# Patient Record
Sex: FEMALE | Race: OTHER | HISPANIC OR LATINO | Employment: FULL TIME | URBAN - METROPOLITAN AREA
[De-identification: names, ages, dates, MRNs, and addresses within clinical notes are randomized per-mention and may not be internally consistent; named-entity substitution may affect disease eponyms.]

---

## 2019-08-21 ENCOUNTER — OFFICE VISIT (OUTPATIENT)
Dept: OBGYN CLINIC | Facility: CLINIC | Age: 39
End: 2019-08-21
Payer: COMMERCIAL

## 2019-08-21 VITALS
BODY MASS INDEX: 19.22 KG/M2 | SYSTOLIC BLOOD PRESSURE: 116 MMHG | HEIGHT: 66 IN | DIASTOLIC BLOOD PRESSURE: 70 MMHG | WEIGHT: 119.6 LBS

## 2019-08-21 DIAGNOSIS — N63.25 BREAST LUMP ON LEFT SIDE AT 3 O'CLOCK POSITION: Primary | ICD-10-CM

## 2019-08-21 PROCEDURE — 99203 OFFICE O/P NEW LOW 30 MIN: CPT | Performed by: OBSTETRICS & GYNECOLOGY

## 2019-08-21 RX ORDER — MONTELUKAST SODIUM 10 MG/1
10 TABLET ORAL
COMMUNITY
End: 2019-10-02 | Stop reason: SDUPTHER

## 2019-08-21 RX ORDER — ACETAMINOPHEN 500 MG
500 TABLET ORAL EVERY 6 HOURS PRN
COMMUNITY
End: 2019-10-30 | Stop reason: ALTCHOICE

## 2019-08-21 NOTE — PROGRESS NOTES
Assessment/Plan:      Diagnoses and all orders for this visit:    Breast lump on left side at 3 o'clock position  -     Mammo diagnostic bilateral w 3d & cad; Future  -     US breast left limited (diagnostic); Future    Other orders  -     montelukast (SINGULAIR) 10 mg tablet; Take 10 mg by mouth daily at bedtime  -     acetaminophen (TYLENOL) 500 mg tablet; Take 500 mg by mouth every 6 (six) hours as needed for mild pain          Subjective:     Patient ID: Annabella Flores is a 44 y o  female  The pt is 45 y/o female who presents today for a breast lump on the lateral left breast she felt on SBE a few months ago  The breast is tender in the same area and the pt thinks the lump is growing  She also noticed some skin changes as she describes as "going in" to the nipple of the left breast  The pt has had 3 mammogram in the past due to some breast changes felt on physical exams, but all the results have been benign  The pt's last mammogram and her last annual visit was 3 years ago when she was living back in [facility]  Discussed getting a b/l mammogram again and a diagnostic one and having the pt return in 4 weeks for a annual exam  The pt does not drink much caffeine       Total time spent at today's visit was 25 minutes of which greater than 50% was spent face-to-face counseling the patient and coordination care      Review of Systems   Constitutional: Negative  HENT: Negative  Eyes: Negative  Respiratory: Negative  Breast lump and pain   Cardiovascular: Negative  Gastrointestinal: Negative  Endocrine: Negative  Genitourinary: Negative  Musculoskeletal: Negative  Skin: Negative  Neurological: Negative  Psychiatric/Behavioral: Negative  Objective:     Physical Exam   Constitutional: She is oriented to person, place, and time  She appears well-developed and well-nourished  HENT:   Head: Normocephalic  Neck: Normal range of motion  Cardiovascular: Normal rate  Pulmonary/Chest: Effort normal and breath sounds normal  Right breast exhibits no inverted nipple, no mass, no nipple discharge, no skin change and no tenderness  Left breast exhibits mass and tenderness  Left breast exhibits no inverted nipple, no nipple discharge and no skin change  Cystic fullness at 3 o'clock on the left breast   Neurological: She is alert and oriented to person, place, and time  Skin: Skin is warm and dry  Psychiatric: She has a normal mood and affect   Her behavior is normal  Judgment and thought content normal

## 2019-08-22 NOTE — PATIENT INSTRUCTIONS
Topic:   Left breast lump    Will obtain diagnostic mammogram as well as diagnostic ultrasound of this area    Had complete discussion with patient regarding this finding    All questions were answered in detail for the patient at today's visit    Further treatment and planning will be based upon final imaging results    Patient will call for any problems, issues or concerns that may arise for her

## 2019-09-03 ENCOUNTER — HOSPITAL ENCOUNTER (OUTPATIENT)
Dept: MAMMOGRAPHY | Facility: CLINIC | Age: 39
Discharge: HOME | End: 2019-09-03
Payer: COMMERCIAL

## 2019-09-03 ENCOUNTER — HOSPITAL ENCOUNTER (OUTPATIENT)
Dept: ULTRASOUND IMAGING | Facility: CLINIC | Age: 39
Discharge: HOME | End: 2019-09-03
Payer: COMMERCIAL

## 2019-09-03 VITALS — WEIGHT: 119 LBS | HEIGHT: 66 IN | BODY MASS INDEX: 19.13 KG/M2

## 2019-09-03 DIAGNOSIS — N63.25 BREAST LUMP ON LEFT SIDE AT 3 O'CLOCK POSITION: ICD-10-CM

## 2019-09-03 PROCEDURE — G0279 TOMOSYNTHESIS, MAMMO: HCPCS

## 2019-09-03 PROCEDURE — 77066 DX MAMMO INCL CAD BI: CPT

## 2019-09-03 PROCEDURE — 76642 ULTRASOUND BREAST LIMITED: CPT

## 2019-09-03 RX ORDER — DIPHENOXYLATE HYDROCHLORIDE AND ATROPINE SULFATE 2.5; .025 MG/1; MG/1
1 TABLET ORAL DAILY
COMMUNITY

## 2019-09-03 NOTE — PROGRESS NOTES
Met with patient and Dr Muñoz Sensing regarding recommendation for;      _____ RIGHT ___x___LEFT      __x___Ultrasound guided  ______Stereotactic  Breast biopsy  ___x__Verbalized understanding        Blood thinners:  _____yes __x___no    Date stopped: ____n/a_______    Biopsy teaching sheet given:  ___x____yes ______no

## 2019-09-10 ENCOUNTER — HOSPITAL ENCOUNTER (OUTPATIENT)
Dept: ULTRASOUND IMAGING | Facility: CLINIC | Age: 39
Discharge: HOME | End: 2019-09-10
Payer: COMMERCIAL

## 2019-09-10 ENCOUNTER — HOSPITAL ENCOUNTER (OUTPATIENT)
Dept: MAMMOGRAPHY | Facility: CLINIC | Age: 39
Discharge: HOME | End: 2019-09-10

## 2019-09-10 VITALS — HEART RATE: 80 BPM | DIASTOLIC BLOOD PRESSURE: 56 MMHG | SYSTOLIC BLOOD PRESSURE: 98 MMHG

## 2019-09-10 DIAGNOSIS — R92.8 ABNORMAL ULTRASOUND OF BREAST: ICD-10-CM

## 2019-09-10 PROCEDURE — 88342 IMHCHEM/IMCYTCHM 1ST ANTB: CPT | Performed by: PATHOLOGY

## 2019-09-10 PROCEDURE — 88305 TISSUE EXAM BY PATHOLOGIST: CPT | Performed by: PATHOLOGY

## 2019-09-10 PROCEDURE — 88361 TUMOR IMMUNOHISTOCHEM/COMPUT: CPT | Performed by: PATHOLOGY

## 2019-09-10 PROCEDURE — 19084 BX BREAST ADD LESION US IMAG: CPT

## 2019-09-10 PROCEDURE — 38505 NEEDLE BIOPSY LYMPH NODES: CPT

## 2019-09-10 PROCEDURE — 88341 IMHCHEM/IMCYTCHM EA ADD ANTB: CPT | Performed by: PATHOLOGY

## 2019-09-10 PROCEDURE — 76942 ECHO GUIDE FOR BIOPSY: CPT

## 2019-09-10 PROCEDURE — 19083 BX BREAST 1ST LESION US IMAG: CPT

## 2019-09-10 RX ORDER — LIDOCAINE HYDROCHLORIDE 10 MG/ML
4 INJECTION, SOLUTION INFILTRATION; PERINEURAL ONCE
Status: COMPLETED | OUTPATIENT
Start: 2019-09-10 | End: 2019-09-10

## 2019-09-10 RX ORDER — LIDOCAINE HYDROCHLORIDE 10 MG/ML
4 INJECTION, SOLUTION EPIDURAL; INFILTRATION; INTRACAUDAL; PERINEURAL ONCE
Status: COMPLETED | OUTPATIENT
Start: 2019-09-10 | End: 2019-09-10

## 2019-09-10 RX ADMIN — LIDOCAINE HYDROCHLORIDE 4 ML: 10 INJECTION, SOLUTION INFILTRATION; PERINEURAL at 14:40

## 2019-09-10 RX ADMIN — LIDOCAINE HYDROCHLORIDE 4 ML: 10 INJECTION, SOLUTION EPIDURAL; INFILTRATION; INTRACAUDAL; PERINEURAL at 14:16

## 2019-09-10 RX ADMIN — LIDOCAINE HYDROCHLORIDE 4 ML: 10 INJECTION, SOLUTION INFILTRATION; PERINEURAL at 14:18

## 2019-09-10 RX ADMIN — LIDOCAINE HYDROCHLORIDE 4 ML: 10 INJECTION, SOLUTION INFILTRATION; PERINEURAL at 14:25

## 2019-09-10 NOTE — DISCHARGE INSTR - OTHER ORDERS
POST LARGE CORE BREAST BIOPSY PATIENT INFORMATION      1  Place an ice pack inside your bra over the top of the dressing every hour for 20 minutes (20 minutes on, 60 minutes off)  Do this until bedtime  2  Do not shower or bathe until the following morning  3  You may bathe your breast carefully with the steri-strips in place  Be careful    Not to loosen them  The steri-strips will fall off in 3-5 days  4  You may have mild discomfort, and you may have some bruising where the   Needle entered the skin  This should clear within 5-7 days  5  If you need medicine for discomfort, take acetaminophen products such as   Tylenol  You may also take Advil or Motrin products  6  Do not participate in strenuous activities such as-tennis, aerobics, skiing,  Weight lifting, etc  for 24 hours  Refrain from swimming/soaking for 72 hours  7  Wearing a bra for sleeping may be more comfortable for the first 24-48 hours  8  Watch for continued bleeding, pain or fever over 101; please call with any questions or concerns  For procedures done at the hospitals  [facility] [place] [facility] call:  Estefani Azevedo RN at [phone]  Elizabeth Wheat RN at [phone]                    *After 4 PM call the Interventional Radiology Department                    [phone] and ask to speak with the nurse on call  For procedures done at the [address] [place] [address] call:         Lise Avelar RN at   *After 4 PM call the Interventional Radiology Department   [phone] and ask to speak with the nurse on call  For procedures done at [facility] call: The Radiology Nurse at [phone]  *After 4 PM call your physician, or go to the Emergency Department  9          The final results of your biopsy are usually available within one week

## 2019-09-10 NOTE — PROGRESS NOTES
(1st site)  Procedure type:  __x___ultrasound guided _____stereotactic    Breast:  __x___Left _____Right    Location: 2:00 2cm from nipple    Needle: 12g Marquee    # of passes: 3 (specimen A)    Clip: Securmark-cylinder    Performed by: Dr Jennifer Davenport held for 5 minutes by: Dr Murali Luke (1st & 2nd sites done through same skin nick)    Steri Strips:  __x___yes _____no (1st & 2nd sites done through same skin nick)    Band aid:  __x___yes_____no (1st & 2nd sites done through same skin nick)    Tape and guaze:  _____yes __x___no    Tolerated procedure:  __x___yes _____no      (2nd site)  Procedure type:  __x___ultrasound guided _____stereotactic    Breast:  __x___Left _____Right    Location: 3:00 1cm from nipple    Needle: 12g Marquee    # of passes: 4 (specimen B)    Clip: Hydromark-butterfly    Performed by: Dr Jennifer Davenport held for 5 minutes by: Dr Murali Luke (1st & 2nd sites done through same skin nick)    Steri Strips:  __x___yes _____no (1st & 2nd sites done through same skin nick)    Band aid:  __x___yes_____no (1st & 2nd sites done through same skin nick)    Tape and guaze:  _____yes __x___no    Tolerated procedure:  __x___yes _____no      (3rd site)  Procedure type:  __x___ultrasound guided _____stereotactic    Breast:   __x___Left _____Right    Location: axillary lymph node    Needle: 16g Marquee    # of passes: 3 (specimen C)    Clip: Hydromark-open coil    Performed by: Dr Jennifer Davenport held for 5 minutes by: Kevin Carvalho    Steri Strips:  __x___yes _____no    Band aid:  __x___yes_____no    Tape and guaze:  _____yes __x___no    Tolerated procedure:  __x___yes _____no

## 2019-09-11 ENCOUNTER — ANNUAL EXAM (OUTPATIENT)
Dept: OBGYN CLINIC | Facility: CLINIC | Age: 39
End: 2019-09-11
Payer: COMMERCIAL

## 2019-09-11 VITALS — WEIGHT: 119.4 LBS | BODY MASS INDEX: 19.27 KG/M2 | DIASTOLIC BLOOD PRESSURE: 64 MMHG | SYSTOLIC BLOOD PRESSURE: 100 MMHG

## 2019-09-11 DIAGNOSIS — Z01.419 ENCOUNTER FOR GYNECOLOGICAL EXAMINATION WITHOUT ABNORMAL FINDING: Primary | ICD-10-CM

## 2019-09-11 DIAGNOSIS — Z01.419 PAP SMEAR, AS PART OF ROUTINE GYNECOLOGICAL EXAMINATION: ICD-10-CM

## 2019-09-11 DIAGNOSIS — Z12.39 BREAST CANCER SCREENING: ICD-10-CM

## 2019-09-11 PROCEDURE — S0612 ANNUAL GYNECOLOGICAL EXAMINA: HCPCS | Performed by: OBSTETRICS & GYNECOLOGY

## 2019-09-11 NOTE — PATIENT INSTRUCTIONS
Normal gynecological physical examination  Self-breast examination stressed  Discussed regular exercise, healthy diet, importance of vitamin D and calcium supplements  Discussed importance of sun block use during periods of prolonged sun exposure  Patient will be seen in 1 year for routine gynecologic and medical examination  Patient will call office for any problems, concerns, or issues which may arise during the interim

## 2019-09-11 NOTE — PROGRESS NOTES
Post procedure call completed on 9/11/19 @ 1226    Bleeding: _____yes __x___no    Pain: _____yes ___x___no    Redness/Swelling: ______yes ___x___no    Band aid removed: __x___yes _____no    Steri-Strips intact: __x____yes _____no    Pt reported "just minimal soreness"

## 2019-09-11 NOTE — PROGRESS NOTES
Assessment/Plan:    No problem-specific Assessment & Plan notes found for this encounter  Diagnoses and all orders for this visit:    Encounter for gynecological examination without abnormal finding  -     Thinprep Tis and HPV mRNA E6/E7    Pap smear, as part of routine gynecological examination  -     Thinprep Tis and HPV mRNA E6/E7    Breast cancer screening        Subjective:      Patient ID: Mary Macias is a 44 y o  female  The pt is a 43 y/o female who presents today for her annual gynecologic wellness examination  The pt has no new allergies  The pt denies any breast changes, skin changes, lumps, tenderness, or nipple discharge  Pt does SBE monthly  The pt denies any heat/cold intolerance, increased sweating, or hot flashes  She has no change in appetite or weight  The pt has no abdominal pain, constipation, diarrhea, changes in bowel or bladder habits, dysuria, hematuria, or blood in the stool/urine  The pt denies any vaginal or vulval changes, tenderness, redness, itching, sores, discharge, or bleeding  The pt's LMP was 8/19/19  The pt is sexually active  The pt has no bleeding or pain during intercourse or after  The pt does not contraception  The pt declined STD testing today  The pt has had 2 prior pregnancies  The pt has no family history of ovarian/uterine/pancreatic/colon cancers  Pt reports a FH of breast cancer in maternal and paternal aunts  The pt has not had a PAP in 3 yrs  Pt had a breast biopsy yesterday, for an abnormality  Pt performs SBEs at home  Gynecologic Exam   The patient's pertinent negatives include no vaginal discharge  Pertinent negatives include no abdominal pain, constipation, diarrhea, dysuria, fever, frequency, hematuria, nausea, urgency or vomiting         The following portions of the patient's history were reviewed and updated as appropriate: allergies, current medications, past family history, past medical history, past social history, past surgical history and problem list     Review of Systems   Constitutional: Negative  Negative for appetite change, diaphoresis, fatigue, fever and unexpected weight change  HENT: Negative  Eyes: Negative  Respiratory: Negative  Cardiovascular: Negative  Gastrointestinal: Negative  Negative for abdominal pain, blood in stool, constipation, diarrhea, nausea and vomiting  Endocrine: Negative  Negative for cold intolerance and heat intolerance  Genitourinary: Negative  Negative for dysuria, frequency, hematuria, urgency, vaginal bleeding, vaginal discharge and vaginal pain  Musculoskeletal: Negative  Skin: Negative  Allergic/Immunologic: Negative  Neurological: Negative  Hematological: Negative  Negative for adenopathy  Psychiatric/Behavioral: Negative  Objective:      /64   Wt 54 2 kg (119 lb 6 4 oz)   LMP 08/19/2019 (Exact Date)   BMI 19 27 kg/m²          Physical Exam   Constitutional: She appears well-developed  HENT:   Head: Normocephalic  Eyes: Pupils are equal, round, and reactive to light  Neck: Normal range of motion  Neck supple  Cardiovascular: Normal rate and regular rhythm  Pulmonary/Chest: Effort normal and breath sounds normal  Right breast exhibits no mass, no nipple discharge, no skin change and no tenderness  Left breast exhibits no mass, no nipple discharge, no skin change and no tenderness  Breasts are symmetrical        Abdominal: Soft  Normal appearance and bowel sounds are normal    Genitourinary: Rectum normal and vagina normal  Pelvic exam was performed with patient supine  There is no rash or lesion on the right labia  There is no rash or lesion on the left labia  Uterus is not enlarged and not tender  Cervix exhibits no discharge and no friability  Right adnexum displays no mass, no tenderness and no fullness  Left adnexum displays no mass, no tenderness and no fullness  No erythema, tenderness or bleeding in the vagina   No vaginal discharge found  Lymphadenopathy:     She has no cervical adenopathy  She has no axillary adenopathy  Right: No inguinal and no supraclavicular adenopathy present  Left: No inguinal and no supraclavicular adenopathy present  Neurological: She is alert  Skin: Skin is intact  No rash noted  Psychiatric: She has a normal mood and affect   Her speech is normal and behavior is normal  Judgment and thought content normal  Cognition and memory are normal

## 2019-09-17 ENCOUNTER — TELEPHONE (OUTPATIENT)
Dept: MAMMOGRAPHY | Facility: CLINIC | Age: 39
End: 2019-09-17

## 2019-09-17 ENCOUNTER — TELEPHONE (OUTPATIENT)
Dept: SURGICAL ONCOLOGY | Facility: CLINIC | Age: 39
End: 2019-09-17

## 2019-09-17 NOTE — TELEPHONE ENCOUNTER
New Patient Encounter    New Patient Intake Form   Patient Details:  Asha Jeronimo  1980  [id number]    Background Information:  [address] starts by opening a telephone encounter and gathering the following information   Who is calling to schedule? If not self, relationship to patient? Kym COTA   Referring Provider RBC   What is the diagnosis? Invasive breast carcinoma    When was the diagnosis? 9/2019   Is patient aware of diagnosis? Yes   Reason for visit? NP DX   Have you had any testing done? If so: when, where? Yes   Are records in [employer]? yes   Was the patient told to bring a disk? no   Scheduling Information:   Preferred [place]:  Any     Requesting Specific Provider? NO   Are there any dates/time the patient cannot be seen? NO   Counseling Pre-Screen:  If the patient answers YES to any of the below questions, please route to the appropriate location specific counselor    Have you felt anxious or worried about cancer and the treatment you are receiving? Did Not Speak to Patient   Has your diagnosis caused physical, emotional, or financial hardship for you? Did Not Speak to Patient   Note: Do not ask the patient about transportation issues/needs  Please notate if the patient brings it up and the counselor will schedule accordingly  Miscellaneous: NA    After completing the above information, please route to Financial Counselor and the appropriate Nurse Navigator for review

## 2019-09-17 NOTE — PROGRESS NOTES
Patient Past Medical History:  Seasonal Allergies          Allergies: ASA-Swelling;  Codeine-Rash        Past Breast History:     Previous Biopsy:   Yes______ No____x____      Breast: Right____ Left______       Malignant______ Benign_______      Treatments if applicable        Present Breast History:     Right__________    Left_____x________     Density_________    Calcifications____________   Palpable_____x_________      Patient notified of results on:  9/16/19    Date of Appointment with Surgeon Kelly Ardon on 9/20/19 @ 8 am

## 2019-09-18 LAB
CLINICAL INFO: ABNORMAL
CYTO CVX: ABNORMAL
CYTOLOGY CMNT CVX/VAG CYTO-IMP: ABNORMAL
DATE PREVIOUS BX: ABNORMAL
GEN CATEG CVX/VAG CYTO-IMP: ABNORMAL
HPV E6+E7 MRNA CVX QL NAA+PROBE: NOT DETECTED
LMP START DATE: ABNORMAL
SL AMB PREV. PAP:: ABNORMAL
SPECIMEN SOURCE CVX/VAG CYTO: ABNORMAL

## 2019-09-18 NOTE — TELEPHONE ENCOUNTER
Called the insurance co & spoke to [facility] call ref# [id number]  Pt has an active self funded ppo plan that runs on a dafne year  This is an HRA plan that the pt has to pay the first $1500 towards the deductible then the HRA will pay the other $1500  Effective date is 08/01/18  Not a cobra plan  pt has chemo benefits that have a $3000 deduct that $1339 45 has bem met then co-ins of 80/20 until the out of pocket of $4000 so far $1079 45 has been met  The chemo drugs are included in the 20%  Radiation benefits are the same as the chemo  No co-pays  the advanced & standard dx imaging are the same as the chemo pt has to pay the deduct then the co-ins then the out of pocket once that is met the they are covered 100%  Labs are included in that  Pt can go to any in network lab  pcp co-pay $20  Specialist co-pay $40 ER co-pay is $100 but waived if admitted  In pt hospitalization & out pt surgery are the same    Deduct/co-ins/out of pocket    rx benefits thru [facility] & the speciality pharmacy is [facility]  can buy & bill  Genetic & genomic testing are also covered the same as the other benefits   deductible/co-ins/then out of pocket  Called pt to go over the benefits got her voicemail  left her a message to call me back

## 2019-09-19 NOTE — TELEPHONE ENCOUNTER
Pt called me back & I went over her insurance with her & she sd that she will call me back after she sees the Dr to find out what her treatment plan will be

## 2019-09-20 ENCOUNTER — CONSULT (OUTPATIENT)
Dept: SURGICAL ONCOLOGY | Facility: CLINIC | Age: 39
End: 2019-09-20
Payer: COMMERCIAL

## 2019-09-20 ENCOUNTER — APPOINTMENT (OUTPATIENT)
Dept: LAB | Facility: CLINIC | Age: 39
End: 2019-09-20
Payer: COMMERCIAL

## 2019-09-20 VITALS
RESPIRATION RATE: 14 BRPM | SYSTOLIC BLOOD PRESSURE: 100 MMHG | WEIGHT: 118 LBS | DIASTOLIC BLOOD PRESSURE: 70 MMHG | HEART RATE: 80 BPM | HEIGHT: 66 IN | BODY MASS INDEX: 18.96 KG/M2 | TEMPERATURE: 98 F

## 2019-09-20 DIAGNOSIS — C77.3 BREAST CANCER METASTASIZED TO AXILLARY LYMPH NODE, LEFT (HCC): ICD-10-CM

## 2019-09-20 DIAGNOSIS — C77.3 BREAST CANCER METASTASIZED TO AXILLARY LYMPH NODE, LEFT (HCC): Primary | ICD-10-CM

## 2019-09-20 DIAGNOSIS — C50.912 BREAST CANCER METASTASIZED TO AXILLARY LYMPH NODE, LEFT (HCC): ICD-10-CM

## 2019-09-20 DIAGNOSIS — C50.912 BREAST CANCER METASTASIZED TO AXILLARY LYMPH NODE, LEFT (HCC): Primary | ICD-10-CM

## 2019-09-20 LAB
ALBUMIN SERPL BCP-MCNC: 4.2 G/DL (ref 3.5–5)
ALP SERPL-CCNC: 58 U/L (ref 46–116)
ALT SERPL W P-5'-P-CCNC: 34 U/L (ref 12–78)
ANION GAP SERPL CALCULATED.3IONS-SCNC: 8 MMOL/L (ref 4–13)
AST SERPL W P-5'-P-CCNC: 19 U/L (ref 5–45)
BILIRUB SERPL-MCNC: 0.5 MG/DL (ref 0.2–1)
BUN SERPL-MCNC: 11 MG/DL (ref 5–25)
CALCIUM SERPL-MCNC: 8.8 MG/DL (ref 8.3–10.1)
CHLORIDE SERPL-SCNC: 103 MMOL/L (ref 100–108)
CO2 SERPL-SCNC: 26 MMOL/L (ref 21–32)
CREAT SERPL-MCNC: 0.77 MG/DL (ref 0.6–1.3)
ERYTHROCYTE [DISTWIDTH] IN BLOOD BY AUTOMATED COUNT: 14.2 % (ref 11.6–15.1)
GFR SERPL CREATININE-BSD FRML MDRD: 98 ML/MIN/1.73SQ M
GLUCOSE P FAST SERPL-MCNC: 84 MG/DL (ref 65–99)
HCT VFR BLD AUTO: 39.3 % (ref 34.8–46.1)
HGB BLD-MCNC: 12.6 G/DL (ref 11.5–15.4)
MCH RBC QN AUTO: 29.5 PG (ref 26.8–34.3)
MCHC RBC AUTO-ENTMCNC: 32.1 G/DL (ref 31.4–37.4)
MCV RBC AUTO: 92 FL (ref 82–98)
PLATELET # BLD AUTO: 350 THOUSANDS/UL (ref 149–390)
PMV BLD AUTO: 9.9 FL (ref 8.9–12.7)
POTASSIUM SERPL-SCNC: 3.3 MMOL/L (ref 3.5–5.3)
PROT SERPL-MCNC: 8.1 G/DL (ref 6.4–8.2)
RBC # BLD AUTO: 4.27 MILLION/UL (ref 3.81–5.12)
SODIUM SERPL-SCNC: 137 MMOL/L (ref 136–145)
WBC # BLD AUTO: 7.35 THOUSAND/UL (ref 4.31–10.16)

## 2019-09-20 PROCEDURE — 85027 COMPLETE CBC AUTOMATED: CPT

## 2019-09-20 PROCEDURE — 99245 OFF/OP CONSLTJ NEW/EST HI 55: CPT | Performed by: SURGERY

## 2019-09-20 PROCEDURE — 36415 COLL VENOUS BLD VENIPUNCTURE: CPT

## 2019-09-20 PROCEDURE — 80053 COMPREHEN METABOLIC PANEL: CPT

## 2019-09-20 RX ORDER — ALBUTEROL SULFATE 90 UG/1
2 AEROSOL, METERED RESPIRATORY (INHALATION) EVERY 6 HOURS PRN
COMMUNITY
End: 2019-10-02 | Stop reason: SDUPTHER

## 2019-09-20 NOTE — LETTER
September 20, 2019     Geneva Fonseca MD  [address]  [address]  [address] [place] [address]    Patient: Lamberto Dodson   YOB: 1980   Date of Visit: 9/20/2019       Dear Dr Andrzej Montalvo: Thank you for referring Lamberto Dodson to me for evaluation  Below are my notes for this consultation  If you have questions, please do not hesitate to call me  I look forward to following your patient along with you  Sincerely,        Herbert Ramos MD        CC: No Recipients  Herbert Ramos MD  9/20/2019 10:02 AM  Sign at close encounter               Surgical Oncology Consult Note       [address] [facility]  [address]  1980  [id number]  [address] [facility] SURGICAL ONCOLOGY [address]  [address]      Chief Complaint:   No chief complaint on file  New Diagnosis of breast cancer triple positive, with lymph node metastasis    Assessment and Plan:   Assessment/Plan    Patient presents with a new diagnosis of  Anatomic stage IIB, prognostic Stage IB  Left breast cancer  I have recommended  Genetic testing, MRI of the breast, bone scan, CT scan chest abdomen and pelvis  Presuming there is no metastatic disease then neoadjuvant chemotherapy  I explained she most likely would need a mastectomy regardless of this  Oncology History:      No history exists  History of Present Illness: This is a 40-year-old woman who recently appreciated a palpable abnormality in the periareolar region of her left breast   She subsequently had diagnostic mammograms and ultrasounds which demonstrated no abnormalities on the right side however on the left side she had 2 tumors 1 at the 2 o'clock position 2 cm from the nipple and the 2nd at the 3:00 position 1 cm from the nipple  She also had multiple suspicious axillary lymph nodes 1 of which was biopsied    The biopsies of the 2 breast masses demonstrated invasive ductal carcinoma the 1st measuring approximately 2 3-2 8 cm the 2nd 1 2 cm both tumors were grade 2 ER strongly positive NC strongly positive and HER2 3+  A secure cylinder clip was placed at the 2 o'clock position a butterfly clip was placed at the 3 o'clock position and an open coil clip was placed in the axilla  The patient has no significant family history for breast cancer  She presents now for an opinion regarding further management  She is accompanied by a friend  Review of Systems:   Review of Systems   Constitutional: Negative for activity change, appetite change and fatigue  HENT: Negative  Eyes: Negative  Respiratory: Negative for cough, shortness of breath and wheezing  Cardiovascular: Negative for chest pain and leg swelling  Gastrointestinal: Negative  Endocrine: Negative  Genitourinary: Negative  Musculoskeletal:        No new changes or complaints of bone pain   Skin: Negative  Allergic/Immunologic: Negative  Neurological: Negative  Hematological: Negative  Psychiatric/Behavioral: Negative  Past Medical History: There is no problem list on file for this patient         Past Medical History:   Diagnosis Date    Asthma     Breast cancer ([facility] [address] ) 09/10/2019    [facility]        Past Surgical History:   Procedure Laterality Date    BREAST BIOPSY Left 09/10/2019     SECTION      US GUIDANCE BREAST BIOPSY LEFT EACH ADDITIONAL Left 9/10/2019    US GUIDED BREAST BIOPSY LEFT COMPLETE Left 9/10/2019    US GUIDED BREAST LYMPH NODE BIOPSY LEFT Left 9/10/2019        Family History   Problem Relation Age of Onset    Diabetes Father     Asthma Father     No Known Problems Daughter     No Known Problems Maternal Aunt     No Known Problems Maternal Aunt     No Known Problems Maternal Aunt     Breast cancer Paternal Aunt         age at dx unk    Stomach cancer Paternal Aunt     Pancreatic cancer Maternal Grandmother         age at dx unk    Cancer Paternal Uncle         type and age unk        Social History     Socioeconomic History    Marital status:      Spouse name: Not on file    Number of children: Not on file    Years of education: Not on file    Highest education level: Not on file   Occupational History    Not on file   Social Needs    Financial resource strain: Not on file    Food insecurity:     Worry: Not on file     Inability: Not on file    Transportation needs:     Medical: Not on file     Non-medical: Not on file   Tobacco Use    Smoking status: Never Smoker    Smokeless tobacco: Never Used   Substance and Sexual Activity    Alcohol use: Not Currently    Drug use: Never    Sexual activity: Not on file   Lifestyle    Physical activity:     Days per week: Not on file     Minutes per session: Not on file    Stress: Not on file   Relationships    Social connections:     Talks on phone: Not on file     Gets together: Not on file     Attends Shinto service: Not on file     Active member of club or organization: Not on file     Attends meetings of clubs or organizations: Not on file     Relationship status: Not on file    Intimate partner violence:     Fear of current or ex partner: Not on file     Emotionally abused: Not on file     Physically abused: Not on file     Forced sexual activity: Not on file   Other Topics Concern    Not on file   Social History Narrative    Not on file        Current Outpatient Medications:     albuterol (PROVENTIL HFA,VENTOLIN HFA) 90 mcg/act inhaler, Inhale 2 puffs every 6 (six) hours as needed for wheezing, Disp: , Rfl:     acetaminophen (TYLENOL) 500 mg tablet, Take 500 mg by mouth every 6 (six) hours as needed for mild pain, Disp: , Rfl:     montelukast (SINGULAIR) 10 mg tablet, Take 10 mg by mouth daily at bedtime, Disp: , Rfl:     multivitamin (THERAGRAN) TABS, Take 1 tablet by mouth daily, Disp: , Rfl:      Allergies Allergen Reactions    Aspirin Edema, Eye Swelling and Facial Swelling    Other Swelling     Seafood    Codeine Rash       Physical Exam:     Vitals:    09/20/19 0831   BP: 100/70   Pulse: 80   Resp: 14   Temp: 98 °F (36 7 °C)     Physical Exam   Constitutional: She is oriented to person, place, and time  She appears well-developed and well-nourished  HENT:   Head: Normocephalic and atraumatic  Mouth/Throat: Oropharynx is clear and moist    Eyes: Pupils are equal, round, and reactive to light  EOM are normal    Neck: Normal range of motion  Neck supple  No JVD present  No tracheal deviation present  No thyromegaly present  Cardiovascular: Normal rate, regular rhythm, normal heart sounds and intact distal pulses  Exam reveals no gallop and no friction rub  No murmur heard  Pulmonary/Chest: Effort normal and breath sounds normal  No respiratory distress  She has no wheezes  She has no rales  Examination of the right breast in both the sitting and supine position demonstrate no skin changes nipple discharge dominant masses or axillary adenopathy  Examination of the left breast demonstrates lateral biopsy incisions  She has suspicious masses as outlined on the diagram   There are no other dominant masses  There is minimal ecchymosis  There is suspicious adenopathy but not bulky in the left axilla  Abdominal: Soft  She exhibits no distension and no mass  There is no hepatomegaly  There is no tenderness  There is no rebound and no guarding  Musculoskeletal: Normal range of motion  She exhibits no edema or tenderness  Lymphadenopathy:     She has no cervical adenopathy  Neurological: She is alert and oriented to person, place, and time  No cranial nerve deficit  Skin: Skin is warm and dry  No rash noted  No erythema  Psychiatric: She has a normal mood and affect  Her behavior is normal    Vitals reviewed  Results:    I reviewed her imaging    Her tumors or approximately 2 and 0 5 cm and 1/2 cm in size and  by somewhere between 2 and 3 cm  Depending on the MLO or cc view  Discussion/Summary:     I had a long conversation with the patient as well as her colleague both of whom speak English well  I reviewed her tumor characteristics and outlined a plan for neoadjuvant chemotherapy followed by surgery followed by radiation therapy  The patient has a good understanding of this course of action  I did explain that she would need the studies outlined above to exclude any genetic predisposition,   Distant metastatic disease and estimate her tumor size more accurately  We also talked about probable reconstruction if she needed a mastectomy  I will see her back in 2 weeks following the studies and will coordinate appointment with Medical Oncology now for that time as well  All questions were answered the patient's satisfaction  Advance Care Planning/Advance Directives:  I discussed the disease status, treatment plans and follow-up with the patient

## 2019-09-20 NOTE — PROGRESS NOTES
Surgical Oncology Consult Note       [address]  [facility] [place]  [address] [facility] [address]  1980  [id number]  [address] [facility]  [facility] ONCOL[facility] [place]  [address] [address]      Chief Complaint:   No chief complaint on file  New Diagnosis of breast cancer triple positive, with lymph node metastasis    Assessment and Plan:   Assessment/Plan    Patient presents with a new diagnosis of  Anatomic stage IIB, prognostic Stage IB  Left breast cancer  I have recommended  Genetic testing, MRI of the breast, bone scan, CT scan chest abdomen and pelvis  Presuming there is no metastatic disease then neoadjuvant chemotherapy  I explained she most likely would need a mastectomy regardless of this  Oncology History:      No history exists  History of Present Illness: This is a 29-year-old woman who recently appreciated a palpable abnormality in the periareolar region of her left breast   She subsequently had diagnostic mammograms and ultrasounds which demonstrated no abnormalities on the right side however on the left side she had 2 tumors 1 at the 2 o'clock position 2 cm from the nipple and the 2nd at the 3:00 position 1 cm from the nipple  She also had multiple suspicious axillary lymph nodes 1 of which was biopsied  The biopsies of the 2 breast masses demonstrated invasive ductal carcinoma the 1st measuring approximately 2 3-2 8 cm the 2nd 1 2 cm both tumors were grade 2 ER strongly positive WI strongly positive and HER2 3+  A secure cylinder clip was placed at the 2 o'clock position a butterfly clip was placed at the 3 o'clock position and an open coil clip was placed in the axilla  The patient has no significant family history for breast cancer  She presents now for an opinion regarding further management  She is accompanied by a friend        Review of Systems:   Review of Systems   Constitutional: Negative for activity change, appetite change and fatigue  HENT: Negative  Eyes: Negative  Respiratory: Negative for cough, shortness of breath and wheezing  Cardiovascular: Negative for chest pain and leg swelling  Gastrointestinal: Negative  Endocrine: Negative  Genitourinary: Negative  Musculoskeletal:        No new changes or complaints of bone pain   Skin: Negative  Allergic/Immunologic: Negative  Neurological: Negative  Hematological: Negative  Psychiatric/Behavioral: Negative  Past Medical History: There is no problem list on file for this patient         Past Medical History:   Diagnosis Date    Asthma     Breast cancer ([facility] [address] ) 09/10/2019    [facility]        Past Surgical History:   Procedure Laterality Date    BREAST BIOPSY Left 09/10/2019     SECTION      US GUIDANCE BREAST BIOPSY LEFT EACH ADDITIONAL Left 9/10/2019    US GUIDED BREAST BIOPSY LEFT COMPLETE Left 9/10/2019    US GUIDED BREAST LYMPH NODE BIOPSY LEFT Left 9/10/2019        Family History   Problem Relation Age of Onset    Diabetes Father     Asthma Father     No Known Problems Daughter     No Known Problems Maternal Aunt     No Known Problems Maternal Aunt     No Known Problems Maternal Aunt     Breast cancer Paternal Aunt         age at dx unk    Stomach cancer Paternal Aunt     Pancreatic cancer Maternal Grandmother         age at dx unk    Cancer Paternal Uncle         type and age unk        Social History     Socioeconomic History    Marital status:      Spouse name: Not on file    Number of children: Not on file    Years of education: Not on file    Highest education level: Not on file   Occupational History    Not on file   Social Needs    Financial resource strain: Not on file    Food insecurity:     Worry: Not on file     Inability: Not on file    Transportation needs:     Medical: Not on file     Non-medical: Not on file   Tobacco Use    Smoking status: Never Smoker    Smokeless tobacco: Never Used   Substance and Sexual Activity    Alcohol use: Not Currently    Drug use: Never    Sexual activity: Not on file   Lifestyle    Physical activity:     Days per week: Not on file     Minutes per session: Not on file    Stress: Not on file   Relationships    Social connections:     Talks on phone: Not on file     Gets together: Not on file     Attends Muslim service: Not on file     Active member of club or organization: Not on file     Attends meetings of clubs or organizations: Not on file     Relationship status: Not on file    Intimate partner violence:     Fear of current or ex partner: Not on file     Emotionally abused: Not on file     Physically abused: Not on file     Forced sexual activity: Not on file   Other Topics Concern    Not on file   Social History Narrative    Not on file        Current Outpatient Medications:     albuterol (PROVENTIL HFA,VENTOLIN HFA) 90 mcg/act inhaler, Inhale 2 puffs every 6 (six) hours as needed for wheezing, Disp: , Rfl:     acetaminophen (TYLENOL) 500 mg tablet, Take 500 mg by mouth every 6 (six) hours as needed for mild pain, Disp: , Rfl:     montelukast (SINGULAIR) 10 mg tablet, Take 10 mg by mouth daily at bedtime, Disp: , Rfl:     multivitamin (THERAGRAN) TABS, Take 1 tablet by mouth daily, Disp: , Rfl:      Allergies   Allergen Reactions    Aspirin Edema, Eye Swelling and Facial Swelling    Other Swelling     Seafood    Codeine Rash       Physical Exam:     Vitals:    09/20/19 0831   BP: 100/70   Pulse: 80   Resp: 14   Temp: 98 °F (36 7 °C)     Physical Exam   Constitutional: She is oriented to person, place, and time  She appears well-developed and well-nourished  HENT:   Head: Normocephalic and atraumatic  Mouth/Throat: Oropharynx is clear and moist    Eyes: Pupils are equal, round, and reactive to light  EOM are normal    Neck: Normal range of motion   Neck supple  No JVD present  No tracheal deviation present  No thyromegaly present  Cardiovascular: Normal rate, regular rhythm, normal heart sounds and intact distal pulses  Exam reveals no gallop and no friction rub  No murmur heard  Pulmonary/Chest: Effort normal and breath sounds normal  No respiratory distress  She has no wheezes  She has no rales  Examination of the right breast in both the sitting and supine position demonstrate no skin changes nipple discharge dominant masses or axillary adenopathy  Examination of the left breast demonstrates lateral biopsy incisions  She has suspicious masses as outlined on the diagram   There are no other dominant masses  There is minimal ecchymosis  There is suspicious adenopathy but not bulky in the left axilla  Abdominal: Soft  She exhibits no distension and no mass  There is no hepatomegaly  There is no tenderness  There is no rebound and no guarding  Musculoskeletal: Normal range of motion  She exhibits no edema or tenderness  Lymphadenopathy:     She has no cervical adenopathy  Neurological: She is alert and oriented to person, place, and time  No cranial nerve deficit  Skin: Skin is warm and dry  No rash noted  No erythema  Psychiatric: She has a normal mood and affect  Her behavior is normal    Vitals reviewed  Results:    I reviewed her imaging  Her tumors or approximately 2 and 0 5 cm and 1/2 cm in size and  by somewhere between 2 and 3 cm  Depending on the MLO or cc view  Discussion/Summary:     I had a long conversation with the patient as well as her colleague both of whom speak English well  I reviewed her tumor characteristics and outlined a plan for neoadjuvant chemotherapy followed by surgery followed by radiation therapy  The patient has a good understanding of this course of action    I did explain that she would need the studies outlined above to exclude any genetic predisposition,   Distant metastatic disease and estimate her tumor size more accurately  We also talked about probable reconstruction if she needed a mastectomy  I will see her back in 2 weeks following the studies and will coordinate appointment with Medical Oncology now for that time as well  All questions were answered the patient's satisfaction  Advance Care Planning/Advance Directives:  I discussed the disease status, treatment plans and follow-up with the patient

## 2019-09-23 ENCOUNTER — TRANSCRIBE ORDERS (OUTPATIENT)
Dept: RADIOLOGY | Facility: HOSPITAL | Age: 39
End: 2019-09-23

## 2019-09-23 ENCOUNTER — CONSULT (OUTPATIENT)
Dept: GYNECOLOGIC ONCOLOGY | Facility: CLINIC | Age: 39
End: 2019-09-23
Payer: COMMERCIAL

## 2019-09-23 ENCOUNTER — HOSPITAL ENCOUNTER (OUTPATIENT)
Dept: RADIOLOGY | Facility: HOSPITAL | Age: 39
Discharge: HOME | End: 2019-09-23
Attending: SURGERY
Payer: COMMERCIAL

## 2019-09-23 VITALS
BODY MASS INDEX: 19.17 KG/M2 | WEIGHT: 119.3 LBS | SYSTOLIC BLOOD PRESSURE: 100 MMHG | HEART RATE: 84 BPM | DIASTOLIC BLOOD PRESSURE: 60 MMHG | HEIGHT: 66 IN

## 2019-09-23 DIAGNOSIS — Z13.71 ENCOUNTER FOR NONPROCREATIVE GENETIC COUNSELING AND TESTING: ICD-10-CM

## 2019-09-23 DIAGNOSIS — C50.912 BREAST CANCER METASTASIZED TO AXILLARY LYMPH NODE, LEFT (HCC): ICD-10-CM

## 2019-09-23 DIAGNOSIS — C77.3 BREAST CANCER METASTASIZED TO AXILLARY LYMPH NODE, LEFT (HCC): ICD-10-CM

## 2019-09-23 DIAGNOSIS — Z13.79 GENETIC TESTING: Primary | ICD-10-CM

## 2019-09-23 DIAGNOSIS — Z71.83 ENCOUNTER FOR NONPROCREATIVE GENETIC COUNSELING AND TESTING: ICD-10-CM

## 2019-09-23 PROCEDURE — 71260 CT THORAX DX C+: CPT

## 2019-09-23 PROCEDURE — 74177 CT ABD & PELVIS W/CONTRAST: CPT

## 2019-09-23 PROCEDURE — 36415 COLL VENOUS BLD VENIPUNCTURE: CPT | Performed by: NURSE PRACTITIONER

## 2019-09-23 PROCEDURE — 99242 OFF/OP CONSLTJ NEW/EST SF 20: CPT | Performed by: NURSE PRACTITIONER

## 2019-09-23 RX ADMIN — IOHEXOL 85 ML: 350 INJECTION, SOLUTION INTRAVENOUS at 18:55

## 2019-09-23 NOTE — PROGRESS NOTES
[place]  1980  [facility]  [facility]  [address] [place] [address] [address] [address] [phone]    Chief Complaint   Patient presents with    Follow-up     genetic testing        Assessment/Plan     Assessment:  1  Genetic testing     2  Encounter for nonprocreative genetic counseling and testing     3  Breast cancer metastasized to axillary lymph node, left [facility])  Ambulatory referral to Hematology / Oncology     Cancer Staging  No matching staging information was found for the patient  Plan: 70-year-old with newly diagnosed breast cancer referred by Dr Lawrence Lokc for genetic testing  She has a family history of breast cancer in her paternal aunt and pancreatic cancer in her paternal grandmother  We discussed risk and benefits of genetic testing  She understands the possible outcomes of testing, including no pathogenic mutation, a positive pathogenic mutation, and variant of undetermined signficance (VUS)  We discussed surveillance and prophylactic measures in the event of a positive finding  We discussed implications for the patient's first degree family members if a pathogenic mutation is identified  The patient verbalizes understanding and agrees to proceed with testing  A blood sample was collected and will be sent to [facility] for processing  She understands that she will be contacted by the company in the event of any OOP co-payment  She has elected to have her results disclosed over the phone when available in approximately 2-4 weeks  In the event of a positive finding, the patient will be referred for formal genetic counseling, and any other appropriate referrals will also be made      30 minutes were spent in the care of this patient  Greater than 50% of the visit was spent in counseling and discussion of risks, benefits, and outcomes of genetic testing  History of Present Illness:  This is a 70-year-old with newly diagnosed left breast cancer who presents for genetic testing  She has a family history of breast cancer and pancreatic cancer  She is clinically well and has no complaints today  Review of Systems   Constitutional: Negative for chills, fatigue, fever and unexpected weight change  HENT: Negative for nosebleeds  Eyes: Negative  Respiratory: Negative for cough, chest tightness, shortness of breath and wheezing  Cardiovascular: Negative for chest pain, palpitations and leg swelling  Gastrointestinal: Negative for abdominal distention, abdominal pain, anal bleeding, blood in stool, constipation, diarrhea, nausea, rectal pain and vomiting  Endocrine: Negative  Genitourinary: Negative for difficulty urinating, dysuria, frequency, hematuria, pelvic pain, urgency, vaginal bleeding, vaginal discharge and vaginal pain  Musculoskeletal: Negative for arthralgias and joint swelling  Skin: Negative for color change, pallor and rash  Neurological: Negative for dizziness, weakness, light-headedness, numbness and headaches  Hematological: Negative  Psychiatric/Behavioral: Negative          Past Medical History:   Diagnosis Date    Asthma     Breast cancer ([facility] [address] ) 09/10/2019    [facility]       Past Surgical History:   Procedure Laterality Date    BREAST BIOPSY Left 09/10/2019     SECTION      US GUIDANCE BREAST BIOPSY LEFT EACH ADDITIONAL Left 9/10/2019    US GUIDED BREAST BIOPSY LEFT COMPLETE Left 9/10/2019    US GUIDED BREAST LYMPH NODE BIOPSY LEFT Left 9/10/2019       OB History        2    Para   2    Term   2            AB        Living           SAB        TAB        Ectopic        Multiple        Live Births               Obstetric Comments   Age at first menses 15  Age at first pregnancy 25             Family History   Problem Relation Age of Onset    Diabetes Father     Asthma Father     No Known Problems Daughter     No Known Problems Maternal Aunt     No Known Problems Maternal Aunt     No Known Problems Maternal Aunt     Breast cancer Paternal Aunt         age at dx unk    Stomach cancer Paternal Aunt     Pancreatic cancer Maternal Grandmother         age at dx unk    Cancer Paternal Uncle         type and age unk       Social History     Socioeconomic History    Marital status:      Spouse name: Not on file    Number of children: Not on file    Years of education: Not on file    Highest education level: Not on file   Occupational History    Not on file   Social Needs    Financial resource strain: Not on file    Food insecurity:     Worry: Not on file     Inability: Not on file    Transportation needs:     Medical: Not on file     Non-medical: Not on file   Tobacco Use    Smoking status: Never Smoker    Smokeless tobacco: Never Used   Substance and Sexual Activity    Alcohol use: Not Currently    Drug use: Never    Sexual activity: Not on file   Lifestyle    Physical activity:     Days per week: Not on file     Minutes per session: Not on file    Stress: Not on file   Relationships    Social connections:     Talks on phone: Not on file     Gets together: Not on file     Attends Buddhist service: Not on file     Active member of club or organization: Not on file     Attends meetings of clubs or organizations: Not on file     Relationship status: Not on file    Intimate partner violence:     Fear of current or ex partner: Not on file     Emotionally abused: Not on file     Physically abused: Not on file     Forced sexual activity: Not on file   Other Topics Concern    Not on file   Social History Narrative    Not on file         Current Outpatient Medications:     acetaminophen (TYLENOL) 500 mg tablet, Take 500 mg by mouth every 6 (six) hours as needed for mild pain, Disp: , Rfl:     albuterol (PROVENTIL HFA,VENTOLIN HFA) 90 mcg/act inhaler, Inhale 2 puffs every 6 (six) hours as needed for wheezing, Disp: , Rfl:     montelukast (SINGULAIR) 10 mg tablet, Take 10 mg by mouth daily at bedtime, Disp: , Rfl:     multivitamin (THERAGRAN) TABS, Take 1 tablet by mouth daily, Disp: , Rfl:     Allergies   Allergen Reactions    Aspirin Edema, Eye Swelling and Facial Swelling    Other Swelling     Seafood    Codeine Rash       Physical Exam   Constitutional: She is oriented to person, place, and time  She appears well-developed and well-nourished  No distress  HENT:   Head: Normocephalic and atraumatic  Pulmonary/Chest: Effort normal  No respiratory distress  Musculoskeletal: Normal range of motion  Neurological: She is alert and oriented to person, place, and time  Skin: Skin is warm and dry  No rash noted  She is not diaphoretic  No erythema  No pallor  Psychiatric: She has a normal mood and affect   Her behavior is normal  Judgment and thought content normal

## 2019-09-23 NOTE — ASSESSMENT & PLAN NOTE
51-year-old with newly diagnosed breast cancer referred by Dr Red Fox for genetic testing  She has a family history of breast cancer in her paternal aunt and pancreatic cancer in her paternal grandmother  We discussed risk and benefits of genetic testing  She understands the possible outcomes of testing, including no pathogenic mutation, a positive pathogenic mutation, and variant of undetermined signficance (VUS)  We discussed surveillance and prophylactic measures in the event of a positive finding  We discussed implications for the patient's first degree family members if a pathogenic mutation is identified  The patient verbalizes understanding and agrees to proceed with testing  A blood sample was collected and will be sent to [facility] for processing  She understands that she will be contacted by the company in the event of any OOP co-payment  She has elected to have her results disclosed over the phone when available in approximately 2-4 weeks  In the event of a positive finding, the patient will be referred for formal genetic counseling, and any other appropriate referrals will also be made      30 minutes were spent in the care of this patient  Greater than 50% of the visit was spent in counseling and discussion of risks, benefits, and outcomes of genetic testing

## 2019-09-24 ENCOUNTER — DOCUMENTATION (OUTPATIENT)
Dept: HEMATOLOGY ONCOLOGY | Facility: CLINIC | Age: 39
End: 2019-09-24

## 2019-09-24 ENCOUNTER — HOSPITAL ENCOUNTER (OUTPATIENT)
Dept: RADIOLOGY | Facility: HOSPITAL | Age: 39
Discharge: HOME | End: 2019-09-24
Attending: SURGERY
Payer: COMMERCIAL

## 2019-09-24 DIAGNOSIS — C50.912 BREAST CANCER METASTASIZED TO AXILLARY LYMPH NODE, LEFT (HCC): ICD-10-CM

## 2019-09-24 DIAGNOSIS — C77.3 BREAST CANCER METASTASIZED TO AXILLARY LYMPH NODE, LEFT (HCC): ICD-10-CM

## 2019-09-24 PROCEDURE — A9585 GADOBUTROL INJECTION: HCPCS | Performed by: SURGERY

## 2019-09-24 PROCEDURE — C8908 MRI W/O FOL W/CONT, BREAST,: HCPCS

## 2019-09-24 PROCEDURE — 77049 MRI BREAST C-+ W/CAD BI: CPT

## 2019-09-24 RX ADMIN — GADOBUTROL 6 ML: 604.72 INJECTION INTRAVENOUS at 17:13

## 2019-09-24 NOTE — PROGRESS NOTES
[facility] Nurse Navigator:  Referral received from [facility]  Initial navigation outreach call made to pt  Discussed recent appts, genetic testing  She has no transportation needs at this time  Primary language is Mong[place]ian but does speak [place] as well  I informed her about cancer support services and she is agreeable to me sending her written information to her home via mail  All questions answered at this time  She stated she " does understand everything but is overwhelmed by it all"  Offered therapeutic listening and encouraged her to call with questions or concerns  Provided  My office contact info with material sent via mail  Will follow up on an as needed basis

## 2019-09-26 ENCOUNTER — HOSPITAL ENCOUNTER (OUTPATIENT)
Dept: NUCLEAR MEDICINE | Facility: HOSPITAL | Age: 39
Discharge: HOME | End: 2019-09-26
Attending: SURGERY
Payer: COMMERCIAL

## 2019-09-26 DIAGNOSIS — C50.912 BREAST CANCER METASTASIZED TO AXILLARY LYMPH NODE, LEFT (HCC): ICD-10-CM

## 2019-09-26 DIAGNOSIS — C77.3 BREAST CANCER METASTASIZED TO AXILLARY LYMPH NODE, LEFT (HCC): ICD-10-CM

## 2019-09-26 PROCEDURE — 78306 BONE IMAGING WHOLE BODY: CPT

## 2019-09-26 PROCEDURE — A9503 TC99M MEDRONATE: HCPCS

## 2019-09-27 ENCOUNTER — PROCEDURE VISIT (OUTPATIENT)
Dept: OBGYN CLINIC | Facility: CLINIC | Age: 39
End: 2019-09-27
Payer: COMMERCIAL

## 2019-09-27 VITALS
WEIGHT: 117.2 LBS | HEIGHT: 66 IN | SYSTOLIC BLOOD PRESSURE: 102 MMHG | DIASTOLIC BLOOD PRESSURE: 64 MMHG | BODY MASS INDEX: 18.84 KG/M2

## 2019-09-27 DIAGNOSIS — R87.610 ASCUS OF CERVIX WITH NEGATIVE HIGH RISK HPV: Primary | ICD-10-CM

## 2019-09-30 ENCOUNTER — TELEPHONE (OUTPATIENT)
Dept: SURGICAL ONCOLOGY | Facility: CLINIC | Age: 39
End: 2019-09-30

## 2019-09-30 NOTE — TELEPHONE ENCOUNTER
Left message for the patient to contact me regarding her CT scan and bone scan as well as her   Breast MRI  Her CT scan demonstrated a benign appearing adnexal mass otherwise no evidence of any metastatic disease other than her known adenopathy  I will review this with her when she returns are call

## 2019-10-01 PROBLEM — C50.412 MALIGNANT NEOPLASM OF UPPER-OUTER QUADRANT OF LEFT BREAST IN FEMALE, ESTROGEN RECEPTOR POSITIVE (HCC): Status: ACTIVE | Noted: 2019-10-01

## 2019-10-01 PROBLEM — C77.3 BREAST CANCER METASTASIZED TO AXILLARY LYMPH NODE, LEFT (HCC): Status: RESOLVED | Noted: 2019-09-23 | Resolved: 2019-10-01

## 2019-10-01 PROBLEM — C50.812 MALIGNANT NEOPLASM OF OVERLAPPING SITES OF LEFT BREAST IN FEMALE, ESTROGEN RECEPTOR POSITIVE (HCC): Status: ACTIVE | Noted: 2019-10-01

## 2019-10-01 PROBLEM — Z13.71 ENCOUNTER FOR NONPROCREATIVE GENETIC COUNSELING AND TESTING: Status: RESOLVED | Noted: 2019-09-23 | Resolved: 2019-10-01

## 2019-10-01 PROBLEM — Z17.0 MALIGNANT NEOPLASM OF UPPER-OUTER QUADRANT OF LEFT BREAST IN FEMALE, ESTROGEN RECEPTOR POSITIVE (HCC): Status: ACTIVE | Noted: 2019-10-01

## 2019-10-01 PROBLEM — Z13.79 GENETIC TESTING: Status: RESOLVED | Noted: 2019-09-23 | Resolved: 2019-10-01

## 2019-10-01 PROBLEM — C50.912 BREAST CANCER METASTASIZED TO AXILLARY LYMPH NODE, LEFT (HCC): Status: RESOLVED | Noted: 2019-09-23 | Resolved: 2019-10-01

## 2019-10-01 PROBLEM — Z71.83 ENCOUNTER FOR NONPROCREATIVE GENETIC COUNSELING AND TESTING: Status: RESOLVED | Noted: 2019-09-23 | Resolved: 2019-10-01

## 2019-10-02 ENCOUNTER — OFFICE VISIT (OUTPATIENT)
Dept: FAMILY MEDICINE CLINIC | Facility: CLINIC | Age: 39
End: 2019-10-02
Payer: COMMERCIAL

## 2019-10-02 VITALS
DIASTOLIC BLOOD PRESSURE: 62 MMHG | WEIGHT: 117 LBS | OXYGEN SATURATION: 99 % | HEIGHT: 66 IN | SYSTOLIC BLOOD PRESSURE: 100 MMHG | HEART RATE: 71 BPM | BODY MASS INDEX: 18.8 KG/M2 | RESPIRATION RATE: 17 BRPM | TEMPERATURE: 98.3 F

## 2019-10-02 DIAGNOSIS — Z00.00 ANNUAL PHYSICAL EXAM: Primary | ICD-10-CM

## 2019-10-02 DIAGNOSIS — J30.1 SEASONAL ALLERGIC RHINITIS DUE TO POLLEN: ICD-10-CM

## 2019-10-02 DIAGNOSIS — J45.20 MILD INTERMITTENT ASTHMA WITHOUT COMPLICATION: ICD-10-CM

## 2019-10-02 DIAGNOSIS — Z13.0 SCREENING FOR ENDOCRINE, METABOLIC AND IMMUNITY DISORDER: ICD-10-CM

## 2019-10-02 DIAGNOSIS — Z17.0 MALIGNANT NEOPLASM OF OVERLAPPING SITES OF LEFT BREAST IN FEMALE, ESTROGEN RECEPTOR POSITIVE (HCC): ICD-10-CM

## 2019-10-02 DIAGNOSIS — Z13.228 SCREENING FOR ENDOCRINE, METABOLIC AND IMMUNITY DISORDER: ICD-10-CM

## 2019-10-02 DIAGNOSIS — Z13.29 SCREENING FOR ENDOCRINE, METABOLIC AND IMMUNITY DISORDER: ICD-10-CM

## 2019-10-02 DIAGNOSIS — C50.812 MALIGNANT NEOPLASM OF OVERLAPPING SITES OF LEFT BREAST IN FEMALE, ESTROGEN RECEPTOR POSITIVE (HCC): ICD-10-CM

## 2019-10-02 DIAGNOSIS — I83.812 VARICOSE VEINS OF LEFT LOWER EXTREMITY WITH PAIN: ICD-10-CM

## 2019-10-02 LAB
PATH REPORT.COMMENTS IMP SPEC: NORMAL
PATH REPORT.FINAL DX SPEC: NORMAL
PATH REPORT.FINAL DX SPEC: NORMAL
SPECIMEN SOURCE: NORMAL
SPECIMEN SOURCE: NORMAL

## 2019-10-02 PROCEDURE — 99385 PREV VISIT NEW AGE 18-39: CPT | Performed by: FAMILY MEDICINE

## 2019-10-02 RX ORDER — LORATADINE 10 MG/1
10 TABLET ORAL DAILY
Qty: 30 TABLET | Refills: 3 | Status: SHIPPED | OUTPATIENT
Start: 2019-10-02 | End: 2020-06-17 | Stop reason: SDUPTHER

## 2019-10-02 RX ORDER — MONTELUKAST SODIUM 10 MG/1
10 TABLET ORAL
Qty: 30 TABLET | Refills: 3 | Status: SHIPPED | OUTPATIENT
Start: 2019-10-02 | End: 2020-06-17 | Stop reason: SDUPTHER

## 2019-10-02 RX ORDER — MOMETASONE FUROATE 50 UG/1
2 SPRAY, METERED NASAL DAILY
Qty: 1 ACT | Refills: 3 | Status: SHIPPED | OUTPATIENT
Start: 2019-10-02 | End: 2020-06-17 | Stop reason: SDUPTHER

## 2019-10-02 RX ORDER — ALBUTEROL SULFATE 90 UG/1
2 AEROSOL, METERED RESPIRATORY (INHALATION) EVERY 6 HOURS PRN
Qty: 1 INHALER | Refills: 3 | Status: SHIPPED | OUTPATIENT
Start: 2019-10-02 | End: 2020-06-17 | Stop reason: SDUPTHER

## 2019-10-02 NOTE — PATIENT INSTRUCTIONS
Wellness Visit for Adults   AMBULATORY CARE:   A wellness visit  is when you see your healthcare provider to get screened for health problems  You can also get advice on how to stay healthy  Write down your questions so you remember to ask them  Ask your healthcare provider how often you should have a wellness visit  What happens at a wellness visit:  Your healthcare provider will ask about your health, and your family history of health problems  This includes high blood pressure, heart disease, and cancer  He or she will ask if you have symptoms that concern you, if you smoke, and about your mood  You may also be asked about your intake of medicines, supplements, food, and alcohol  Any of the following may be done:  · Your weight  will be checked  Your height may also be checked so your body mass index (BMI) can be calculated  Your BMI shows if you are at a healthy weight  · Your blood pressure  and heart rate will be checked  Your temperature may also be checked  · Blood and urine tests  may be done  Blood tests may be done to check your cholesterol levels  Abnormal cholesterol levels increase your risk for heart disease and stroke  You may also need a blood or urine test to check for diabetes if you are at increased risk  Urine tests may be done to look for signs of an infection or kidney disease  · A physical exam  includes checking your heartbeat and lungs with a stethoscope  Your healthcare provider may also check your skin to look for sun damage  · Screening tests  may be recommended  A screening test is done to check for diseases that may not cause symptoms  The screening tests you may need depend on your age, gender, family history, and lifestyle habits  For example, colorectal screening may be recommended if you are 48years old or older  Screening tests you need if you are a woman:   · A Pap smear  is used to screen for cervical cancer   Pap smears are usually done every 3 to 5 years depending on your age  You may need them more often if you have had abnormal Pap smear test results in the past  Ask your healthcare provider how often you should have a Pap smear  · A mammogram  is an x-ray of your breasts to screen for breast cancer  Experts recommend mammograms every 2 years starting at age 48 years  You may need a mammogram at age 52 years or younger if you have an increased risk for breast cancer  Talk to your healthcare provider about when you should start having mammograms and how often you need them  Vaccines you may need:   · Get an influenza vaccine  every year  The influenza vaccine protects you from the flu  Several types of viruses cause the flu  The viruses change over time, so new vaccines are made each year  · Get a tetanus-diphtheria (Td) booster vaccine  every 10 years  This vaccine protects you against tetanus and diphtheria  Tetanus is a severe infection that may cause painful muscle spasms and lockjaw  Diphtheria is a severe bacterial infection that causes a thick covering in the back of your mouth and throat  · Get a human papillomavirus (HPV) vaccine  if you are female and aged 23 to 32 or male 23 to 24 and never received it  This vaccine protects you from HPV infection  HPV is the most common infection spread by sexual contact  HPV may also cause vaginal, penile, and anal cancers  · Get a pneumococcal vaccine  if you are aged 72 years or older  The pneumococcal vaccine is an injection given to protect you from pneumococcal disease  Pneumococcal disease is an infection caused by pneumococcal bacteria  The infection may cause pneumonia, meningitis, or an ear infection  · Get a shingles vaccine  if you are aged 61 or older, even if you have had shingles before  The shingles vaccine is an injection to protect you from the varicella-zoster virus  This is the same virus that causes chickenpox   Shingles is a painful rash that develops in people who had chickenpox or have been exposed to the virus  How to eat healthy:  My Plate is a model for planning healthy meals  It shows the types and amounts of foods that should go on your plate  Fruits and vegetables make up about half of your plate, and grains and protein make up the other half  A serving of dairy is included on the side of your plate  The amount of calories and serving sizes you need depends on your age, gender, weight, and height  Examples of healthy foods are listed below:  · Eat a variety of vegetables  such as dark green, red, and orange vegetables  You can also include canned vegetables low in sodium (salt) and frozen vegetables without added butter or sauces  · Eat a variety of fresh fruits , canned fruit in 100% juice, frozen fruit, and dried fruit  · Include whole grains  At least half of the grains you eat should be whole grains  Examples include whole-wheat bread, wheat pasta, brown rice, and whole-grain cereals such as oatmeal     · Eat a variety of protein foods such as seafood (fish and shellfish), lean meat, and poultry without skin (turkey and chicken)  Examples of lean meats include pork leg, shoulder, or tenderloin, and beef round, sirloin, tenderloin, and extra lean ground beef  Other protein foods include eggs and egg substitutes, beans, peas, soy products, nuts, and seeds  · Choose low-fat dairy products such as skim or 1% milk or low-fat yogurt, cheese, and cottage cheese  · Limit unhealthy fats  such as butter, hard margarine, and shortening  Exercise:  Exercise at least 30 minutes per day on most days of the week  Some examples of exercise include walking, biking, dancing, and swimming  You can also fit in more physical activity by taking the stairs instead of the elevator or parking farther away from stores  Include muscle strengthening activities 2 days each week  Regular exercise provides many health benefits   It helps you manage your weight, and decreases your risk for type 2 diabetes, heart disease, stroke, and high blood pressure  Exercise can also help improve your mood  Ask your healthcare provider about the best exercise plan for you  General health and safety guidelines:   · Do not smoke  Nicotine and other chemicals in cigarettes and cigars can cause lung damage  Ask your healthcare provider for information if you currently smoke and need help to quit  E-cigarettes or smokeless tobacco still contain nicotine  Talk to your healthcare provider before you use these products  · Limit alcohol  A drink of alcohol is 12 ounces of beer, 5 ounces of wine, or 1½ ounces of liquor  · Lose weight, if needed  Being overweight increases your risk of certain health conditions  These include heart disease, high blood pressure, type 2 diabetes, and certain types of cancer  · Protect your skin  Do not sunbathe or use tanning beds  Use sunscreen with a SPF 15 or higher  Apply sunscreen at least 15 minutes before you go outside  Reapply sunscreen every 2 hours  Wear protective clothing, hats, and sunglasses when you are outside  · Drive safely  Always wear your seatbelt  Make sure everyone in your car wears a seatbelt  A seatbelt can save your life if you are in an accident  Do not use your cell phone when you are driving  This could distract you and cause an accident  Pull over if you need to make a call or send a text message  · Practice safe sex  Use latex condoms if are sexually active and have more than one partner  Your healthcare provider may recommend screening tests for sexually transmitted infections (STIs)  · Wear helmets, lifejackets, and protective gear  Always wear a helmet when you ride a bike or motorcycle, go skiing, or play sports that could cause a head injury  Wear protective equipment when you play sports  Wear a lifejacket when you are on a boat or doing water sports    © 2017 [address] [employer] Information is for End User's use only and may not be sold, redistributed or otherwise used for commercial purposes  All illustrations and images included in CareNotes® are the copyrighted property of A D A M , Inc  or Daniel Randall  The above information is an  only  It is not intended as medical advice for individual conditions or treatments  Talk to your doctor, nurse or pharmacist before following any medical regimen to see if it is safe and effective for you  Dieta con alto contenido de [place]   CUIDADO AMBULATORIO:   [facility] dieta con alto contenido de fibra  incluye alimentos con cecilia sally cantidad de fibra  La fibra es la parte de las frutas, los vegetales y los granos, que no se descompone al ser ingerida por dominguez cuerpo  La fibra ayuda a regular las evacuaciones intestinales  La fibra además ayuda a bajar el colesterol, controlar la glucosa en la liliana en las personas que sufren de diabetes y para aliviar el estreñimiento  También la fibra podría ayudarle a controlar dominguez peso debido a que le da la sensación de llenura más rápidamente  La mayoría de los adultos deberían consumir entre 25 a 35 gramos de fibra al día  Consulte con dominguez dietista o médico sobre la cantidad de fibra que usted necesita    Buenas rogers de fibra:   · Alimentos que contienen al menos 4 gramos de fibra por porción:      ¨ ? a ½ de cecilia taza de cereal con alto contenido de fibra (benigno la etiqueta nutricional en la caja)    ¨ ½ taza de moras o frambuesas    ¨ 4 ciruelas pasas    ¨ 1 alcachofa cocida    ¨ ½ taza de legumbres cocidas, gomez lentejas o frijoles rojos o pintos    · LocalBanya contienen 1 a 3 gramos de [place] por porción:      ¨ 1 rebanada de pan de paramjit integral, pan integral de nguyen o pan de nguyen    ¨ ½ taza de arroz integral cocido    ¨ 4 galletas integrales    ¨ 1 taza de ranjeet    ¨ ½ taza de cereal con 1 a 3 gramos de fibra por porción (benigno la etiqueta nutricional en la caja)    ¨ 1 porción pequeña de fruta gomez manzana, banana, arlette, kiwi o angelia    ¨ 3 dátiles    ¨ ½ taza de albaricoques enlatados, ensalada de frutas, duraznos o peras    ¨ ½ taza de verduras crudas o cocidas, gomez zanahorias, coliflor, repollo, espinaca, calabaza o maíz  Formas en que puede aumentar la fibra en meyers dieta:   · Escoja arroz integral o jeb en vez de arroz flanagan      · Use harina de grano integral en las recetas en vez de harina gregorio  · Demario Javier y arvejas a los guisos o las sopas  · Shu Sake y verduras frescas con la cascara en vez de jugos  Otras pautas dietéticas que debe seguir:   · Walda Maw a meyers dieta lentamente  Usted podría presentar malestar o inflamación estomacal y gases si añade fibra a meyers dieta demasiado rápido  · Claudia mucho líquido a medida que agrega fibra a meyers dieta  Es posible que tenga náuseas o desarrolle estreñimiento si no vignesh suficiente agua  Pregunte cuánto líquido debe iliana cada día y cuáles líquidos son los más adecuados para usted  © 2017 [address] Information is for End User's use only and may not be sold, redistributed or otherwise used for commercial purposes  All illustrations and images included in CareNotes® are the copyrighted property of A D A M , Inc  or Daniel Randall  Esta información es sólo para uso en educación  Meyers intención no es darle un consejo médico sobre enfermedades o tratamientos  Colsulte con meyers [employer] farmacéut[employer] antes de seguir cualquier régimen médico para saber si es seguro y efectivo para usted

## 2019-10-02 NOTE — PROGRESS NOTES
ADULT ANNUAL [facility]    NAME: Luis F Cornelius  AGE: 44 y o  SEX: female  : 1980     DATE: 10/2/2019     Assessment and Plan:     Problem List Items Addressed This Visit        Respiratory    Mild intermittent asthma without complication     Continue Singulair 10 mg daily and PRN albuterol  Relevant Medications    albuterol (PROVENTIL HFA,VENTOLIN HFA) 90 mcg/act inhaler    montelukast (SINGULAIR) 10 mg tablet    Seasonal allergic rhinitis due to pollen     Discussed using a nasal steroid spray daily and loratadine PRN  Relevant Medications    mometasone (NASONEX) 50 mcg/act nasal spray    loratadine (CLARITIN) 10 mg tablet       Cardiovascular and Mediastinum    Varicose veins of left lower extremity with pain     Will refer to vascular surgery for further evaluation and management of this issue  Relevant Orders    Ambulatory referral to Vascular Surgery       Other    Malignant neoplasm of overlapping sites of left breast in female, estrogen receptor positive ([facility] [address] )     Continue to follow with Oncology  Other Visit Diagnoses     Annual physical exam    -  Primary    Screening for endocrine, metabolic and immunity disorder        Relevant Orders    Lipid panel    CBC and Platelet    Comprehensive metabolic panel    TSH, 3rd generation with Free T4 reflex          Immunizations and preventive care screenings were discussed with patient today  Appropriate education was printed on patient's after visit summary  Counseling:  · Exercise: the importance of regular exercise/physical activity was discussed  Recommend exercise 3-5 times per week for at least 30 minutes            Return in 1 year (on 10/2/2020) for Annual physical      Chief Complaint:     Chief Complaint   Patient presents with    Establish Care      History of Present Illness:     Adult Annual Physical   Patient here for a comprehensive physical exam  The patient reports problems - asthma  Patient needs a refill of medications for her asthma and allergies  She also reports having a varicose vein on the posterior left left that intermittently causes her discomfort and spider veins on the left lower leg  Diet and Physical Activity  · Diet/Nutrition: limited junk food and limited fruits/vegetables  · Exercise: no formal exercise  Depression Screening  PHQ-9 Depression Screening    PHQ-9:    Frequency of the following problems over the past two weeks:       Little interest or pleasure in doing things:  0 - not at all  Feeling down, depressed, or hopeless:  1 - several days  PHQ-2 Score:  1       General Health  · Sleep: sleeps well and gets 4-6 hours of sleep on average  · Hearing: normal - bilateral   · Vision: no vision problems  · Dental: regular dental visits  /GYN Health  · Last menstrual period: 19  · Contraceptive method: not addressed  · History of STDs?: no      Review of Systems:     Review of Systems   Respiratory: Negative for shortness of breath and wheezing  History of intermittent asthma   Allergic/Immunologic: Positive for environmental allergies (fall due to pollen)  All other systems reviewed and are negative       Past Medical History:     Past Medical History:   Diagnosis Date    Asthma     Breast cancer ([facility] [address] ) 09/10/2019    [facility]      Past Surgical History:     Past Surgical History:   Procedure Laterality Date    BREAST BIOPSY Left 09/10/2019     SECTION      US GUIDANCE BREAST BIOPSY LEFT EACH ADDITIONAL Left 9/10/2019    US GUIDED BREAST BIOPSY LEFT COMPLETE Left 9/10/2019    US GUIDED BREAST LYMPH NODE BIOPSY LEFT Left 9/10/2019      Social History:     Social History     Socioeconomic History    Marital status:      Spouse name: None    Number of children: None    Years of education: None    Highest education level: None   Occupational History    None   Social Needs    Financial resource strain: Not hard at all   Araceli-Hali insecurity:     Worry: Never true     Inability: Never true    Transportation needs:     Medical: No     Non-medical: No   Tobacco Use    Smoking status: Never Smoker    Smokeless tobacco: Never Used   Substance and Sexual Activity    Alcohol use: Not Currently    Drug use: Never    Sexual activity: Yes   Lifestyle    Physical activity:     Days per week: 0 days     Minutes per session: 0 min    Stress:  Only a little   Relationships    Social connections:     Talks on phone: Patient refused     Gets together: Patient refused     Attends Jainism service: Patient refused     Active member of club or organization: Patient refused     Attends meetings of clubs or organizations: Patient refused     Relationship status: Patient refused    Intimate partner violence:     Fear of current or ex partner: Patient refused     Emotionally abused: Patient refused     Physically abused: Patient refused     Forced sexual activity: Patient refused   Other Topics Concern    None   Social History Narrative    None      Family History:     Family History   Problem Relation Age of Onset    Diabetes Father     Asthma Father     No Known Problems Daughter     No Known Problems Maternal Aunt     No Known Problems Maternal Aunt     No Known Problems Maternal Aunt     Breast cancer Paternal Aunt         age at dx unk    Stomach cancer Paternal Aunt     Pancreatic cancer Maternal Grandmother         age at dx unk    Cancer Paternal Uncle         type and age unk      Current Medications:     Current Outpatient Medications   Medication Sig Dispense Refill    acetaminophen (TYLENOL) 500 mg tablet Take 500 mg by mouth every 6 (six) hours as needed for mild pain      albuterol (PROVENTIL HFA,VENTOLIN HFA) 90 mcg/act inhaler Inhale 2 puffs every 6 (six) hours as needed for wheezing or shortness of breath 1 Inhaler 3    loratadine (CLARITIN) 10 mg tablet Take 1 tablet (10 mg total) by mouth daily 30 tablet 3    mometasone (NASONEX) 50 mcg/act nasal spray 2 sprays into each nostril daily 1 Act 3    montelukast (SINGULAIR) 10 mg tablet Take 1 tablet (10 mg total) by mouth daily at bedtime 30 tablet 3    multivitamin (THERAGRAN) TABS Take 1 tablet by mouth daily       No current facility-administered medications for this visit  Allergies: Allergies   Allergen Reactions    Aspirin Edema, Eye Swelling and Facial Swelling    Other Swelling     Seafood    Codeine Rash      Physical Exam:     /62 (BP Location: Left arm, Patient Position: Sitting, Cuff Size: Standard)   Pulse 71   Temp 98 3 °F (36 8 °C) (Oral)   Resp 17   Ht 5' 6" (1 676 m)   Wt 53 1 kg (117 lb)   LMP 09/11/2019 (Exact Date)   SpO2 99%   BMI 18 88 kg/m²     Physical Exam   Constitutional: She is oriented to person, place, and time  She appears well-developed and well-nourished  She is cooperative  HENT:   Head: Normocephalic and atraumatic  Right Ear: Hearing, tympanic membrane, external ear and ear canal normal    Left Ear: Hearing, tympanic membrane, external ear and ear canal normal    Mouth/Throat: Uvula is midline and mucous membranes are normal  Posterior oropharyngeal erythema (mild with post nasal drainage noted) present  Eyes: Pupils are equal, round, and reactive to light  Conjunctivae and lids are normal    Neck: Trachea normal and normal range of motion  Neck supple  No thyromegaly present  Cardiovascular: Normal rate, regular rhythm and normal heart sounds  No murmur heard  Pulses:       Posterior tibial pulses are 2+ on the right side, and 2+ on the left side  Pulmonary/Chest: Effort normal and breath sounds normal  She has no decreased breath sounds  She has no wheezes  She has no rhonchi  She has no rales  Abdominal: Soft  Normal appearance and bowel sounds are normal  There is no hepatosplenomegaly  There is no tenderness     Musculoskeletal: Normal range of motion  Right ankle: She exhibits no swelling  Left ankle: She exhibits no swelling  Lymphadenopathy:        Head (right side): No submandibular adenopathy present  Head (left side): No submandibular adenopathy present  She has no cervical adenopathy  Neurological: She is alert and oriented to person, place, and time  No cranial nerve deficit  She exhibits normal muscle tone  Gait normal    Skin: Skin is warm, dry and intact  Lesion (spider veins noted distal left lower extremity) noted  Psychiatric: She has a normal mood and affect  Her speech is normal and behavior is normal    Nursing note and vitals reviewed        Karl Leal MD   [address] [place] [address]

## 2019-10-03 ENCOUNTER — TELEPHONE (OUTPATIENT)
Dept: SURGICAL ONCOLOGY | Facility: CLINIC | Age: 39
End: 2019-10-03

## 2019-10-03 ENCOUNTER — TELEPHONE (OUTPATIENT)
Dept: OBGYN CLINIC | Facility: CLINIC | Age: 39
End: 2019-10-03

## 2019-10-03 ENCOUNTER — OFFICE VISIT (OUTPATIENT)
Dept: SURGICAL ONCOLOGY | Facility: CLINIC | Age: 39
End: 2019-10-03
Payer: COMMERCIAL

## 2019-10-03 VITALS
BODY MASS INDEX: 18.96 KG/M2 | WEIGHT: 118 LBS | SYSTOLIC BLOOD PRESSURE: 110 MMHG | HEART RATE: 73 BPM | HEIGHT: 66 IN | DIASTOLIC BLOOD PRESSURE: 70 MMHG | RESPIRATION RATE: 16 BRPM | TEMPERATURE: 96.5 F

## 2019-10-03 DIAGNOSIS — N83.209 CYST OF OVARY, UNSPECIFIED LATERALITY: Primary | ICD-10-CM

## 2019-10-03 DIAGNOSIS — C50.812 MALIGNANT NEOPLASM OF OVERLAPPING SITES OF LEFT BREAST IN FEMALE, ESTROGEN RECEPTOR POSITIVE (HCC): Primary | ICD-10-CM

## 2019-10-03 DIAGNOSIS — Z17.0 MALIGNANT NEOPLASM OF OVERLAPPING SITES OF LEFT BREAST IN FEMALE, ESTROGEN RECEPTOR POSITIVE (HCC): Primary | ICD-10-CM

## 2019-10-03 PROCEDURE — 99214 OFFICE O/P EST MOD 30 MIN: CPT | Performed by: SURGERY

## 2019-10-03 NOTE — PROGRESS NOTES
Surgical Oncology Follow Up       [address] [facility]  [facility] [place]  [address] [facility] [address]  [facility] [address]    Bekah Bravo  1980  [id number]  [address] [facility],[address] [facility]  [facility] [place]  [address] [facility] [address]    Chief Complaint   Patient presents with    Breast Cancer     Pt is here for 2 week f/u          Assessment & Plan:   Patient presents for a follow-up visit  Her metastatic workup was negative  Her genetic test are still pending  Her CT scan did demonstrate a  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  I recommended she start neoadjuvant chemotherapy and will coordinate appoint with Dr Vanita Francois  She will contact her gynecologist to review her CT scan findings  I will see her back midway through her neoadjuvant chemotherapy  Cancer History:        Malignant neoplasm of overlapping sites of left breast in female, estrogen receptor positive ([facility] [address] )    9/10/2019 Biopsy     Left breast ultrasound-guided biopsy  A  2 o'clock, 2 cm from nipple  Invasive breast carcinoma of no special type (ductal, NST)  Grade 2  ER 90  SC 70  HER2 3+    B  3 o'clock, 1 cm from nipple  Invasive breast carcinoma of no special type (ductal NST)  Grade 2  ER 70  SC 60  HER2 3+    C  Left axillary lymph node  Metastatic adenocarcinoma      9/23/2019 Genetic Testing     RESULTS PENDING  [facility]           Interval History:   See Assessment & Plan    Review of Systems:   Review of Systems   Constitutional: Negative for activity change, appetite change and fatigue  HENT: Negative  Eyes: Negative  Respiratory: Negative for cough, shortness of breath and wheezing  Cardiovascular: Negative for chest pain and leg swelling  Gastrointestinal: Negative  Endocrine: Negative  Genitourinary: Negative  Musculoskeletal:        No new changes or complaints of bone pain   Skin: Negative  Allergic/Immunologic: Negative      Neurological: Negative  Hematological: Negative  Psychiatric/Behavioral: Negative          Past Medical History     Patient Active Problem List   Diagnosis    Malignant neoplasm of overlapping sites of left breast in female, estrogen receptor positive ([facility] )    Varicose veins of left lower extremity with pain    Mild intermittent asthma without complication    Seasonal allergic rhinitis due to pollen     Past Medical History:   Diagnosis Date    Asthma     Breast cancer ([facility] ) 09/10/2019    [facility]     Past Surgical History:   Procedure Laterality Date    BREAST BIOPSY Left 09/10/2019     SECTION      US GUIDANCE BREAST BIOPSY LEFT EACH ADDITIONAL Left 9/10/2019    US GUIDED BREAST BIOPSY LEFT COMPLETE Left 9/10/2019    US GUIDED BREAST LYMPH NODE BIOPSY LEFT Left 9/10/2019     Family History   Problem Relation Age of Onset    Diabetes Father     Asthma Father     No Known Problems Daughter     No Known Problems Maternal Aunt     No Known Problems Maternal Aunt     No Known Problems Maternal Aunt     Breast cancer Paternal Aunt         age at dx unk    Stomach cancer Paternal Aunt     Pancreatic cancer Maternal Grandmother         age at dx unk    Cancer Paternal Uncle         type and age unk     Social History     Socioeconomic History    Marital status:      Spouse name: Not on file    Number of children: Not on file    Years of education: Not on file    Highest education level: Not on file   Occupational History    Not on file   Social Needs    Financial resource strain: Not hard at all   Araceli-Hali insecurity:     Worry: Never true     Inability: Never true    Transportation needs:     Medical: No     Non-medical: No   Tobacco Use    Smoking status: Never Smoker    Smokeless tobacco: Never Used   Substance and Sexual Activity    Alcohol use: Not Currently    Drug use: Never    Sexual activity: Yes   Lifestyle    Physical activity:     Days per week: 0 days     Minutes per session: 0 min    Stress: Only a little   Relationships    Social connections:     Talks on phone: Patient refused     Gets together: Patient refused     Attends Sikh service: Patient refused     Active member of club or organization: Patient refused     Attends meetings of clubs or organizations: Patient refused     Relationship status: Patient refused    Intimate partner violence:     Fear of current or ex partner: Patient refused     Emotionally abused: Patient refused     Physically abused: Patient refused     Forced sexual activity: Patient refused   Other Topics Concern    Not on file   Social History Narrative    Not on file       Current Outpatient Medications:     acetaminophen (TYLENOL) 500 mg tablet, Take 500 mg by mouth every 6 (six) hours as needed for mild pain, Disp: , Rfl:     albuterol (PROVENTIL HFA,VENTOLIN HFA) 90 mcg/act inhaler, Inhale 2 puffs every 6 (six) hours as needed for wheezing or shortness of breath, Disp: 1 Inhaler, Rfl: 3    loratadine (CLARITIN) 10 mg tablet, Take 1 tablet (10 mg total) by mouth daily, Disp: 30 tablet, Rfl: 3    mometasone (NASONEX) 50 mcg/act nasal spray, 2 sprays into each nostril daily, Disp: 1 Act, Rfl: 3    montelukast (SINGULAIR) 10 mg tablet, Take 1 tablet (10 mg total) by mouth daily at bedtime, Disp: 30 tablet, Rfl: 3    multivitamin (THERAGRAN) TABS, Take 1 tablet by mouth daily, Disp: , Rfl:   Allergies   Allergen Reactions    Aspirin Edema, Eye Swelling and Facial Swelling    Other Swelling     Seafood    Codeine Rash       Physical Exam:     Vitals:    10/03/19 0934   BP: 110/70   Pulse: 73   Resp: 16   Temp: (!) 96 5 °F (35 8 °C)     Physical Exam    Physical exam deferred     Results & Discussion:      I spent 30 minutes  ( off face-to-face time) reviewing the patient's results with her and answering questions regarding neoadjuvant chemotherapy, imaging, Plastic surgery,  In genetic testing results     We also talked about surgery as well as time the therapy  All questions were answered the patient's satisfaction  We will coordinate appointment with Dr Gabi Colon and I will see her back in 3 months  Advance Care Planning/Advance Directives:  I discussed the disease status, treatment plans and follow-up with the patient

## 2019-10-03 NOTE — TELEPHONE ENCOUNTER
New Patient Encounter    New Patient Intake Form   Patient Details:  Dede Plunkett  1980  [id number]    Background Information:  [address] starts by opening a telephone encounter and gathering the following information   Who is calling to schedule? If not self, relationship to patient? provider   Referring Provider Sharri Youngblood   What is the diagnosis? Breast cancer   When was the diagnosis? 9/2019   Is patient aware of diagnosis? Yes   Reason for visit? NP DX   Have you had any testing done? If so: when, where? Yes   Are records in [facility]? yes   Was the patient told to bring a disk? no   Scheduling Information:   Preferred Normal:  Any     Requesting Specific Provider? corbin   Are there any dates/time the patient cannot be seen? no   Counseling Pre-Screen:  If the patient answers YES to any of the below questions, please route to the appropriate location specific counselor    Have you felt anxious or worried about cancer and the treatment you are receiving? Did Not Speak to Patient   Has your diagnosis caused physical, emotional, or financial hardship for you? Did Not Speak to Patient   Note: Do not ask the patient about transportation issues/needs  Please notate if the patient brings it up and the counselor will schedule accordingly  Miscellaneous: n/a   After completing the above information, please route to Financial Counselor and the appropriate Nurse Navigator for review

## 2019-10-03 NOTE — LETTER
October 3, 2019     Godwin Siddiqui MD  [address]    Patient: Alonso Kamara   YOB: 1980   Date of Visit: 10/3/2019       Dear Dr Alessandra Tolentino: Thank you for referring Alonso Kamara to me for evaluation  Below are my notes for this consultation  If you have questions, please do not hesitate to call me  I look forward to following your patient along with you  Sincerely,        Bertin Beach MD        CC: MD Bertin Lobato MD  10/3/2019 10:24 AM  Sign at close encounter     Surgical Oncology Follow Up       [address] [facility]  [address] [place] [address]  1980  [id number]  [address] [facility] [place]  [address] [address]    Chief Complaint   Patient presents with    Breast Cancer     Pt is here for 2 week f/u          Assessment & Plan:   Patient presents for a follow-up visit  Her metastatic workup was negative  Her genetic test are still pending  Her CT scan did demonstrate a  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  I recommended she start neoadjuvant chemotherapy and will coordinate appoint with Dr Gabi Colon  She will contact her gynecologist to review her CT scan findings  I will see her back midway through her neoadjuvant chemotherapy  Cancer History:        Malignant neoplasm of overlapping sites of left breast in female, estrogen receptor positive ([facility] [address] )    9/10/2019 Biopsy     Left breast ultrasound-guided biopsy  A  2 o'clock, 2 cm from nipple  Invasive breast carcinoma of no special type (ductal, NST)  Grade 2  ER 90  FL 70  HER2 3+    B  3 o'clock, 1 cm from nipple  Invasive breast carcinoma of no special type (ductal NST)  Grade 2  ER 70  FL 60  HER2 3+    C   Left axillary lymph node  Metastatic adenocarcinoma      9/23/2019 Genetic Testing     RESULTS PENDING  [facility] Interval History:   See Assessment & Plan    Review of Systems:   Review of Systems   Constitutional: Negative for activity change, appetite change and fatigue  HENT: Negative  Eyes: Negative  Respiratory: Negative for cough, shortness of breath and wheezing  Cardiovascular: Negative for chest pain and leg swelling  Gastrointestinal: Negative  Endocrine: Negative  Genitourinary: Negative  Musculoskeletal:        No new changes or complaints of bone pain   Skin: Negative  Allergic/Immunologic: Negative  Neurological: Negative  Hematological: Negative  Psychiatric/Behavioral: Negative          Past Medical History     Patient Active Problem List   Diagnosis    Malignant neoplasm of overlapping sites of left breast in female, estrogen receptor positive ([facility] )    Varicose veins of left lower extremity with pain    Mild intermittent asthma without complication    Seasonal allergic rhinitis due to pollen     Past Medical History:   Diagnosis Date    Asthma     Breast cancer ([facility] ) 09/10/2019    [facility]     Past Surgical History:   Procedure Laterality Date    BREAST BIOPSY Left 09/10/2019     SECTION      US GUIDANCE BREAST BIOPSY LEFT EACH ADDITIONAL Left 9/10/2019    US GUIDED BREAST BIOPSY LEFT COMPLETE Left 9/10/2019    US GUIDED BREAST LYMPH NODE BIOPSY LEFT Left 9/10/2019     Family History   Problem Relation Age of Onset    Diabetes Father     Asthma Father     No Known Problems Daughter     No Known Problems Maternal Aunt     No Known Problems Maternal Aunt     No Known Problems Maternal Aunt     Breast cancer Paternal Aunt         age at dx unk    Stomach cancer Paternal Aunt     Pancreatic cancer Maternal Grandmother         age at dx unk    Cancer Paternal Uncle         type and age unk     Social History     Socioeconomic History    Marital status:      Spouse name: Not on file    Number of children: Not on file    Years of education: Not on file    Highest education level: Not on file   Occupational History    Not on file   Social Needs    Financial resource strain: Not hard at all    Food insecurity:     Worry: Never true     Inability: Never true   Medine needs:     Medical: No     Non-medical: No   Tobacco Use    Smoking status: Never Smoker    Smokeless tobacco: Never Used   Substance and Sexual Activity    Alcohol use: Not Currently    Drug use: Never    Sexual activity: Yes   Lifestyle    Physical activity:     Days per week: 0 days     Minutes per session: 0 min    Stress:  Only a little   Relationships    Social connections:     Talks on phone: Patient refused     Gets together: Patient refused     Attends Pentecostal service: Patient refused     Active member of club or organization: Patient refused     Attends meetings of clubs or organizations: Patient refused     Relationship status: Patient refused    Intimate partner violence:     Fear of current or ex partner: Patient refused     Emotionally abused: Patient refused     Physically abused: Patient refused     Forced sexual activity: Patient refused   Other Topics Concern    Not on file   Social History Narrative    Not on file       Current Outpatient Medications:     acetaminophen (TYLENOL) 500 mg tablet, Take 500 mg by mouth every 6 (six) hours as needed for mild pain, Disp: , Rfl:     albuterol (PROVENTIL HFA,VENTOLIN HFA) 90 mcg/act inhaler, Inhale 2 puffs every 6 (six) hours as needed for wheezing or shortness of breath, Disp: 1 Inhaler, Rfl: 3    loratadine (CLARITIN) 10 mg tablet, Take 1 tablet (10 mg total) by mouth daily, Disp: 30 tablet, Rfl: 3    mometasone (NASONEX) 50 mcg/act nasal spray, 2 sprays into each nostril daily, Disp: 1 Act, Rfl: 3    montelukast (SINGULAIR) 10 mg tablet, Take 1 tablet (10 mg total) by mouth daily at bedtime, Disp: 30 tablet, Rfl: 3    multivitamin (THERAGRAN) TABS, Take 1 tablet by mouth daily, Disp: , Rfl: Allergies   Allergen Reactions    Aspirin Edema, Eye Swelling and Facial Swelling    Other Swelling     Seafood    Codeine Rash       Physical Exam:     Vitals:    10/03/19 0934   BP: 110/70   Pulse: 73   Resp: 16   Temp: (!) 96 5 °F (35 8 °C)     Physical Exam    Physical exam deferred     Results & Discussion:      I spent 30 minutes  ( off face-to-face time) reviewing the patient's results with her and answering questions regarding neoadjuvant chemotherapy, imaging, Plastic surgery,  In genetic testing results  We also talked about surgery as well as time the therapy  All questions were answered the patient's satisfaction  We will coordinate appointment with Dr Zeinab Rojas and I will see her back in 3 months  Advance Care Planning/Advance Directives:  I discussed the disease status, treatment plans and follow-up with the patient

## 2019-10-09 ENCOUNTER — HOSPITAL ENCOUNTER (OUTPATIENT)
Dept: RADIOLOGY | Facility: HOSPITAL | Age: 39
Discharge: HOME | End: 2019-10-09
Attending: OBSTETRICS & GYNECOLOGY
Payer: COMMERCIAL

## 2019-10-09 DIAGNOSIS — N83.209 CYST OF OVARY, UNSPECIFIED LATERALITY: ICD-10-CM

## 2019-10-09 PROCEDURE — 57454 BX/CURETT OF CERVIX W/SCOPE: CPT | Performed by: OBSTETRICS & GYNECOLOGY

## 2019-10-09 PROCEDURE — 76856 US EXAM PELVIC COMPLETE: CPT

## 2019-10-09 NOTE — PROGRESS NOTES
Colposcopy  Date/Time: 10/9/2019 2:15 PM  Performed by: Chino Billingsley MD  Authorized by: Chino Billingsley MD     Consent:     Consent obtained:  Verbal    Consent given by:  Patient    Procedural risks discussed:  Bleeding    Patient questions answered: yes      Patient agrees, verbalizes understanding, and wants to proceed: yes      Educational handouts given: no      Instructions and paperwork completed: yes    Universal protocol:     Patient states understanding of procedure being performed: yes      Relevant documents present and verified: yes      Test results available and properly labeled: yes      Imaging studies available: yes      Required blood products, implants, devices, and special equipment available: yes      Site marked: no    Pre-procedure:     Pre-procedure timeout performed: yes      Premeds:  Ibuprofen    Prepped with: acetic acid    Indication:     Indication:  ASC-US  Procedure:     Procedure: Colposcopy w/ cervical biopsy and ECC      Under satisfactory analgesia the patient was prepped and draped in the dorsal lithotomy position: yes      Early Branch speculum was placed in the vagina: yes      Under colposcopic examination the transition zone was seen in entirety: yes      Intracervical block was performed: no      Endocervix was curetted using a Kevorkian curette: yes      Cervical biopsy performed with a cervical biopsy punch: yes      Tampon inserted: no      Monsel's solution was applied: yes      Biopsy(s): yes      Location:  5 o'clock    Specimen to pathology: yes    Post-procedure:     Findings: Mosaicism and White epithelium      Impression: Low grade cervical dysplasia    Comments:      Topic:  ASCUS Pap smear without HPV risk    patient recently diagnosed with invasive breast cancer    Colposcopy performed without difficulty and biopsy taken at 5 o'clock as well as an endocervical curettage    Excellent hemostasis observed with Monsel's solution    Instructions and restrictions given to the patient    All questions answered for her at today's visit    Further treatment and follow-up planning will be based upon final pathology results    Patient will call for any problems issues or concerns which may arise for her

## 2019-10-09 NOTE — PATIENT INSTRUCTIONS
Topic:  ASCUS Pap smear without high-risk HPV changes    Patient has recent history newly diagnosed invasive breast cancer    Colposcopy performed without difficulty and biopsy taken at 5 o'clock as well as an endocervical curettage    Instructions and restrictions given to the patient    All questions were answered for her at today's visit    Further treatment and follow-up planning will be based upon final pathology results    Patient to call for any problems issues or concerns which may arise for her

## 2019-10-10 ENCOUNTER — TELEPHONE (OUTPATIENT)
Dept: OBGYN CLINIC | Facility: CLINIC | Age: 39
End: 2019-10-10

## 2019-10-10 NOTE — TELEPHONE ENCOUNTER
PT AWARE OF RESULTS        ----- Message from Heather Miller MD sent at 10/9/2019  1:13 PM EDT -----  Good news    Both cervical biopsies were negative       will just need annual Pap smears    Thanks

## 2019-10-16 ENCOUNTER — TELEPHONE (OUTPATIENT)
Dept: GYNECOLOGIC ONCOLOGY | Facility: HOSPITAL | Age: 39
End: 2019-10-16

## 2019-10-16 NOTE — TELEPHONE ENCOUNTER
Patient notified of genetic testing results  [facility] Breast[facility] panel wild-type  Results mailed to patient and scanned into Epic

## 2019-10-22 ENCOUNTER — TELEPHONE (OUTPATIENT)
Dept: HEMATOLOGY ONCOLOGY | Facility: CLINIC | Age: 39
End: 2019-10-22

## 2019-10-22 NOTE — TELEPHONE ENCOUNTER
Left VM for patient to see if she could come in at 140pm instead of 2pm tomorrow 10/23 for her consult with Dr Vel Carcamo  Left call back number for patient

## 2019-10-23 ENCOUNTER — DOCUMENTATION (OUTPATIENT)
Dept: HEMATOLOGY ONCOLOGY | Facility: CLINIC | Age: 39
End: 2019-10-23

## 2019-10-23 ENCOUNTER — CONSULT (OUTPATIENT)
Dept: HEMATOLOGY ONCOLOGY | Facility: CLINIC | Age: 39
End: 2019-10-23
Payer: COMMERCIAL

## 2019-10-23 ENCOUNTER — DOCUMENTATION (OUTPATIENT)
Dept: RADIATION ONCOLOGY | Facility: HOSPITAL | Age: 39
End: 2019-10-23

## 2019-10-23 VITALS
SYSTOLIC BLOOD PRESSURE: 98 MMHG | DIASTOLIC BLOOD PRESSURE: 64 MMHG | HEIGHT: 66 IN | OXYGEN SATURATION: 97 % | TEMPERATURE: 98.3 F | WEIGHT: 118 LBS | RESPIRATION RATE: 18 BRPM | BODY MASS INDEX: 18.96 KG/M2 | HEART RATE: 89 BPM

## 2019-10-23 DIAGNOSIS — L65.8 DRUG-RELATED HAIR LOSS: ICD-10-CM

## 2019-10-23 DIAGNOSIS — T45.1X5A CHEMOTHERAPY INDUCED NEUTROPENIA (HCC): ICD-10-CM

## 2019-10-23 DIAGNOSIS — C50.812 MALIGNANT NEOPLASM OF OVERLAPPING SITES OF LEFT BREAST IN FEMALE, ESTROGEN RECEPTOR POSITIVE (HCC): ICD-10-CM

## 2019-10-23 DIAGNOSIS — Z17.0 MALIGNANT NEOPLASM OF OVERLAPPING SITES OF LEFT BREAST IN FEMALE, ESTROGEN RECEPTOR POSITIVE (HCC): Primary | ICD-10-CM

## 2019-10-23 DIAGNOSIS — T50.905A DRUG-RELATED HAIR LOSS: ICD-10-CM

## 2019-10-23 DIAGNOSIS — D70.1 CHEMOTHERAPY INDUCED NEUTROPENIA (HCC): ICD-10-CM

## 2019-10-23 DIAGNOSIS — C50.812 MALIGNANT NEOPLASM OF OVERLAPPING SITES OF LEFT BREAST IN FEMALE, ESTROGEN RECEPTOR POSITIVE (HCC): Primary | ICD-10-CM

## 2019-10-23 DIAGNOSIS — Z17.0 MALIGNANT NEOPLASM OF OVERLAPPING SITES OF LEFT BREAST IN FEMALE, ESTROGEN RECEPTOR POSITIVE (HCC): ICD-10-CM

## 2019-10-23 PROCEDURE — 99245 OFF/OP CONSLTJ NEW/EST HI 55: CPT | Performed by: INTERNAL MEDICINE

## 2019-10-23 RX ORDER — DOXORUBICIN HYDROCHLORIDE 2 MG/ML
60 INJECTION, SOLUTION INTRAVENOUS ONCE
Status: CANCELLED | OUTPATIENT
Start: 2019-11-05

## 2019-10-23 RX ORDER — SODIUM CHLORIDE 9 MG/ML
20 INJECTION, SOLUTION INTRAVENOUS ONCE
Status: CANCELLED | OUTPATIENT
Start: 2019-11-20

## 2019-10-23 RX ORDER — ONDANSETRON 4 MG/1
4 TABLET, FILM COATED ORAL EVERY 8 HOURS PRN
Qty: 30 TABLET | Refills: 1 | Status: SHIPPED | OUTPATIENT
Start: 2019-10-23 | End: 2020-06-17 | Stop reason: ALTCHOICE

## 2019-10-23 RX ORDER — DOXORUBICIN HYDROCHLORIDE 2 MG/ML
60 INJECTION, SOLUTION INTRAVENOUS ONCE
Status: CANCELLED | OUTPATIENT
Start: 2019-11-20

## 2019-10-23 RX ORDER — SODIUM CHLORIDE 9 MG/ML
20 INJECTION, SOLUTION INTRAVENOUS ONCE
Status: CANCELLED | OUTPATIENT
Start: 2019-11-05

## 2019-10-23 NOTE — PROGRESS NOTES
Met with patient, her mother, and her  to discuss treatment  Dr Lashae Albright had extensive discussion regarding the diagnosis, stage probable staging information, aggressive nature of disease, tumor phenotype rationale of neoadjuvant chemotherapy, goal of treatment and treatment options  He recommended her to have neoadjuvant chemotherapy with dose dense AC x4 followed by weekly paclitaxel, trastuzumab and try weekly pertuzumab  Side effects of this regimen was thoroughly discussed, including but not limited to alopecia, nausea, vomiting, neutropenia, risk of infection, cardiotoxicity, fatigue, neuropathy, allergic reaction, risk of pulmonary menopause as well as small risk of secondary leukemia  Script for zofran sent to pharmacy on file, wig script given to patient, medication information in Irish/english provided, chemotherapy and You Booklet, and Nutrition booklet provided to patient  Also provided patient with a work note to excuse her from 11/6-11/11, with a return date of 11/12  This will also her to adjust to first chemotherapy cycle and time to process information  We discussed different medications including, magic mouthwash, imodium, lomotil, zofran, reglan, compazine, miralax, and senna  IR is going to place a port for patient and an echo will be done before treatment  Patient will begin treatment 11/6 at the [address] [place] [address]  She verbalized understanding of above  Consent signed  I will ask that LEYLA Richardson contact patient to discuss any additional information, including wig resources

## 2019-10-23 NOTE — PROGRESS NOTES
Hematology / Oncology Outpatient Consult Note    Cheryl Rivera 44 y o  female [id number]         Date:  10/23/2019    Assessment / Plan:  A 66-year-old premenopausal woman with newly diagnosed locally advanced left breast cancer, grade 2, ER 90 present positive, NJ 70% positive, HER2 3+ disease  She has relatively large palpable left breast mass as well as palpable left axillary adenopathy which was biopsy confirmed to be metastatic disease  She has no evidence of distant metastasis  She presents today with her  and mother to discuss neoadjuvant chemotherapy  We had extensive discussion regarding the diagnosis, stage probable staging information, aggressive nature of disease, tumor phenotype rationale of neoadjuvant chemotherapy, goal of treatment and treatment options  I recommended her to have neoadjuvant chemotherapy with dose dense AC x4 followed by weekly paclitaxel, trastuzumab and try weekly pertuzumab  Side effects of this regimen was thoroughly discussed, including but not limited to alopecia, nausea, vomiting, neutropenia, risk of infection, cardiotoxicity, fatigue, neuropathy, allergic reaction, risk of pulmonary menopause as well as small risk of secondary leukemia  She understood and wished to proceed  I am going to ask interventional radiologist to place a Port-A-Cath  She is going to have echocardiogram as initial cardiac monitoring  She is going to start 1st cycle of chemotherapy at [facility] in November 5, 2019  She was instructed to give us a call immediately if she has fever  I will see her again when she is due for 2nd cycle treatment  All the patient and her family members questions were answered to their satisfaction  Subjective:     HPI:  A 66-year-old premenopausal woman who noticed a left breast lump recently which she brought to medical attention    She was found to have radiographic abnormality in her left breast as well as left axilla  She underwent left breast biopsy in September 10, 2019 which showed invasive ductal carcinoma, grade 2  This was ER 90 % positive, WI 70% positive, HER2 3+ disease  Biopsy from enlarged left axillary lymph node was also positive for metastatic disease  She was seen by Dr Lul Patel who sent her to me for neoadjuvant chemotherapy  She is negative for BRCA gene mutation  She underwent bone scan and CT scan chest abdomen pelvis which showed no evidence of distant metastasis  She presents today with her  in mother to discuss neoadjuvant chemotherapy  She has some left breast pain  Otherwise, she feels well  She denied any respiratory symptoms  She has no complaint of bone pain  She has regular menstrual cycle  She has 2 children  She has no plan to have more children  She has no surgical history  She has no significant past medical history  She is a lifetime never smoker  Her performance status is normal           Interval History:          Objective:     Primary Diagnosis:    Locally advanced left breast cancer, grade 2, ER 90 % positive, WI 70 % positive, HER2 3+ disease  Diagnosed in September 2019  Cancer Staging:  Cancer Staging  No matching staging information was found for the patient  Previous Hematologic/ Oncologic Treatment:         Current Hematologic/ Oncologic Treatment:      Neoadjuvant chemotherapy with dose dense AC x4 followed by weekly paclitaxel, trastuzumab and try weekly pertuzumab x12 weeks  1st cycle of AC to be given in November 5, 2019  Disease Status:     Not evaluated at this time  Test Results:    Pathology:    Left breast biopsy showed invasive ductal carcinoma, grade 2  ER 90 % positive, WI 70 % positive, HER2 3+ disease  Radiology:    CT scan chest abdomen pelvis showed no evidence of distant metastasis  Bone scan was negative for osseous metastasis  Laboratory:    See below      Physical Exam:      General Appearance:    Alert, oriented        Eyes:    PERRL   Ears:    Normal external ear canals, both ears   Nose:   Nares normal, septum midline   Throat:   Mucosa moist  Pharynx without injection  Neck:   Supple       Lungs:     Clear to auscultation bilaterally   Chest Wall:    No tenderness or deformity    Heart:    Regular rate and rhythm       Abdomen:     Soft, non-tender, bowel sounds +, no organomegaly           Extremities:   Extremities no cyanosis or edema       Skin:   no rash or icterus  Lymph nodes:   Cervical, supraclavicular, and axillary nodes normal   Neurologic:   CNII-XII intact, normal strength, sensation and reflexes     Throughout          Breast exam:   3 x 4 cm of palpable mass at 2:00 position in her left breast   1-1 5 cm of palpable left axillary adenopathy  Right breast exam is negative  ROS: Review of Systems   All other systems reviewed and are negative  Imaging: Nm Bone Scan Whole Body    Result Date: 9/26/2019  Narrative: BONE SCAN  WHOLE BODY INDICATION: C50 912: Malignant neoplasm of unspecified site of left female breast C77 3: Secondary and unspecified malignant neoplasm of axilla and upper limb lymph nodes PREVIOUS FILM CORRELATION:    CT chest abdomen pelvis September 23, 2019 TECHNIQUE:   This study was performed following the intravenous administration of 27 5 mCi Tc-99m labeled MDP  Delayed, anterior and posterior whole body images were acquired, 2-3 hours after radiopharmaceutical administration  Additional delayed static images acquired of the thorax  FINDINGS:  Focal asymmetric radiotracer uptake at the left 1st costochondral junction corresponding to degenerative spurring on CT  Whole-body uptake is otherwise within normal limits  There is no scintigraphic evidence of osseous metastasis  Renal activity is symmetric  Impression: 1  No scintigraphic evidence of osseous metastasis   Workstation performed: [id number]     Ct Chest Abdomen Pelvis W Contrast    Result Date: 9/23/2019  Narrative: CT CHEST, ABDOMEN AND PELVIS WITH IV CONTRAST INDICATION:   C50 912: Malignant neoplasm of unspecified site of left female breast C77 3: Secondary and unspecified malignant neoplasm of axilla and upper limb lymph nodes  Newly diagnosed left breast cancer  COMPARISON:  None  TECHNIQUE: CT examination of the chest, abdomen and pelvis was performed  Axial, sagittal, and coronal 2D reformatted images were created from the source data and submitted for interpretation  Radiation dose length product (DLP) for this visit:  560 mGy-cm   This examination, like all CT scans performed in the [facility], was performed utilizing techniques to minimize radiation dose exposure, including the use of iterative reconstruction and automated exposure control  IV Contrast:  85 mL of iohexol (OMNIPAQUE) Enteric Contrast: Enteric contrast was administered  FINDINGS: CHEST LUNGS:  Lungs are clear  There is no tracheal or endobronchial lesion  PLEURA:  Unremarkable  HEART/GREAT VESSELS:  Unremarkable for patient's age  MEDIASTINUM AND ROHAN:  Unremarkable  CHEST WALL AND LOWER NECK:   Left axillary adenopathy with dominant node measuring 21 x 13 mm  High density area in the left breast likely correlating to a biopsy site  ABDOMEN LIVER/BILIARY TREE:  One or more subcentimeter sharply circumscribed low-density hepatic lesion(s) are noted, too small to accurately characterize, but statistically most likely to represent subcentimeter hepatic cysts  No suspicious solid hepatic lesion is identified  Hepatic contours are normal   No biliary dilatation  GALLBLADDER:  No calcified gallstones  No pericholecystic inflammatory change  SPLEEN:  Unremarkable  PANCREAS:  Unremarkable  ADRENAL GLANDS:  Unremarkable  KIDNEYS/URETERS:  Unremarkable  No hydronephrosis  STOMACH AND BOWEL:  Unremarkable  APPENDIX:  No findings to suggest appendicitis   ABDOMINOPELVIC CAVITY:  No ascites or free intraperitoneal air  No lymphadenopathy  VESSELS:  Unremarkable for patient's age  PELVIS REPRODUCTIVE ORGANS:  Predominantly lipomatous mass of the right adnexa measuring 3 7 x 2 8 x 3 2 cm with some small solid and calcific elements in keeping with a mature teratoma/ dermoid  URINARY BLADDER:  Unremarkable  ABDOMINAL WALL/INGUINAL REGIONS:  Unremarkable  OSSEOUS STRUCTURES:  No acute fracture or destructive osseous lesion  Impression: Left axillary adenopathy  No other signs of metastatic disease within the chest, abdomen or pelvis  Predominantly lipomatous mass of the right adnexa measuring 3 7 x 2 8 x 3 2 cm with some small solid and calcific elements in keeping with a mature teratoma/ dermoid  Workstation performed: [id number]     Mri Breast Bilateral W And Wo Contrast W Cad    Result Date: 9/26/2019  Narrative: DIAGNOSIS: Breast cancer metastasized to axillary lymph node, left (HCC) TECHNIQUE: MRI of both breasts was performed in axial and sagittal planes utilizing a combination of axial diffusion, fat suppressed sagittal T2 , gradient echo in phase T1 weighting followed by sagittal dynamic post contrast imaging with 3D fat suppressed gradient-echo T1 sequences (VIBRANT) at 1 5 minute intervals  Axial VIBRANT post contrast images were obtained  Sagittal subtraction images were generated  This exam was read in conjunction with Prevacus software  Intravenous contrast was administered at 2cc/sec, without documented contrast reaction  MEDICATIONS ADMINISTERED: gadobutrol injection (MULTI-DOSE) SOLN 6 mL, Total Given: 6 mL (1 dose) COMPARISONS: None available  RELEVANT HISTORY: Family Breast Cancer History: History of breast cancer in Paternal [de-identified]  Family Medical History: Family medical history includes breast cancer in paternal aunt  Personal History: No known relevant hormone history  Surgical history includes breast biopsy  Medical history includes breast cancer   RISK ASSESSMENT: 5 Year Tyrer-Cuzick: 0 76 % 10 Year Tyrer-Cuzick: 1 94 % Lifetime Tyrer-Cuzick: 16 52 % TISSUE DENSITY: FGT: The breasts have heterogeneous fibroglandular tissue  BPE: The background parenchymal enhancement is mild and symmetric  INDICATION: Bekah Bravo is a 44 y o  female presenting for pre neoadjuvant chemotherapy  FINDINGS: LEFT 1) MASS: There is an 18 mm x 18 mm x 14 mm irregularly shaped mass with spiculated margins seen in the left breast at 2 o'clock in the anterior depth  Kinetics initial phase is fast  Delayed phase is washout  2) MASS: There is a 10 mm x 7 mm x 10 mm irregularly shaped mass with indistinct margins seen in the left breast at 3 o'clock in the posterior depth, 1 cm from the nipple  Kinetics initial phase is fast  Delayed phase is washout  3) LYMPH NODE: There are 4 similar 17 mm x 12 mm x 14 mm lymph nodes seen in the axilla region of the left axilla  Other foci of enhancement in the left breast are not dissimilar to symmetric enhancement seen in the contralateral breast  RIGHT There are no suspicious enhancing masses or suspicious areas of nonmass enhancement  There is no axillary or internal mammary adenopathy  Impression:  Previously biopsied malignant lesions in the left breast (including metastatic lymphadenopathy) identified  ASSESSMENT/BI-RADS CATEGORY: Left: 6 - Known Biopsy-Proven Malignancy Overall: 6 - Known Biopsy-Proven Malignancy RECOMMENDATION:      - Surgical consultation for the left breast  Workstation ID: [id number]    Us Pelvis Transabdominal Only    Result Date: 10/13/2019  Narrative: PELVIC ULTRASOUND, COMPLETE INDICATION:  44years old  N83 209: Unspecified ovarian cyst, unspecified side  COMPARISON: CT September 23, 2019 TECHNIQUE:   Transabdominal pelvic ultrasound was performed in sagittal and transverse planes with a curvilinear transducer  Imaging included volumetric sweeps as well as traditional still imaging technique   FINDINGS: UTERUS: The uterus is anteverted in position, measuring 8 3 x 4 5 x 5 9 cm  Contour and echotexture appear normal  The cervix shows no suspicious abnormality  ENDOMETRIUM:  Normal caliber of 3 mm  Trace nonspecific endometrial fluid  OVARIES/ADNEXA: Right ovary is normal size, 13 cc Right ovarian 4 x 2 7 x 3 3 cm dermoid  Doppler flow within normal limits  Left ovary is normal size, 2 cc No suspicious left ovarian abnormality  Doppler flow within normal limits  No suspicious adnexal mass or loculated collections  There is no free fluid  Impression:  Right ovarian 4 x 2 7 x 3 3 cm dermoid  Trace nonspecific endometrial fluid  Endometrial material could relate to blood products although indeterminate, consider direct visualization or follow-up pelvic ultrasound in 8-12 weeks  Workstation performed: [id number]         Labs:   Lab Results   Component Value Date    WBC 7 35 09/20/2019    HGB 12 6 09/20/2019    HCT 39 3 09/20/2019    MCV 92 09/20/2019     09/20/2019     Lab Results   Component Value Date    K 3 3 (L) 09/20/2019     09/20/2019    CO2 26 09/20/2019    BUN 11 09/20/2019    CREATININE 0 77 09/20/2019    GLUF 84 09/20/2019    CALCIUM 8 8 09/20/2019    AST 19 09/20/2019    ALT 34 09/20/2019    ALKPHOS 58 09/20/2019    EGFR 98 09/20/2019         Vital Sign:    Body surface area is 1 6 meters squared      Wt Readings from Last 3 Encounters:   10/23/19 53 5 kg (118 lb)   10/03/19 53 5 kg (118 lb)   10/02/19 53 1 kg (117 lb)        Temp Readings from Last 3 Encounters:   10/23/19 98 3 °F (36 8 °C) (Oral)   10/03/19 (!) 96 5 °F (35 8 °C) (Temporal)   10/02/19 98 3 °F (36 8 °C) (Oral)        BP Readings from Last 3 Encounters:   10/23/19 98/64   10/03/19 110/70   10/02/19 100/62         Pulse Readings from Last 3 Encounters:   10/23/19 89   10/03/19 73   10/02/19 71     @LASTSAO2(3)@    Active Problems:   Patient Active Problem List   Diagnosis    Malignant neoplasm of overlapping sites of left breast in female, estrogen receptor positive ([facility] [address] )    Varicose veins of left lower extremity with pain    Mild intermittent asthma without complication    Seasonal allergic rhinitis due to pollen       Past Medical History:   Past Medical History:   Diagnosis Date    Asthma     Breast cancer ([address] ) 09/10/2019    IDC       Surgical History:   Past Surgical History:   Procedure Laterality Date    BREAST BIOPSY Left 09/10/2019     SECTION      US GUIDANCE BREAST BIOPSY LEFT EACH ADDITIONAL Left 9/10/2019    US GUIDED BREAST BIOPSY LEFT COMPLETE Left 9/10/2019    US GUIDED BREAST LYMPH NODE BIOPSY LEFT Left 9/10/2019       Family History:    Family History   Problem Relation Age of Onset    Diabetes Father     Asthma Father     No Known Problems Daughter     No Known Problems Maternal Aunt     No Known Problems Maternal Aunt     No Known Problems Maternal Aunt     Breast cancer Paternal Aunt         age at dx unk    Stomach cancer Paternal Aunt     Pancreatic cancer Maternal Grandmother         age at dx unk    Cancer Paternal Uncle         type and age unk       Cancer-related family history includes Breast cancer in her paternal aunt; Cancer in her paternal uncle; Pancreatic cancer in her maternal grandmother; Stomach cancer in her paternal aunt      Social History:   Social History     Socioeconomic History    Marital status:      Spouse name: Not on file    Number of children: Not on file    Years of education: Not on file    Highest education level: Not on file   Occupational History    Not on file   Social Needs    Financial resource strain: Not hard at all   FlowCo insecurity:     Worry: Never true     Inability: Never true   Rise needs:     Medical: No     Non-medical: No   Tobacco Use    Smoking status: Never Smoker    Smokeless tobacco: Never Used   Substance and Sexual Activity    Alcohol use: Not Currently    Drug use: Never    Sexual activity: Yes   Lifestyle    Physical activity:     Days per week: 0 days     Minutes per session: 0 min    Stress: Only a little   Relationships    Social connections:     Talks on phone: Patient refused     Gets together: Patient refused     Attends Shinto service: Patient refused     Active member of club or organization: Patient refused     Attends meetings of clubs or organizations: Patient refused     Relationship status: Patient refused    Intimate partner violence:     Fear of current or ex partner: Patient refused     Emotionally abused: Patient refused     Physically abused: Patient refused     Forced sexual activity: Patient refused   Other Topics Concern    Not on file   Social History Narrative    Not on file       Current Medications:   Current Outpatient Medications   Medication Sig Dispense Refill    acetaminophen (TYLENOL) 500 mg tablet Take 500 mg by mouth every 6 (six) hours as needed for mild pain      albuterol (PROVENTIL HFA,VENTOLIN HFA) 90 mcg/act inhaler Inhale 2 puffs every 6 (six) hours as needed for wheezing or shortness of breath 1 Inhaler 3    loratadine (CLARITIN) 10 mg tablet Take 1 tablet (10 mg total) by mouth daily 30 tablet 3    mometasone (NASONEX) 50 mcg/act nasal spray 2 sprays into each nostril daily 1 Act 3    montelukast (SINGULAIR) 10 mg tablet Take 1 tablet (10 mg total) by mouth daily at bedtime 30 tablet 3    multivitamin (THERAGRAN) TABS Take 1 tablet by mouth daily       No current facility-administered medications for this visit  Allergies:    Allergies   Allergen Reactions    Aspirin Edema, Eye Swelling and Facial Swelling    Other Swelling     Seafood    Codeine Rash

## 2019-10-23 NOTE — H&P (VIEW-ONLY)
Hematology / Oncology Outpatient Consult Note    Britt Pastrana 44 y o  female [id number]         Date:  10/23/2019    Assessment / Plan:  A 68-year-old premenopausal woman with newly diagnosed locally advanced left breast cancer, grade 2, ER 90 present positive, IN 70% positive, HER2 3+ disease  She has relatively large palpable left breast mass as well as palpable left axillary adenopathy which was biopsy confirmed to be metastatic disease  She has no evidence of distant metastasis  She presents today with her  and mother to discuss neoadjuvant chemotherapy  We had extensive discussion regarding the diagnosis, stage probable staging information, aggressive nature of disease, tumor phenotype rationale of neoadjuvant chemotherapy, goal of treatment and treatment options  I recommended her to have neoadjuvant chemotherapy with dose dense AC x4 followed by weekly paclitaxel, trastuzumab and try weekly pertuzumab  Side effects of this regimen was thoroughly discussed, including but not limited to alopecia, nausea, vomiting, neutropenia, risk of infection, cardiotoxicity, fatigue, neuropathy, allergic reaction, risk of pulmonary menopause as well as small risk of secondary leukemia  She understood and wished to proceed  I am going to ask interventional radiologist to place a Port-A-Cath  She is going to have echocardiogram as initial cardiac monitoring  She is going to start 1st cycle of chemotherapy at [facility] in November 5, 2019  She was instructed to give us a call immediately if she has fever  I will see her again when she is due for 2nd cycle treatment  All the patient and her family members questions were answered to their satisfaction  Subjective:     HPI:  A 68-year-old premenopausal woman who noticed a left breast lump recently which she brought to medical attention    She was found to have radiographic abnormality in her left breast as well as left axilla  She underwent left breast biopsy in September 10, 2019 which showed invasive ductal carcinoma, grade 2  This was ER 90 % positive, FL 70% positive, HER2 3+ disease  Biopsy from enlarged left axillary lymph node was also positive for metastatic disease  She was seen by Dr Angeles Bhagat who sent her to me for neoadjuvant chemotherapy  She is negative for BRCA gene mutation  She underwent bone scan and CT scan chest abdomen pelvis which showed no evidence of distant metastasis  She presents today with her  in mother to discuss neoadjuvant chemotherapy  She has some left breast pain  Otherwise, she feels well  She denied any respiratory symptoms  She has no complaint of bone pain  She has regular menstrual cycle  She has 2 children  She has no plan to have more children  She has no surgical history  She has no significant past medical history  She is a lifetime never smoker  Her performance status is normal           Interval History:          Objective:     Primary Diagnosis:    Locally advanced left breast cancer, grade 2, ER 90 % positive, FL 70 % positive, HER2 3+ disease  Diagnosed in September 2019  Cancer Staging:  Cancer Staging  No matching staging information was found for the patient  Previous Hematologic/ Oncologic Treatment:         Current Hematologic/ Oncologic Treatment:      Neoadjuvant chemotherapy with dose dense AC x4 followed by weekly paclitaxel, trastuzumab and try weekly pertuzumab x12 weeks  1st cycle of AC to be given in November 5, 2019  Disease Status:     Not evaluated at this time  Test Results:    Pathology:    Left breast biopsy showed invasive ductal carcinoma, grade 2  ER 90 % positive, FL 70 % positive, HER2 3+ disease  Radiology:    CT scan chest abdomen pelvis showed no evidence of distant metastasis  Bone scan was negative for osseous metastasis  Laboratory:    See below      Physical Exam:      General Appearance:    Alert, oriented        Eyes:    PERRL   Ears:    Normal external ear canals, both ears   Nose:   Nares normal, septum midline   Throat:   Mucosa moist  Pharynx without injection  Neck:   Supple       Lungs:     Clear to auscultation bilaterally   Chest Wall:    No tenderness or deformity    Heart:    Regular rate and rhythm       Abdomen:     Soft, non-tender, bowel sounds +, no organomegaly           Extremities:   Extremities no cyanosis or edema       Skin:   no rash or icterus  Lymph nodes:   Cervical, supraclavicular, and axillary nodes normal   Neurologic:   CNII-XII intact, normal strength, sensation and reflexes     Throughout          Breast exam:   3 x 4 cm of palpable mass at 2:00 position in her left breast   1-1 5 cm of palpable left axillary adenopathy  Right breast exam is negative  ROS: Review of Systems   All other systems reviewed and are negative  Imaging: Nm Bone Scan Whole Body    Result Date: 9/26/2019  Narrative: BONE SCAN  WHOLE BODY INDICATION: C50 912: Malignant neoplasm of unspecified site of left female breast C77 3: Secondary and unspecified malignant neoplasm of axilla and upper limb lymph nodes PREVIOUS FILM CORRELATION:    CT chest abdomen pelvis September 23, 2019 TECHNIQUE:   This study was performed following the intravenous administration of 27 5 mCi Tc-99m labeled MDP  Delayed, anterior and posterior whole body images were acquired, 2-3 hours after radiopharmaceutical administration  Additional delayed static images acquired of the thorax  FINDINGS:  Focal asymmetric radiotracer uptake at the left 1st costochondral junction corresponding to degenerative spurring on CT  Whole-body uptake is otherwise within normal limits  There is no scintigraphic evidence of osseous metastasis  Renal activity is symmetric  Impression: 1  No scintigraphic evidence of osseous metastasis   Workstation performed: [id number]     Ct Chest Abdomen Pelvis W Contrast    Result Date: 9/23/2019  Narrative: CT CHEST, ABDOMEN AND PELVIS WITH IV CONTRAST INDICATION:   C50 912: Malignant neoplasm of unspecified site of left female breast C77 3: Secondary and unspecified malignant neoplasm of axilla and upper limb lymph nodes  Newly diagnosed left breast cancer  COMPARISON:  None  TECHNIQUE: CT examination of the chest, abdomen and pelvis was performed  Axial, sagittal, and coronal 2D reformatted images were created from the source data and submitted for interpretation  Radiation dose length product (DLP) for this visit:  560 mGy-cm   This examination, like all CT scans performed in the [facility], was performed utilizing techniques to minimize radiation dose exposure, including the use of iterative reconstruction and automated exposure control  IV Contrast:  85 mL of iohexol (OMNIPAQUE) Enteric Contrast: Enteric contrast was administered  FINDINGS: CHEST LUNGS:  Lungs are clear  There is no tracheal or endobronchial lesion  PLEURA:  Unremarkable  HEART/GREAT VESSELS:  Unremarkable for patient's age  MEDIASTINUM AND ROHAN:  Unremarkable  CHEST WALL AND LOWER NECK:   Left axillary adenopathy with dominant node measuring 21 x 13 mm  High density area in the left breast likely correlating to a biopsy site  ABDOMEN LIVER/BILIARY TREE:  One or more subcentimeter sharply circumscribed low-density hepatic lesion(s) are noted, too small to accurately characterize, but statistically most likely to represent subcentimeter hepatic cysts  No suspicious solid hepatic lesion is identified  Hepatic contours are normal   No biliary dilatation  GALLBLADDER:  No calcified gallstones  No pericholecystic inflammatory change  SPLEEN:  Unremarkable  PANCREAS:  Unremarkable  ADRENAL GLANDS:  Unremarkable  KIDNEYS/URETERS:  Unremarkable  No hydronephrosis  STOMACH AND BOWEL:  Unremarkable  APPENDIX:  No findings to suggest appendicitis   ABDOMINOPELVIC CAVITY:  No ascites or free intraperitoneal air  No lymphadenopathy  VESSELS:  Unremarkable for patient's age  PELVIS REPRODUCTIVE ORGANS:  Predominantly lipomatous mass of the right adnexa measuring 3 7 x 2 8 x 3 2 cm with some small solid and calcific elements in keeping with a mature teratoma/ dermoid  URINARY BLADDER:  Unremarkable  ABDOMINAL WALL/INGUINAL REGIONS:  Unremarkable  OSSEOUS STRUCTURES:  No acute fracture or destructive osseous lesion  Impression: Left axillary adenopathy  No other signs of metastatic disease within the chest, abdomen or pelvis  Predominantly lipomatous mass of the right adnexa measuring 3 7 x 2 8 x 3 2 cm with some small solid and calcific elements in keeping with a mature teratoma/ dermoid  Workstation performed: [id number]     Mri Breast Bilateral W And Wo Contrast W Cad    Result Date: 9/26/2019  Narrative: DIAGNOSIS: Breast cancer metastasized to axillary lymph node, left (HCC) TECHNIQUE: MRI of both breasts was performed in axial and sagittal planes utilizing a combination of axial diffusion, fat suppressed sagittal T2 , gradient echo in phase T1 weighting followed by sagittal dynamic post contrast imaging with 3D fat suppressed gradient-echo T1 sequences (VIBRANT) at 1 5 minute intervals  Axial VIBRANT post contrast images were obtained  Sagittal subtraction images were generated  This exam was read in conjunction with Gem software  Intravenous contrast was administered at 2cc/sec, without documented contrast reaction  MEDICATIONS ADMINISTERED: gadobutrol injection (MULTI-DOSE) SOLN 6 mL, Total Given: 6 mL (1 dose) COMPARISONS: None available  RELEVANT HISTORY: Family Breast Cancer History: History of breast cancer in Paternal [de-identified]  Family Medical History: Family medical history includes breast cancer in paternal aunt  Personal History: No known relevant hormone history  Surgical history includes breast biopsy  Medical history includes breast cancer   RISK ASSESSMENT: 5 Year Tyrer-Cuzick: 0 76 % 10 Year Tyrer-Cuzick: 1 94 % Lifetime Tyrer-Cuzick: 16 52 % TISSUE DENSITY: FGT: The breasts have heterogeneous fibroglandular tissue  BPE: The background parenchymal enhancement is mild and symmetric  INDICATION: Paty Lamas is a 44 y o  female presenting for pre neoadjuvant chemotherapy  FINDINGS: LEFT 1) MASS: There is an 18 mm x 18 mm x 14 mm irregularly shaped mass with spiculated margins seen in the left breast at 2 o'clock in the anterior depth  Kinetics initial phase is fast  Delayed phase is washout  2) MASS: There is a 10 mm x 7 mm x 10 mm irregularly shaped mass with indistinct margins seen in the left breast at 3 o'clock in the posterior depth, 1 cm from the nipple  Kinetics initial phase is fast  Delayed phase is washout  3) LYMPH NODE: There are 4 similar 17 mm x 12 mm x 14 mm lymph nodes seen in the axilla region of the left axilla  Other foci of enhancement in the left breast are not dissimilar to symmetric enhancement seen in the contralateral breast  RIGHT There are no suspicious enhancing masses or suspicious areas of nonmass enhancement  There is no axillary or internal mammary adenopathy  Impression:  Previously biopsied malignant lesions in the left breast (including metastatic lymphadenopathy) identified  ASSESSMENT/BI-RADS CATEGORY: Left: 6 - Known Biopsy-Proven Malignancy Overall: 6 - Known Biopsy-Proven Malignancy RECOMMENDATION:      - Surgical consultation for the left breast  Workstation ID: [id number]    Us Pelvis Transabdominal Only    Result Date: 10/13/2019  Narrative: PELVIC ULTRASOUND, COMPLETE INDICATION:  44years old  N83 209: Unspecified ovarian cyst, unspecified side  COMPARISON: CT September 23, 2019 TECHNIQUE:   Transabdominal pelvic ultrasound was performed in sagittal and transverse planes with a curvilinear transducer  Imaging included volumetric sweeps as well as traditional still imaging technique   FINDINGS: UTERUS: The uterus is anteverted in position, measuring 8 3 x 4 5 x 5 9 cm  Contour and echotexture appear normal  The cervix shows no suspicious abnormality  ENDOMETRIUM:  Normal caliber of 3 mm  Trace nonspecific endometrial fluid  OVARIES/ADNEXA: Right ovary is normal size, 13 cc Right ovarian 4 x 2 7 x 3 3 cm dermoid  Doppler flow within normal limits  Left ovary is normal size, 2 cc No suspicious left ovarian abnormality  Doppler flow within normal limits  No suspicious adnexal mass or loculated collections  There is no free fluid  Impression:  Right ovarian 4 x 2 7 x 3 3 cm dermoid  Trace nonspecific endometrial fluid  Endometrial material could relate to blood products although indeterminate, consider direct visualization or follow-up pelvic ultrasound in 8-12 weeks  Workstation performed: [id number]         Labs:   Lab Results   Component Value Date    WBC 7 35 09/20/2019    HGB 12 6 09/20/2019    HCT 39 3 09/20/2019    MCV 92 09/20/2019     09/20/2019     Lab Results   Component Value Date    K 3 3 (L) 09/20/2019     09/20/2019    CO2 26 09/20/2019    BUN 11 09/20/2019    CREATININE 0 77 09/20/2019    GLUF 84 09/20/2019    CALCIUM 8 8 09/20/2019    AST 19 09/20/2019    ALT 34 09/20/2019    ALKPHOS 58 09/20/2019    EGFR 98 09/20/2019         Vital Sign:    Body surface area is 1 6 meters squared      Wt Readings from Last 3 Encounters:   10/23/19 53 5 kg (118 lb)   10/03/19 53 5 kg (118 lb)   10/02/19 53 1 kg (117 lb)        Temp Readings from Last 3 Encounters:   10/23/19 98 3 °F (36 8 °C) (Oral)   10/03/19 (!) 96 5 °F (35 8 °C) (Temporal)   10/02/19 98 3 °F (36 8 °C) (Oral)        BP Readings from Last 3 Encounters:   10/23/19 98/64   10/03/19 110/70   10/02/19 100/62         Pulse Readings from Last 3 Encounters:   10/23/19 89   10/03/19 73   10/02/19 71     @LASTSAO2(3)@    Active Problems:   Patient Active Problem List   Diagnosis    Malignant neoplasm of overlapping sites of left breast in female, estrogen receptor positive ([address] )    Varicose veins of left lower extremity with pain    Mild intermittent asthma without complication    Seasonal allergic rhinitis due to pollen       Past Medical History:   Past Medical History:   Diagnosis Date    Asthma     Breast cancer ([address] ) 09/10/2019    IDC       Surgical History:   Past Surgical History:   Procedure Laterality Date    BREAST BIOPSY Left 09/10/2019     SECTION      US GUIDANCE BREAST BIOPSY LEFT EACH ADDITIONAL Left 9/10/2019    US GUIDED BREAST BIOPSY LEFT COMPLETE Left 9/10/2019    US GUIDED BREAST LYMPH NODE BIOPSY LEFT Left 9/10/2019       Family History:    Family History   Problem Relation Age of Onset    Diabetes Father     Asthma Father     No Known Problems Daughter     No Known Problems Maternal Aunt     No Known Problems Maternal Aunt     No Known Problems Maternal Aunt     Breast cancer Paternal Aunt         age at dx unk    Stomach cancer Paternal Aunt     Pancreatic cancer Maternal Grandmother         age at dx unk    Cancer Paternal Uncle         type and age unk       Cancer-related family history includes Breast cancer in her paternal aunt; Cancer in her paternal uncle; Pancreatic cancer in her maternal grandmother; Stomach cancer in her paternal aunt      Social History:   Social History     Socioeconomic History    Marital status:      Spouse name: Not on file    Number of children: Not on file    Years of education: Not on file    Highest education level: Not on file   Occupational History    Not on file   Social Needs    Financial resource strain: Not hard at all   [employer] insecurity:     Worry: Never true     Inability: Never true   [employer] needs:     Medical: No     Non-medical: No   Tobacco Use    Smoking status: Never Smoker    Smokeless tobacco: Never Used   Substance and Sexual Activity    Alcohol use: Not Currently    Drug use: Never    Sexual activity: Yes   Lifestyle    Physical activity:     Days per week: 0 days     Minutes per session: 0 min    Stress: Only a little   Relationships    Social connections:     Talks on phone: Patient refused     Gets together: Patient refused     Attends Zoroastrianism service: Patient refused     Active member of club or organization: Patient refused     Attends meetings of clubs or organizations: Patient refused     Relationship status: Patient refused    Intimate partner violence:     Fear of current or ex partner: Patient refused     Emotionally abused: Patient refused     Physically abused: Patient refused     Forced sexual activity: Patient refused   Other Topics Concern    Not on file   Social History Narrative    Not on file       Current Medications:   Current Outpatient Medications   Medication Sig Dispense Refill    acetaminophen (TYLENOL) 500 mg tablet Take 500 mg by mouth every 6 (six) hours as needed for mild pain      albuterol (PROVENTIL HFA,VENTOLIN HFA) 90 mcg/act inhaler Inhale 2 puffs every 6 (six) hours as needed for wheezing or shortness of breath 1 Inhaler 3    loratadine (CLARITIN) 10 mg tablet Take 1 tablet (10 mg total) by mouth daily 30 tablet 3    mometasone (NASONEX) 50 mcg/act nasal spray 2 sprays into each nostril daily 1 Act 3    montelukast (SINGULAIR) 10 mg tablet Take 1 tablet (10 mg total) by mouth daily at bedtime 30 tablet 3    multivitamin (THERAGRAN) TABS Take 1 tablet by mouth daily       No current facility-administered medications for this visit  Allergies:    Allergies   Allergen Reactions    Aspirin Edema, Eye Swelling and Facial Swelling    Other Swelling     Seafood    Codeine Rash

## 2019-10-23 NOTE — LETTER
October 23, 2019     Denise Rodriguez MD  [id number]    Patient: Sonia Ratliff   YOB: 1980   Date of Visit: 10/23/2019       Dear Dr Cyn Mckeon: Thank you for referring Sonia Ratliff to me for evaluation  Below are my notes for this consultation  If you have questions, please do not hesitate to call me  I look forward to following your patient along with you  Sincerely,        Irasema Casey MD        CC: MD Irasema Mohan MD  10/23/2019  2:42 PM  Sign at close encounter  Hematology / Oncology Outpatient Consult Note    Sonia Ratliff 44 y o  female [id number]         Date:  10/23/2019    Assessment / Plan:  A 22-year-old premenopausal woman with newly diagnosed locally advanced left breast cancer, grade 2, ER 90 present positive, ID 70% positive, HER2 3+ disease  She has relatively large palpable left breast mass as well as palpable left axillary adenopathy which was biopsy confirmed to be metastatic disease  She has no evidence of distant metastasis  She presents today with her  and mother to discuss neoadjuvant chemotherapy  We had extensive discussion regarding the diagnosis, stage probable staging information, aggressive nature of disease, tumor phenotype rationale of neoadjuvant chemotherapy, goal of treatment and treatment options  I recommended her to have neoadjuvant chemotherapy with dose dense AC x4 followed by weekly paclitaxel, trastuzumab and try weekly pertuzumab  Side effects of this regimen was thoroughly discussed, including but not limited to alopecia, nausea, vomiting, neutropenia, risk of infection, cardiotoxicity, fatigue, neuropathy, allergic reaction, risk of pulmonary menopause as well as small risk of secondary leukemia  She understood and wished to proceed  I am going to ask interventional radiologist to place a Port-A-Cath    She is going to have echocardiogram as initial cardiac monitoring  She is going to start 1st cycle of chemotherapy at [facility] in November 5, 2019  She was instructed to give us a call immediately if she has fever  I will see her again when she is due for 2nd cycle treatment  All the patient and her family members questions were answered to their satisfaction  Subjective:     HPI:  A 51-year-old premenopausal woman who noticed a left breast lump recently which she brought to medical attention  She was found to have radiographic abnormality in her left breast as well as left axilla  She underwent left breast biopsy in September 10, 2019 which showed invasive ductal carcinoma, grade 2  This was ER 90 % positive, RI 70% positive, HER2 3+ disease  Biopsy from enlarged left axillary lymph node was also positive for metastatic disease  She was seen by Dr North Turner who sent her to me for neoadjuvant chemotherapy  She is negative for BRCA gene mutation  She underwent bone scan and CT scan chest abdomen pelvis which showed no evidence of distant metastasis  She presents today with her  in mother to discuss neoadjuvant chemotherapy  She has some left breast pain  Otherwise, she feels well  She denied any respiratory symptoms  She has no complaint of bone pain  She has regular menstrual cycle  She has 2 children  She has no plan to have more children  She has no surgical history  She has no significant past medical history  She is a lifetime never smoker  Her performance status is normal           Interval History:          Objective:     Primary Diagnosis:    Locally advanced left breast cancer, grade 2, ER 90 % positive, RI 70 % positive, HER2 3+ disease  Diagnosed in September 2019  Cancer Staging:  Cancer Staging  No matching staging information was found for the patient        Previous Hematologic/ Oncologic Treatment:         Current Hematologic/ Oncologic Treatment:      Neoadjuvant chemotherapy with dose dense AC x4 followed by weekly paclitaxel, trastuzumab and try weekly pertuzumab x12 weeks  1st cycle of AC to be given in November 5, 2019  Disease Status:     Not evaluated at this time  Test Results:    Pathology:    Left breast biopsy showed invasive ductal carcinoma, grade 2  ER 90 % positive, ND 70 % positive, HER2 3+ disease  Radiology:    CT scan chest abdomen pelvis showed no evidence of distant metastasis  Bone scan was negative for osseous metastasis  Laboratory:    See below  Physical Exam:      General Appearance:    Alert, oriented        Eyes:    PERRL   Ears:    Normal external ear canals, both ears   Nose:   Nares normal, septum midline   Throat:   Mucosa moist  Pharynx without injection  Neck:   Supple       Lungs:     Clear to auscultation bilaterally   Chest Wall:    No tenderness or deformity    Heart:    Regular rate and rhythm       Abdomen:     Soft, non-tender, bowel sounds +, no organomegaly           Extremities:   Extremities no cyanosis or edema       Skin:   no rash or icterus  Lymph nodes:   Cervical, supraclavicular, and axillary nodes normal   Neurologic:   CNII-XII intact, normal strength, sensation and reflexes     Throughout          Breast exam:   3 x 4 cm of palpable mass at 2:00 position in her left breast   1-1 5 cm of palpable left axillary adenopathy  Right breast exam is negative  ROS: Review of Systems   All other systems reviewed and are negative  Imaging: Nm Bone Scan Whole Body    Result Date: 9/26/2019  Narrative: BONE SCAN  WHOLE BODY INDICATION: C50 912: Malignant neoplasm of unspecified site of left female breast C77 3: Secondary and unspecified malignant neoplasm of axilla and upper limb lymph nodes PREVIOUS FILM CORRELATION:    CT chest abdomen pelvis September 23, 2019 TECHNIQUE:   This study was performed following the intravenous administration of 27 5 mCi Tc-99m labeled MDP    Delayed, anterior and posterior whole body images were acquired, 2-3 hours after radiopharmaceutical administration  Additional delayed static images acquired of the thorax  FINDINGS:  Focal asymmetric radiotracer uptake at the left 1st costochondral junction corresponding to degenerative spurring on CT  Whole-body uptake is otherwise within normal limits  There is no scintigraphic evidence of osseous metastasis  Renal activity is symmetric  Impression: 1  No scintigraphic evidence of osseous metastasis  Workstation performed: [id number]     Ct Chest Abdomen Pelvis W Contrast    Result Date: 9/23/2019  Narrative: CT CHEST, ABDOMEN AND PELVIS WITH IV CONTRAST INDICATION:   C50 912: Malignant neoplasm of unspecified site of left female breast C77 3: Secondary and unspecified malignant neoplasm of axilla and upper limb lymph nodes  Newly diagnosed left breast cancer  COMPARISON:  None  TECHNIQUE: CT examination of the chest, abdomen and pelvis was performed  Axial, sagittal, and coronal 2D reformatted images were created from the source data and submitted for interpretation  Radiation dose length product (DLP) for this visit:  560 mGy-cm   This examination, like all CT scans performed in the [facility], was performed utilizing techniques to minimize radiation dose exposure, including the use of iterative reconstruction and automated exposure control  IV Contrast:  85 mL of iohexol (OMNIPAQUE) Enteric Contrast: Enteric contrast was administered  FINDINGS: CHEST LUNGS:  Lungs are clear  There is no tracheal or endobronchial lesion  PLEURA:  Unremarkable  HEART/GREAT VESSELS:  Unremarkable for patient's age  MEDIASTINUM AND ROHAN:  Unremarkable  CHEST WALL AND LOWER NECK:   Left axillary adenopathy with dominant node measuring 21 x 13 mm  High density area in the left breast likely correlating to a biopsy site   ABDOMEN LIVER/BILIARY TREE:  One or more subcentimeter sharply circumscribed low-density hepatic lesion(s) are noted, too small to accurately characterize, but statistically most likely to represent subcentimeter hepatic cysts  No suspicious solid hepatic lesion is identified  Hepatic contours are normal   No biliary dilatation  GALLBLADDER:  No calcified gallstones  No pericholecystic inflammatory change  SPLEEN:  Unremarkable  PANCREAS:  Unremarkable  ADRENAL GLANDS:  Unremarkable  KIDNEYS/URETERS:  Unremarkable  No hydronephrosis  STOMACH AND BOWEL:  Unremarkable  APPENDIX:  No findings to suggest appendicitis  ABDOMINOPELVIC CAVITY:  No ascites or free intraperitoneal air  No lymphadenopathy  VESSELS:  Unremarkable for patient's age  PELVIS REPRODUCTIVE ORGANS:  Predominantly lipomatous mass of the right adnexa measuring 3 7 x 2 8 x 3 2 cm with some small solid and calcific elements in keeping with a mature teratoma/ dermoid  URINARY BLADDER:  Unremarkable  ABDOMINAL WALL/INGUINAL REGIONS:  Unremarkable  OSSEOUS STRUCTURES:  No acute fracture or destructive osseous lesion  Impression: Left axillary adenopathy  No other signs of metastatic disease within the chest, abdomen or pelvis  Predominantly lipomatous mass of the right adnexa measuring 3 7 x 2 8 x 3 2 cm with some small solid and calcific elements in keeping with a mature teratoma/ dermoid  Workstation performed: [id number]     Mri Breast Bilateral W And Wo Contrast W Cad    Result Date: 9/26/2019  Narrative: DIAGNOSIS: Breast cancer metastasized to axillary lymph node, left (HCC) TECHNIQUE: MRI of both breasts was performed in axial and sagittal planes utilizing a combination of axial diffusion, fat suppressed sagittal T2 , gradient echo in phase T1 weighting followed by sagittal dynamic post contrast imaging with 3D fat suppressed gradient-echo T1 sequences (VIBRANT) at 1 5 minute intervals  Axial VIBRANT post contrast images were obtained  Sagittal subtraction images were generated  This exam was read in conjunction with [employer] software   Intravenous contrast was administered at 2cc/sec, without documented contrast reaction  MEDICATIONS ADMINISTERED: gadobutrol injection (MULTI-DOSE) SOLN 6 mL, Total Given: 6 mL (1 dose) COMPARISONS: None available  RELEVANT HISTORY: Family Breast Cancer History: History of breast cancer in Paternal [de-identified]  Family Medical History: Family medical history includes breast cancer in paternal aunt  Personal History: No known relevant hormone history  Surgical history includes breast biopsy  Medical history includes breast cancer  RISK ASSESSMENT: 5 Year Tyrer-Cuzick: 0 76 % 10 Year Tyrer-Cuzick: 1 94 % Lifetime Tyrer-Cuzick: 16 52 % TISSUE DENSITY: FGT: The breasts have heterogeneous fibroglandular tissue  BPE: The background parenchymal enhancement is mild and symmetric  INDICATION: Juma Smart is a 44 y o  female presenting for pre neoadjuvant chemotherapy  FINDINGS: LEFT 1) MASS: There is an 18 mm x 18 mm x 14 mm irregularly shaped mass with spiculated margins seen in the left breast at 2 o'clock in the anterior depth  Kinetics initial phase is fast  Delayed phase is washout  2) MASS: There is a 10 mm x 7 mm x 10 mm irregularly shaped mass with indistinct margins seen in the left breast at 3 o'clock in the posterior depth, 1 cm from the nipple  Kinetics initial phase is fast  Delayed phase is washout  3) LYMPH NODE: There are 4 similar 17 mm x 12 mm x 14 mm lymph nodes seen in the axilla region of the left axilla  Other foci of enhancement in the left breast are not dissimilar to symmetric enhancement seen in the contralateral breast  RIGHT There are no suspicious enhancing masses or suspicious areas of nonmass enhancement  There is no axillary or internal mammary adenopathy  Impression:  Previously biopsied malignant lesions in the left breast (including metastatic lymphadenopathy) identified   ASSESSMENT/BI-RADS CATEGORY: Left: 6 - Known Biopsy-Proven Malignancy Overall: 6 - Known Biopsy-Proven Malignancy RECOMMENDATION:      - Surgical consultation for the left breast  Workstation ID: [id number]    Us Pelvis Transabdominal Only    Result Date: 10/13/2019  Narrative: PELVIC ULTRASOUND, COMPLETE INDICATION:  44years old  N83 209: Unspecified ovarian cyst, unspecified side  COMPARISON: CT September 23, 2019 TECHNIQUE:   Transabdominal pelvic ultrasound was performed in sagittal and transverse planes with a curvilinear transducer  Imaging included volumetric sweeps as well as traditional still imaging technique  FINDINGS: UTERUS: The uterus is anteverted in position, measuring 8 3 x 4 5 x 5 9 cm  Contour and echotexture appear normal  The cervix shows no suspicious abnormality  ENDOMETRIUM:  Normal caliber of 3 mm  Trace nonspecific endometrial fluid  OVARIES/ADNEXA: Right ovary is normal size, 13 cc Right ovarian 4 x 2 7 x 3 3 cm dermoid  Doppler flow within normal limits  Left ovary is normal size, 2 cc No suspicious left ovarian abnormality  Doppler flow within normal limits  No suspicious adnexal mass or loculated collections  There is no free fluid  Impression:  Right ovarian 4 x 2 7 x 3 3 cm dermoid  Trace nonspecific endometrial fluid  Endometrial material could relate to blood products although indeterminate, consider direct visualization or follow-up pelvic ultrasound in 8-12 weeks  Workstation performed: [id number]         Labs:   Lab Results   Component Value Date    WBC 7 35 09/20/2019    HGB 12 6 09/20/2019    HCT 39 3 09/20/2019    MCV 92 09/20/2019     09/20/2019     Lab Results   Component Value Date    K 3 3 (L) 09/20/2019     09/20/2019    CO2 26 09/20/2019    BUN 11 09/20/2019    CREATININE 0 77 09/20/2019    GLUF 84 09/20/2019    CALCIUM 8 8 09/20/2019    AST 19 09/20/2019    ALT 34 09/20/2019    ALKPHOS 58 09/20/2019    EGFR 98 09/20/2019         Vital Sign:    Body surface area is 1 6 meters squared      Wt Readings from Last 3 Encounters:   10/23/19 53 5 kg (118 lb)   10/03/19 53 5 kg (118 lb) 10/02/19 53 1 kg (117 lb)        Temp Readings from Last 3 Encounters:   10/23/19 98 3 °F (36 8 °C) (Oral)   10/03/19 (!) 96 5 °F (35 8 °C) (Temporal)   10/02/19 98 3 °F (36 8 °C) (Oral)        BP Readings from Last 3 Encounters:   10/23/19 98/64   10/03/19 110/70   10/02/19 100/62         Pulse Readings from Last 3 Encounters:   10/23/19 89   10/03/19 73   10/02/19 71     @LASTSAO2(3)@    Active Problems:   Patient Active Problem List   Diagnosis    Malignant neoplasm of overlapping sites of left breast in female, estrogen receptor positive ([facility] [address] )    Varicose veins of left lower extremity with pain    Mild intermittent asthma without complication    Seasonal allergic rhinitis due to pollen       Past Medical History:   Past Medical History:   Diagnosis Date    Asthma     Breast cancer ([facility] [address] ) 09/10/2019    IDC       Surgical History:   Past Surgical History:   Procedure Laterality Date    BREAST BIOPSY Left 09/10/2019     SECTION      US GUIDANCE BREAST BIOPSY LEFT EACH ADDITIONAL Left 9/10/2019    US GUIDED BREAST BIOPSY LEFT COMPLETE Left 9/10/2019    US GUIDED BREAST LYMPH NODE BIOPSY LEFT Left 9/10/2019       Family History:    Family History   Problem Relation Age of Onset    Diabetes Father     Asthma Father     No Known Problems Daughter     No Known Problems Maternal Aunt     No Known Problems Maternal Aunt     No Known Problems Maternal Aunt     Breast cancer Paternal Aunt         age at dx unk    Stomach cancer Paternal Aunt     Pancreatic cancer Maternal Grandmother         age at dx unk    Cancer Paternal Uncle         type and age unk       Cancer-related family history includes Breast cancer in her paternal aunt; Cancer in her paternal uncle; Pancreatic cancer in her maternal grandmother; Stomach cancer in her paternal aunt      Social History:   Social History     Socioeconomic History    Marital status:      Spouse name: Not on file    Number of children: Not on file    Years of education: Not on file    Highest education level: Not on file   Occupational History    Not on file   Social Needs    Financial resource strain: Not hard at all    Food insecurity:     Worry: Never true     Inability: Never true   [employer] needs:     Medical: No     Non-medical: No   Tobacco Use    Smoking status: Never Smoker    Smokeless tobacco: Never Used   Substance and Sexual Activity    Alcohol use: Not Currently    Drug use: Never    Sexual activity: Yes   Lifestyle    Physical activity:     Days per week: 0 days     Minutes per session: 0 min    Stress:  Only a little   Relationships    Social connections:     Talks on phone: Patient refused     Gets together: Patient refused     Attends [employer] service: Patient refused     Active member of club or organization: Patient refused     Attends meetings of clubs or organizations: Patient refused     Relationship status: Patient refused    Intimate partner violence:     Fear of current or ex partner: Patient refused     Emotionally abused: Patient refused     Physically abused: Patient refused     Forced sexual activity: Patient refused   Other Topics Concern    Not on file   Social History Narrative    Not on file       Current Medications:   Current Outpatient Medications   Medication Sig Dispense Refill    acetaminophen (TYLENOL) 500 mg tablet Take 500 mg by mouth every 6 (six) hours as needed for mild pain      albuterol (PROVENTIL HFA,VENTOLIN HFA) 90 mcg/act inhaler Inhale 2 puffs every 6 (six) hours as needed for wheezing or shortness of breath 1 Inhaler 3    loratadine (CLARITIN) 10 mg tablet Take 1 tablet (10 mg total) by mouth daily 30 tablet 3    mometasone (NASONEX) 50 mcg/act nasal spray 2 sprays into each nostril daily 1 Act 3    montelukast (SINGULAIR) 10 mg tablet Take 1 tablet (10 mg total) by mouth daily at bedtime 30 tablet 3    multivitamin (THERAGRAN) TABS Take 1 tablet by mouth daily       No current facility-administered medications for this visit  Allergies:    Allergies   Allergen Reactions    Aspirin Edema, Eye Swelling and Facial Swelling    Other Swelling     Seafood    Codeine Rash

## 2019-10-23 NOTE — PROGRESS NOTES
Reviewed pt's medical record in [facility]  Met with her, her spouse Judson Leahy and her mother Dave Mcrae in [address] [place] [address] at [facility]  Briefly reviewed my role with them and provided them with my contact information  Pt was agreeable to have me meet with her when she is receiving treatment at [facility] on 11/5/19  Offered ongoing support for which the pt expressed her gratitude

## 2019-10-24 ENCOUNTER — TELEPHONE (OUTPATIENT)
Dept: RADIOLOGY | Facility: HOSPITAL | Age: 39
End: 2019-10-24

## 2019-10-24 RX ORDER — SODIUM CHLORIDE 9 MG/ML
75 INJECTION, SOLUTION INTRAVENOUS CONTINUOUS
Status: CANCELLED | OUTPATIENT
Start: 2019-10-24

## 2019-10-25 ENCOUNTER — TELEPHONE (OUTPATIENT)
Dept: HEMATOLOGY ONCOLOGY | Facility: CLINIC | Age: 39
End: 2019-10-25

## 2019-10-25 NOTE — TELEPHONE ENCOUNTER
BCN Navigator:  Called patient for navigation outreach pre-chemo start  Discussed upcoming port placement, infusion center process, chemotherapy, general side effects,  RX, and cancer support  Patient will be coming with her mother and/or  to her first cycle, transportation is not a concern  I will plan to meet pt at [facility] infusion C[facility], to which she is agreeable to  At that time I will answer any further questions she might have, offer emotional support and provide reinforcement education as needed  Pt was given my contact information and encouraged to call for questions/concerns  Will follow up as needed

## 2019-10-28 ENCOUNTER — TELEPHONE (OUTPATIENT)
Dept: SURGERY | Facility: HOSPITAL | Age: 39
End: 2019-10-28

## 2019-10-29 ENCOUNTER — HOSPITAL ENCOUNTER (OUTPATIENT)
Dept: RADIOLOGY | Facility: HOSPITAL | Age: 39
Discharge: HOME | End: 2019-10-29
Attending: INTERNAL MEDICINE | Admitting: INTERNAL MEDICINE
Payer: COMMERCIAL

## 2019-10-29 VITALS
RESPIRATION RATE: 17 BRPM | OXYGEN SATURATION: 100 % | DIASTOLIC BLOOD PRESSURE: 51 MMHG | WEIGHT: 118 LBS | TEMPERATURE: 98.2 F | BODY MASS INDEX: 18.96 KG/M2 | SYSTOLIC BLOOD PRESSURE: 92 MMHG | HEIGHT: 66 IN | HEART RATE: 79 BPM

## 2019-10-29 DIAGNOSIS — Z17.0 MALIGNANT NEOPLASM OF OVERLAPPING SITES OF LEFT BREAST IN FEMALE, ESTROGEN RECEPTOR POSITIVE (HCC): ICD-10-CM

## 2019-10-29 DIAGNOSIS — C50.812 MALIGNANT NEOPLASM OF OVERLAPPING SITES OF LEFT BREAST IN FEMALE, ESTROGEN RECEPTOR POSITIVE (HCC): ICD-10-CM

## 2019-10-29 PROCEDURE — 76937 US GUIDE VASCULAR ACCESS: CPT

## 2019-10-29 PROCEDURE — 77001 FLUOROGUIDE FOR VEIN DEVICE: CPT | Performed by: RADIOLOGY

## 2019-10-29 PROCEDURE — 99153 MOD SED SAME PHYS/QHP EA: CPT

## 2019-10-29 PROCEDURE — C1769 GUIDE WIRE: HCPCS

## 2019-10-29 PROCEDURE — 36561 INSERT TUNNELED CV CATH: CPT | Performed by: RADIOLOGY

## 2019-10-29 PROCEDURE — 77001 FLUOROGUIDE FOR VEIN DEVICE: CPT

## 2019-10-29 PROCEDURE — C1788 PORT, INDWELLING, IMP: HCPCS

## 2019-10-29 PROCEDURE — 99152 MOD SED SAME PHYS/QHP 5/>YRS: CPT | Performed by: RADIOLOGY

## 2019-10-29 PROCEDURE — 36561 INSERT TUNNELED CV CATH: CPT

## 2019-10-29 PROCEDURE — 99152 MOD SED SAME PHYS/QHP 5/>YRS: CPT

## 2019-10-29 PROCEDURE — 76937 US GUIDE VASCULAR ACCESS: CPT | Performed by: RADIOLOGY

## 2019-10-29 RX ORDER — DIPHENHYDRAMINE HYDROCHLORIDE 50 MG/ML
INJECTION INTRAMUSCULAR; INTRAVENOUS CODE/TRAUMA/SEDATION MEDICATION
Status: COMPLETED | OUTPATIENT
Start: 2019-10-29 | End: 2019-10-29

## 2019-10-29 RX ORDER — MIDAZOLAM HYDROCHLORIDE 1 MG/ML
INJECTION INTRAMUSCULAR; INTRAVENOUS CODE/TRAUMA/SEDATION MEDICATION
Status: COMPLETED | OUTPATIENT
Start: 2019-10-29 | End: 2019-10-29

## 2019-10-29 RX ORDER — FENTANYL CITRATE 50 UG/ML
INJECTION, SOLUTION INTRAMUSCULAR; INTRAVENOUS CODE/TRAUMA/SEDATION MEDICATION
Status: COMPLETED | OUTPATIENT
Start: 2019-10-29 | End: 2019-10-29

## 2019-10-29 RX ADMIN — Medication 1000 MG: at 10:50

## 2019-10-29 RX ADMIN — DIPHENHYDRAMINE HYDROCHLORIDE 50 MG: 50 INJECTION, SOLUTION INTRAMUSCULAR; INTRAVENOUS at 11:07

## 2019-10-29 RX ADMIN — MIDAZOLAM HYDROCHLORIDE 0.5 MG: 1 INJECTION, SOLUTION INTRAMUSCULAR; INTRAVENOUS at 11:32

## 2019-10-29 RX ADMIN — FENTANYL CITRATE 25 MCG: 50 INJECTION INTRAMUSCULAR; INTRAVENOUS at 11:32

## 2019-10-29 RX ADMIN — MIDAZOLAM HYDROCHLORIDE 1 MG: 1 INJECTION, SOLUTION INTRAMUSCULAR; INTRAVENOUS at 11:01

## 2019-10-29 RX ADMIN — MIDAZOLAM HYDROCHLORIDE 0.5 MG: 1 INJECTION, SOLUTION INTRAMUSCULAR; INTRAVENOUS at 11:18

## 2019-10-29 RX ADMIN — MIDAZOLAM HYDROCHLORIDE 0.5 MG: 1 INJECTION, SOLUTION INTRAMUSCULAR; INTRAVENOUS at 11:23

## 2019-10-29 RX ADMIN — FENTANYL CITRATE 25 MCG: 50 INJECTION INTRAMUSCULAR; INTRAVENOUS at 11:18

## 2019-10-29 RX ADMIN — FENTANYL CITRATE 50 MCG: 50 INJECTION INTRAMUSCULAR; INTRAVENOUS at 11:01

## 2019-10-29 RX ADMIN — FENTANYL CITRATE 25 MCG: 50 INJECTION INTRAMUSCULAR; INTRAVENOUS at 11:23

## 2019-10-29 NOTE — DISCHARGE INSTRUCTIONS
Implanted Venous Access Port     WHAT YOU NEED TO KNOW:   An implanted venous access port is a device used to give treatments and take blood  It may also be called a central venous access device (CVAD)  The port is a small container that is placed under your skin, usually in your upper chest  The port is attached to a catheter that enters a large vein  DISCHARGE INSTRUCTIONS:   Resume your normal diet  Small sips of flat soda will help with mild nausea  Prevent an infection:   · Wash your hands often  Use soap and water  Clean your hands before and after you care for your port  Remind everyone who cares for your port to wash their hands  · Check your skin for infection every day  Look for redness, swelling, or fluid oozing from the port site  Care for your port:   1  You may shower beginning 48 hours after procedure  2  Change dressing if it becomes wet  3  Remove dressing after 24 hours  Leave glue in place  4  It is normal for some bruising to occur  5  Use Tylenol for pain  6  Limit use of arm on the side that your port was placed  Lift nothing heavier than 5 pounds for 1 week, and then gradually increase activity as tolerated  7  DO NOT apply ointment, lotion or cream to port site until incision is healed  Allow glue to fall off  DO NOT attempt to peel glue from skin even it it begins to flake  8, After the port incision is healed you may swim, bathe  Notify the Interventional Radiologist if you have any of the followin  Fever above 101 F    2  Increased redness or swelling after 1st day  3  Increased pain after 1st day  4  Any sign of infection (drainage from port site, skin separation, hot to touch)  5  Persistent nausea or vomiting  Contact Interventional Radiology at [phone] [facility] PATIENTS: Contact Interventional Radiology at [phone]) ([address]: Contact Interventional Radiology at [phone])

## 2019-10-29 NOTE — INTERVAL H&P NOTE
Update: (This section must be completed if the H&P was completed greater than 24 hrs to procedure or admission)    H&P reviewed  After examining the patient, I find no changed to the H&P since it had been written  BP 99/56   Pulse 62   Temp (!) 97 2 °F (36 2 °C) (Temporal)   Resp 18   Ht 5' 6" (1 676 m)   Wt 53 5 kg (118 lb)   LMP 09/08/2019 Comment: URINE PREG NEGATIVE  SpO2 100%   BMI 19 05 kg/m²     Patient re-evaluated   Accept as history and physical     Marilu Staples MD/October 29, 2019/11:03 AM

## 2019-10-29 NOTE — LETTER
[address] [address]  Dept: [phone]    October 29, 2019     Patient: Axel Kamara   YOB: 1980   Date of Visit: 10/29/2019       To Whom it May Concern:    Axel Kamara is under my professional care  She was seen in the hospital from 10/29/2019   to 10/29/19  She may return to work on November 4, 2019 without limitations  If you have any questions or concerns, please don't hesitate to call           Sincerely,          Comfort Soto MD

## 2019-10-29 NOTE — BRIEF OP NOTE (RAD/CATH)
IR PORT PLACEMENT  Procedure Note    PATIENT NAME: Kamla Momin  : 1980  MRN: [id number]     Pre-op Diagnosis:   1  Malignant neoplasm of overlapping sites of left breast in female, estrogen receptor positive ([facility] [address] )      Post-op Diagnosis:   1  Malignant neoplasm of overlapping sites of left breast in female, estrogen receptor positive [facility])        Surgeon:   John Nayak MD  Assistants:     No qualified resident was available, Resident is only observing    Estimated Blood Loss: minimal  Findings: Successful port placement  May use immediately      Specimens: none    Complications:  None immediate    Anesthesia: Conscious sedation    John Nayak MD     Date: 10/29/2019  Time: 11:03 AM

## 2019-10-30 ENCOUNTER — CONSULT (OUTPATIENT)
Dept: VASCULAR SURGERY | Facility: CLINIC | Age: 39
End: 2019-10-30
Payer: COMMERCIAL

## 2019-10-30 ENCOUNTER — TELEPHONE (OUTPATIENT)
Dept: HEMATOLOGY ONCOLOGY | Facility: CLINIC | Age: 39
End: 2019-10-30

## 2019-10-30 ENCOUNTER — HOSPITAL ENCOUNTER (OUTPATIENT)
Dept: NON INVASIVE DIAGNOSTICS | Facility: CLINIC | Age: 39
Discharge: HOME | End: 2019-10-30
Payer: COMMERCIAL

## 2019-10-30 VITALS
SYSTOLIC BLOOD PRESSURE: 102 MMHG | HEART RATE: 71 BPM | TEMPERATURE: 97.2 F | WEIGHT: 119 LBS | HEIGHT: 66 IN | DIASTOLIC BLOOD PRESSURE: 64 MMHG | BODY MASS INDEX: 19.13 KG/M2

## 2019-10-30 DIAGNOSIS — Z17.0 MALIGNANT NEOPLASM OF OVERLAPPING SITES OF LEFT BREAST IN FEMALE, ESTROGEN RECEPTOR POSITIVE (HCC): ICD-10-CM

## 2019-10-30 DIAGNOSIS — C50.812 MALIGNANT NEOPLASM OF OVERLAPPING SITES OF LEFT BREAST IN FEMALE, ESTROGEN RECEPTOR POSITIVE (HCC): ICD-10-CM

## 2019-10-30 DIAGNOSIS — I83.812 VARICOSE VEINS OF LEFT LOWER EXTREMITY WITH PAIN: Primary | ICD-10-CM

## 2019-10-30 DIAGNOSIS — D70.1 CHEMOTHERAPY INDUCED NEUTROPENIA (HCC): ICD-10-CM

## 2019-10-30 DIAGNOSIS — T45.1X5A CHEMOTHERAPY INDUCED NEUTROPENIA (HCC): ICD-10-CM

## 2019-10-30 PROCEDURE — 99214 OFFICE O/P EST MOD 30 MIN: CPT | Performed by: NURSE PRACTITIONER

## 2019-10-30 PROCEDURE — 93306 TTE W/DOPPLER COMPLETE: CPT

## 2019-10-30 PROCEDURE — 93306 TTE W/DOPPLER COMPLETE: CPT | Performed by: INTERNAL MEDICINE

## 2019-10-30 NOTE — TELEPHONE ENCOUNTER
Left VM for patient to let her know the following information  Her insurance denied Neulasta onpro  However, they did approve the equivalent, which is Fulphila  This will need to be given on day 2 of treatment  Her infusions of chemotherapy did not change  However, her day 2 injections needed to be scheduled  These appointments are in the system  I did send patient a calender of the updated appointments and highlighted her appointments for chemotherapy infusions and Fulphila injection  If she has any questions, I left call back number

## 2019-10-30 NOTE — PATIENT INSTRUCTIONS
Symptomatic varicose veins to the left leg with pain  -We will initiate a trial of conservative therapy to include the daily use of 20-30 mmHg knee high compression stockings  Wear stockings daily during waking hours only, there is no benefit to sleeping in stockings  -Other things we discussed that can help manage symptoms:  Periodic elevation, regular physical activity and maintenance of a healthy weight  -If your symptoms worsen despite conservative therapy notify our office and further testing can be done at that time to explore further possible surgical treatment options          Varicose Veins   WHAT YOU NEED TO KNOW:   Varicose veins are veins that become large, twisted, and swollen  They are common on the back of the calves, knees, and thighs  Varicose veins are caused by valves in your veins that do not work properly  This causes blood to collect and increase pressure in the veins of your legs  The increased pressure causes your veins to stretch, get larger, swell, and twist        DISCHARGE INSTRUCTIONS:   Return to the emergency department if:   · You have a wound that does not heal or is infected  · You have an injury that has broken your skin and caused your varicose veins to bleed  · Your leg is swollen and hard  · You have pain in your leg that does not go away or gets worse  · You notice that your legs or feet are turning blue or black  · Your leg feels warm, tender, and painful  It may look swollen and red  Contact your healthcare provider if:   · Your symptoms get worse or they keep you from doing your daily activities  · You have an injury that has caused your varicose veins to bleed underneath your skin  · You have a rash on your leg  · Your symptoms keep you from doing your daily activities  · You have questions or concerns about your condition or care  Medicines:   · Prescription pain medicine  may be given  Ask how to take this medicine safely      · Take your medicine as directed  Contact your healthcare provider if you think your medicine is not helping or if you have side effects  Tell him or her if you are allergic to any medicine  Keep a list of the medicines, vitamins, and herbs you take  Include the amounts, and when and why you take them  Bring the list or the pill bottles to follow-up visits  Carry your medicine list with you in case of an emergency  Follow up with your healthcare provider as directed:  Write down your questions so you remember to ask them during your visits  Manage varicose veins:   · Wear pressure stockings  The stockings are tight and put pressure on your legs  They improve blood flow and help prevent clots  · Elevate your legs  Keep them above the level of your heart for 15 to 30 minutes several times a day  This will help blood to flow back to your heart  · Do not sit or stand for long periods of time  This can cause the blood to collect in your legs and make your symptoms worse  Walk around for a few minutes every hour to get blood moving in your legs  · Do not wear tight clothing or shoes  Do not wear high-heeled shoes  Do not wear clothes that are tight around the waist     · Get plenty of exercise  Talk to your healthcare provider about the best exercise plan for you  Exercise can decrease your blood pressure and improve your health  Bend or rotate your ankles several times every hour  This will help blood to flow back to the heart  · Maintain a healthy weight  Your heart works harder when you are overweight  This can make varicose vein worse  Ask your healthcare provider how much you should weigh  Ask him to help you create a weight loss plan if you are overweight  · Do not smoke  Nicotine and other chemicals in cigarettes and cigars can cause blood vessel damage  Ask your healthcare provider for information if you currently smoke and need help to quit   E-cigarettes or smokeless tobacco still contain nicotine  Talk to your healthcare provider before you use these products  © 2017 [employer] is for End User's use only and may not be sold, redistributed or otherwise used for commercial purposes  All illustrations and images included in CareNotes® are the copyrighted property of [employer]  or Daniel Randall  The above information is an  only  It is not intended as medical advice for individual conditions or treatments  Talk to your doctor, nurse or pharmacist before following any medical regimen to see if it is safe and effective for you

## 2019-10-30 NOTE — PROGRESS NOTES
Assessment/Plan:    Varicose veins of left lower extremity with pain  43 y/o F with active breast cancer, s/p port placement yesterday, planning to start chemotherapy next week, seen for evaluation of varicose veins  She has varicosities to BLE with intermittent pain to the left leg only  We discussed the pathophysiology of venous disease as it relates to her symptoms  Advised the use of 20-30 mmHg compression stockings  Discussed the benefits of periodic elevation and regular activity to help manage symptoms  If her symptoms worsen despite conservative management she will notify our office, otherwise we will be happy to see her on an as needed basis  Ideally would complete breast cancer treatment prior to any elective treatment of her varicose veins  Diagnoses and all orders for this visit:    Varicose veins of left lower extremity with pain  -     Ambulatory referral to Vascular Surgery  -     Compression Stocking    Malignant neoplasm of overlapping sites of left breast in female, estrogen receptor positive ([facility] [address] [address] )    Other orders  -     Cancel: VAS reflux lower limb venous duplex study with reflux assesment, unilateral; Future          Subjective:      Patient ID: Natalie Leavitt is a 44 y o  female  Patient is new to our practice, referred by her PCP for evaluation of varicose veins  She has bilateral lower extremity varicose veins, worse to the left leg with truncal varicosity to the left posterior calf  She states that veins have been present since she was around 23years old, however over the past few years she has developed intermittent pain symptoms  She states that when she is sitting or standing for long periods of time she develops pain to the left leg  She is unable to further qualify the pain, but states it is localized over the vein on her left posterior calf  She reports intermittent lower extremity swelling  She does not wear compression  She does not elevate    She was recently diagnosed with breast cancer and had a chemotherapy port placed yesterday  She plans to start chemotherapy next week  No skin changes  New patient presents in office for VV  Patient has not prior testing  Patient reports pain in L leg when sitting or walking for long periods of time  Patient reports tiredness, heaviness, cramping in the calf, and swelling  Patient reports never using compression socks  The following portions of the patient's history were reviewed and updated as appropriate: allergies, current medications, past family history, past medical history, past social history, past surgical history and problem list     Review of Systems   Constitutional: Positive for appetite change, fatigue and unexpected weight change (2lbs weight gain)  HENT: Positive for rhinorrhea, sinus pressure, sinus pain and sneezing  Eyes: Negative  Respiratory: Negative  Cardiovascular:        Painful veins   Gastrointestinal: Positive for abdominal pain, constipation and nausea  Endocrine: Negative  Genitourinary: Negative  Musculoskeletal: Positive for neck pain  Leg pain   Skin: Negative  Allergic/Immunologic: Positive for environmental allergies and food allergies  Neurological: Negative  Hematological: Negative  Psychiatric/Behavioral: Negative  Objective:     Physical Exam   Constitutional: She is oriented to person, place, and time  She appears well-nourished  No distress  HENT:   Head: Normocephalic and atraumatic  Eyes: No scleral icterus  Neck: Neck supple  No JVD present  Cardiovascular: Normal rate and regular rhythm  Pulses:       Dorsalis pedis pulses are 2+ on the right side, and 2+ on the left side  Bilateral lower extremity varicosities, truncal varicosity to the left posterior calf   Pulmonary/Chest: Effort normal    Abdominal: Soft  She exhibits no distension  There is no tenderness  Musculoskeletal: She exhibits no edema  Neurological: She is alert and oriented to person, place, and time  Skin: Skin is warm and dry  Psychiatric: She has a normal mood and affect  I have reviewed and made appropriate changes to the review of systems input by the medical assistant      Vitals:    10/30/19 1537   BP: 102/64   BP Location: Left arm   Patient Position: Sitting   Cuff Size: Adult   Pulse: 71   Temp: (!) 97 2 °F (36 2 °C)   TempSrc: Tympanic   Weight: 54 kg (119 lb)   Height: 5' 6" (1 676 m)       Patient Active Problem List   Diagnosis    Malignant neoplasm of overlapping sites of left breast in female, estrogen receptor positive ([address] )    Varicose veins of left lower extremity with pain    Mild intermittent asthma without complication    Seasonal allergic rhinitis due to pollen    Chemotherapy induced neutropenia [facility])       Past Surgical History:   Procedure Laterality Date    BREAST BIOPSY Left 09/10/2019     SECTION      IR PORT PLACEMENT  10/29/2019    US GUIDANCE BREAST BIOPSY LEFT EACH ADDITIONAL Left 9/10/2019    US GUIDED BREAST BIOPSY LEFT COMPLETE Left 9/10/2019    US GUIDED BREAST LYMPH NODE BIOPSY LEFT Left 9/10/2019       Family History   Problem Relation Age of Onset    Diabetes Father     Asthma Father     No Known Problems Daughter     No Known Problems Maternal Aunt     No Known Problems Maternal Aunt     No Known Problems Maternal Aunt     Breast cancer Paternal Aunt         age at dx unk    Stomach cancer Paternal Aunt     Pancreatic cancer Maternal Grandmother         age at dx unk    Cancer Paternal Uncle         type and age unk       Social History     Socioeconomic History    Marital status:      Spouse name: Not on file    Number of children: Not on file    Years of education: Not on file    Highest education level: Not on file   Occupational History    Not on file   Social Needs    Financial resource strain: Not hard at all   [place] insecurity:     Worry: Never true     Inability: Never true    Transportation needs:     Medical: No     Non-medical: No   Tobacco Use    Smoking status: Never Smoker    Smokeless tobacco: Never Used   Substance and Sexual Activity    Alcohol use: Not Currently    Drug use: Never    Sexual activity: Yes   Lifestyle    Physical activity:     Days per week: 0 days     Minutes per session: 0 min    Stress:  Only a little   Relationships    Social connections:     Talks on phone: Patient refused     Gets together: Patient refused     Attends Mosque service: Patient refused     Active member of club or organization: Patient refused     Attends meetings of clubs or organizations: Patient refused     Relationship status: Patient refused    Intimate partner violence:     Fear of current or ex partner: Patient refused     Emotionally abused: Patient refused     Physically abused: Patient refused     Forced sexual activity: Patient refused   Other Topics Concern    Not on file   Social History Narrative    Not on file       Allergies   Allergen Reactions    Aspirin Edema, Eye Swelling and Facial Swelling    Other Swelling     Seafood    Codeine Rash         Current Outpatient Medications:     albuterol (PROVENTIL HFA,VENTOLIN HFA) 90 mcg/act inhaler, Inhale 2 puffs every 6 (six) hours as needed for wheezing or shortness of breath, Disp: 1 Inhaler, Rfl: 3    CRANIAL PROSTHESIS, RX,, One wig as needed, Disp: 1 Piece, Rfl: 0    loratadine (CLARITIN) 10 mg tablet, Take 1 tablet (10 mg total) by mouth daily, Disp: 30 tablet, Rfl: 3    mometasone (NASONEX) 50 mcg/act nasal spray, 2 sprays into each nostril daily, Disp: 1 Act, Rfl: 3    montelukast (SINGULAIR) 10 mg tablet, Take 1 tablet (10 mg total) by mouth daily at bedtime, Disp: 30 tablet, Rfl: 3    multivitamin (THERAGRAN) TABS, Take 1 tablet by mouth daily, Disp: , Rfl:     ondansetron (ZOFRAN) 4 mg tablet, Take 1 tablet (4 mg total) by mouth every 8 (eight) hours as needed for nausea or vomiting, Disp: 30 tablet, Rfl: 1

## 2019-10-30 NOTE — ASSESSMENT & PLAN NOTE
43 y/o F with active breast cancer, s/p port placement yesterday, planning to start chemotherapy next week, seen for evaluation of varicose veins  She has varicosities to BLE with intermittent pain to the left leg only  We discussed the pathophysiology of venous disease as it relates to her symptoms  Advised the use of 20-30 mmHg compression stockings  Discussed the benefits of periodic elevation and regular activity to help manage symptoms  If her symptoms worsen despite conservative management she will notify our office, otherwise we will be happy to see her on an as needed basis  Ideally would complete breast cancer treatment prior to any elective treatment of her varicose veins

## 2019-10-31 ENCOUNTER — TELEPHONE (OUTPATIENT)
Dept: OBGYN CLINIC | Facility: CLINIC | Age: 39
End: 2019-10-31

## 2019-10-31 NOTE — TELEPHONE ENCOUNTER
----- Message from Holli Christian MD sent at 10/27/2019  4:39 PM EDT -----  Ultrasound   showed what appeared to be a small benign ovarian cyst    There was also a minimal amount of fluid inside the uterus but the lining was normal    Please it bring patient in for brief visit with me for discussion    Thanks

## 2019-11-04 ENCOUNTER — TELEPHONE (OUTPATIENT)
Dept: HEMATOLOGY ONCOLOGY | Facility: CLINIC | Age: 39
End: 2019-11-04

## 2019-11-04 ENCOUNTER — APPOINTMENT (OUTPATIENT)
Dept: LAB | Facility: HOSPITAL | Age: 39
End: 2019-11-04
Attending: INTERNAL MEDICINE
Payer: COMMERCIAL

## 2019-11-04 DIAGNOSIS — T45.1X5A CHEMOTHERAPY INDUCED NEUTROPENIA (HCC): ICD-10-CM

## 2019-11-04 DIAGNOSIS — D70.1 CHEMOTHERAPY INDUCED NEUTROPENIA (HCC): ICD-10-CM

## 2019-11-04 DIAGNOSIS — C50.812 MALIGNANT NEOPLASM OF OVERLAPPING SITES OF LEFT BREAST IN FEMALE, ESTROGEN RECEPTOR POSITIVE (HCC): ICD-10-CM

## 2019-11-04 DIAGNOSIS — Z17.0 MALIGNANT NEOPLASM OF OVERLAPPING SITES OF LEFT BREAST IN FEMALE, ESTROGEN RECEPTOR POSITIVE (HCC): ICD-10-CM

## 2019-11-04 LAB
ALBUMIN SERPL BCP-MCNC: 4.5 G/DL (ref 3.5–5)
ALP SERPL-CCNC: 47 U/L (ref 46–116)
ALT SERPL W P-5'-P-CCNC: 17 U/L (ref 12–78)
ANION GAP SERPL CALCULATED.3IONS-SCNC: 7 MMOL/L (ref 4–13)
AST SERPL W P-5'-P-CCNC: 12 U/L (ref 5–45)
BASOPHILS # BLD AUTO: 0.02 THOUSANDS/ΜL (ref 0–0.1)
BASOPHILS NFR BLD AUTO: 0 % (ref 0–1)
BILIRUB SERPL-MCNC: 0.44 MG/DL (ref 0.2–1)
BUN SERPL-MCNC: 6 MG/DL (ref 5–25)
CALCIUM SERPL-MCNC: 8.9 MG/DL (ref 8.3–10.1)
CHLORIDE SERPL-SCNC: 106 MMOL/L (ref 100–108)
CO2 SERPL-SCNC: 27 MMOL/L (ref 21–32)
CREAT SERPL-MCNC: 0.71 MG/DL (ref 0.6–1.3)
EOSINOPHIL # BLD AUTO: 0.25 THOUSAND/ΜL (ref 0–0.61)
EOSINOPHIL NFR BLD AUTO: 5 % (ref 0–6)
ERYTHROCYTE [DISTWIDTH] IN BLOOD BY AUTOMATED COUNT: 12.9 % (ref 11.6–15.1)
GFR SERPL CREATININE-BSD FRML MDRD: 108 ML/MIN/1.73SQ M
GLUCOSE SERPL-MCNC: 90 MG/DL (ref 65–140)
HCT VFR BLD AUTO: 38.2 % (ref 34.8–46.1)
HGB BLD-MCNC: 12.4 G/DL (ref 11.5–15.4)
IMM GRANULOCYTES # BLD AUTO: 0 THOUSAND/UL (ref 0–0.2)
IMM GRANULOCYTES NFR BLD AUTO: 0 % (ref 0–2)
LYMPHOCYTES # BLD AUTO: 2.21 THOUSANDS/ΜL (ref 0.6–4.47)
LYMPHOCYTES NFR BLD AUTO: 43 % (ref 14–44)
MCH RBC QN AUTO: 30 PG (ref 26.8–34.3)
MCHC RBC AUTO-ENTMCNC: 32.5 G/DL (ref 31.4–37.4)
MCV RBC AUTO: 93 FL (ref 82–98)
MONOCYTES # BLD AUTO: 0.51 THOUSAND/ΜL (ref 0.17–1.22)
MONOCYTES NFR BLD AUTO: 10 % (ref 4–12)
NEUTROPHILS # BLD AUTO: 2.16 THOUSANDS/ΜL (ref 1.85–7.62)
NEUTS SEG NFR BLD AUTO: 42 % (ref 43–75)
NRBC BLD AUTO-RTO: 0 /100 WBCS
PLATELET # BLD AUTO: 264 THOUSANDS/UL (ref 149–390)
PMV BLD AUTO: 10.7 FL (ref 8.9–12.7)
POTASSIUM SERPL-SCNC: 3.4 MMOL/L (ref 3.5–5.3)
PROT SERPL-MCNC: 7.7 G/DL (ref 6.4–8.2)
RBC # BLD AUTO: 4.13 MILLION/UL (ref 3.81–5.12)
SODIUM SERPL-SCNC: 140 MMOL/L (ref 136–145)
WBC # BLD AUTO: 5.15 THOUSAND/UL (ref 4.31–10.16)

## 2019-11-04 PROCEDURE — 36415 COLL VENOUS BLD VENIPUNCTURE: CPT

## 2019-11-04 PROCEDURE — 80053 COMPREHEN METABOLIC PANEL: CPT

## 2019-11-04 PROCEDURE — 85025 COMPLETE CBC W/AUTO DIFF WBC: CPT

## 2019-11-06 ENCOUNTER — DOCUMENTATION (OUTPATIENT)
Dept: HEMATOLOGY ONCOLOGY | Facility: CLINIC | Age: 39
End: 2019-11-06

## 2019-11-06 ENCOUNTER — HOSPITAL ENCOUNTER (OUTPATIENT)
Dept: INFUSION CENTER | Facility: CLINIC | Age: 39
Discharge: HOME | End: 2019-11-06
Payer: COMMERCIAL

## 2019-11-06 ENCOUNTER — OFFICE VISIT (OUTPATIENT)
Dept: OBGYN CLINIC | Facility: CLINIC | Age: 39
End: 2019-11-06
Payer: COMMERCIAL

## 2019-11-06 VITALS
WEIGHT: 115.08 LBS | TEMPERATURE: 98.5 F | BODY MASS INDEX: 19.17 KG/M2 | DIASTOLIC BLOOD PRESSURE: 59 MMHG | RESPIRATION RATE: 18 BRPM | SYSTOLIC BLOOD PRESSURE: 102 MMHG | HEART RATE: 71 BPM | HEIGHT: 65 IN

## 2019-11-06 VITALS — SYSTOLIC BLOOD PRESSURE: 100 MMHG | BODY MASS INDEX: 18.24 KG/M2 | DIASTOLIC BLOOD PRESSURE: 72 MMHG | WEIGHT: 113 LBS

## 2019-11-06 DIAGNOSIS — D70.1 CHEMOTHERAPY INDUCED NEUTROPENIA (HCC): Primary | ICD-10-CM

## 2019-11-06 DIAGNOSIS — T45.1X5A CHEMOTHERAPY INDUCED NEUTROPENIA (HCC): Primary | ICD-10-CM

## 2019-11-06 DIAGNOSIS — N83.209 CYST OF OVARY, UNSPECIFIED LATERALITY: Primary | ICD-10-CM

## 2019-11-06 DIAGNOSIS — C50.812 MALIGNANT NEOPLASM OF OVERLAPPING SITES OF LEFT BREAST IN FEMALE, ESTROGEN RECEPTOR POSITIVE (HCC): ICD-10-CM

## 2019-11-06 DIAGNOSIS — Z17.0 MALIGNANT NEOPLASM OF OVERLAPPING SITES OF LEFT BREAST IN FEMALE, ESTROGEN RECEPTOR POSITIVE (HCC): ICD-10-CM

## 2019-11-06 PROCEDURE — 99213 OFFICE O/P EST LOW 20 MIN: CPT | Performed by: OBSTETRICS & GYNECOLOGY

## 2019-11-06 PROCEDURE — 96413 CHEMO IV INFUSION 1 HR: CPT

## 2019-11-06 PROCEDURE — 96367 TX/PROPH/DG ADDL SEQ IV INF: CPT

## 2019-11-06 PROCEDURE — 96411 CHEMO IV PUSH ADDL DRUG: CPT

## 2019-11-06 RX ORDER — DOXORUBICIN HYDROCHLORIDE 2 MG/ML
60 INJECTION, SOLUTION INTRAVENOUS ONCE
Status: COMPLETED | OUTPATIENT
Start: 2019-11-06 | End: 2019-11-06

## 2019-11-06 RX ORDER — SODIUM CHLORIDE 9 MG/ML
20 INJECTION, SOLUTION INTRAVENOUS ONCE
Status: COMPLETED | OUTPATIENT
Start: 2019-11-06 | End: 2019-11-06

## 2019-11-06 RX ADMIN — SODIUM CHLORIDE 150 MG: 0.9 INJECTION, SOLUTION INTRAVENOUS at 11:35

## 2019-11-06 RX ADMIN — DOXORUBICIN HYDROCHLORIDE 96 MG: 2 INJECTION, SOLUTION INTRAVENOUS at 12:15

## 2019-11-06 RX ADMIN — CYCLOPHOSPHAMIDE 1000 MG: 1 INJECTION, POWDER, FOR SOLUTION INTRAVENOUS; ORAL at 12:34

## 2019-11-06 RX ADMIN — SODIUM CHLORIDE 20 ML/HR: 0.9 INJECTION, SOLUTION INTRAVENOUS at 10:50

## 2019-11-06 RX ADMIN — DEXAMETHASONE SODIUM PHOSPHATE: 10 INJECTION, SOLUTION INTRAMUSCULAR; INTRAVENOUS at 11:07

## 2019-11-06 NOTE — PROGRESS NOTES
BCN Navigator:  Met with patient and her  at [facility] infusion center for Cycle 1 A/C  Educated on and provided her with information for cancer support  We discussed alopecia, wigs, N/V, antiemetics, constipation, mouth care/sores and labs  I answered all their questions to their satisfaction, provided them with my contact information and encouraged them to call for any questions or concerns  Will follow up as needed

## 2019-11-06 NOTE — PROGRESS NOTES
Pt here for initial chemotherapy, pt is nervous and tearful  Emotional support provided, explained infusion day to patient  Vitals stable  Labs reviewed-within parameters for treatment  Call bell in reach, will continue to monitor

## 2019-11-06 NOTE — PROGRESS NOTES
Pt tolerated treatment well without any adverse reactions  Aware of next appointment tomorrow at 2pm for Neulasta injection  AVS provided

## 2019-11-07 ENCOUNTER — HOSPITAL ENCOUNTER (OUTPATIENT)
Dept: INFUSION CENTER | Facility: CLINIC | Age: 39
Discharge: HOME | End: 2019-11-07
Payer: COMMERCIAL

## 2019-11-07 ENCOUNTER — DOCUMENTATION (OUTPATIENT)
Dept: RADIATION ONCOLOGY | Facility: HOSPITAL | Age: 39
End: 2019-11-07

## 2019-11-07 DIAGNOSIS — T45.1X5A CHEMOTHERAPY INDUCED NEUTROPENIA (HCC): Primary | ICD-10-CM

## 2019-11-07 DIAGNOSIS — Z17.0 MALIGNANT NEOPLASM OF OVERLAPPING SITES OF LEFT BREAST IN FEMALE, ESTROGEN RECEPTOR POSITIVE (HCC): ICD-10-CM

## 2019-11-07 DIAGNOSIS — D70.1 CHEMOTHERAPY INDUCED NEUTROPENIA (HCC): Primary | ICD-10-CM

## 2019-11-07 DIAGNOSIS — C50.812 MALIGNANT NEOPLASM OF OVERLAPPING SITES OF LEFT BREAST IN FEMALE, ESTROGEN RECEPTOR POSITIVE (HCC): ICD-10-CM

## 2019-11-07 PROCEDURE — 96372 THER/PROPH/DIAG INJ SC/IM: CPT

## 2019-11-07 RX ADMIN — PEGFILGRASTIM 6 MG: 6 INJECTION SUBCUTANEOUS at 14:11

## 2019-11-07 NOTE — PROGRESS NOTES
Pt reported tolerating chemo well, having some slight dizziness, nausea and fatigue today  Pt has not yet taken her anti-nausea medication; meds encouraged  Pt aware to call MD if nausea is unresolved  Fulphila given in [facility] without issue  Pt declined AVS  Next appt reviewed c/ pt

## 2019-11-07 NOTE — PROGRESS NOTES
Late Entry  Met with the pt and her spouse on 11/6/19 in the [address] [place] [address] [facility]aka Kristy  Pt was fearful at times during our visit re her concerns about tx and the unknown  Provided reassurance and support re these feeling and the appropriateness of same as txs are initiated  She indicated that she has a strong support system between her family, friends and [employer]ai  It is not in her nature to ask for help, preferring to be the caretaker rather than the  of same  We discussed the difficulty in changing one's approach to life in general as well as one's personality  I asked the pt to consider how this state she's currently in will hopefully pass quickly and that she can resume taking the approach with which she's most comfortable  We took time and discussed her 2 children who are in high school, a senior and a tonny  Offered ongoing support to the pt and her family as needed

## 2019-11-08 NOTE — PROGRESS NOTES
48 Hour follow-up phone call done  No answer on patient  phone  Left message advising patient to call back if she has any issues or questions  Also reminded patient her next Dr Suman Kaminski and chemotherapy appointments are Nov 20th and to make sure she has her blood work done before that day

## 2019-11-16 ENCOUNTER — APPOINTMENT (OUTPATIENT)
Dept: LAB | Facility: HOSPITAL | Age: 39
End: 2019-11-16
Payer: COMMERCIAL

## 2019-11-16 DIAGNOSIS — Z13.0 SCREENING FOR ENDOCRINE, METABOLIC AND IMMUNITY DISORDER: ICD-10-CM

## 2019-11-16 DIAGNOSIS — Z13.228 SCREENING FOR ENDOCRINE, METABOLIC AND IMMUNITY DISORDER: ICD-10-CM

## 2019-11-16 DIAGNOSIS — D70.1 CHEMOTHERAPY INDUCED NEUTROPENIA (HCC): ICD-10-CM

## 2019-11-16 DIAGNOSIS — T45.1X5A CHEMOTHERAPY INDUCED NEUTROPENIA (HCC): ICD-10-CM

## 2019-11-16 DIAGNOSIS — Z13.29 SCREENING FOR ENDOCRINE, METABOLIC AND IMMUNITY DISORDER: ICD-10-CM

## 2019-11-16 DIAGNOSIS — C50.812 MALIGNANT NEOPLASM OF OVERLAPPING SITES OF LEFT BREAST IN FEMALE, ESTROGEN RECEPTOR POSITIVE (HCC): ICD-10-CM

## 2019-11-16 DIAGNOSIS — Z17.0 MALIGNANT NEOPLASM OF OVERLAPPING SITES OF LEFT BREAST IN FEMALE, ESTROGEN RECEPTOR POSITIVE (HCC): ICD-10-CM

## 2019-11-16 LAB
ALBUMIN SERPL BCP-MCNC: 3.8 G/DL (ref 3.5–5)
ALP SERPL-CCNC: 89 U/L (ref 46–116)
ALT SERPL W P-5'-P-CCNC: 15 U/L (ref 12–78)
ANION GAP SERPL CALCULATED.3IONS-SCNC: 11 MMOL/L (ref 4–13)
ANISOCYTOSIS BLD QL SMEAR: PRESENT
AST SERPL W P-5'-P-CCNC: 12 U/L (ref 5–45)
BASOPHILS # BLD MANUAL: 0 THOUSAND/UL (ref 0–0.1)
BASOPHILS NFR MAR MANUAL: 0 % (ref 0–1)
BILIRUB SERPL-MCNC: 0.13 MG/DL (ref 0.2–1)
BUN SERPL-MCNC: 11 MG/DL (ref 5–25)
CALCIUM SERPL-MCNC: 8.6 MG/DL (ref 8.3–10.1)
CHLORIDE SERPL-SCNC: 107 MMOL/L (ref 100–108)
CHOLEST SERPL-MCNC: 92 MG/DL (ref 50–200)
CO2 SERPL-SCNC: 26 MMOL/L (ref 21–32)
CREAT SERPL-MCNC: 0.56 MG/DL (ref 0.6–1.3)
EOSINOPHIL # BLD MANUAL: 0 THOUSAND/UL (ref 0–0.4)
EOSINOPHIL NFR BLD MANUAL: 0 % (ref 0–6)
ERYTHROCYTE [DISTWIDTH] IN BLOOD BY AUTOMATED COUNT: 12.4 % (ref 11.6–15.1)
GFR SERPL CREATININE-BSD FRML MDRD: 118 ML/MIN/1.73SQ M
GLUCOSE P FAST SERPL-MCNC: 82 MG/DL (ref 65–99)
HCT VFR BLD AUTO: 32.7 % (ref 34.8–46.1)
HDLC SERPL-MCNC: 35 MG/DL
HGB BLD-MCNC: 10.7 G/DL (ref 11.5–15.4)
LDLC SERPL CALC-MCNC: 48 MG/DL (ref 0–100)
LYMPHOCYTES # BLD AUTO: 1.56 THOUSAND/UL (ref 0.6–4.47)
LYMPHOCYTES # BLD AUTO: 7 % (ref 14–44)
MCH RBC QN AUTO: 30.1 PG (ref 26.8–34.3)
MCHC RBC AUTO-ENTMCNC: 32.7 G/DL (ref 31.4–37.4)
MCV RBC AUTO: 92 FL (ref 82–98)
METAMYELOCYTES NFR BLD MANUAL: 6 % (ref 0–1)
MONOCYTES # BLD AUTO: 1.34 THOUSAND/UL (ref 0–1.22)
MONOCYTES NFR BLD: 6 % (ref 4–12)
MYELOCYTES NFR BLD MANUAL: 10 % (ref 0–1)
NEUTROPHILS # BLD MANUAL: 15.8 THOUSAND/UL (ref 1.85–7.62)
NEUTS SEG NFR BLD AUTO: 71 % (ref 43–75)
NONHDLC SERPL-MCNC: 57 MG/DL
NRBC BLD AUTO-RTO: 0 /100 WBCS
PLATELET # BLD AUTO: 131 THOUSANDS/UL (ref 149–390)
PLATELET BLD QL SMEAR: ABNORMAL
PMV BLD AUTO: 11 FL (ref 8.9–12.7)
POLYCHROMASIA BLD QL SMEAR: PRESENT
POTASSIUM SERPL-SCNC: 3.4 MMOL/L (ref 3.5–5.3)
PROT SERPL-MCNC: 7 G/DL (ref 6.4–8.2)
RBC # BLD AUTO: 3.56 MILLION/UL (ref 3.81–5.12)
RBC MORPH BLD: PRESENT
SODIUM SERPL-SCNC: 144 MMOL/L (ref 136–145)
TOTAL CELLS COUNTED SPEC: 100
TRIGL SERPL-MCNC: 43 MG/DL
TSH SERPL DL<=0.05 MIU/L-ACNC: 1.37 UIU/ML (ref 0.36–3.74)
WBC # BLD AUTO: 22.25 THOUSAND/UL (ref 4.31–10.16)

## 2019-11-16 PROCEDURE — 85007 BL SMEAR W/DIFF WBC COUNT: CPT

## 2019-11-16 PROCEDURE — 85027 COMPLETE CBC AUTOMATED: CPT

## 2019-11-16 PROCEDURE — 84443 ASSAY THYROID STIM HORMONE: CPT

## 2019-11-16 PROCEDURE — 80053 COMPREHEN METABOLIC PANEL: CPT

## 2019-11-16 PROCEDURE — 36415 COLL VENOUS BLD VENIPUNCTURE: CPT

## 2019-11-16 PROCEDURE — 80061 LIPID PANEL: CPT

## 2019-11-17 NOTE — PROGRESS NOTES
Assessment/Plan:      Diagnoses and all orders for this visit:    Cyst of ovary, unspecified laterality          Subjective:     Patient ID: Paty Lamas is a 44 y o  female  Patient is a 70-year-old female who is being followed by  hematology oncology for management of recently diagnosed malignancy of the left breast   She is scheduled to begin chemotherapy in the near future      She presented today to review findings on a recent pelvic ultrasound    Small ovarian cyst had consistent with a small dermoid was visualized at that time as well as a trace amount of endometrial fluid    I advised her that we would follow up the study and approximately 4-6 weeks to ensure that the cyst remains stable and that no other changes are seen within the uterine cavity  All questions were answered in detail for her at today's visit    An appointment will be made for follow-up scan in approximately 4-6 weeks    Patient will call for any problems, issues or concerns which may arise for her during the interim      Total time of today's visit was 20 minutes of which greater than 50% was spent face-to-face counseling the patient as well as coordination of care  Review of Systems   Constitutional: Negative  Negative for appetite change, diaphoresis, fatigue, fever and unexpected weight change  HENT: Negative  Eyes: Negative  Respiratory: Negative  Cardiovascular: Negative  Gastrointestinal: Negative  Negative for abdominal pain, blood in stool, constipation, diarrhea, nausea and vomiting  Endocrine: Negative  Negative for cold intolerance and heat intolerance  Genitourinary: Negative  Negative for dysuria, frequency, hematuria, urgency, vaginal bleeding, vaginal discharge and vaginal pain  Musculoskeletal: Negative  Skin: Negative  Allergic/Immunologic: Negative  Neurological: Negative  Hematological: Negative  Negative for adenopathy  Psychiatric/Behavioral: Negative  Objective:     Physical Exam   Constitutional: She is oriented to person, place, and time  She appears well-developed and well-nourished  HENT:   Head: Normocephalic  Eyes: Pupils are equal, round, and reactive to light  Neck: Normal range of motion  Neck supple  Cardiovascular: Normal rate and regular rhythm  Pulmonary/Chest: Effort normal and breath sounds normal    Abdominal: Soft  Musculoskeletal: Normal range of motion  Neurological: She is alert and oriented to person, place, and time  Skin: Skin is warm and dry  Psychiatric: She has a normal mood and affect   Her behavior is normal  Judgment and thought content normal

## 2019-11-17 NOTE — PATIENT INSTRUCTIONS
Topic:  Ovarian cyst and fluid within the endometrial cavity    Results of a recent pelvic ultrasound were carefully discussed and the office with the patient at today's visit and I reviewed the above findings    All questions were answered for her during the visit    We will obtain a follow-up scan in 4-6 weeks to ensure that findings remained stable    Patient will call for any problems issues or concerns which may arise for her

## 2019-11-20 ENCOUNTER — HOSPITAL ENCOUNTER (OUTPATIENT)
Dept: INFUSION CENTER | Facility: CLINIC | Age: 39
Discharge: HOME | End: 2019-11-20
Payer: COMMERCIAL

## 2019-11-20 ENCOUNTER — OFFICE VISIT (OUTPATIENT)
Dept: HEMATOLOGY ONCOLOGY | Facility: CLINIC | Age: 39
End: 2019-11-20
Payer: COMMERCIAL

## 2019-11-20 VITALS
BODY MASS INDEX: 18.99 KG/M2 | HEART RATE: 66 BPM | TEMPERATURE: 98.1 F | DIASTOLIC BLOOD PRESSURE: 60 MMHG | WEIGHT: 114 LBS | SYSTOLIC BLOOD PRESSURE: 82 MMHG | HEIGHT: 65 IN | RESPIRATION RATE: 18 BRPM

## 2019-11-20 VITALS
RESPIRATION RATE: 18 BRPM | DIASTOLIC BLOOD PRESSURE: 64 MMHG | HEIGHT: 65 IN | WEIGHT: 114 LBS | BODY MASS INDEX: 18.99 KG/M2 | SYSTOLIC BLOOD PRESSURE: 102 MMHG | OXYGEN SATURATION: 98 % | TEMPERATURE: 97.3 F | HEART RATE: 86 BPM

## 2019-11-20 DIAGNOSIS — T45.1X5A CHEMOTHERAPY INDUCED NEUTROPENIA (HCC): ICD-10-CM

## 2019-11-20 DIAGNOSIS — D70.1 CHEMOTHERAPY INDUCED NEUTROPENIA (HCC): ICD-10-CM

## 2019-11-20 DIAGNOSIS — C50.812 MALIGNANT NEOPLASM OF OVERLAPPING SITES OF LEFT BREAST IN FEMALE, ESTROGEN RECEPTOR POSITIVE (HCC): ICD-10-CM

## 2019-11-20 DIAGNOSIS — Z17.0 MALIGNANT NEOPLASM OF OVERLAPPING SITES OF LEFT BREAST IN FEMALE, ESTROGEN RECEPTOR POSITIVE (HCC): ICD-10-CM

## 2019-11-20 DIAGNOSIS — D70.1 CHEMOTHERAPY INDUCED NEUTROPENIA (HCC): Primary | ICD-10-CM

## 2019-11-20 DIAGNOSIS — Z17.0 MALIGNANT NEOPLASM OF OVERLAPPING SITES OF LEFT BREAST IN FEMALE, ESTROGEN RECEPTOR POSITIVE (HCC): Primary | ICD-10-CM

## 2019-11-20 DIAGNOSIS — C50.812 MALIGNANT NEOPLASM OF OVERLAPPING SITES OF LEFT BREAST IN FEMALE, ESTROGEN RECEPTOR POSITIVE (HCC): Primary | ICD-10-CM

## 2019-11-20 DIAGNOSIS — T45.1X5A CHEMOTHERAPY INDUCED NEUTROPENIA (HCC): Primary | ICD-10-CM

## 2019-11-20 PROCEDURE — 96411 CHEMO IV PUSH ADDL DRUG: CPT

## 2019-11-20 PROCEDURE — 99214 OFFICE O/P EST MOD 30 MIN: CPT | Performed by: INTERNAL MEDICINE

## 2019-11-20 PROCEDURE — 96367 TX/PROPH/DG ADDL SEQ IV INF: CPT

## 2019-11-20 PROCEDURE — 96413 CHEMO IV INFUSION 1 HR: CPT

## 2019-11-20 RX ORDER — SODIUM CHLORIDE 9 MG/ML
20 INJECTION, SOLUTION INTRAVENOUS ONCE
Status: COMPLETED | OUTPATIENT
Start: 2019-11-20 | End: 2019-11-20

## 2019-11-20 RX ORDER — DOXORUBICIN HYDROCHLORIDE 2 MG/ML
60 INJECTION, SOLUTION INTRAVENOUS ONCE
Status: CANCELLED | OUTPATIENT
Start: 2019-12-04

## 2019-11-20 RX ORDER — DOXORUBICIN HYDROCHLORIDE 2 MG/ML
60 INJECTION, SOLUTION INTRAVENOUS ONCE
Status: COMPLETED | OUTPATIENT
Start: 2019-11-20 | End: 2019-11-20

## 2019-11-20 RX ORDER — CEPHALEXIN 500 MG/1
500 CAPSULE ORAL EVERY 8 HOURS SCHEDULED
Qty: 21 CAPSULE | Refills: 0 | Status: SHIPPED | OUTPATIENT
Start: 2019-11-20 | End: 2019-11-27

## 2019-11-20 RX ORDER — SODIUM CHLORIDE 9 MG/ML
20 INJECTION, SOLUTION INTRAVENOUS ONCE
Status: CANCELLED | OUTPATIENT
Start: 2019-12-04

## 2019-11-20 RX ORDER — SODIUM CHLORIDE 9 MG/ML
20 INJECTION, SOLUTION INTRAVENOUS ONCE
Status: CANCELLED | OUTPATIENT
Start: 2019-12-18

## 2019-11-20 RX ORDER — DOXORUBICIN HYDROCHLORIDE 2 MG/ML
60 INJECTION, SOLUTION INTRAVENOUS ONCE
Status: CANCELLED | OUTPATIENT
Start: 2019-12-18

## 2019-11-20 RX ADMIN — SODIUM CHLORIDE 20 ML/HR: 0.9 INJECTION, SOLUTION INTRAVENOUS at 09:20

## 2019-11-20 RX ADMIN — CYCLOPHOSPHAMIDE 1000 MG: 1 INJECTION, POWDER, FOR SOLUTION INTRAVENOUS; ORAL at 11:20

## 2019-11-20 RX ADMIN — DOXORUBICIN HYDROCHLORIDE 96 MG: 2 INJECTION, SOLUTION INTRAVENOUS at 11:05

## 2019-11-20 RX ADMIN — SODIUM CHLORIDE 150 MG: 0.9 INJECTION, SOLUTION INTRAVENOUS at 10:20

## 2019-11-20 RX ADMIN — DEXAMETHASONE SODIUM PHOSPHATE: 10 INJECTION, SOLUTION INTRAMUSCULAR; INTRAVENOUS at 09:55

## 2019-11-20 NOTE — PROGRESS NOTES
Patient to [facility] for Adriamycin / Cytoxan: Offers no complaints at present time: Lab work ( 11/16/19 ) reviewed:  Within parameters to treat: Right PAC accessed without difficulty: Good blood return noted

## 2019-11-20 NOTE — PROGRESS NOTES
Tolerated infusion without incident: No adverse reactions noted: Verified follow up appt with patient: AVS given per request

## 2019-11-20 NOTE — PROGRESS NOTES
Hematology / Oncology Outpatient Follow Up Note    Alonso Kamara 44 y o  female QBE:[id number]         Date:  11/20/2019    Assessment / Plan:  A 80-year-old premenopausal woman with locally advanced left breast cancer, grade 2, ER 90 % positive, [place] 70% positive, HER2 3+ disease  She has relatively large palpable left breast mass as well as palpable left axillary adenopathy which was biopsy confirmed to be metastatic disease  She is negative for BRCA gene mutation  She is currently on neoadjuvant chemotherapy with dose dense AC with excellent tolerance  Based on physical examination, she already have partial response  She has adequate ANC to have 2nd cycle of dose dense AC, today  She has minor redness around the port site  I prescribed Keflex for 7 days  I will see her again in 2 weeks when she is due for 3rd cycle of AC  She and her  are in agreement with my recommendations              Subjective:      HPI:  A 80-year-old premenopausal woman who noticed a left breast lump recently which she brought to medical attention  She was found to have radiographic abnormality in her left breast as well as left axilla  She underwent left breast biopsy in September 10, 2019 which showed invasive ductal carcinoma, grade 2  This was ER 90 % positive, AZ 70% positive, HER2 3+ disease  Biopsy from enlarged left axillary lymph node was also positive for metastatic disease  She was seen by Dr Gerri Rowell who sent her to me for neoadjuvant chemotherapy  She is negative for BRCA gene mutation  She underwent bone scan and CT scan chest abdomen pelvis which showed no evidence of distant metastasis  She presents today with her  in mother to discuss neoadjuvant chemotherapy  She has some left breast pain  Otherwise, she feels well  She denied any respiratory symptoms  She has no complaint of bone pain  She has regular menstrual cycle  She has 2 children    She has no plan to have more children  She has no surgical history  She has no significant past medical history  She is a lifetime never smoker  Her performance status is normal              Interval History:  A 40-year-old premenopausal woman with locally advanced left breast cancer, grade 2, ER 90 % positive, MS 70% positive, HER2 3+ disease  She has relatively large palpable left breast mass as well as palpable left axillary adenopathy which was biopsy confirmed to be metastatic disease  She has no evidence of distant metastasis  She is negative for BRCA gene mutation  She started neoadjuvant chemotherapy with dose dense AC which she tolerated very well  She had 4 days of nausea with minimal vomiting  She had fatigue for 7 days  She is maintaining her weight  She has no respiratory symptoms  She also had 1-2 days of Neulasta induced body achiness  Currently, she feels well with no pain  She is afebrile  She has no respiratory symptoms  Her performance status is normal            Objective:      Primary Diagnosis:     1  Locally advanced left breast cancer, grade 2, ER 90 % positive, MS 70 % positive, HER2 3+ disease  Diagnosed in September 2019    2  BRCA gene mutation negative      Cancer Staging:  Cancer Staging  No matching staging information was found for the patient         Previous Hematologic/ Oncologic Treatment:            Current Hematologic/ Oncologic Treatment:       Neoadjuvant chemotherapy with dose dense AC x4 followed by weekly paclitaxel, trastuzumab and try weekly pertuzumab x12 weeks  2nd cycle of AC to be given today       Disease Status:      Not evaluated at this time      Test Results:     Pathology:     Left breast biopsy showed invasive ductal carcinoma, grade 2  ER 90 % positive, MS 70 % positive, HER2 3+ disease        Radiology:     CT scan chest abdomen pelvis showed no evidence of distant metastasis      Bone scan was negative for osseous metastasis      Echocardiogram showed ejection fraction 60%      Laboratory:     See below      Physical Exam:        General Appearance:    Alert, oriented          Eyes:    PERRL   Ears:    Normal external ear canals, both ears   Nose:   Nares normal, septum midline   Throat:   Mucosa moist  Pharynx without injection  Neck:   Supple         Lungs:     Clear to auscultation bilaterally   Chest Wall:    No tenderness or deformity    Heart:    Regular rate and rhythm         Abdomen:     Soft, non-tender, bowel sounds +, no organomegaly               Extremities:   Extremities no cyanosis or edema         Skin:   no rash or icterus  Lymph nodes:   Cervical, supraclavicular, and axillary nodes normal   Neurologic:   CNII-XII intact, normal strength, sensation and reflexes     Throughout             Breast exam:    2 5 x 2 5 cm of palpable mass at 2:00 position in her left breast    less than 1 cm of palpable left axillary adenopathy  Right breast exam is negative  ROS: Review of Systems   All other systems reviewed and are negative  Imaging: Ir Port Placement    Result Date: 10/29/2019  Narrative: Chest port placement  Clinical History: Breast cancer Fluoro time: 1 4 MINUTES Number of Images: 4 Radiation Dose:  6 mGy Conscious sedation time: 1 hour and 15 minutes Technique: The patient was brought to the interventional radiology suite and identified verbally and by wristband  The patient was placed supine on the table  The right internal jugular vein was evaluated as a potential access site with ultrasound  The vessel was found to be patent and compressible  The right neck and upper chest was prepped and draped in the usual sterile fashion  All elements of maximal sterile barrier technique were followed (cap, mask, sterile gown, sterile gloves, large sterile sheet, hand hygiene, and 2% chlorhexidine for cutaneous antisepsis)  Lidocaine was administered to the skin and a small skin incision was made   Under ultrasound guidance, utilizing sterile ultrasound technique with sterile gel and a sterile probe cover, the right internal jugular vein was accessed using single wall Seldinger technique  Static images of real time needle entry into the vessel were obtained  A 0 018 wire was then advanced through the needle into the central venous system  The needle was removed, and a 5 Palestinian coaxial dilator was inserted  A heavy wire was inserted through the outer dilator and a 6 Palestinian peel-away sheath was inserted over the wire  A subcutaneous pocket was created in the skin of the upper chest for the reservoir utilizing a surgical incision  The port catheter was then tunneled under the skin of the upper chest  The catheter was advanced through the peel-away and the peel-away was removed  The catheter tip was then positioned in the right atrium under fluoroscopic control  The catheter was connected to the port, and the port inserted into the subcutaneous pocket  The pocket was closed with Vicryl suture and Histoacryl  A sterile dressing was applied and 100 unit heparin/cc solution was administered into the lumen  Impression: Impression: 1  Successful ultrasound and fluoroscopically guided placement of a chest port via the right internal jugular vein  2  The tip of the catheter is in the right atrium and may be used immediately   Workstation performed: [id number]         Labs:   Lab Results   Component Value Date    WBC 22 25 (H) 11/16/2019    HGB 10 7 (L) 11/16/2019    HCT 32 7 (L) 11/16/2019    MCV 92 11/16/2019     (L) 11/16/2019     Lab Results   Component Value Date    K 3 4 (L) 11/16/2019     11/16/2019    CO2 26 11/16/2019    BUN 11 11/16/2019    CREATININE 0 56 (L) 11/16/2019    GLUF 82 11/16/2019    CALCIUM 8 6 11/16/2019    AST 12 11/16/2019    ALT 15 11/16/2019    ALKPHOS 89 11/16/2019    EGFR 118 11/16/2019           Current Medications: Reviewed  Allergies: Reviewed  PMH/FH/SH:  Reviewed      Vital Sign:    Body surface area is 1 55 meters squared      Wt Readings from Last 3 Encounters:   11/20/19 51 7 kg (114 lb)   11/06/19 52 2 kg (115 lb 1 3 oz)   11/06/19 51 3 kg (113 lb)        Temp Readings from Last 3 Encounters:   11/20/19 (!) 97 3 °F (36 3 °C) (Tympanic Core)   11/06/19 98 5 °F (36 9 °C) (Oral)   10/30/19 (!) 97 2 °F (36 2 °C) (Tympanic)        BP Readings from Last 3 Encounters:   11/20/19 102/64   11/06/19 102/59   11/06/19 100/72         Pulse Readings from Last 3 Encounters:   11/20/19 86   11/06/19 71   10/30/19 71     @LASTSAO2(3)@

## 2019-11-21 ENCOUNTER — HOSPITAL ENCOUNTER (OUTPATIENT)
Dept: INFUSION CENTER | Facility: CLINIC | Age: 39
Discharge: HOME | End: 2019-11-21
Payer: COMMERCIAL

## 2019-11-21 VITALS — TEMPERATURE: 98 F

## 2019-11-21 DIAGNOSIS — T45.1X5A CHEMOTHERAPY INDUCED NEUTROPENIA (HCC): Primary | ICD-10-CM

## 2019-11-21 DIAGNOSIS — D70.1 CHEMOTHERAPY INDUCED NEUTROPENIA (HCC): Primary | ICD-10-CM

## 2019-11-21 DIAGNOSIS — Z17.0 MALIGNANT NEOPLASM OF OVERLAPPING SITES OF LEFT BREAST IN FEMALE, ESTROGEN RECEPTOR POSITIVE (HCC): ICD-10-CM

## 2019-11-21 DIAGNOSIS — C50.812 MALIGNANT NEOPLASM OF OVERLAPPING SITES OF LEFT BREAST IN FEMALE, ESTROGEN RECEPTOR POSITIVE (HCC): ICD-10-CM

## 2019-11-21 PROCEDURE — 96372 THER/PROPH/DIAG INJ SC/IM: CPT

## 2019-11-21 RX ADMIN — PEGFILGRASTIM 6 MG: 6 INJECTION SUBCUTANEOUS at 13:09

## 2019-11-21 NOTE — PROGRESS NOTES
Pt here for fulphila injection, offers no complaints  Afebrile  Fulphila given in L arm without any issues  Pt aware of next appointments   Declines AVS

## 2019-12-03 ENCOUNTER — APPOINTMENT (OUTPATIENT)
Dept: LAB | Facility: HOSPITAL | Age: 39
End: 2019-12-03
Attending: INTERNAL MEDICINE
Payer: COMMERCIAL

## 2019-12-03 DIAGNOSIS — D70.1 CHEMOTHERAPY INDUCED NEUTROPENIA (HCC): ICD-10-CM

## 2019-12-03 DIAGNOSIS — T45.1X5A CHEMOTHERAPY INDUCED NEUTROPENIA (HCC): ICD-10-CM

## 2019-12-03 DIAGNOSIS — C50.812 MALIGNANT NEOPLASM OF OVERLAPPING SITES OF LEFT BREAST IN FEMALE, ESTROGEN RECEPTOR POSITIVE (HCC): ICD-10-CM

## 2019-12-03 DIAGNOSIS — Z17.0 MALIGNANT NEOPLASM OF OVERLAPPING SITES OF LEFT BREAST IN FEMALE, ESTROGEN RECEPTOR POSITIVE (HCC): ICD-10-CM

## 2019-12-03 LAB
ALBUMIN SERPL BCP-MCNC: 4.2 G/DL (ref 3.5–5)
ALP SERPL-CCNC: 99 U/L (ref 46–116)
ALT SERPL W P-5'-P-CCNC: 17 U/L (ref 12–78)
ANION GAP SERPL CALCULATED.3IONS-SCNC: 3 MMOL/L (ref 4–13)
AST SERPL W P-5'-P-CCNC: 11 U/L (ref 5–45)
BASOPHILS # BLD MANUAL: 0.21 THOUSAND/UL (ref 0–0.1)
BASOPHILS NFR MAR MANUAL: 1 % (ref 0–1)
BILIRUB SERPL-MCNC: 0.18 MG/DL (ref 0.2–1)
BUN SERPL-MCNC: 11 MG/DL (ref 5–25)
CALCIUM SERPL-MCNC: 9.6 MG/DL (ref 8.3–10.1)
CHLORIDE SERPL-SCNC: 108 MMOL/L (ref 100–108)
CO2 SERPL-SCNC: 30 MMOL/L (ref 21–32)
CREAT SERPL-MCNC: 0.61 MG/DL (ref 0.6–1.3)
EOSINOPHIL # BLD MANUAL: 0 THOUSAND/UL (ref 0–0.4)
EOSINOPHIL NFR BLD MANUAL: 0 % (ref 0–6)
ERYTHROCYTE [DISTWIDTH] IN BLOOD BY AUTOMATED COUNT: 12.3 % (ref 11.6–15.1)
GFR SERPL CREATININE-BSD FRML MDRD: 115 ML/MIN/1.73SQ M
GLUCOSE SERPL-MCNC: 97 MG/DL (ref 65–140)
HCT VFR BLD AUTO: 29.9 % (ref 34.8–46.1)
HGB BLD-MCNC: 9.6 G/DL (ref 11.5–15.4)
LYMPHOCYTES # BLD AUTO: 1.07 THOUSAND/UL (ref 0.6–4.47)
LYMPHOCYTES # BLD AUTO: 5 % (ref 14–44)
MCH RBC QN AUTO: 29.4 PG (ref 26.8–34.3)
MCHC RBC AUTO-ENTMCNC: 32.1 G/DL (ref 31.4–37.4)
MCV RBC AUTO: 92 FL (ref 82–98)
MONOCYTES # BLD AUTO: 2.13 THOUSAND/UL (ref 0–1.22)
MONOCYTES NFR BLD: 10 % (ref 4–12)
NEUTROPHILS # BLD MANUAL: 17.91 THOUSAND/UL (ref 1.85–7.62)
NEUTS BAND NFR BLD MANUAL: 1 % (ref 0–8)
NEUTS SEG NFR BLD AUTO: 83 % (ref 43–75)
NRBC BLD AUTO-RTO: 0 /100 WBCS
PLATELET # BLD AUTO: 310 THOUSANDS/UL (ref 149–390)
PLATELET BLD QL SMEAR: ADEQUATE
PMV BLD AUTO: 9.5 FL (ref 8.9–12.7)
POTASSIUM SERPL-SCNC: 3.6 MMOL/L (ref 3.5–5.3)
PROT SERPL-MCNC: 7.5 G/DL (ref 6.4–8.2)
RBC # BLD AUTO: 3.26 MILLION/UL (ref 3.81–5.12)
RBC MORPH BLD: NORMAL
SODIUM SERPL-SCNC: 141 MMOL/L (ref 136–145)
WBC # BLD AUTO: 21.32 THOUSAND/UL (ref 4.31–10.16)

## 2019-12-03 PROCEDURE — 85007 BL SMEAR W/DIFF WBC COUNT: CPT

## 2019-12-03 PROCEDURE — 36415 COLL VENOUS BLD VENIPUNCTURE: CPT

## 2019-12-03 PROCEDURE — 80053 COMPREHEN METABOLIC PANEL: CPT

## 2019-12-03 PROCEDURE — 85027 COMPLETE CBC AUTOMATED: CPT

## 2019-12-04 ENCOUNTER — HOSPITAL ENCOUNTER (OUTPATIENT)
Dept: INFUSION CENTER | Facility: CLINIC | Age: 39
Discharge: HOME | End: 2019-12-04
Payer: COMMERCIAL

## 2019-12-04 ENCOUNTER — OFFICE VISIT (OUTPATIENT)
Dept: HEMATOLOGY ONCOLOGY | Facility: CLINIC | Age: 39
End: 2019-12-04
Payer: COMMERCIAL

## 2019-12-04 VITALS
OXYGEN SATURATION: 99 % | BODY MASS INDEX: 18.37 KG/M2 | DIASTOLIC BLOOD PRESSURE: 58 MMHG | TEMPERATURE: 98 F | WEIGHT: 110.23 LBS | RESPIRATION RATE: 18 BRPM | HEART RATE: 90 BPM | SYSTOLIC BLOOD PRESSURE: 82 MMHG | HEIGHT: 65 IN

## 2019-12-04 VITALS
DIASTOLIC BLOOD PRESSURE: 68 MMHG | RESPIRATION RATE: 18 BRPM | WEIGHT: 110 LBS | HEART RATE: 106 BPM | BODY MASS INDEX: 18.33 KG/M2 | SYSTOLIC BLOOD PRESSURE: 102 MMHG | OXYGEN SATURATION: 98 % | TEMPERATURE: 98.6 F | HEIGHT: 65 IN

## 2019-12-04 DIAGNOSIS — C50.812 MALIGNANT NEOPLASM OF OVERLAPPING SITES OF LEFT BREAST IN FEMALE, ESTROGEN RECEPTOR POSITIVE (HCC): ICD-10-CM

## 2019-12-04 DIAGNOSIS — T45.1X5A CHEMOTHERAPY INDUCED NEUTROPENIA (HCC): ICD-10-CM

## 2019-12-04 DIAGNOSIS — Z17.0 MALIGNANT NEOPLASM OF OVERLAPPING SITES OF LEFT BREAST IN FEMALE, ESTROGEN RECEPTOR POSITIVE (HCC): ICD-10-CM

## 2019-12-04 DIAGNOSIS — Z17.0 MALIGNANT NEOPLASM OF OVERLAPPING SITES OF LEFT BREAST IN FEMALE, ESTROGEN RECEPTOR POSITIVE (HCC): Primary | ICD-10-CM

## 2019-12-04 DIAGNOSIS — K13.79 MOUTH SORES: Primary | ICD-10-CM

## 2019-12-04 DIAGNOSIS — C50.812 MALIGNANT NEOPLASM OF OVERLAPPING SITES OF LEFT BREAST IN FEMALE, ESTROGEN RECEPTOR POSITIVE (HCC): Primary | ICD-10-CM

## 2019-12-04 DIAGNOSIS — D70.1 CHEMOTHERAPY INDUCED NEUTROPENIA (HCC): Primary | ICD-10-CM

## 2019-12-04 DIAGNOSIS — T45.1X5A CHEMOTHERAPY INDUCED NEUTROPENIA (HCC): Primary | ICD-10-CM

## 2019-12-04 DIAGNOSIS — D70.1 CHEMOTHERAPY INDUCED NEUTROPENIA (HCC): ICD-10-CM

## 2019-12-04 PROCEDURE — 96367 TX/PROPH/DG ADDL SEQ IV INF: CPT

## 2019-12-04 PROCEDURE — 96413 CHEMO IV INFUSION 1 HR: CPT

## 2019-12-04 PROCEDURE — 96411 CHEMO IV PUSH ADDL DRUG: CPT

## 2019-12-04 PROCEDURE — 99214 OFFICE O/P EST MOD 30 MIN: CPT | Performed by: INTERNAL MEDICINE

## 2019-12-04 RX ORDER — DOXORUBICIN HYDROCHLORIDE 2 MG/ML
60 INJECTION, SOLUTION INTRAVENOUS ONCE
Status: COMPLETED | OUTPATIENT
Start: 2019-12-04 | End: 2019-12-04

## 2019-12-04 RX ORDER — SODIUM CHLORIDE 9 MG/ML
20 INJECTION, SOLUTION INTRAVENOUS ONCE
Status: CANCELLED | OUTPATIENT
Start: 2020-02-17

## 2019-12-04 RX ORDER — SODIUM CHLORIDE 9 MG/ML
20 INJECTION, SOLUTION INTRAVENOUS ONCE
Status: CANCELLED | OUTPATIENT
Start: 2020-01-20

## 2019-12-04 RX ORDER — SODIUM CHLORIDE 9 MG/ML
20 INJECTION, SOLUTION INTRAVENOUS ONCE
Status: CANCELLED | OUTPATIENT
Start: 2020-01-29

## 2019-12-04 RX ORDER — SODIUM CHLORIDE 9 MG/ML
20 INJECTION, SOLUTION INTRAVENOUS ONCE
Status: CANCELLED | OUTPATIENT
Start: 2020-02-06

## 2019-12-04 RX ORDER — SODIUM CHLORIDE 9 MG/ML
20 INJECTION, SOLUTION INTRAVENOUS ONCE
Status: CANCELLED | OUTPATIENT
Start: 2020-01-10

## 2019-12-04 RX ORDER — SODIUM CHLORIDE 9 MG/ML
20 INJECTION, SOLUTION INTRAVENOUS ONCE
Status: COMPLETED | OUTPATIENT
Start: 2019-12-04 | End: 2019-12-04

## 2019-12-04 RX ADMIN — DOXORUBICIN HYDROCHLORIDE 96 MG: 2 INJECTION, SOLUTION INTRAVENOUS at 15:03

## 2019-12-04 RX ADMIN — CYCLOPHOSPHAMIDE 1000 MG: 1 INJECTION, POWDER, FOR SOLUTION INTRAVENOUS; ORAL at 15:19

## 2019-12-04 RX ADMIN — DEXAMETHASONE SODIUM PHOSPHATE: 10 INJECTION, SOLUTION INTRAMUSCULAR; INTRAVENOUS at 13:48

## 2019-12-04 RX ADMIN — SODIUM CHLORIDE 150 MG: 0.9 INJECTION, SOLUTION INTRAVENOUS at 14:21

## 2019-12-04 RX ADMIN — SODIUM CHLORIDE 20 ML/HR: 0.9 INJECTION, SOLUTION INTRAVENOUS at 13:36

## 2019-12-04 NOTE — PROGRESS NOTES
Hematology / Oncology Outpatient Follow Up Note    Shayan Ho 44 y o  female QWY:[id number]         Date:  12/4/2019     Assessment / Plan:  A 70-year-old premenopausal woman with locally advanced left breast cancer, grade 2, ER 90 % positive, [place] 70% positive, HER2 3+ disease  Zev Marcelo has relatively large palpable left breast mass as well as palpable left axillary adenopathy which was biopsy confirmed to be metastatic disease  She is negative for BRCA gene mutation  She is currently on neoadjuvant chemotherapy with dose dense AC with excellent tolerance  Clinically, she has partial response based on physical examinations  She has adequate ANC to have 3rd cycle of AC, today as neoadjuvant chemotherapy  Regarding her mouth sore, I will prescribe Magic mouthwash  I will see her again in the 2nd week of January 2020 for follow-up  She is in agreement with my recommendation  She will continue with current regimen as we initially planned        Subjective:      HPI:  A 70-year-old premenopausal woman who noticed a left breast lump recently which she brought to medical attention  Zev Marcelo was found to have radiographic abnormality in her left breast as well as left axilla   She underwent left breast biopsy in September 10, 2019 which showed invasive ductal carcinoma, grade 2   This was ER 90 % positive, [place] 70% positive, HER2 3+ disease   Biopsy from enlarged left axillary lymph node was also positive for metastatic disease   She was seen by Dr Mary Kate Quiñonez who sent her to me for neoadjuvant chemotherapy   She is negative for BRCA gene mutation   She underwent bone scan and CT scan chest abdomen pelvis which showed no evidence of distant metastasis   She presents today with her  in mother to discuss neoadjuvant chemotherapy   She has some left breast pain   Otherwise, she feels well   She denied any respiratory symptoms   She has no complaint of bone pain   She has regular menstrual cycle   She has 2 children   She has no plan to have more children   She has no surgical history   She has no significant past medical history  Colleen Stewart is a lifetime never smoker   Her performance status is normal              Interval History:  A 68-year-old premenopausal woman with locally advanced left breast cancer, grade 2, ER 90 % positive, MS 70% positive, HER2 3+ disease   She has relatively large palpable left breast mass as well as palpable left axillary adenopathy which was biopsy confirmed to be metastatic disease   She has no evidence of distant metastasis  She is negative for BRCA gene mutation  She is currently on neoadjuvant chemotherapy with dose dense AC  With 2nd cycle treatment, she tolerated treatment better  She has some nausea but no vomiting  She has a complaint of mouth sore  She has 4 lb of weight loss  She has no history of neutropenic fever  She has no respiratory symptoms  Her performance status is normal   She presents today for 3rd cycle of neoadjuvant chemotherapy with AC        Objective:      Primary Diagnosis:     1  Locally advanced left breast cancer, grade 2, ER 90 % positive, MS 70 % positive, HER2 3+ disease   Diagnosed in September 2019    2  BRCA gene mutation negative      Cancer Staging:  Cancer Staging  No matching staging information was found for the patient         Previous Hematologic/ Oncologic Treatment:            Current Hematologic/ Oncologic Treatment:       Neoadjuvant chemotherapy with dose dense AC x4 followed by weekly paclitaxel, trastuzumab and try weekly pertuzumab x12 weeks  3rd cycle of AC to be given today       Disease Status:      Clinical partial response      Test Results:     Pathology:     Left breast biopsy showed invasive ductal carcinoma, grade 2   ER 90 % positive, MS 70 % positive, HER2 3+ disease        Radiology:     CT scan chest abdomen pelvis showed no evidence of distant metastasis      Bone scan was negative for osseous metastasis      Echocardiogram showed ejection fraction 60%     Laboratory:     See below      Physical Exam:        General Appearance:    Alert, oriented          Eyes:    PERRL   Ears:    Normal external ear canals, both ears   Nose:   Nares normal, septum midline   Throat:   Mucosa moist  Pharynx without injection  Neck:   Supple         Lungs:     Clear to auscultation bilaterally   Chest Wall:    No tenderness or deformity    Heart:    Regular rate and rhythm         Abdomen:     Soft, non-tender, bowel sounds +, no organomegaly               Extremities:   Extremities no cyanosis or edema         Skin:   no rash or icterus  Lymph nodes:   Cervical, supraclavicular, and axillary nodes normal   Neurologic:   CNII-XII intact, normal strength, sensation and reflexes     Throughout             Breast exam:  2 x 2  cm of palpable mass at 2:00 position in her left breast   Left axillary adenopathy with hardly palpable   Right breast exam is negative  ROS: Review of Systems   HENT:        Mouth sore   All other systems reviewed and are negative  Imaging: No results found  Labs:   Lab Results   Component Value Date    WBC 21 32 (H) 12/03/2019    HGB 9 6 (L) 12/03/2019    HCT 29 9 (L) 12/03/2019    MCV 92 12/03/2019     12/03/2019     Lab Results   Component Value Date    K 3 6 12/03/2019     12/03/2019    CO2 30 12/03/2019    BUN 11 12/03/2019    CREATININE 0 61 12/03/2019    GLUF 82 11/16/2019    CALCIUM 9 6 12/03/2019    AST 11 12/03/2019    ALT 17 12/03/2019    ALKPHOS 99 12/03/2019    EGFR 115 12/03/2019         Current Medications: Reviewed  Allergies: Reviewed  PMH/FH/SH:  Reviewed      Vital Sign:    Body surface area is 1 53 meters squared      Wt Readings from Last 3 Encounters:   12/04/19 49 9 kg (110 lb)   11/20/19 51 7 kg (114 lb)   11/20/19 51 7 kg (114 lb)        Temp Readings from Last 3 Encounters:   12/04/19 98 6 °F (37 °C) (Tympanic Core)   11/21/19 98 °F (36 7 °C) (Oral)   11/20/19 98 1 °F (36 7 °C) (Temporal)        BP Readings from Last 3 Encounters:   12/04/19 102/68   11/20/19 (!) 82/60   11/20/19 102/64         Pulse Readings from Last 3 Encounters:   12/04/19 (!) 106   11/20/19 66   11/20/19 86     @LASTSAO2(3)@

## 2019-12-04 NOTE — PROGRESS NOTES
Pt here for chemotherapy, offers no new complaints, resting comfortably  Vitals stable  Labs reviewed-within parameters for treatment  Pt tolerated treatment well without any adverse reactions  Aware of next appointment tomorrow for Fulphila at 4pm  AVS provided

## 2019-12-05 ENCOUNTER — ULTRASOUND (OUTPATIENT)
Dept: OBGYN CLINIC | Facility: CLINIC | Age: 39
End: 2019-12-05
Payer: COMMERCIAL

## 2019-12-05 ENCOUNTER — HOSPITAL ENCOUNTER (OUTPATIENT)
Dept: INFUSION CENTER | Facility: CLINIC | Age: 39
Discharge: HOME | End: 2019-12-05
Payer: COMMERCIAL

## 2019-12-05 VITALS — SYSTOLIC BLOOD PRESSURE: 88 MMHG | TEMPERATURE: 98.1 F | DIASTOLIC BLOOD PRESSURE: 48 MMHG

## 2019-12-05 DIAGNOSIS — D70.1 CHEMOTHERAPY INDUCED NEUTROPENIA (HCC): Primary | ICD-10-CM

## 2019-12-05 DIAGNOSIS — N83.292 COMPLEX CYST OF LEFT OVARY: Primary | ICD-10-CM

## 2019-12-05 DIAGNOSIS — R10.2 PELVIC PAIN: ICD-10-CM

## 2019-12-05 DIAGNOSIS — T45.1X5A CHEMOTHERAPY INDUCED NEUTROPENIA (HCC): Primary | ICD-10-CM

## 2019-12-05 DIAGNOSIS — C50.812 MALIGNANT NEOPLASM OF OVERLAPPING SITES OF LEFT BREAST IN FEMALE, ESTROGEN RECEPTOR POSITIVE (HCC): ICD-10-CM

## 2019-12-05 DIAGNOSIS — Z17.0 MALIGNANT NEOPLASM OF OVERLAPPING SITES OF LEFT BREAST IN FEMALE, ESTROGEN RECEPTOR POSITIVE (HCC): ICD-10-CM

## 2019-12-05 PROCEDURE — 96372 THER/PROPH/DIAG INJ SC/IM: CPT

## 2019-12-05 PROCEDURE — 76856 US EXAM PELVIC COMPLETE: CPT | Performed by: OBSTETRICS & GYNECOLOGY

## 2019-12-05 RX ADMIN — PEGFILGRASTIM 6 MG: 6 INJECTION SUBCUTANEOUS at 16:00

## 2019-12-05 NOTE — PROGRESS NOTES
Pt here for Fulphila, states that her mouth is dry and sore, picked up magic mouthwash today from pharmacy    Pt is afebrile  Fulphila given in L arm without any issues  Pt aware of next appointments   Declines AVS

## 2019-12-05 NOTE — PROGRESS NOTES
AMB US Pelvic Non OB  Date/Time: 12/5/2019 12:53 PM  Performed by: Daniel Marc  Authorized by: Darin Ramos MD     Procedure details:     Indications: ovarian cysts and non-obstetric abdominal pain      Technique:  US Pelvic, Non-OB with complete exam  Uterine findings:     Length (cm): 6 3    Height (cm):  6 6    Width (cm):  3 8    Uterine adhesions: not identified      Adnexal mass: not identified      Polyps: not identified      Myomas: not identified      Endometrial stripe: identified      Endometrial hyperplasia: not identified      Endometrium thickness (mm):  5  Left ovary findings:     Left ovary:  Visualized    Cysts: not identified      Length (cm): 2 4    Height (cm): 2 1    Width (cm): 1 9  Right ovary findings:     Right ovary:  Visualized    Cysts: not identified      Length (cm): 2 5    Height (cm): 2 4    Width (cm): 2 3  Other findings:     Free pelvic fluid: not identified      Free peritoneal fluid: not identified    Post-Procedure Details:     Impression:  No cyst seen today, WNL    Tolerance:   Tolerated well, no immediate complications

## 2019-12-17 ENCOUNTER — APPOINTMENT (OUTPATIENT)
Dept: LAB | Facility: HOSPITAL | Age: 39
End: 2019-12-17
Attending: INTERNAL MEDICINE
Payer: COMMERCIAL

## 2019-12-17 DIAGNOSIS — D70.1 CHEMOTHERAPY INDUCED NEUTROPENIA (HCC): ICD-10-CM

## 2019-12-17 DIAGNOSIS — Z17.0 MALIGNANT NEOPLASM OF OVERLAPPING SITES OF LEFT BREAST IN FEMALE, ESTROGEN RECEPTOR POSITIVE (HCC): ICD-10-CM

## 2019-12-17 DIAGNOSIS — C50.812 MALIGNANT NEOPLASM OF OVERLAPPING SITES OF LEFT BREAST IN FEMALE, ESTROGEN RECEPTOR POSITIVE (HCC): ICD-10-CM

## 2019-12-17 DIAGNOSIS — T45.1X5A CHEMOTHERAPY INDUCED NEUTROPENIA (HCC): ICD-10-CM

## 2019-12-17 LAB
ALBUMIN SERPL BCP-MCNC: 3.8 G/DL (ref 3.5–5)
ALP SERPL-CCNC: 106 U/L (ref 46–116)
ALT SERPL W P-5'-P-CCNC: 17 U/L (ref 12–78)
ANION GAP SERPL CALCULATED.3IONS-SCNC: 3 MMOL/L (ref 4–13)
AST SERPL W P-5'-P-CCNC: 9 U/L (ref 5–45)
BASOPHILS # BLD MANUAL: 0 THOUSAND/UL (ref 0–0.1)
BASOPHILS NFR MAR MANUAL: 0 % (ref 0–1)
BILIRUB SERPL-MCNC: 0.23 MG/DL (ref 0.2–1)
BUN SERPL-MCNC: 8 MG/DL (ref 5–25)
CALCIUM SERPL-MCNC: 8.9 MG/DL (ref 8.3–10.1)
CHLORIDE SERPL-SCNC: 108 MMOL/L (ref 100–108)
CO2 SERPL-SCNC: 29 MMOL/L (ref 21–32)
CREAT SERPL-MCNC: 0.55 MG/DL (ref 0.6–1.3)
EOSINOPHIL # BLD MANUAL: 0.27 THOUSAND/UL (ref 0–0.4)
EOSINOPHIL NFR BLD MANUAL: 1 % (ref 0–6)
ERYTHROCYTE [DISTWIDTH] IN BLOOD BY AUTOMATED COUNT: 13.2 % (ref 11.6–15.1)
GFR SERPL CREATININE-BSD FRML MDRD: 119 ML/MIN/1.73SQ M
GLUCOSE SERPL-MCNC: 87 MG/DL (ref 65–140)
HCT VFR BLD AUTO: 24.6 % (ref 34.8–46.1)
HGB BLD-MCNC: 7.9 G/DL (ref 11.5–15.4)
LYMPHOCYTES # BLD AUTO: 0.8 THOUSAND/UL (ref 0.6–4.47)
LYMPHOCYTES # BLD AUTO: 3 % (ref 14–44)
MCH RBC QN AUTO: 29.7 PG (ref 26.8–34.3)
MCHC RBC AUTO-ENTMCNC: 32.1 G/DL (ref 31.4–37.4)
MCV RBC AUTO: 93 FL (ref 82–98)
MONOCYTES # BLD AUTO: 0.8 THOUSAND/UL (ref 0–1.22)
MONOCYTES NFR BLD: 3 % (ref 4–12)
NEUTROPHILS # BLD MANUAL: 24.65 THOUSAND/UL (ref 1.85–7.62)
NEUTS BAND NFR BLD MANUAL: 2 % (ref 0–8)
NEUTS SEG NFR BLD AUTO: 90 % (ref 43–75)
NRBC BLD AUTO-RTO: 0 /100 WBCS
PLATELET # BLD AUTO: 252 THOUSANDS/UL (ref 149–390)
PLATELET BLD QL SMEAR: ADEQUATE
PMV BLD AUTO: 9.7 FL (ref 8.9–12.7)
POIKILOCYTOSIS BLD QL SMEAR: PRESENT
POLYCHROMASIA BLD QL SMEAR: PRESENT
POTASSIUM SERPL-SCNC: 3.1 MMOL/L (ref 3.5–5.3)
PROT SERPL-MCNC: 7 G/DL (ref 6.4–8.2)
RBC # BLD AUTO: 2.66 MILLION/UL (ref 3.81–5.12)
RBC MORPH BLD: PRESENT
SODIUM SERPL-SCNC: 140 MMOL/L (ref 136–145)
VARIANT LYMPHS # BLD AUTO: 1 %
WBC # BLD AUTO: 26.79 THOUSAND/UL (ref 4.31–10.16)

## 2019-12-17 PROCEDURE — 85027 COMPLETE CBC AUTOMATED: CPT

## 2019-12-17 PROCEDURE — 80053 COMPREHEN METABOLIC PANEL: CPT

## 2019-12-17 PROCEDURE — 36415 COLL VENOUS BLD VENIPUNCTURE: CPT

## 2019-12-17 PROCEDURE — 85007 BL SMEAR W/DIFF WBC COUNT: CPT

## 2019-12-18 ENCOUNTER — HOSPITAL ENCOUNTER (OUTPATIENT)
Dept: INFUSION CENTER | Facility: CLINIC | Age: 39
Discharge: HOME | End: 2019-12-18
Payer: COMMERCIAL

## 2019-12-18 VITALS
BODY MASS INDEX: 18.16 KG/M2 | OXYGEN SATURATION: 100 % | TEMPERATURE: 97.8 F | DIASTOLIC BLOOD PRESSURE: 54 MMHG | HEART RATE: 84 BPM | WEIGHT: 109 LBS | RESPIRATION RATE: 14 BRPM | SYSTOLIC BLOOD PRESSURE: 90 MMHG | HEIGHT: 65 IN

## 2019-12-18 DIAGNOSIS — T45.1X5A CHEMOTHERAPY INDUCED NEUTROPENIA (HCC): Primary | ICD-10-CM

## 2019-12-18 DIAGNOSIS — Z17.0 MALIGNANT NEOPLASM OF OVERLAPPING SITES OF LEFT BREAST IN FEMALE, ESTROGEN RECEPTOR POSITIVE (HCC): ICD-10-CM

## 2019-12-18 DIAGNOSIS — C50.812 MALIGNANT NEOPLASM OF OVERLAPPING SITES OF LEFT BREAST IN FEMALE, ESTROGEN RECEPTOR POSITIVE (HCC): ICD-10-CM

## 2019-12-18 DIAGNOSIS — D70.1 CHEMOTHERAPY INDUCED NEUTROPENIA (HCC): Primary | ICD-10-CM

## 2019-12-18 PROCEDURE — 96411 CHEMO IV PUSH ADDL DRUG: CPT

## 2019-12-18 PROCEDURE — 96367 TX/PROPH/DG ADDL SEQ IV INF: CPT

## 2019-12-18 PROCEDURE — 96413 CHEMO IV INFUSION 1 HR: CPT

## 2019-12-18 RX ORDER — SODIUM CHLORIDE 9 MG/ML
20 INJECTION, SOLUTION INTRAVENOUS ONCE
Status: COMPLETED | OUTPATIENT
Start: 2019-12-18 | End: 2019-12-18

## 2019-12-18 RX ORDER — DOXORUBICIN HYDROCHLORIDE 2 MG/ML
60 INJECTION, SOLUTION INTRAVENOUS ONCE
Status: COMPLETED | OUTPATIENT
Start: 2019-12-18 | End: 2019-12-18

## 2019-12-18 RX ADMIN — CYCLOPHOSPHAMIDE 1000 MG: 1 INJECTION, POWDER, FOR SOLUTION INTRAVENOUS; ORAL at 12:15

## 2019-12-18 RX ADMIN — DEXAMETHASONE SODIUM PHOSPHATE: 10 INJECTION, SOLUTION INTRAMUSCULAR; INTRAVENOUS at 10:54

## 2019-12-18 RX ADMIN — DOXORUBICIN HYDROCHLORIDE 96 MG: 2 INJECTION, SOLUTION INTRAVENOUS at 11:59

## 2019-12-18 RX ADMIN — SODIUM CHLORIDE 20 ML/HR: 0.9 INJECTION, SOLUTION INTRAVENOUS at 10:40

## 2019-12-18 RX ADMIN — SODIUM CHLORIDE 150 MG: 0.9 INJECTION, SOLUTION INTRAVENOUS at 11:24

## 2019-12-18 NOTE — PLAN OF CARE
Problem: Potential for Falls  Goal: Patient will remain free of falls  Description  INTERVENTIONS:  - Assess patient frequently for physical needs  -  Identify cognitive and physical deficits and behaviors that affect risk of falls  -  [place] fall precautions as indicated by assessment   - Educate patient/family on patient safety including physical limitations  - Instruct patient to call for assistance with activity based on assessment  - Modify environment to reduce risk of injury  - Consider OT/PT consult to assist with strengthening/mobility  Outcome: Progressing     Problem: SAFETY ADULT  Goal: Patient will remain free of falls  Description  INTERVENTIONS:  - Assess patient frequently for physical needs  -  Identify cognitive and physical deficits and behaviors that affect risk of falls  -  [place] fall precautions as indicated by assessment   - Educate patient/family on patient safety including physical limitations  - Instruct patient to call for assistance with activity based on assessment  - Modify environment to reduce risk of injury  - Consider OT/PT consult to assist with strengthening/mobility  Outcome: Progressing     Problem: Knowledge Deficit  Goal: Patient/family/caregiver demonstrates understanding of disease process, treatment plan, medications, and discharge instructions  Description  Complete learning assessment and assess knowledge base    Interventions:  - Provide teaching at level of understanding  - Provide teaching via preferred learning methods  Outcome: Progressing

## 2019-12-18 NOTE — PROGRESS NOTES
Patient tolerated treatment today without issues  Good blood return before, during, and after Adriamycin push  Patient aware to return tomorrow for Fulphila injection  AVS provided

## 2019-12-19 ENCOUNTER — HOSPITAL ENCOUNTER (OUTPATIENT)
Dept: INFUSION CENTER | Facility: CLINIC | Age: 39
Discharge: HOME | End: 2019-12-19
Payer: COMMERCIAL

## 2019-12-19 VITALS
SYSTOLIC BLOOD PRESSURE: 92 MMHG | OXYGEN SATURATION: 100 % | TEMPERATURE: 98.4 F | DIASTOLIC BLOOD PRESSURE: 55 MMHG | HEART RATE: 79 BPM | RESPIRATION RATE: 20 BRPM

## 2019-12-19 DIAGNOSIS — C50.812 MALIGNANT NEOPLASM OF OVERLAPPING SITES OF LEFT BREAST IN FEMALE, ESTROGEN RECEPTOR POSITIVE (HCC): ICD-10-CM

## 2019-12-19 DIAGNOSIS — Z17.0 MALIGNANT NEOPLASM OF OVERLAPPING SITES OF LEFT BREAST IN FEMALE, ESTROGEN RECEPTOR POSITIVE (HCC): ICD-10-CM

## 2019-12-19 DIAGNOSIS — D70.1 CHEMOTHERAPY INDUCED NEUTROPENIA (HCC): Primary | ICD-10-CM

## 2019-12-19 DIAGNOSIS — T45.1X5A CHEMOTHERAPY INDUCED NEUTROPENIA (HCC): Primary | ICD-10-CM

## 2019-12-19 PROCEDURE — 96372 THER/PROPH/DIAG INJ SC/IM: CPT

## 2019-12-19 RX ADMIN — PEGFILGRASTIM 6 MG: 6 INJECTION SUBCUTANEOUS at 14:46

## 2019-12-19 NOTE — PROGRESS NOTES
Nurse communicated via [place] with Verito Jennings RN & she stated per Dr Feng Pina pt  Should contact & follow up with her PCP regarding Left sided pain  Nurse ok to give Fulphila as ordered at this time  Nurse informed the pt  Of the same  Pt  Is agreeable & verbalized understanding  Confirmed pts  Next appt   Pt  Declined AVS

## 2019-12-19 NOTE — PROGRESS NOTES
Nurse spoke with Christine Mortensen RN & informed her pt  Is here for fulphila injection & pt  Reports a 7/10 pain on her left side rib area x 4 days and states it is constant  Pt  Reports pain with deep inspiration at times  Pt  States she has been taking tylenol with some relief  Salome Hernandez stated she would discuss the same with Dr Vel Carcamo & call nurse back

## 2019-12-21 ENCOUNTER — OFFICE VISIT (OUTPATIENT)
Dept: FAMILY MEDICINE CLINIC | Facility: CLINIC | Age: 39
End: 2019-12-21
Payer: COMMERCIAL

## 2019-12-21 VITALS
RESPIRATION RATE: 17 BRPM | DIASTOLIC BLOOD PRESSURE: 62 MMHG | WEIGHT: 110 LBS | BODY MASS INDEX: 18.78 KG/M2 | HEIGHT: 64 IN | SYSTOLIC BLOOD PRESSURE: 90 MMHG | HEART RATE: 104 BPM | TEMPERATURE: 97 F | OXYGEN SATURATION: 98 %

## 2019-12-21 DIAGNOSIS — M62.838 MUSCLE SPASM: Primary | ICD-10-CM

## 2019-12-21 DIAGNOSIS — C50.812 MALIGNANT NEOPLASM OF OVERLAPPING SITES OF LEFT BREAST IN FEMALE, ESTROGEN RECEPTOR POSITIVE (HCC): ICD-10-CM

## 2019-12-21 DIAGNOSIS — J45.20 MILD INTERMITTENT ASTHMA WITHOUT COMPLICATION: ICD-10-CM

## 2019-12-21 DIAGNOSIS — Z17.0 MALIGNANT NEOPLASM OF OVERLAPPING SITES OF LEFT BREAST IN FEMALE, ESTROGEN RECEPTOR POSITIVE (HCC): ICD-10-CM

## 2019-12-21 PROCEDURE — 99214 OFFICE O/P EST MOD 30 MIN: CPT | Performed by: FAMILY MEDICINE

## 2019-12-21 RX ORDER — METHYLPREDNISOLONE 4 MG/1
TABLET ORAL
Qty: 21 EACH | Refills: 0 | Status: SHIPPED | OUTPATIENT
Start: 2019-12-21 | End: 2020-05-22 | Stop reason: ALTCHOICE

## 2019-12-21 RX ORDER — CYCLOBENZAPRINE HCL 5 MG
TABLET ORAL
Qty: 30 TABLET | Refills: 0 | Status: SHIPPED | OUTPATIENT
Start: 2019-12-21 | End: 2020-06-17 | Stop reason: ALTCHOICE

## 2019-12-21 NOTE — PATIENT INSTRUCTIONS
Espasmo muscular   LO QUE NECESITA SABER:   Un espasmo muscular es cecilia contracción convulsiva involuntaria de cualquier músculo o de un kyleigh de músculos  Un calambre muscular es un espasmo muscular muy doloroso  Los calambres musculares generalmente ocurren después del ejercicio intenso o rosmery el [place]  Estos también pueden ser provocados por ciertos medicamentos, la deshidratación, bajos niveles de calcio o magnesio u otras condiciones de Bennie Yang Homans [facility]:   Medicamentos:  Usted podría  necesitar lo siguiente:  · AINEs (Analgésicos antiinflamatorios no esteroides)  pueden disminuir la inflamación y el dolor o la fiebre  Micky medicamento esta disponible con o sin cecilia receta médica  Los AINEs pueden causar sangrado estomacal o problemas renales en ciertas personas  Si usted vignesh un medicamento anticoagulante, siempre pregúntele a dominguez médico si los KASSANDRA son seguros para usted  Siempre benigno la etiqueta de micky medicamento y [place] instrucciones  · [place] lissett medicamentos gomez se le haya indicado  Consulte con dominguez médico si usted mathew que dominguez medicamento no le está ayudando o si presenta efectos secundarios  Infórmele si es alérgico a algún medicamento  Mantenga cecilia lista actualizada de los [employer], las vitaminas y los productos herbales que vignesh  Incluya los siguientes datos de los medicamentos: cantidad, frecuencia y motivo de administración  Traiga con usted la lista o los envases de la píldoras a lissett citas de seguimiento  Lleve la lista de los medicamentos con usted en bo de cecilia emergencia  Acuda a lissett consultas de control con dominguez médico según le indicaron  Usted puede necesitar otros exámenes o tratamientos  También es posible que lo refieran a un fisioterapeuta u otro especialista  Anote lissett preguntas para que se acuerde de hacerlas rosmery lissett visitas  Cuidados personales:   · El estiramiento  de dominguez músculo ayuda a aliviar el calambre   También puede ser de Mariano [place] mantener el músculo estirado hasta que el calambre desaparezca  · Aplique calor  para ayudar a disminuir el dolor y el espasmo muscular  Aplíquese calor en el área lesionada rosmery 20 a 30 minutos cada 2 horas rosmery la cantidad de [employer] indiquen  · Aplique hielo  para ayudar a disminuir la inflamación y el dolor  El hielo también puede contribuir a evitar el daño de los tejidos  Use un paquete de hielo o ponga hielo molido dentro de [employer]  Envuelva la compresa o bolsa con cecilia toalla y colóquela sobre dominguez músculo por 15 a 20 minutos cada hora o gomez se lo indicaron  · Consuma más líquidos  para ayudar a prevenir espasmos musculares causados por la deshidratación  Las bebidas atléticas pueden ayudar a reemplazar los electrolitos que pierde rosmery el ejercicio por la sudoración  Pregunte a dominguez médico sobre la cantidad de líquido que necesita iliana todos los días y cuáles le recomienda  · Consuma alimentos saludables , gomez frutas, verduras, granos integrales, productos lácteos bajos en grasa y proteínas bajas en grasa (carne [place], legumbres y pescado)  Si está embarazada, pregúntele a dominguez médico qué alimentos son ricos en magnesio y [place]  Estos pueden ayudar para [employer] calambres rosmery el [place]  · Dé masajes suaves sobre el músculo  para aliviar el calambre  · Respire profundo varias veces  hasta que el calambre se mejore  Acuéstese mientras respira profundo para que no sufra un mareo o desvanecimiento  Pregúntele a dominguez Hennie Rise vitaminas y minerales son adecuados para usted  · Usted presenta signos de deshidratación, gomez dolor de [place] de color amarillo oscuro, ojos o boca secos o latidos cardíacos rápidos  · Usted tiene preguntas o inquietudes acerca de dominguez condición o cuidado  Regrese a la shane de emergencias si:   · El músculo con el calambre está caliente al tacto, inflamado o enrojecido       · Usted sufre con frecuencia y sin alivio de calambres musculares en varios músculos diferentes  · Usted presenta calambres musculares con entumecimiento, cosquilleo y sensación quemante en lissett mirza y pies  © 2017 [facility] Information is for End User's use only and may not be sold, redistributed or otherwise used for commercial purposes  All illustrations and images included in CareNotes® are the copyrighted property of [employer]  or Daniel Randall  Esta información es sólo para uso en educación  Dominguez intención no es darle un consejo médico sobre enfermedades o tratamientos  Colsulte con dominguez Gladys Grapes farmacéutico antes de seguir cualquier régimen médico para saber si es seguro y efectivo para usted

## 2019-12-21 NOTE — PROGRESS NOTES
Assessment/Plan:    Muscle spasm  Patient appears to have a muscle spasm of a left abdominal muscle  Unable to prescribe NSAID due to aspirin allergy  Will treat with a course of oral steroids and a muscle relaxer  Discussed that the muscle relaxer will cause drowsiness, and to use only as needed  Encouraged gentle stretching exercises  Also recommend applying heat 20 minutes three times a day  If symptoms change or worsen, recommend follow-up to reassess  Malignant neoplasm of overlapping sites of left breast in female, estrogen receptor positive ([facility] )  Patient has had mouth sores and nausea due to her chemotherapy  Thus, she has not been hydrating well as of late  Encouraged patient to increase her fluids as tolerated  Continue magic mouthwash and ondansetron as prescribed by oncology  Mild intermittent asthma without complication  No wheezing, rales, or rhonchi on exam today  Her left side pain does not appear to be due to a pulmonary issue at this time  Continue Singluair and PRN albuterol as prescribed  Diagnoses and all orders for this visit:    Muscle spasm  -     methylPREDNISolone 4 MG tablet therapy pack; Use as directed on package  -     cyclobenzaprine (FLEXERIL) 5 mg tablet; 1-2 tablets Q 8 Hours PRN muscle pain    Malignant neoplasm of overlapping sites of left breast in female, estrogen receptor positive ([facility] )    Mild intermittent asthma without complication          Subjective:      Patient ID: Shayan Ho is a 44 y o  female  Pain in the left side  Pain x 1 week  Tried Icy Hot topically  Pain same all day; worse with deep inspiration and twisting  No fever, cough, some nasal congestion due to allergies  Nausea secondary to cancer therapy; no rash in area of pain  No trauma  On chemotherapy; having issues with fluids due to nausea and mouth sores        The following portions of the patient's history were reviewed and updated as appropriate: allergies, current medications, past family history, past medical history, past social history, past surgical history and problem list     Review of Systems   Constitutional: Negative for fever  HENT: Positive for congestion and mouth sores (has magic mouthwash Rx by oncology)  Respiratory: Negative for cough and shortness of breath  Gastrointestinal: Positive for nausea (has Zofran Rx by oncology)  Musculoskeletal: Positive for myalgias (left side)  Skin: Negative for rash  Allergic/Immunologic: Positive for environmental allergies  Objective:      BP 90/62 (BP Location: Left arm, Patient Position: Sitting, Cuff Size: Standard)   Pulse 104   Temp (!) 97 °F (36 1 °C) (Tympanic)   Resp 17   Ht 5' 4" (1 626 m)   Wt 49 9 kg (110 lb)   SpO2 98%   BMI 18 88 kg/m²          Physical Exam   Constitutional: She is oriented to person, place, and time  She appears well-developed and well-nourished  HENT:   Head: Normocephalic and atraumatic  Right Ear: External ear normal    Left Ear: External ear normal    Mouth/Throat: Oropharynx is clear and moist    Eyes: Conjunctivae are normal    Neck: Normal range of motion  Neck supple  Cardiovascular: Normal rate, regular rhythm and normal heart sounds  Pulmonary/Chest: Effort normal and breath sounds normal  No respiratory distress  She has no wheezes  She has no rhonchi  She has no rales  Musculoskeletal: Normal range of motion  Tenderness to palpation over left lateral abdominal muscle and onto left lower rib area   Lymphadenopathy:     She has no cervical adenopathy  Neurological: She is alert and oriented to person, place, and time  Skin: Skin is warm and dry  No rash (in area of pain) noted  Psychiatric: She has a normal mood and affect   Her behavior is normal

## 2019-12-28 ENCOUNTER — APPOINTMENT (EMERGENCY)
Dept: RADIOLOGY | Facility: HOSPITAL | Age: 39
End: 2019-12-28
Payer: COMMERCIAL

## 2019-12-28 ENCOUNTER — HOSPITAL ENCOUNTER (EMERGENCY)
Facility: HOSPITAL | Age: 39
Discharge: HOME | End: 2019-12-28
Attending: EMERGENCY MEDICINE | Admitting: EMERGENCY MEDICINE
Payer: COMMERCIAL

## 2019-12-28 VITALS
DIASTOLIC BLOOD PRESSURE: 54 MMHG | HEIGHT: 65 IN | WEIGHT: 109 LBS | SYSTOLIC BLOOD PRESSURE: 99 MMHG | OXYGEN SATURATION: 100 % | HEART RATE: 87 BPM | TEMPERATURE: 99 F | RESPIRATION RATE: 18 BRPM | BODY MASS INDEX: 18.16 KG/M2

## 2019-12-28 DIAGNOSIS — R10.9 FLANK PAIN: Primary | ICD-10-CM

## 2019-12-28 DIAGNOSIS — C50.912 INVASIVE DUCTAL CARCINOMA OF BREAST, FEMALE, LEFT (HCC): ICD-10-CM

## 2019-12-28 LAB
ANION GAP SERPL CALCULATED.3IONS-SCNC: 6 MMOL/L (ref 4–13)
BASOPHILS # BLD MANUAL: 0 THOUSAND/UL (ref 0–0.1)
BASOPHILS NFR MAR MANUAL: 0 % (ref 0–1)
BUN SERPL-MCNC: 11 MG/DL (ref 5–25)
CALCIUM SERPL-MCNC: 9.1 MG/DL (ref 8.3–10.1)
CHLORIDE SERPL-SCNC: 103 MMOL/L (ref 100–108)
CO2 SERPL-SCNC: 29 MMOL/L (ref 21–32)
CREAT SERPL-MCNC: 0.61 MG/DL (ref 0.6–1.3)
D DIMER PPP FEU-MCNC: 0.51 UG/ML FEU
DACRYOCYTES BLD QL SMEAR: PRESENT
EOSINOPHIL # BLD MANUAL: 0 THOUSAND/UL (ref 0–0.4)
EOSINOPHIL NFR BLD MANUAL: 0 % (ref 0–6)
ERYTHROCYTE [DISTWIDTH] IN BLOOD BY AUTOMATED COUNT: 14.1 % (ref 11.6–15.1)
GFR SERPL CREATININE-BSD FRML MDRD: 115 ML/MIN/1.73SQ M
GLUCOSE SERPL-MCNC: 99 MG/DL (ref 65–140)
HCT VFR BLD AUTO: 23.2 % (ref 34.8–46.1)
HGB BLD-MCNC: 7.7 G/DL (ref 11.5–15.4)
LG PLATELETS BLD QL SMEAR: PRESENT
LYMPHOCYTES # BLD AUTO: 0.59 THOUSAND/UL (ref 0.6–4.47)
LYMPHOCYTES # BLD AUTO: 8 % (ref 14–44)
MCH RBC QN AUTO: 30.6 PG (ref 26.8–34.3)
MCHC RBC AUTO-ENTMCNC: 33.2 G/DL (ref 31.4–37.4)
MCV RBC AUTO: 92 FL (ref 82–98)
MONOCYTES # BLD AUTO: 0.29 THOUSAND/UL (ref 0–1.22)
MONOCYTES NFR BLD: 4 % (ref 4–12)
MYELOCYTES NFR BLD MANUAL: 1 % (ref 0–1)
NEUTROPHILS # BLD MANUAL: 6.3 THOUSAND/UL (ref 1.85–7.62)
NEUTS BAND NFR BLD MANUAL: 5 % (ref 0–8)
NEUTS SEG NFR BLD AUTO: 81 % (ref 43–75)
NRBC BLD AUTO-RTO: 0 /100 WBCS
NRBC BLD AUTO-RTO: 1 /100 WBC (ref 0–2)
OVALOCYTES BLD QL SMEAR: PRESENT
PLATELET # BLD AUTO: 125 THOUSANDS/UL (ref 149–390)
PLATELET BLD QL SMEAR: ABNORMAL
PMV BLD AUTO: 11 FL (ref 8.9–12.7)
POIKILOCYTOSIS BLD QL SMEAR: PRESENT
POTASSIUM SERPL-SCNC: 3 MMOL/L (ref 3.5–5.3)
RBC # BLD AUTO: 2.52 MILLION/UL (ref 3.81–5.12)
RBC MORPH BLD: PRESENT
SCHISTOCYTES BLD QL SMEAR: PRESENT
SODIUM SERPL-SCNC: 138 MMOL/L (ref 136–145)
VARIANT LYMPHS # BLD AUTO: 1 %
WBC # BLD AUTO: 7.33 THOUSAND/UL (ref 4.31–10.16)

## 2019-12-28 PROCEDURE — 74177 CT ABD & PELVIS W/CONTRAST: CPT

## 2019-12-28 PROCEDURE — 85007 BL SMEAR W/DIFF WBC COUNT: CPT | Performed by: EMERGENCY MEDICINE

## 2019-12-28 PROCEDURE — 36415 COLL VENOUS BLD VENIPUNCTURE: CPT | Performed by: EMERGENCY MEDICINE

## 2019-12-28 PROCEDURE — 96374 THER/PROPH/DIAG INJ IV PUSH: CPT

## 2019-12-28 PROCEDURE — 99285 EMERGENCY DEPT VISIT HI MDM: CPT

## 2019-12-28 PROCEDURE — 96361 HYDRATE IV INFUSION ADD-ON: CPT

## 2019-12-28 PROCEDURE — 99284 EMERGENCY DEPT VISIT MOD MDM: CPT | Performed by: EMERGENCY MEDICINE

## 2019-12-28 PROCEDURE — 80048 BASIC METABOLIC PNL TOTAL CA: CPT | Performed by: EMERGENCY MEDICINE

## 2019-12-28 PROCEDURE — 85027 COMPLETE CBC AUTOMATED: CPT | Performed by: EMERGENCY MEDICINE

## 2019-12-28 PROCEDURE — 71275 CT ANGIOGRAPHY CHEST: CPT

## 2019-12-28 PROCEDURE — 85379 FIBRIN DEGRADATION QUANT: CPT | Performed by: EMERGENCY MEDICINE

## 2019-12-28 RX ORDER — ACETAMINOPHEN 325 MG/1
650 TABLET ORAL ONCE
Status: COMPLETED | OUTPATIENT
Start: 2019-12-28 | End: 2019-12-28

## 2019-12-28 RX ORDER — POTASSIUM CHLORIDE 20 MEQ/1
40 TABLET, EXTENDED RELEASE ORAL ONCE
Status: COMPLETED | OUTPATIENT
Start: 2019-12-28 | End: 2019-12-28

## 2019-12-28 RX ORDER — KETOROLAC TROMETHAMINE 30 MG/ML
15 INJECTION, SOLUTION INTRAMUSCULAR; INTRAVENOUS ONCE
Status: COMPLETED | OUTPATIENT
Start: 2019-12-28 | End: 2019-12-28

## 2019-12-28 RX ADMIN — IOHEXOL 100 ML: 350 INJECTION, SOLUTION INTRAVENOUS at 21:54

## 2019-12-28 RX ADMIN — ACETAMINOPHEN 650 MG: 325 TABLET ORAL at 20:36

## 2019-12-28 RX ADMIN — SODIUM CHLORIDE 1000 ML: 0.9 INJECTION, SOLUTION INTRAVENOUS at 20:31

## 2019-12-28 RX ADMIN — KETOROLAC TROMETHAMINE 15 MG: 30 INJECTION, SOLUTION INTRAMUSCULAR at 20:37

## 2019-12-28 RX ADMIN — POTASSIUM CHLORIDE 40 MEQ: 1500 TABLET, EXTENDED RELEASE ORAL at 22:16

## 2019-12-29 NOTE — DISCHARGE INSTRUCTIONS
Please take ibuprofen 600mg every 6 hours as needed for pain  Please take tylenol 625mg every 4 hours as needed for pain

## 2019-12-29 NOTE — ED ATTENDING ATTESTATION
12/28/2019  Angel Emerson DO, saw and evaluated the patient  I have discussed the patient with the resident/non-physician practitioner and agree with the resident's/non-physician practitioner's findings, Plan of Care, and MDM as documented in the resident's/non-physician practitioner's note, except where noted  All available labs and Radiology studies were reviewed  I was present for key portions of any procedure(s) performed by the resident/non-physician practitioner and I was immediately available to provide assistance  At this point I agree with the current assessment done in the Emergency Department  I have conducted an independent evaluation of this patient a history and physical is as follows:    43 yo female w/recent dx breast CA 9/2019 presents for evaluation of 2wk hx of L sided flank/back pain worse with deep breath, and moving  Denies CP/SOB, leg pain or swelling  Tried robaxin and a steroid rx by PMD without improvement  Pain severe when she takes a deep breath  Non radiating  No other a/e factors  No hx of DVT/PE  Pt is currently undergoing chemotx and had a ultrasound guided breast bx at time of dx  Imp: L flank/back pain, likely MSK but pt tachy, active CA w/chemotx  Has pretest probability for PE of 16 2% plan: ECG, BMP, D-dimer  If elevated, recommend CTA PE study  If not elevated, CXR will be suitable  Bedside echo shows no pleural effusion  Will tx pain and reassess        ED Course         Critical Care Time  Procedures

## 2019-12-29 NOTE — ED PROVIDER NOTES
History  Chief Complaint   Patient presents with    Flank Pain     pt has history of breast cancer and lymph node cancer  States she having left sided pain under armpit/down left flank     72-year-old female history of invasive ductal carcinoma status post left breast biopsy, left axillary lymph node biopsy in September, currently on chemo therapy presents with 2 weeks of left flank pain  Patient describes a pressure-like pain, worse with movement, breathing, repositioning  Does not remember what she was doing when she 1st noticed it, denies any heavy lifting, trauma  Went to PCP 1 week ago was prescribed steroid and Flexeril without any relief  Has tried [employer], NSAIDs unsure of dose at home without relief  Has not been taking Tylenol home  Has generalized constitutional symptoms secondary to chemotherapy including reduced appetite, constipation, intermittent fevers, nausea  Denies chest pain, abdominal pain, shortness of breath, dysuria, hematuria  Prior to Admission Medications   Prescriptions Last Dose Informant Patient Reported? Taking?    CRANIAL PROSTHESIS, RX,  Self No No   Sig: One wig as needed   al mag oxide-diphenhydramine-lidocaine viscous (MAGIC MOUTHWASH) 1:1:1 suspension   No Yes   Sig: Swish and spit 10 mL every 4 (four) hours as needed for mouth pain or discomfort   albuterol (PROVENTIL HFA,VENTOLIN HFA) 90 mcg/act inhaler  Self No Yes   Sig: Inhale 2 puffs every 6 (six) hours as needed for wheezing or shortness of breath   cyclobenzaprine (FLEXERIL) 5 mg tablet   No Yes   Si-2 tablets Q 8 Hours PRN muscle pain   loratadine (CLARITIN) 10 mg tablet  Self No Yes   Sig: Take 1 tablet (10 mg total) by mouth daily   Patient taking differently: Take 10 mg by mouth daily as needed    methylPREDNISolone 4 MG tablet therapy pack Not Taking at Unknown time  No No   Sig: Use as directed on package   Patient not taking: Reported on 2019   mometasone (NASONEX) 50 mcg/act nasal spray Self No Yes   Si sprays into each nostril daily   Patient taking differently: 2 sprays into each nostril daily as needed    montelukast (SINGULAIR) 10 mg tablet  Self No Yes   Sig: Take 1 tablet (10 mg total) by mouth daily at bedtime   Patient taking differently: Take 10 mg by mouth daily at bedtime as needed    multivitamin (THERAGRAN) TABS Not Taking at Unknown time Self Yes No   Sig: Take 1 tablet by mouth daily   ondansetron (ZOFRAN) 4 mg tablet  Self No Yes   Sig: Take 1 tablet (4 mg total) by mouth every 8 (eight) hours as needed for nausea or vomiting      Facility-Administered Medications: None       Past Medical History:   Diagnosis Date    Asthma     Breast cancer ([facility] ) 09/10/2019    ID[facility]       Past Surgical History:   Procedure Laterality Date    BREAST BIOPSY Left 09/10/2019     SECTION      IR PORT PLACEMENT  10/29/2019    US GUIDANCE BREAST BIOPSY LEFT EACH ADDITIONAL Left 9/10/2019    US GUIDED BREAST BIOPSY LEFT COMPLETE Left 9/10/2019    US GUIDED BREAST LYMPH NODE BIOPSY LEFT Left 9/10/2019       Family History   Problem Relation Age of Onset    Diabetes Father     Asthma Father     No Known Problems Daughter     No Known Problems Maternal Aunt     No Known Problems Maternal Aunt     No Known Problems Maternal Aunt     Breast cancer Paternal Aunt         age at dx unk    Stomach cancer Paternal Aunt     Pancreatic cancer Maternal Grandmother         age at dx unk    Cancer Paternal Uncle         type and age unk     I have reviewed and agree with the history as documented  Social History     Tobacco Use    Smoking status: Never Smoker    Smokeless tobacco: Never Used   Substance Use Topics    Alcohol use: Not Currently    Drug use: Never        Review of Systems   Constitutional: Positive for appetite change  Negative for fever  HENT: Negative for ear pain, sinus pain and sore throat  Eyes: Negative for pain     Respiratory: Negative for shortness of breath  Cardiovascular: Negative for chest pain  Gastrointestinal: Positive for constipation and nausea  Negative for abdominal pain, diarrhea and vomiting  Genitourinary: Positive for flank pain  Negative for decreased urine volume, difficulty urinating, dysuria, hematuria, pelvic pain and urgency  Musculoskeletal: Negative for back pain and neck pain  Skin: Negative for rash  Neurological: Negative for headaches  All other systems reviewed and are negative  Physical Exam  ED Triage Vitals   Temperature Pulse Respirations Blood Pressure SpO2   12/28/19 1931 12/28/19 1931 12/28/19 1931 12/28/19 1931 12/28/19 1931   99 °F (37 2 °C) (!) 127 18 130/86 98 %      Temp Source Heart Rate Source Patient Position - Orthostatic VS BP Location FiO2 (%)   12/28/19 1931 12/28/19 1931 12/28/19 1931 12/28/19 1931 --   Oral Monitor Sitting Left arm       Pain Score       12/28/19 2037       9             Orthostatic Vital Signs  Vitals:    12/28/19 1931 12/28/19 2224   BP: 130/86 99/54   Pulse: (!) 127 87   Patient Position - Orthostatic VS: Sitting        Physical Exam   Constitutional: She is oriented to person, place, and time  No distress  Cachectic   HENT:   Head: Normocephalic  Nose: Nose normal    Eyes: Conjunctivae and EOM are normal  Right eye exhibits no discharge  Left eye exhibits no discharge  Neck: Normal range of motion  Cardiovascular: Normal rate and normal heart sounds  Exam reveals no gallop and no friction rub  No murmur heard  Pulmonary/Chest: Effort normal and breath sounds normal  No stridor  No respiratory distress  Abdominal: Soft  She exhibits no distension  There is no tenderness  There is no rebound and no guarding  Musculoskeletal: She exhibits tenderness (left flank)  Neurological: She is alert and oriented to person, place, and time  No cranial nerve deficit  Skin: Skin is warm and dry  Capillary refill takes less than 2 seconds  She is not diaphoretic  Psychiatric: Her behavior is normal  Thought content normal    Tearful on exam of left flank   Nursing note and vitals reviewed  ED Medications  Medications   acetaminophen (TYLENOL) tablet 650 mg (650 mg Oral Given 12/28/19 2036)   ketorolac (TORADOL) injection 15 mg (15 mg Intravenous Given 12/28/19 2037)   sodium chloride 0 9 % bolus 1,000 mL (0 mL Intravenous Stopped 12/28/19 2200)   potassium chloride (K-DUR,KLOR-CON) CR tablet 40 mEq (40 mEq Oral Given 12/28/19 2216)   iohexol (OMNIPAQUE) 350 MG/ML injection (MULTI-DOSE) 100 mL (100 mL Intravenous Given 12/28/19 2154)       Diagnostic Studies  Results Reviewed     Procedure Component Value Units Date/Time    CBC and differential [[id number]]  (Abnormal) Collected:  12/28/19 2031    Lab Status:  Final result Specimen:  Blood from Arm, Left Updated:  12/28/19 2140     WBC 7 33 Thousand/uL      RBC 2 52 Million/uL      Hemoglobin 7 7 g/dL      Hematocrit 23 2 %      MCV 92 fL      MCH 30 6 pg      MCHC 33 2 g/dL      RDW 14 1 %      MPV 11 0 fL      Platelets 327 Thousands/uL      nRBC 0 /100 WBCs     Narrative: This is an appended report  These results have been appended to a previously verified report      D-dimer, quantitative [[id number]]  (Abnormal) Collected:  12/28/19 2031    Lab Status:  Final result Specimen:  Blood from Arm, Left Updated:  12/28/19 2130     D-Dimer, Quant 0 51 ug/ml FEU     Basic metabolic panel [[id number]]  (Abnormal) Collected:  12/28/19 2031    Lab Status:  Final result Specimen:  Blood from Arm, Left Updated:  12/28/19 2118     Sodium 138 mmol/L      Potassium 3 0 mmol/L      Chloride 103 mmol/L      CO2 29 mmol/L      ANION GAP 6 mmol/L      BUN 11 mg/dL      Creatinine 0 61 mg/dL      Glucose 99 mg/dL      Calcium 9 1 mg/dL      eGFR 115 ml/min/1 73sq m     Narrative:       [place] guidelines for Chronic Kidney Disease (CKD):     Stage 1 with normal or high GFR (GFR > 90 mL/min/1 73 square meters)    Stage 2 Mild CKD (GFR = 60-89 mL/min/1 73 square meters)    Stage 3A Moderate CKD (GFR = 45-59 mL/min/1 73 square meters)    Stage 3B Moderate CKD (GFR = 30-44 mL/min/1 73 square meters)    Stage 4 Severe CKD (GFR = 15-29 mL/min/1 73 square meters)    Stage 5 End Stage CKD (GFR <15 mL/min/1 73 square meters)  Note: GFR calculation is accurate only with a steady state creatinine                 PE Study with CT Abdomen and Pelvis with contrast    (Results Pending)         Procedures  Procedures      ED Course                               MDM  Number of Diagnoses or Management Options  Flank pain:   Invasive ductal carcinoma of breast, female, left [facility]):   Diagnosis management comments: Will treat pain with Tylenol, Toradol  Basic labs, will include D-dimer to rule out PE  Morphine if inadequate response to above mentioned meds  D dimer above threshold, will order CT PE, exclude PE, other pleuritic etiologies including PNA, atelectasis, effusion, hemothorax  Pain much improved without need for morphine  With negative study will discharge with outpatient nsaid, tylenol  Return precautions and PCP follow up  Disposition  Final diagnoses:   Flank pain   Invasive ductal carcinoma of breast, female, left ([facility] )     Time reflects when diagnosis was documented in both MDM as applicable and the Disposition within this note     Time User Action Codes Description Comment    12/28/2019 11:23 PM Tennis Fruit Add [R10 9] Flank pain     12/28/2019 11:26 PM Jayne Litten T Add [C50 912] Invasive ductal carcinoma of breast, female, left [facility])       ED Disposition     ED Disposition Condition Date/Time Comment    Discharge Stable Sat Dec 28, 2019 11:23 PM Bekah Bravo discharge to home/self care              Follow-up Information     Follow up With Specialties Details Why Contact Info Additional Information    Waqar Cabral MD Family Medicine Schedule an appointment as soon as possible for a visit For follow up regarding your symptoms [address] [place] [address] [facility] Emergency Department Emergency Medicine  If symptoms worsen [address] [phone]  [phone]  ED, [address], [address], [address] [facility], [facility]          Discharge Medication List as of 12/28/2019 11:29 PM      CONTINUE these medications which have NOT CHANGED    Details   al mag oxide-diphenhydramine-lidocaine viscous (MAGIC MOUTHWASH) 1:1:1 suspension Swish and spit 10 mL every 4 (four) hours as needed for mouth pain or discomfort, Starting Wed 12/4/2019, Normal      albuterol (PROVENTIL HFA,VENTOLIN HFA) 90 mcg/act inhaler Inhale 2 puffs every 6 (six) hours as needed for wheezing or shortness of breath, Starting Wed 10/2/2019, Normal      CRANIAL PROSTHESIS, RX, One wig as needed, Print      cyclobenzaprine (FLEXERIL) 5 mg tablet 1-2 tablets Q 8 Hours PRN muscle pain, Normal      loratadine (CLARITIN) 10 mg tablet Take 1 tablet (10 mg total) by mouth daily, Starting Wed 10/2/2019, Normal      methylPREDNISolone 4 MG tablet therapy pack Use as directed on package, Normal      mometasone (NASONEX) 50 mcg/act nasal spray 2 sprays into each nostril daily, Starting Wed 10/2/2019, Normal      montelukast (SINGULAIR) 10 mg tablet Take 1 tablet (10 mg total) by mouth daily at bedtime, Starting Wed 10/2/2019, Normal      multivitamin (THERAGRAN) TABS Take 1 tablet by mouth daily, Historical Med      ondansetron (ZOFRAN) 4 mg tablet Take 1 tablet (4 mg total) by mouth every 8 (eight) hours as needed for nausea or vomiting, Starting Wed 10/23/2019, Normal           No discharge procedures on file  ED Provider  Attending physically available and evaluated Shayan Ho  LAUREEN managed the patient along with the ED Attending      Electronically Signed by         Geronimo Carver MD  12/29/19 2991

## 2020-01-03 ENCOUNTER — HOSPITAL ENCOUNTER (OUTPATIENT)
Dept: INFUSION CENTER | Facility: CLINIC | Age: 40
Discharge: HOME | End: 2020-01-03

## 2020-01-03 DIAGNOSIS — T45.1X5A CHEMOTHERAPY INDUCED NEUTROPENIA (HCC): ICD-10-CM

## 2020-01-03 DIAGNOSIS — C50.812 MALIGNANT NEOPLASM OF OVERLAPPING SITES OF LEFT BREAST IN FEMALE, ESTROGEN RECEPTOR POSITIVE (HCC): ICD-10-CM

## 2020-01-03 DIAGNOSIS — Z17.0 MALIGNANT NEOPLASM OF OVERLAPPING SITES OF LEFT BREAST IN FEMALE, ESTROGEN RECEPTOR POSITIVE (HCC): ICD-10-CM

## 2020-01-03 DIAGNOSIS — B02.9 HERPES ZOSTER WITHOUT COMPLICATION: Primary | ICD-10-CM

## 2020-01-03 DIAGNOSIS — D70.1 CHEMOTHERAPY INDUCED NEUTROPENIA (HCC): ICD-10-CM

## 2020-01-03 RX ORDER — VALACYCLOVIR HYDROCHLORIDE 1 G/1
1000 TABLET, FILM COATED ORAL 3 TIMES DAILY
Qty: 21 TABLET | Refills: 0 | Status: SHIPPED | OUTPATIENT
Start: 2020-01-03 | End: 2020-03-30 | Stop reason: ALTCHOICE

## 2020-01-03 NOTE — PROGRESS NOTES
Patient arrived for cycle 1 Taxol with herceptin and perjeta  She states she has had a workup for left sided rib area pain several weeks back and her symptoms continue  She also states she had pain about 3 days ago around her port and now has a rash in that area  I noted a patchy vesicular rash that starts to the right of her port and tracks up to her right shoulder and right side of her neck  She states the rash is itchy as well as painful at times  Confirmed with Adelaide Khan that this is most likely shingles  Spoke with Dr Tha Kwan nurse regarding above and a picture was requested to be sent by my manager Adelaide Khan via tiger text to Dr Kennedy Auguste  Awaiting a call back regarding this from Manuel Morales RN

## 2020-01-03 NOTE — PROGRESS NOTES
Per Dr Yoni Coffman we will delay patient treatment for a week as well as all other treatments will be bumped by 1 week  She will start valtrex 3x daily for 7 days and patient verbalized understanding she needs to pick this up and start this today  Regarding her left sided pain they would like her to give this some more time to get better and feel as though it could be related to her shingles outbreak  Patient states she was advised previously to alternate tylenol and ibuprofen for this pain so she will do this along with the valtrex over the next week and keep us informed regarding relief of this pain  She does have a f/u with Dr Yoin Coffman on 1/8/20 prior to her next dosing of chemotherapy  Chemotherapy rescheduled for 1/15/20

## 2020-01-06 ENCOUNTER — OFFICE VISIT (OUTPATIENT)
Dept: SURGICAL ONCOLOGY | Facility: CLINIC | Age: 40
End: 2020-01-06
Payer: COMMERCIAL

## 2020-01-06 VITALS
TEMPERATURE: 97.9 F | BODY MASS INDEX: 17.33 KG/M2 | RESPIRATION RATE: 14 BRPM | WEIGHT: 104 LBS | SYSTOLIC BLOOD PRESSURE: 90 MMHG | DIASTOLIC BLOOD PRESSURE: 60 MMHG | HEIGHT: 65 IN | HEART RATE: 112 BPM

## 2020-01-06 DIAGNOSIS — C50.812 MALIGNANT NEOPLASM OF OVERLAPPING SITES OF LEFT BREAST IN FEMALE, ESTROGEN RECEPTOR POSITIVE (HCC): Primary | ICD-10-CM

## 2020-01-06 DIAGNOSIS — Z17.0 MALIGNANT NEOPLASM OF OVERLAPPING SITES OF LEFT BREAST IN FEMALE, ESTROGEN RECEPTOR POSITIVE (HCC): Primary | ICD-10-CM

## 2020-01-06 PROCEDURE — 99214 OFFICE O/P EST MOD 30 MIN: CPT | Performed by: SURGERY

## 2020-01-06 NOTE — PROGRESS NOTES
Surgical Oncology Follow Up       [address]  [facility] [place]  [address] [facility] [address]    Arian Iniguez  1980  [id number]  [address] [facility] [facility]  [facility] [place]  [address] [address]    Chief Complaint   Patient presents with    Breast Cancer     Pt is here or a three month follow up          Assessment & Plan:   Patient presents for a three-month follow-up visit  She is midway through her chemotherapy and tolerating that relatively well however she has recently developed shingles along her right shoulder and back  Her chemotherapy is currently on hold  Review of her mammogram and MRI demonstrate relatively extensive multifocal disease  I have recommended mastectomy in conjunction with an axillary dissection  I have also recommended she see a plastic surgeon  She would need radiation therapy given her lan involvement  We will coordinate this for her  We will see her back in 3 months  I most likely would order a MRI at that time  She still has some palpable tissue in the left breast     Cancer History:        Malignant neoplasm of overlapping sites of left breast in female, estrogen receptor positive ([facility] [address] )    9/10/2019 Biopsy     Left breast ultrasound-guided biopsy  A  2 o'clock, 2 cm from nipple  Invasive breast carcinoma of no special type (ductal, NST)  Grade 2  ER 90  MN 70  HER2 3+    B  3 o'clock, 1 cm from nipple  Invasive breast carcinoma of no special type (ductal NST)  Grade 2  ER 70  MN 60  HER2 3+    C  Left axillary lymph node  Metastatic adenocarcinoma      9/23/2019 Genetic Testing     Panel of 19 genes were evaluated  Negative result   No pathogenic sequence variants or deletions/dupllications identified  [facility]      11/6/2019 -  Chemotherapy     DOXOrubicin (ADRIAMYCIN) injection 96 mg, 60 mg/m2 = 96 mg, Intravenous, Once, 4 of 4 cycles  Administration: 96 mg (2019), 96 mg (2019), 96 mg (2019), 96 mg (2019)  cyclophosphamide (CYTOXAN) 1,000 mg in sodium chloride 0 9 % 250 mL IVPB, 960 mg, Intravenous, Once, 4 of 4 cycles  Administration: 1,000 mg (2019), 1,000 mg (2019), 1,000 mg (2019), 1,000 mg (2019)  fosaprepitant (EMEND) 150 mg in sodium chloride 0 9 % 255 mL IVPB, 150 mg, Intravenous, Once, 4 of 4 cycles  Administration: 150 mg (2019), 150 mg (2019), 150 mg (2019), 150 mg (2019)  pertuzumab (PERJETA) 840 mg in sodium chloride 0 9 % 250 mL IVPB, 840 mg (100 % of original dose 840 mg), Intravenous, Once, 1 of 4 cycles  Dose modification: 840 mg (original dose 840 mg, Cycle 5), 420 mg (original dose 420 mg, Cycle 8)  PACLitaxel (TAXOL) 127 8 mg in sodium chloride 0 9 % 250 mL chemo IVPB, 80 mg/m2 = 127 8 mg, Intravenous, Once, 1 of 12 cycles  trastuzumab (HERCEPTIN) 214 mg in sodium chloride 0 9 % 250 mL chemo infusion, 4 mg/kg = 214 mg, Intravenous, Once, 1 of 26 cycles           Interval History:   See Assessment & Plan    Review of Systems:   Review of Systems   Constitutional: Positive for fatigue  Right shingles   Musculoskeletal: Positive for arthralgias         Past Medical History     Patient Active Problem List   Diagnosis    Malignant neoplasm of overlapping sites of left breast in female, estrogen receptor positive ([address] )    Varicose veins of left lower extremity with pain    Mild intermittent asthma without complication    Seasonal allergic rhinitis due to pollen    Chemotherapy induced neutropenia ([address] )    Muscle spasm     Past Medical History:   Diagnosis Date    Asthma     BRCA gene mutation negative 10/2019    [facility]    Breast cancer ([address] ) 09/10/2019    [facility]    History of chemotherapy 10/2019     Past Surgical History:   Procedure Laterality Date    BREAST BIOPSY Left 09/10/2019     SECTION      IR PORT PLACEMENT  10/29/2019    US GUIDANCE BREAST BIOPSY LEFT EACH ADDITIONAL Left 9/10/2019    US GUIDED BREAST BIOPSY LEFT COMPLETE Left 9/10/2019    US GUIDED BREAST LYMPH NODE BIOPSY LEFT Left 9/10/2019     Family History   Problem Relation Age of Onset    Diabetes Father     Asthma Father     No Known Problems Daughter     No Known Problems Maternal Aunt     No Known Problems Maternal Aunt     No Known Problems Maternal Aunt     Breast cancer Paternal Aunt         age at dx unk    Stomach cancer Paternal Aunt     Pancreatic cancer Maternal Grandmother         age at dx unk    Cancer Paternal Uncle         type and age unk     Social History     Socioeconomic History    Marital status:      Spouse name: Not on file    Number of children: Not on file    Years of education: Not on file    Highest education level: Not on file   Occupational History    Not on file   Social Needs    Financial resource strain: Not hard at all   Smarp insecurity:     Worry: Never true     Inability: Never true   [employer] needs:     Medical: No     Non-medical: No   Tobacco Use    Smoking status: Never Smoker    Smokeless tobacco: Never Used   Substance and Sexual Activity    Alcohol use: Not Currently    Drug use: Never    Sexual activity: Yes   Lifestyle    Physical activity:     Days per week: 0 days     Minutes per session: 0 min    Stress:  Only a little   Relationships    Social connections:     Talks on phone: Patient refused     Gets together: Patient refused     Attends Jainism service: Patient refused     Active member of club or organization: Patient refused     Attends meetings of clubs or organizations: Patient refused     Relationship status: Patient refused    Intimate partner violence:     Fear of current or ex partner: Patient refused     Emotionally abused: Patient refused     Physically abused: Patient refused     Forced sexual activity: Patient refused   Other Topics Concern    Not on file   Social History Narrative    Not on file Current Outpatient Medications:     al mag oxide-diphenhydramine-lidocaine viscous (MAGIC MOUTHWASH) 1:1:1 suspension, Swish and spit 10 mL every 4 (four) hours as needed for mouth pain or discomfort, Disp: 180 mL, Rfl: 1    albuterol (PROVENTIL HFA,VENTOLIN HFA) 90 mcg/act inhaler, Inhale 2 puffs every 6 (six) hours as needed for wheezing or shortness of breath, Disp: 1 Inhaler, Rfl: 3    CRANIAL PROSTHESIS, RX,, One wig as needed, Disp: 1 Piece, Rfl: 0    cyclobenzaprine (FLEXERIL) 5 mg tablet, 1-2 tablets Q 8 Hours PRN muscle pain, Disp: 30 tablet, Rfl: 0    loratadine (CLARITIN) 10 mg tablet, Take 1 tablet (10 mg total) by mouth daily (Patient taking differently: Take 10 mg by mouth daily as needed ), Disp: 30 tablet, Rfl: 3    methylPREDNISolone 4 MG tablet therapy pack, Use as directed on package, Disp: 21 each, Rfl: 0    mometasone (NASONEX) 50 mcg/act nasal spray, 2 sprays into each nostril daily (Patient taking differently: 2 sprays into each nostril daily as needed ), Disp: 1 Act, Rfl: 3    montelukast (SINGULAIR) 10 mg tablet, Take 1 tablet (10 mg total) by mouth daily at bedtime (Patient taking differently: Take 10 mg by mouth daily at bedtime as needed ), Disp: 30 tablet, Rfl: 3    multivitamin (THERAGRAN) TABS, Take 1 tablet by mouth daily, Disp: , Rfl:     ondansetron (ZOFRAN) 4 mg tablet, Take 1 tablet (4 mg total) by mouth every 8 (eight) hours as needed for nausea or vomiting, Disp: 30 tablet, Rfl: 1    valACYclovir (VALTREX) 1,000 mg tablet, Take 1 tablet (1,000 mg total) by mouth 3 (three) times a day for 7 days, Disp: 21 tablet, Rfl: 0  Allergies   Allergen Reactions    Aspirin Edema, Eye Swelling and Facial Swelling    Other Swelling     Seafood    Codeine Rash       Physical Exam:     Vitals:    01/06/20 0913   BP: 90/60   Pulse: (!) 112   Resp: 14   Temp: 97 9 °F (36 6 °C)     Physical Exam   Constitutional: She is oriented to person, place, and time   She appears well-developed and well-nourished  HENT:   Head: Normocephalic and atraumatic  Mouth/Throat: Oropharynx is clear and moist    Eyes: Pupils are equal, round, and reactive to light  EOM are normal    Neck: Normal range of motion  Neck supple  No JVD present  No tracheal deviation present  No thyromegaly present  Cardiovascular: Normal rate, regular rhythm, normal heart sounds and intact distal pulses  Exam reveals no gallop and no friction rub  No murmur heard  Pulmonary/Chest: Effort normal and breath sounds normal  No respiratory distress  She has no wheezes  She has no rales  Examination of the right breast demonstrates shingles over the anterior and posterior chest and shoulder  The right breast shows no worrisome skin findings other than shingles in the upper portion, there are no dominant masses and no nipple discharge  Examination of the left breast demonstrates an area of indurated tissue consistent with her breast cancer  I do not appreciate any adenopathy  Abdominal: Soft  She exhibits no distension and no mass  There is no hepatomegaly  There is no tenderness  There is no rebound and no guarding  Musculoskeletal: Normal range of motion  She exhibits no edema or tenderness  Lymphadenopathy:     She has no cervical adenopathy  Neurological: She is alert and oriented to person, place, and time  No cranial nerve deficit  Skin: Skin is warm and dry  No rash noted  No erythema  Psychiatric: She has a normal mood and affect  Her behavior is normal    Vitals reviewed  Results & Discussion:     The patient has a clinical response based on her axillary disease is difficult to compare this with her original exam for response in the breast itself  I recommended a mastectomy in conjunction with an axillary dissection given her overall findings both clinically and on her MRI  I recommended she see a plastic surgeon she is agreeable to this    We will see her back in 3 months  Advance Care Planning/Advance Directives:  I discussed the disease status, treatment plans and follow-up with the patient

## 2020-01-07 ENCOUNTER — OFFICE VISIT (OUTPATIENT)
Dept: HEMATOLOGY ONCOLOGY | Facility: CLINIC | Age: 40
End: 2020-01-07
Payer: COMMERCIAL

## 2020-01-07 VITALS
BODY MASS INDEX: 17.33 KG/M2 | RESPIRATION RATE: 17 BRPM | DIASTOLIC BLOOD PRESSURE: 60 MMHG | TEMPERATURE: 98.3 F | WEIGHT: 104 LBS | OXYGEN SATURATION: 100 % | HEIGHT: 65 IN | HEART RATE: 126 BPM | SYSTOLIC BLOOD PRESSURE: 102 MMHG

## 2020-01-07 DIAGNOSIS — T45.1X5A CHEMOTHERAPY INDUCED NEUTROPENIA (HCC): ICD-10-CM

## 2020-01-07 DIAGNOSIS — C50.812 MALIGNANT NEOPLASM OF OVERLAPPING SITES OF LEFT BREAST IN FEMALE, ESTROGEN RECEPTOR POSITIVE (HCC): Primary | ICD-10-CM

## 2020-01-07 DIAGNOSIS — Z17.0 MALIGNANT NEOPLASM OF OVERLAPPING SITES OF LEFT BREAST IN FEMALE, ESTROGEN RECEPTOR POSITIVE (HCC): Primary | ICD-10-CM

## 2020-01-07 DIAGNOSIS — D70.1 CHEMOTHERAPY INDUCED NEUTROPENIA (HCC): ICD-10-CM

## 2020-01-07 PROCEDURE — 99215 OFFICE O/P EST HI 40 MIN: CPT | Performed by: INTERNAL MEDICINE

## 2020-01-07 RX ORDER — SODIUM CHLORIDE 9 MG/ML
20 INJECTION, SOLUTION INTRAVENOUS ONCE
Status: CANCELLED | OUTPATIENT
Start: 2020-02-25

## 2020-01-07 RX ORDER — SODIUM CHLORIDE 9 MG/ML
20 INJECTION, SOLUTION INTRAVENOUS ONCE
Status: CANCELLED | OUTPATIENT
Start: 2020-03-02

## 2020-01-07 RX ORDER — OXYCODONE HYDROCHLORIDE AND ACETAMINOPHEN 5; 325 MG/1; MG/1
1 TABLET ORAL EVERY 6 HOURS PRN
Qty: 30 TABLET | Refills: 0 | Status: SHIPPED | OUTPATIENT
Start: 2020-01-07 | End: 2020-01-28 | Stop reason: SDUPTHER

## 2020-01-07 NOTE — PROGRESS NOTES
Hematology / Oncology Outpatient Follow Up Note    Prince Murry 44 y o  female NBZ:[id number] RRW:[id number]         Date:  1/7/2020    Assessment / Plan:  A 25-year-old premenopausal woman with locally advanced left breast cancer, grade 2, ER 90 % positive, RI 70% positive, HER2 3+ disease  Roxana Ochoa has relatively large palpable left breast mass as well as palpable left axillary adenopathy which was biopsy confirmed to be metastatic disease   She is negative for BRCA gene mutation  She had 4 cycle of dose dense AC as neoadjuvant chemotherapy with partial response  Her paclitaxel and trastuzumab of was delayed due to the shingle for which she is on a 1st cycle of air  She has very localized left lower ribcage pain for which she underwent CTA of chest abdomen pelvis with no evidence of PE or intra-abdominal abnormalities  Etiology of this pain is unclear  This could be related to the rib or radiculopathy  I recommended her to have bone scan as well as MRI of the thoracic spine  She is not ready to restart chemotherapy due to the shingle tomorrow  I recommended her to start weekly paclitaxel, trastuzumab and try weekly pertuzumab in January 15, 2020  I will see her again in 3 weeks for routine follow-up  I prescribed Percocet which she can take as needed  She is in agreement with my recommendations         Subjective:      HPI:  A 25-year-old premenopausal woman who noticed a left breast lump recently which she brought to medical attention  Roxana Ochoa was found to have radiographic abnormality in her left breast as well as left axilla   She underwent left breast biopsy in September 10, 2019 which showed invasive ductal carcinoma, grade 2   This was ER 90 % positive, RI 70% positive, HER2 3+ disease   Biopsy from enlarged left axillary lymph node was also positive for metastatic disease   She was seen by Dr Ronaldo Castleman who sent her to me for neoadjuvant chemotherapy   She is negative for BRCA gene mutation  Roxana Ochoa underwent bone scan and CT scan chest abdomen pelvis which showed no evidence of distant metastasis   She presents today with her  in mother to discuss neoadjuvant chemotherapy   She has some left breast pain   Otherwise, she feels well   She denied any respiratory symptoms   She has no complaint of bone pain   She has regular menstrual cycle   She has 2 children   She has no plan to have more children   She has no surgical history   She has no significant past medical history  Barbara Benoit is a lifetime never smoker   Her performance status is normal              Interval History:  A 17-year-old premenopausal woman with locally advanced left breast cancer, grade 2, ER 90% positive, RI 70% positive, HER2 3+ disease   She has relatively large palpable left breast mass as well as palpable left axillary adenopathy which was biopsy confirmed to be metastatic disease   She has no evidence of distant metastasis   She is negative for BRCA gene mutation  She had 4 cycle of dose dense AC with partial response  Her left axillary adenopathy is no longer palpable  She was in infusion center to start paclitaxel and trastuzumab, last week when she was found to have shingles  Therefore, chemotherapy was postponed  She is currently on valacyclovir  Her rash just started to be crusted  She still have some sore on the right shoulder where she has shingle rash  Since a month ago, she had very localized left flank pain, almost located in the lower edge of the left rib  Even with the light touch as well as movement cause significant pain  She went to the emergency room almost end of December 2019 where, she underwent CTA of chest abdomen pelvis which showed no pulmonary embolism or any other intra-abdominal abnormalities  Deep inspiration also cause the pain in the left lower ribcage  She has no weight change  She has no complaint shortness of breath  She is afebrile    Her performance status is 0 to 1/4 on the ECOG scale       Objective:      Primary Diagnosis:     1  Locally advanced left breast cancer, grade 2, ER 90 % positive, AK 70 % positive, HER2 3+ disease   Diagnosed in September 2019    2  BRCA gene mutation negative      Cancer Staging:  Cancer Staging  No matching staging information was found for the patient         Previous Hematologic/ Oncologic Treatment:            Current Hematologic/ Oncologic Treatment:       Neoadjuvant chemotherapy with dose dense AC x4 followed by weekly paclitaxel, trastuzumab and try weekly pertuzumab x12 weeks     1st cycle of paclitaxel, trastuzumab and pertuzumab to be given in January 10, 2020      Disease Status:      Clinical partial response      Test Results:     Pathology:     Left breast biopsy showed invasive ductal carcinoma, grade 2  ER 90 % positive, AK 70 % positive, HER2 3+ disease        Radiology:     CT scan chest abdomen pelvis showed no evidence of distant metastasis      Bone scan was negative for osseous metastasis      Echocardiogram showed ejection fraction 60%     Laboratory:     See below      Physical Exam:        General Appearance:    Alert, oriented          Eyes:    PERRL   Ears:    Normal external ear canals, both ears   Nose:   Nares normal, septum midline   Throat:   Mucosa moist  Pharynx without injection  Neck:   Supple         Lungs:     Clear to auscultation bilaterally, very localized tenderness even with light touch at left lateral lower rib cage  Chest Wall:    No tenderness or deformity    Heart:    Regular rate and rhythm         Abdomen:     Soft, non-tender, bowel sounds +, no organomegaly               Extremities:   Extremities no cyanosis or edema         Skin:   no rash or icterus  Typical shingle rash in the right shoulder     Lymph nodes:   Cervical, supraclavicular, and axillary nodes normal   Neurologic:   CNII-XII intact, normal strength, sensation and reflexes     Throughout             Breast exam:  2 x 2cm of palpable mass at 2:00 position in her left breast   Left axillary adenopathy with hardly palpable   Right breast exam is negative  ROS: Review of Systems   All other systems reviewed and are negative  Imaging: Pe Study With Ct Abdomen And Pelvis With Contrast    Result Date: 12/29/2019  Narrative: CT PULMONARY ANGIOGRAM OF THE CHEST AND CT ABDOMEN AND PELVIS WITH INTRAVENOUS CONTRAST INDICATION:   PE suspected, intermediate prob, positive D-dimer  History of invasive ductal carcinoma left breast, status post ultrasound-guided biopsy September 10, 2019  History of left axillary adenopathy  Presents with left-sided chest pressure and nausea  COMPARISON:  CT chest, abdomen and pelvis September 23, 2019  TECHNIQUE:  CT examination of the chest, abdomen and pelvis was performed  Thin section CT angiographic technique was used in the chest in order to evaluate for pulmonary embolus and coronal 3D MIP postprocessing was performed on the acquisition scanner  Axial, sagittal, and coronal 2D reformatted images were created from the source data and submitted for interpretation  Radiation dose length product (DLP) for this visit:  487 73 mGy-cm   This examination, like all CT scans performed in the [facility], was performed utilizing techniques to minimize radiation dose exposure, including the use of iterative  reconstruction and automated exposure control  IV Contrast:  100 mL of iohexol (OMNIPAQUE) Enteric Contrast:  Enteric contrast was not administered  FINDINGS: CHEST PULMONARY ARTERIAL TREE:  No pulmonary embolus is seen  LUNGS:  Lungs are clear  There is no tracheal or endobronchial lesion  PLEURA:  Unremarkable  HEART/AORTA:  Unremarkable for patient's age  MEDIASTINUM AND ROHAN:  Unremarkable  CHEST WALL AND LOWER NECK:   Improving adenopathy within the left axilla  Right-sided single lumen [id number] with catheter tip above cavoatrial junction   ABDOMEN LIVER/BILIARY TREE:  One or more subcentimeter sharply circumscribed low-density hepatic lesion(s) are noted, too small to accurately characterize, but statistically most likely to represent subcentimeter hepatic cysts  No suspicious solid hepatic lesion is identified  Hepatic contours are normal   No biliary dilatation  GALLBLADDER: Contracted, consistent with the nonfasting state  No calcified gallstones  No pericholecystic inflammatory change  SPLEEN:  Unremarkable  PANCREAS:  Unremarkable  ADRENAL GLANDS:  Unremarkable  KIDNEYS/URETERS:  Unremarkable  No hydronephrosis  STOMACH AND BOWEL:  Evaluation limited due to lack of oral contrast material as well as the nonfasting state of the patient  Stomach mildly distended and filled with ingested food products  Hiatal hernia  No evidence of small bowel obstruction  Normal fecal burden throughout the colon  APPENDIX:  No findings to suggest appendicitis  ABDOMINOPELVIC CAVITY:  No ascites or free intraperitoneal air  No lymphadenopathy  VESSELS:  Unremarkable for patient's age  PELVIS REPRODUCTIVE ORGANS:  Stable fat-containing mass right adnexa, most compatible with ovarian dermoid/teratoma  Prominent vessels in the adnexal regions bilaterally, consistent with pelvic venous congestion  URINARY BLADDER:  Unremarkable  ABDOMINAL WALL/INGUINAL REGIONS:  Unremarkable  OSSEOUS STRUCTURES:  No acute fracture or destructive osseous lesion  Impression: 1  No CT evidence of pulmonary embolism  2   No acute abnormality within the chest, abdomen or pelvis  3   Improving adenopathy left axilla  Findings are consistent with the preliminary report from [facility] which was provided shortly after completion of the exam  Workstation performed: [id number]     Radiology Results    Result Date: 12/30/2019  Narrative: Ordered by an unspecified provider          Labs:   Lab Results   Component Value Date    WBC 7 33 12/28/2019    HGB 7 7 (L) 12/28/2019    HCT 23 2 (L) 12/28/2019    MCV 92 12/28/2019  (L) 12/28/2019     Lab Results   Component Value Date    K 3 0 (L) 12/28/2019     12/28/2019    CO2 29 12/28/2019    BUN 11 12/28/2019    CREATININE 0 61 12/28/2019    GLUF 82 11/16/2019    CALCIUM 9 1 12/28/2019    AST 9 12/17/2019    ALT 17 12/17/2019    ALKPHOS 106 12/17/2019    EGFR 115 12/28/2019         Current Medications: Reviewed  Allergies: Reviewed  PMH/FH/SH:  Reviewed      Vital Sign:    Body surface area is 1 5 meters squared      Wt Readings from Last 3 Encounters:   01/07/20 47 2 kg (104 lb)   01/06/20 47 2 kg (104 lb)   12/28/19 49 4 kg (109 lb)        Temp Readings from Last 3 Encounters:   01/07/20 98 3 °F (36 8 °C) (Oral)   01/06/20 97 9 °F (36 6 °C)   12/28/19 99 °F (37 2 °C) (Oral)        BP Readings from Last 3 Encounters:   01/07/20 102/60   01/06/20 90/60   12/28/19 99/54         Pulse Readings from Last 3 Encounters:   01/07/20 (!) 126   01/06/20 (!) 112   12/28/19 87     @LASTSAO2(3)@

## 2020-01-08 ENCOUNTER — HOSPITAL ENCOUNTER (OUTPATIENT)
Dept: INFUSION CENTER | Facility: CLINIC | Age: 40
End: 2020-01-08

## 2020-01-14 ENCOUNTER — APPOINTMENT (OUTPATIENT)
Dept: LAB | Facility: HOSPITAL | Age: 40
End: 2020-01-14
Attending: INTERNAL MEDICINE
Payer: COMMERCIAL

## 2020-01-14 DIAGNOSIS — C50.812 MALIGNANT NEOPLASM OF OVERLAPPING SITES OF LEFT BREAST IN FEMALE, ESTROGEN RECEPTOR POSITIVE (HCC): ICD-10-CM

## 2020-01-14 DIAGNOSIS — D70.1 CHEMOTHERAPY INDUCED NEUTROPENIA (HCC): ICD-10-CM

## 2020-01-14 DIAGNOSIS — T45.1X5A CHEMOTHERAPY INDUCED NEUTROPENIA (HCC): ICD-10-CM

## 2020-01-14 DIAGNOSIS — Z17.0 MALIGNANT NEOPLASM OF OVERLAPPING SITES OF LEFT BREAST IN FEMALE, ESTROGEN RECEPTOR POSITIVE (HCC): ICD-10-CM

## 2020-01-14 LAB
ALBUMIN SERPL BCP-MCNC: 4 G/DL (ref 3.5–5)
ALP SERPL-CCNC: 60 U/L (ref 46–116)
ALT SERPL W P-5'-P-CCNC: 62 U/L (ref 12–78)
ANION GAP SERPL CALCULATED.3IONS-SCNC: 5 MMOL/L (ref 4–13)
AST SERPL W P-5'-P-CCNC: 40 U/L (ref 5–45)
BASOPHILS # BLD AUTO: 0.03 THOUSANDS/ΜL (ref 0–0.1)
BASOPHILS NFR BLD AUTO: 1 % (ref 0–1)
BILIRUB SERPL-MCNC: 0.3 MG/DL (ref 0.2–1)
BUN SERPL-MCNC: 10 MG/DL (ref 5–25)
CALCIUM SERPL-MCNC: 9.2 MG/DL (ref 8.3–10.1)
CHLORIDE SERPL-SCNC: 107 MMOL/L (ref 100–108)
CO2 SERPL-SCNC: 29 MMOL/L (ref 21–32)
CREAT SERPL-MCNC: 0.49 MG/DL (ref 0.6–1.3)
EOSINOPHIL # BLD AUTO: 0.04 THOUSAND/ΜL (ref 0–0.61)
EOSINOPHIL NFR BLD AUTO: 1 % (ref 0–6)
ERYTHROCYTE [DISTWIDTH] IN BLOOD BY AUTOMATED COUNT: 18.6 % (ref 11.6–15.1)
GFR SERPL CREATININE-BSD FRML MDRD: 123 ML/MIN/1.73SQ M
GLUCOSE SERPL-MCNC: 78 MG/DL (ref 65–140)
HCT VFR BLD AUTO: 30.4 % (ref 34.8–46.1)
HGB BLD-MCNC: 9.5 G/DL (ref 11.5–15.4)
IMM GRANULOCYTES # BLD AUTO: 0.01 THOUSAND/UL (ref 0–0.2)
IMM GRANULOCYTES NFR BLD AUTO: 0 % (ref 0–2)
LYMPHOCYTES # BLD AUTO: 1.15 THOUSANDS/ΜL (ref 0.6–4.47)
LYMPHOCYTES NFR BLD AUTO: 27 % (ref 14–44)
MCH RBC QN AUTO: 30.6 PG (ref 26.8–34.3)
MCHC RBC AUTO-ENTMCNC: 31.3 G/DL (ref 31.4–37.4)
MCV RBC AUTO: 98 FL (ref 82–98)
MONOCYTES # BLD AUTO: 0.92 THOUSAND/ΜL (ref 0.17–1.22)
MONOCYTES NFR BLD AUTO: 22 % (ref 4–12)
NEUTROPHILS # BLD AUTO: 2.07 THOUSANDS/ΜL (ref 1.85–7.62)
NEUTS SEG NFR BLD AUTO: 49 % (ref 43–75)
NRBC BLD AUTO-RTO: 0 /100 WBCS
PLATELET # BLD AUTO: 362 THOUSANDS/UL (ref 149–390)
PMV BLD AUTO: 9.5 FL (ref 8.9–12.7)
POTASSIUM SERPL-SCNC: 3.5 MMOL/L (ref 3.5–5.3)
PROT SERPL-MCNC: 7.7 G/DL (ref 6.4–8.2)
RBC # BLD AUTO: 3.1 MILLION/UL (ref 3.81–5.12)
SODIUM SERPL-SCNC: 141 MMOL/L (ref 136–145)
WBC # BLD AUTO: 4.22 THOUSAND/UL (ref 4.31–10.16)

## 2020-01-14 PROCEDURE — 36415 COLL VENOUS BLD VENIPUNCTURE: CPT

## 2020-01-14 PROCEDURE — 85025 COMPLETE CBC W/AUTO DIFF WBC: CPT

## 2020-01-14 PROCEDURE — 80053 COMPREHEN METABOLIC PANEL: CPT

## 2020-01-14 RX ORDER — SODIUM CHLORIDE 9 MG/ML
20 INJECTION, SOLUTION INTRAVENOUS ONCE
Status: CANCELLED | OUTPATIENT
Start: 2020-01-15

## 2020-01-15 ENCOUNTER — HOSPITAL ENCOUNTER (OUTPATIENT)
Dept: INFUSION CENTER | Facility: CLINIC | Age: 40
Discharge: HOME | End: 2020-01-15
Payer: COMMERCIAL

## 2020-01-15 VITALS
DIASTOLIC BLOOD PRESSURE: 58 MMHG | HEART RATE: 77 BPM | TEMPERATURE: 98.1 F | OXYGEN SATURATION: 100 % | SYSTOLIC BLOOD PRESSURE: 92 MMHG | RESPIRATION RATE: 16 BRPM | BODY MASS INDEX: 24.69 KG/M2 | WEIGHT: 106.7 LBS | HEIGHT: 55 IN

## 2020-01-15 DIAGNOSIS — Z17.0 MALIGNANT NEOPLASM OF OVERLAPPING SITES OF LEFT BREAST IN FEMALE, ESTROGEN RECEPTOR POSITIVE (HCC): ICD-10-CM

## 2020-01-15 DIAGNOSIS — C50.812 MALIGNANT NEOPLASM OF OVERLAPPING SITES OF LEFT BREAST IN FEMALE, ESTROGEN RECEPTOR POSITIVE (HCC): ICD-10-CM

## 2020-01-15 DIAGNOSIS — T45.1X5A CHEMOTHERAPY INDUCED NEUTROPENIA (HCC): Primary | ICD-10-CM

## 2020-01-15 DIAGNOSIS — D70.1 CHEMOTHERAPY INDUCED NEUTROPENIA (HCC): Primary | ICD-10-CM

## 2020-01-15 PROCEDURE — 96367 TX/PROPH/DG ADDL SEQ IV INF: CPT

## 2020-01-15 PROCEDURE — 96375 TX/PRO/DX INJ NEW DRUG ADDON: CPT

## 2020-01-15 PROCEDURE — 96417 CHEMO IV INFUS EACH ADDL SEQ: CPT

## 2020-01-15 PROCEDURE — 96413 CHEMO IV INFUSION 1 HR: CPT

## 2020-01-15 RX ORDER — SODIUM CHLORIDE 9 MG/ML
20 INJECTION, SOLUTION INTRAVENOUS ONCE
Status: COMPLETED | OUTPATIENT
Start: 2020-01-15 | End: 2020-01-15

## 2020-01-15 RX ADMIN — DIPHENHYDRAMINE HYDROCHLORIDE 25 MG: 50 INJECTION, SOLUTION INTRAMUSCULAR; INTRAVENOUS at 12:42

## 2020-01-15 RX ADMIN — PERTUZUMAB 840 MG: 30 INJECTION, SOLUTION, CONCENTRATE INTRAVENOUS at 13:41

## 2020-01-15 RX ADMIN — SODIUM CHLORIDE 20 ML/HR: 0.9 INJECTION, SOLUTION INTRAVENOUS at 12:23

## 2020-01-15 RX ADMIN — DEXAMETHASONE SODIUM PHOSPHATE 10 MG: 10 INJECTION, SOLUTION INTRAMUSCULAR; INTRAVENOUS at 12:25

## 2020-01-15 RX ADMIN — PACLITAXEL 127.8 MG: 6 INJECTION, SOLUTION INTRAVENOUS at 14:50

## 2020-01-15 RX ADMIN — TRASTUZUMAB 214 MG: 150 INJECTION, POWDER, LYOPHILIZED, FOR SOLUTION INTRAVENOUS at 15:56

## 2020-01-15 RX ADMIN — FAMOTIDINE 20 MG: 10 INJECTION INTRAVENOUS at 13:03

## 2020-01-15 NOTE — PROGRESS NOTES
Patient tolerated treatment without incident and was discharged post   She offers no c/o and will RTO for same 1/22/20 as previously scheduled

## 2020-01-15 NOTE — PATIENT INSTRUCTIONS
January 2020 Sunday Monday Tuesday Wednesday Thursday Friday Saturday                  1     2     3    INF ONCOLOGY TX-TREATMENT PLAN  11:30 AM   (300 min )   AN INF CHAIR [place] 4                5     6    OFFICE VISIT LONG PG   9:00 AM   (30 min )   Dante Burrows MD   [facility] [place] 7    FOLLOW UP PG   3:45 PM   (20 min )   Vida Del Rio MD   [facility] [place] 8     9     10     11                12     13     14    LAB WALK IN   4:50 PM   (5 min )   BE HOSP OP LAB [address] Laboratory Services 15    INF ONCOLOGY TX-TREATMENT PLAN  12:00 PM   (210 min )   AN INF CHAIR [address] 16     17    NM BONE CAMERA INJ  11:00 AM   (30 min )   BE NM [address] Nuclear Medicine    NM BONE SCAN WHOLE BODY   1:30 PM   (60 min )   BE NM [address] Nuclear Medicine 18          Cycle 5, Day 1      19     20     21     22    INF ONCOLOGY TX-TREATMENT PLAN  12:00 PM   (240 min )   AN INF CHAIR Gustavo    MRI THORACIC SPINE W WO CON   5:30 PM   (60 min )   BE MRI 2   [facility] [address] MRI 23     24     25        Cycle 6, Day 1        26     27     28    FOLLOW UP PG   4:25 PM   (20 min )   Vida Del Rio MD   [facility] [place] 29    INF ONCOLOGY TX-TREATMENT PLAN  11:30 AM   (240 min )   AN INF CHAIR 12   [address] 30     31             Cycle 7, Day 1             Treatment Details       1/15/2020 - Cycle 5, Day 1      Chemotherapy: PERTUZUMAB IVPB, ONCBCN PROVIDER COMMUNICATION2, PACLITAXEL IVPB, ONCBCN PROVIDER COMMUNICATION2, TRASTUZUMAB INFUSION    1/20/2020 - Cycle 6, Day 1      Chemotherapy: ONCBCN PROVIDER COMMUNICATION2, PACLITAXEL IVPB, ONCBCN PROVIDER COMMUNICATION5, TRASTUZUMAB INFUSION    1/27/2020 - Cycle 7, Day 1      Chemotherapy: [facility] PROVIDER COMMUNICATION2, PACLITAXEL IVPB, ONCBCN PROVIDER COMMUNICATION5, TRASTUZUMAB INFUSION        February 2020 Sunday Monday Tuesday Wednesday Thursday Friday Saturday                                 1                2     3     4     5     6     7     8        Cycle 8, Day 1        9     10     11     12     13     14     15        Cycle 9, Day 1        16     17     18     19     20     21    CONSULT PG   9:45 AM   (30 min )   Sharifa Arceo MD   [facility] [place] 22        Cycle 10, Day 1        23     24     25     26     27     28     29        Cycle 11, Day 1             Treatment Details       2/3/2020 - Cycle 8, Day 1      Chemotherapy: PERTUZUMAB IVPB, ONCBCN PROVIDER COMMUNICATION2, PACLITAXEL IVPB, ONCBCN PROVIDER COMMUNICATION5, TRASTUZUMAB INFUSION    2/10/2020 - Cycle 9, Day 1      Chemotherapy: ONCBCN PROVIDER COMMUNICATION2, PACLITAXEL IVPB, ONCBCN PROVIDER COMMUNICATION5, TRASTUZUMAB INFUSION    2/17/2020 - Cycle 10, Day 1      Chemotherapy: ONCBCN PROVIDER COMMUNICATION2, PACLITAXEL IVPB, ONCBCN PROVIDER COMMUNICATION5, TRASTUZUMAB INFUSION    2/24/2020 - Cycle 11, Day 1      Chemotherapy: PERTUZUMAB IVPB, ONCBCN PROVIDER COMMUNICATION2, PACLITAXEL IVPB, ONCBCN PROVIDER COMMUNICATION5, TRASTUZUMAB INFUSION

## 2020-01-15 NOTE — PROGRESS NOTES
Patient at infusion center to be treated with Perjeta, Taxol, and Herceptin  Patients vitals WNL  Patient has no complaints at this time  Patient denies pain  Patient had her Right port accessed without complication and with blood return  Patient has confirmed next appt and declines AVS at this time  Will continue to monitor

## 2020-01-17 ENCOUNTER — HOSPITAL ENCOUNTER (OUTPATIENT)
Dept: RADIOLOGY | Facility: HOSPITAL | Age: 40
Discharge: HOME | End: 2020-01-17
Attending: INTERNAL MEDICINE
Payer: COMMERCIAL

## 2020-01-17 ENCOUNTER — TELEPHONE (OUTPATIENT)
Dept: HEMATOLOGY ONCOLOGY | Facility: CLINIC | Age: 40
End: 2020-01-17

## 2020-01-17 ENCOUNTER — TRANSCRIBE ORDERS (OUTPATIENT)
Dept: RADIOLOGY | Facility: HOSPITAL | Age: 40
End: 2020-01-17

## 2020-01-17 DIAGNOSIS — D70.1 CHEMOTHERAPY INDUCED NEUTROPENIA (HCC): ICD-10-CM

## 2020-01-17 DIAGNOSIS — Z17.0 MALIGNANT NEOPLASM OF OVERLAPPING SITES OF LEFT BREAST IN FEMALE, ESTROGEN RECEPTOR POSITIVE (HCC): ICD-10-CM

## 2020-01-17 DIAGNOSIS — T45.1X5A CHEMOTHERAPY INDUCED NEUTROPENIA (HCC): ICD-10-CM

## 2020-01-17 DIAGNOSIS — C50.812 MALIGNANT NEOPLASM OF OVERLAPPING SITES OF LEFT BREAST IN FEMALE, ESTROGEN RECEPTOR POSITIVE (HCC): ICD-10-CM

## 2020-01-17 PROCEDURE — 78306 BONE IMAGING WHOLE BODY: CPT

## 2020-01-17 PROCEDURE — A9503 TC99M MEDRONATE: HCPCS

## 2020-01-17 NOTE — TELEPHONE ENCOUNTER
CALL FROM MARLON IN RADIOLOGY WITH FINDINGS ON BONE SCAN JUST DONE    PLEASE HAVE DR Benji Kwong SEE REPORT    THANKS

## 2020-01-21 ENCOUNTER — APPOINTMENT (OUTPATIENT)
Dept: LAB | Facility: HOSPITAL | Age: 40
End: 2020-01-21
Attending: INTERNAL MEDICINE
Payer: COMMERCIAL

## 2020-01-21 DIAGNOSIS — C50.812 MALIGNANT NEOPLASM OF OVERLAPPING SITES OF LEFT BREAST IN FEMALE, ESTROGEN RECEPTOR POSITIVE (HCC): ICD-10-CM

## 2020-01-21 DIAGNOSIS — D70.1 CHEMOTHERAPY INDUCED NEUTROPENIA (HCC): ICD-10-CM

## 2020-01-21 DIAGNOSIS — Z17.0 MALIGNANT NEOPLASM OF OVERLAPPING SITES OF LEFT BREAST IN FEMALE, ESTROGEN RECEPTOR POSITIVE (HCC): ICD-10-CM

## 2020-01-21 DIAGNOSIS — T45.1X5A CHEMOTHERAPY INDUCED NEUTROPENIA (HCC): ICD-10-CM

## 2020-01-21 LAB
BASOPHILS # BLD AUTO: 0.03 THOUSANDS/ΜL (ref 0–0.1)
BASOPHILS NFR BLD AUTO: 1 % (ref 0–1)
EOSINOPHIL # BLD AUTO: 0.23 THOUSAND/ΜL (ref 0–0.61)
EOSINOPHIL NFR BLD AUTO: 6 % (ref 0–6)
ERYTHROCYTE [DISTWIDTH] IN BLOOD BY AUTOMATED COUNT: 17.5 % (ref 11.6–15.1)
HCT VFR BLD AUTO: 29.3 % (ref 34.8–46.1)
HGB BLD-MCNC: 9.1 G/DL (ref 11.5–15.4)
IMM GRANULOCYTES # BLD AUTO: 0.03 THOUSAND/UL (ref 0–0.2)
IMM GRANULOCYTES NFR BLD AUTO: 1 % (ref 0–2)
LYMPHOCYTES # BLD AUTO: 0.77 THOUSANDS/ΜL (ref 0.6–4.47)
LYMPHOCYTES NFR BLD AUTO: 20 % (ref 14–44)
MCH RBC QN AUTO: 30.8 PG (ref 26.8–34.3)
MCHC RBC AUTO-ENTMCNC: 31.1 G/DL (ref 31.4–37.4)
MCV RBC AUTO: 99 FL (ref 82–98)
MONOCYTES # BLD AUTO: 0.32 THOUSAND/ΜL (ref 0.17–1.22)
MONOCYTES NFR BLD AUTO: 8 % (ref 4–12)
NEUTROPHILS # BLD AUTO: 2.5 THOUSANDS/ΜL (ref 1.85–7.62)
NEUTS SEG NFR BLD AUTO: 64 % (ref 43–75)
NRBC BLD AUTO-RTO: 0 /100 WBCS
PLATELET # BLD AUTO: 224 THOUSANDS/UL (ref 149–390)
PMV BLD AUTO: 10.6 FL (ref 8.9–12.7)
RBC # BLD AUTO: 2.95 MILLION/UL (ref 3.81–5.12)
WBC # BLD AUTO: 3.88 THOUSAND/UL (ref 4.31–10.16)

## 2020-01-21 PROCEDURE — 85025 COMPLETE CBC W/AUTO DIFF WBC: CPT

## 2020-01-21 PROCEDURE — 36415 COLL VENOUS BLD VENIPUNCTURE: CPT

## 2020-01-22 ENCOUNTER — HOSPITAL ENCOUNTER (OUTPATIENT)
Dept: RADIOLOGY | Facility: HOSPITAL | Age: 40
Discharge: HOME | End: 2020-01-22
Attending: INTERNAL MEDICINE
Payer: COMMERCIAL

## 2020-01-22 ENCOUNTER — HOSPITAL ENCOUNTER (OUTPATIENT)
Dept: INFUSION CENTER | Facility: CLINIC | Age: 40
Discharge: HOME | End: 2020-01-22
Payer: COMMERCIAL

## 2020-01-22 VITALS
DIASTOLIC BLOOD PRESSURE: 58 MMHG | HEART RATE: 87 BPM | TEMPERATURE: 97.9 F | WEIGHT: 105.5 LBS | OXYGEN SATURATION: 100 % | SYSTOLIC BLOOD PRESSURE: 90 MMHG | HEIGHT: 65 IN | RESPIRATION RATE: 18 BRPM | BODY MASS INDEX: 17.58 KG/M2

## 2020-01-22 DIAGNOSIS — D70.1 CHEMOTHERAPY INDUCED NEUTROPENIA (HCC): ICD-10-CM

## 2020-01-22 DIAGNOSIS — Z17.0 MALIGNANT NEOPLASM OF OVERLAPPING SITES OF LEFT BREAST IN FEMALE, ESTROGEN RECEPTOR POSITIVE (HCC): ICD-10-CM

## 2020-01-22 DIAGNOSIS — T45.1X5A CHEMOTHERAPY INDUCED NEUTROPENIA (HCC): Primary | ICD-10-CM

## 2020-01-22 DIAGNOSIS — D70.1 CHEMOTHERAPY INDUCED NEUTROPENIA (HCC): Primary | ICD-10-CM

## 2020-01-22 DIAGNOSIS — C50.812 MALIGNANT NEOPLASM OF OVERLAPPING SITES OF LEFT BREAST IN FEMALE, ESTROGEN RECEPTOR POSITIVE (HCC): ICD-10-CM

## 2020-01-22 DIAGNOSIS — T45.1X5A CHEMOTHERAPY INDUCED NEUTROPENIA (HCC): ICD-10-CM

## 2020-01-22 PROCEDURE — 72157 MRI CHEST SPINE W/O & W/DYE: CPT

## 2020-01-22 PROCEDURE — 96413 CHEMO IV INFUSION 1 HR: CPT

## 2020-01-22 PROCEDURE — 96417 CHEMO IV INFUS EACH ADDL SEQ: CPT

## 2020-01-22 PROCEDURE — A9585 GADOBUTROL INJECTION: HCPCS | Performed by: INTERNAL MEDICINE

## 2020-01-22 PROCEDURE — 96367 TX/PROPH/DG ADDL SEQ IV INF: CPT

## 2020-01-22 RX ORDER — SODIUM CHLORIDE 9 MG/ML
20 INJECTION, SOLUTION INTRAVENOUS ONCE
Status: COMPLETED | OUTPATIENT
Start: 2020-01-22 | End: 2020-01-22

## 2020-01-22 RX ADMIN — DEXAMETHASONE SODIUM PHOSPHATE 10 MG: 10 INJECTION, SOLUTION INTRAMUSCULAR; INTRAVENOUS at 13:30

## 2020-01-22 RX ADMIN — DIPHENHYDRAMINE HYDROCHLORIDE 25 MG: 50 INJECTION, SOLUTION INTRAMUSCULAR; INTRAVENOUS at 13:08

## 2020-01-22 RX ADMIN — FAMOTIDINE 20 MG: 10 INJECTION INTRAVENOUS at 13:55

## 2020-01-22 RX ADMIN — GADOBUTROL 5 ML: 604.72 INJECTION INTRAVENOUS at 19:40

## 2020-01-22 RX ADMIN — SODIUM CHLORIDE 20 ML/HR: 0.9 INJECTION, SOLUTION INTRAVENOUS at 12:50

## 2020-01-22 RX ADMIN — TRASTUZUMAB 96 MG: 150 INJECTION, POWDER, LYOPHILIZED, FOR SOLUTION INTRAVENOUS at 15:28

## 2020-01-22 RX ADMIN — PACLITAXEL 120.6 MG: 6 INJECTION, SOLUTION INTRAVENOUS at 14:22

## 2020-01-22 NOTE — PROGRESS NOTES
Patient here for taxol and herceptin for treatment of breast cancer  Patient resting with no complaints  Vitals stable  Labs within parameters  Callbell within reach  Will continue to monitor

## 2020-01-22 NOTE — PROGRESS NOTES
Near completion of her pre-meds, pt told the RN that she has been having blurred vision at home  She is unsure when exactly it started but she stated that she thinks it's been about 2-3 weeks and is consistent and not changing  Pt denied ever having been to an eye doctor  Spoke to Juan Luis Montejo in Dr Narinder Paniagua office, who reported pt is okay to treat today and instructed pt to follow up with an eye doctor

## 2020-01-27 ENCOUNTER — TELEPHONE (OUTPATIENT)
Dept: HEMATOLOGY ONCOLOGY | Facility: CLINIC | Age: 40
End: 2020-01-27

## 2020-01-27 NOTE — TELEPHONE ENCOUNTER
Left VM for patient  There is a 4pm and 1020 slot available for tomorrow 1/28  If she would like to move her appointment time from 440 to one of the above, I asked that she contact the office to let us know  Please make Shelli Doshi or Karina Glaser aware if patient calls back

## 2020-01-28 ENCOUNTER — OFFICE VISIT (OUTPATIENT)
Dept: HEMATOLOGY ONCOLOGY | Facility: CLINIC | Age: 40
End: 2020-01-28
Payer: COMMERCIAL

## 2020-01-28 ENCOUNTER — APPOINTMENT (OUTPATIENT)
Dept: LAB | Facility: HOSPITAL | Age: 40
End: 2020-01-28
Attending: INTERNAL MEDICINE
Payer: COMMERCIAL

## 2020-01-28 VITALS
HEART RATE: 104 BPM | HEIGHT: 65 IN | DIASTOLIC BLOOD PRESSURE: 66 MMHG | BODY MASS INDEX: 17.83 KG/M2 | WEIGHT: 107 LBS | TEMPERATURE: 98 F | OXYGEN SATURATION: 99 % | SYSTOLIC BLOOD PRESSURE: 110 MMHG | RESPIRATION RATE: 16 BRPM

## 2020-01-28 DIAGNOSIS — C50.812 MALIGNANT NEOPLASM OF OVERLAPPING SITES OF LEFT BREAST IN FEMALE, ESTROGEN RECEPTOR POSITIVE (HCC): Primary | ICD-10-CM

## 2020-01-28 DIAGNOSIS — C50.812 MALIGNANT NEOPLASM OF OVERLAPPING SITES OF LEFT BREAST IN FEMALE, ESTROGEN RECEPTOR POSITIVE (HCC): ICD-10-CM

## 2020-01-28 DIAGNOSIS — T45.1X5A CHEMOTHERAPY INDUCED NEUTROPENIA (HCC): ICD-10-CM

## 2020-01-28 DIAGNOSIS — D70.1 CHEMOTHERAPY INDUCED NEUTROPENIA (HCC): ICD-10-CM

## 2020-01-28 DIAGNOSIS — Z17.0 MALIGNANT NEOPLASM OF OVERLAPPING SITES OF LEFT BREAST IN FEMALE, ESTROGEN RECEPTOR POSITIVE (HCC): ICD-10-CM

## 2020-01-28 DIAGNOSIS — Z17.0 MALIGNANT NEOPLASM OF OVERLAPPING SITES OF LEFT BREAST IN FEMALE, ESTROGEN RECEPTOR POSITIVE (HCC): Primary | ICD-10-CM

## 2020-01-28 LAB
BASOPHILS # BLD AUTO: 0.03 THOUSANDS/ΜL (ref 0–0.1)
BASOPHILS NFR BLD AUTO: 1 % (ref 0–1)
EOSINOPHIL # BLD AUTO: 0.24 THOUSAND/ΜL (ref 0–0.61)
EOSINOPHIL NFR BLD AUTO: 5 % (ref 0–6)
ERYTHROCYTE [DISTWIDTH] IN BLOOD BY AUTOMATED COUNT: 17 % (ref 11.6–15.1)
HCT VFR BLD AUTO: 30.9 % (ref 34.8–46.1)
HGB BLD-MCNC: 9.9 G/DL (ref 11.5–15.4)
IMM GRANULOCYTES # BLD AUTO: 0.04 THOUSAND/UL (ref 0–0.2)
IMM GRANULOCYTES NFR BLD AUTO: 1 % (ref 0–2)
LYMPHOCYTES # BLD AUTO: 0.72 THOUSANDS/ΜL (ref 0.6–4.47)
LYMPHOCYTES NFR BLD AUTO: 14 % (ref 14–44)
MCH RBC QN AUTO: 31.9 PG (ref 26.8–34.3)
MCHC RBC AUTO-ENTMCNC: 32 G/DL (ref 31.4–37.4)
MCV RBC AUTO: 100 FL (ref 82–98)
MONOCYTES # BLD AUTO: 0.28 THOUSAND/ΜL (ref 0.17–1.22)
MONOCYTES NFR BLD AUTO: 6 % (ref 4–12)
NEUTROPHILS # BLD AUTO: 3.74 THOUSANDS/ΜL (ref 1.85–7.62)
NEUTS SEG NFR BLD AUTO: 73 % (ref 43–75)
NRBC BLD AUTO-RTO: 0 /100 WBCS
PLATELET # BLD AUTO: 231 THOUSANDS/UL (ref 149–390)
PMV BLD AUTO: 10 FL (ref 8.9–12.7)
RBC # BLD AUTO: 3.1 MILLION/UL (ref 3.81–5.12)
WBC # BLD AUTO: 5.05 THOUSAND/UL (ref 4.31–10.16)

## 2020-01-28 PROCEDURE — 99214 OFFICE O/P EST MOD 30 MIN: CPT | Performed by: INTERNAL MEDICINE

## 2020-01-28 PROCEDURE — 85025 COMPLETE CBC W/AUTO DIFF WBC: CPT

## 2020-01-28 PROCEDURE — 36415 COLL VENOUS BLD VENIPUNCTURE: CPT

## 2020-01-28 RX ORDER — SODIUM CHLORIDE 9 MG/ML
20 INJECTION, SOLUTION INTRAVENOUS ONCE
Status: CANCELLED | OUTPATIENT
Start: 2020-03-09

## 2020-01-28 RX ORDER — OXYCODONE HYDROCHLORIDE AND ACETAMINOPHEN 5; 325 MG/1; MG/1
1 TABLET ORAL EVERY 6 HOURS PRN
Qty: 30 TABLET | Refills: 0 | Status: SHIPPED | OUTPATIENT
Start: 2020-01-28 | End: 2020-04-28 | Stop reason: HOSPADM

## 2020-01-28 NOTE — PROGRESS NOTES
Hematology / Oncology Outpatient Follow Up Note    Dede Plunkett 44 y o  female RKD:[id number] NTV:[id number]         Date:  1/28/2020    Assessment / Plan:  A 79-year-old premenopausal woman with locally advanced left breast cancer, grade 2, ER 90 % positive, GA 70% positive, HER2 3+ disease  Colleen Stewart has relatively large palpable left breast mass as well as palpable left axillary adenopathy which was biopsy confirmed to be metastatic disease   She is negative for BRCA gene mutation  She had 4 cycle of dose dense AC as neoadjuvant chemotherapy with partial response  Her paclitaxel and trastuzumab of was delayed due to the shingle  Since 2 weeks ago, she has been tolerating paclitaxel, trastuzumab and pertuzumab very well  She is going to have 3rd cycle of type paclitaxel tomorrow  Since her left lower rib cage pain is improving, does not seem to be cancer related pain  Indeterminate bone scan may suggest some trauma  Her blurred vision may be due to the weekly steroid  Since she has no infusion reaction, I am going to reduce the dose of dexamethasone to 4 mg  I will see her again in 4 weeks for routine follow-up  She is in agreement with my recommendation        Subjective:      HPI:  A 79-year-old premenopausal woman who noticed a left breast lump recently which she brought to medical attention  Colleen Stewart was found to have radiographic abnormality in her left breast as well as left axilla   She underwent left breast biopsy in September 10, 2019 which showed invasive ductal carcinoma, grade 2   This was ER 90 % positive, GA 70% positive, HER2 3+ disease   Biopsy from enlarged left axillary lymph node was also positive for metastatic disease   She was seen by Dr Jose D Meza who sent her to me for neoadjuvant chemotherapy   She is negative for BRCA gene mutation   She underwent bone scan and CT scan chest abdomen pelvis which showed no evidence of distant metastasis   She presents today with her  in mother to discuss neoadjuvant chemotherapy   She has some left breast pain   Otherwise, she feels well   She denied any respiratory symptoms   She has no complaint of bone pain   She has regular menstrual cycle   She has 2 children   She has no plan to have more children   She has no surgical history   She has no significant past medical history  Kelsy Kaye is a lifetime never smoker   Her performance status is normal              Interval History:  A 80-year-old premenopausal woman with locally advanced left breast cancer, grade 2, ER 90% positive, CA 70% positive, HER2 3+ disease   She has relatively large palpable left breast mass as well as palpable left axillary adenopathy which was biopsy confirmed to be metastatic disease   She has no evidence of distant metastasis   She is negative for BRCA gene mutation  She had 4 cycle of dose dense AC with partial response  Her paclitaxel and trastuzumab was delayed by a week due to the shingle  She is going to have 3rd cycle of weekly paclitaxel and trastuzumab  She is tolerating treatment very well with no nausea vomiting  Regarding her left lower rib cage pain, she has significant improvement  Bone scan showed isolated uptake with uncertain etiology  MRI of the spine was negative for metastatic disease  Previous CT scan of chest showed no evidence of bone lesion  She has no respiratory symptoms  She has no infusion reaction with paclitaxel  She has no complaint of neuropathy  She has some complaint of blurred vision    Her performance status is normal     Objective:      Primary Diagnosis:     1  Locally advanced left breast cancer, grade 2, ER 90 % positive, CA 70 % positive, HER2 3+ disease   Diagnosed in September 2019    2  BRCA gene mutation negative      Cancer Staging:  Cancer Staging  No matching staging information was found for the patient         Previous Hematologic/ Oncologic Treatment:            Current Hematologic/ Oncologic Treatment:       Neoadjuvant chemotherapy with dose dense AC x4 followed by weekly paclitaxel, trastuzumab and try weekly pertuzumab x12 weeks  3rd cycle of paclitaxel, trastuzumab and pertuzumab to be given tomorrow    Disease Status:      Clinical partial response      Test Results:     Pathology:     Left breast biopsy showed invasive ductal carcinoma, grade 2  ER 90 % positive, NC 70 % positive, HER2 3+ disease        Radiology:     CT scan chest abdomen pelvis showed no evidence of distant metastasis      Bone scan was negative for osseous metastasis      Echocardiogram showed ejection fraction 60%     Laboratory:     See below      Physical Exam:        General Appearance:    Alert, oriented          Eyes:    PERRL   Ears:    Normal external ear canals, both ears   Nose:   Nares normal, septum midline   Throat:   Mucosa moist  Pharynx without injection  Neck:   Supple         Lungs:     Clear to auscultation bilaterally, very localized tenderness even with light touch at left lateral lower rib cage  Chest Wall:    No tenderness or deformity    Heart:    Regular rate and rhythm         Abdomen:     Soft, non-tender, bowel sounds +, no organomegaly               Extremities:   Extremities no cyanosis or edema         Skin:   no rash or icterus  Typical shingle rash in the right shoulder  Lymph nodes:   Cervical, supraclavicular, and axillary nodes normal   Neurologic:   CNII-XII intact, normal strength, sensation and reflexes     Throughout             Breast exam:  1 5 x 2 cm of palpable mass at 2:00 position in her left breast   Left axillary adenopathy with hardly palpable   Right breast exam is negative  ROS: Review of Systems   All other systems reviewed and are negative  Imaging: Mri Thoracic Spine W Wo Contrast    Result Date: 1/24/2020  Narrative: MRI THORACIC SPINE WITH AND WITHOUT CONTRAST INDICATION: Back pain, history of left breast cancer COMPARISON:  None   TECHNIQUE:  Sagittal T1, sagittal T2, sagittal inversion recovery, axial T2,  axial 2D MERGE  Sagittal and axial T1 postcontrast  IV Contrast:  5 mL of gadobutrol injection (MULTI-DOSE) IMAGE QUALITY:  Diagnostic  FINDINGS: ALIGNMENT:  Normal alignment of the thoracic spine  No compression fracture  No subluxation  MARROW SIGNAL:  No abnormal bone marrow signal   No suspicious discrete marrow lesion  THORACIC CORD:  Normal signal within the thoracic cord  PREVERTEBRAL AND PARASPINAL SOFT TISSUES:   Normal  Partially imaged right chest Port-A-Cath THORACIC DEGENERATIVE CHANGE:  No significant degenerative changes  No canal or significant foraminal stenosis at any levels  POSTCONTRAST:  No abnormal enhancement  Impression: 1  Unremarkable thoracic spine MRI  No canal or significant foraminal stenosis at any levels  2   No suspicious enhancement  Please note that the area of small increased radiotracer uptake at left lateral 10th rib reported on bone scan 1/17/2020 is not included in the field-of-view in current study  Workstation performed: [id number]     Nm Bone Scan Whole Body    Result Date: 1/17/2020  Narrative: BONE SCAN  WHOLE BODY INDICATION: Localized left lower rib cage pain  W71 359: Malignant neoplasm of overlapping sites of left female breast Z17 0: Estrogen receptor positive status (ER+) D70 1: Agranulocytosis secondary to cancer chemotherapy T45  1X5A: Adverse effect of antineoplastic and immunosuppressive drugs, initial encounter PREVIOUS FILM CORRELATION:    CT 12/28/2019 of the chest abdomen pelvis, bone scan 9/26/2019 TECHNIQUE:   This study was performed following the intravenous administration of 26 8 mCi Tc-99m labeled MDP  Delayed, anterior and posterior whole body images were acquired, 2-3 hours after radiopharmaceutical administration  Additional delayed static images acquired of the ribs  FINDINGS:  Slightly pronounced focal radiotracer uptake at the left 1st anterior costochondral junction, stable    This corresponds to degenerative spurring on CT  New small focus radiotracer uptake at the left lateral 10th rib  No corresponding abnormality here on prior CT  Remainder of the whole-body uptake is otherwise unremarkable  The renal activity appears symmetric  Impression: 1  New small focus radiotracer uptake at the left lateral 10th rib  As an isolated finding this is nonspecific  Correlate for any recent trauma  The possibility of osseous metastasis is not excluded in this patient with history of breast cancer  Consider follow-up with FDG PET/CT or MRI  The study was marked in EPIC for significant notification  Workstation performed: [id number]     Radiology Results    Result Date: 12/30/2019  Narrative: Ordered by an unspecified provider  Labs:   Lab Results   Component Value Date    WBC 5 05 01/28/2020    HGB 9 9 (L) 01/28/2020    HCT 30 9 (L) 01/28/2020     (H) 01/28/2020     01/28/2020     Lab Results   Component Value Date    K 3 5 01/14/2020     01/14/2020    CO2 29 01/14/2020    BUN 10 01/14/2020    CREATININE 0 49 (L) 01/14/2020    GLUF 82 11/16/2019    CALCIUM 9 2 01/14/2020    AST 40 01/14/2020    ALT 62 01/14/2020    ALKPHOS 60 01/14/2020    EGFR 123 01/14/2020           Current Medications: Reviewed  Allergies: Reviewed  PMH/FH/SH:  Reviewed      Vital Sign:    Body surface area is 1 52 meters squared      Wt Readings from Last 3 Encounters:   01/28/20 48 5 kg (107 lb)   01/22/20 47 9 kg (105 lb 8 oz)   01/15/20 48 4 kg (106 lb 11 2 oz)        Temp Readings from Last 3 Encounters:   01/28/20 98 °F (36 7 °C) (Oral)   01/22/20 97 9 °F (36 6 °C) (Oral)   01/15/20 98 1 °F (36 7 °C) (Oral)        BP Readings from Last 3 Encounters:   01/28/20 110/66   01/22/20 90/58   01/15/20 92/58         Pulse Readings from Last 3 Encounters:   01/28/20 104   01/22/20 87   01/15/20 77     @LASTSAO2(3)@

## 2020-01-29 ENCOUNTER — HOSPITAL ENCOUNTER (OUTPATIENT)
Dept: INFUSION CENTER | Facility: CLINIC | Age: 40
Discharge: HOME | End: 2020-01-29
Payer: COMMERCIAL

## 2020-01-29 VITALS
DIASTOLIC BLOOD PRESSURE: 56 MMHG | TEMPERATURE: 97.7 F | HEART RATE: 90 BPM | OXYGEN SATURATION: 100 % | WEIGHT: 105.5 LBS | HEIGHT: 66 IN | SYSTOLIC BLOOD PRESSURE: 90 MMHG | BODY MASS INDEX: 16.95 KG/M2 | RESPIRATION RATE: 16 BRPM

## 2020-01-29 DIAGNOSIS — C50.812 MALIGNANT NEOPLASM OF OVERLAPPING SITES OF LEFT BREAST IN FEMALE, ESTROGEN RECEPTOR POSITIVE (HCC): ICD-10-CM

## 2020-01-29 DIAGNOSIS — T45.1X5A CHEMOTHERAPY INDUCED NEUTROPENIA (HCC): ICD-10-CM

## 2020-01-29 DIAGNOSIS — T45.1X5A CHEMOTHERAPY INDUCED NEUTROPENIA (HCC): Primary | ICD-10-CM

## 2020-01-29 DIAGNOSIS — D70.1 CHEMOTHERAPY INDUCED NEUTROPENIA (HCC): Primary | ICD-10-CM

## 2020-01-29 DIAGNOSIS — Z17.0 MALIGNANT NEOPLASM OF OVERLAPPING SITES OF LEFT BREAST IN FEMALE, ESTROGEN RECEPTOR POSITIVE (HCC): ICD-10-CM

## 2020-01-29 DIAGNOSIS — D70.1 CHEMOTHERAPY INDUCED NEUTROPENIA (HCC): ICD-10-CM

## 2020-01-29 PROCEDURE — 96367 TX/PROPH/DG ADDL SEQ IV INF: CPT

## 2020-01-29 PROCEDURE — 96417 CHEMO IV INFUS EACH ADDL SEQ: CPT

## 2020-01-29 PROCEDURE — 96413 CHEMO IV INFUSION 1 HR: CPT

## 2020-01-29 RX ORDER — SODIUM CHLORIDE 9 MG/ML
20 INJECTION, SOLUTION INTRAVENOUS ONCE
Status: COMPLETED | OUTPATIENT
Start: 2020-01-29 | End: 2020-01-29

## 2020-01-29 RX ADMIN — SODIUM CHLORIDE 20 ML/HR: 0.9 INJECTION, SOLUTION INTRAVENOUS at 11:45

## 2020-01-29 RX ADMIN — DEXAMETHASONE SODIUM PHOSPHATE 4 MG: 10 INJECTION, SOLUTION INTRAMUSCULAR; INTRAVENOUS at 11:45

## 2020-01-29 RX ADMIN — TRASTUZUMAB 96 MG: 150 INJECTION, POWDER, LYOPHILIZED, FOR SOLUTION INTRAVENOUS at 14:50

## 2020-01-29 RX ADMIN — FAMOTIDINE 20 MG: 10 INJECTION INTRAVENOUS at 12:07

## 2020-01-29 RX ADMIN — PACLITAXEL 120.6 MG: 6 INJECTION, SOLUTION INTRAVENOUS at 13:36

## 2020-01-29 RX ADMIN — DIPHENHYDRAMINE HYDROCHLORIDE 25 MG: 50 INJECTION, SOLUTION INTRAMUSCULAR; INTRAVENOUS at 12:28

## 2020-01-29 NOTE — PROGRESS NOTES
Patient tolerated treatment without incident and was discharged post   She offers no c/o and will RTO 2/6/20 to [facility] for her next treatment

## 2020-01-29 NOTE — PATIENT INSTRUCTIONS
January 2020 Sunday Monday Tuesday Wednesday Thursday Friday Saturday                  1     2     3    INF ONCOLOGY TX-TREATMENT PLAN  11:30 AM   (300 min )   AN INF CHAIR [address] 4                5     6    OFFICE VISIT LONG PG   9:00 AM   (30 min )   Paty Whipple MD   [facility] [place] 7    FOLLOW UP PG   3:45 PM   (20 min )   Saima Miguel MD   [facility] [place] 8     9     10     11                12     13     14    LAB WALK IN   4:50 PM   (5 min )   BE HOSP OP LAB [facility] Laboratory Services 15    INF ONCOLOGY TX-TREATMENT PLAN  12:00 PM   (210 min )   AN INF CHAIR [address] 16     17    NM BONE CAMERA INJ  11:00 AM   (30 min )   BE NM 1   [address] Nuclear Medicine    NM BONE SCAN WHOLE BODY   1:30 PM   (60 min )   BE NM [address] Nuclear Medicine 18          Cycle 5, Day 1      19     20     21    LAB WALK IN   4:45 PM   (5 min )   BE HOSP OP LAB [address] Laboratory Services 22    INF ONCOLOGY TX-TREATMENT PLAN  12:00 PM   (240 min )   AN INF CHAIR [address]    MRI THORACIC SPINE W WO CON   6:00 PM   (60 min )   BE MRI 1   [facility] MRI 23     24     25          Cycle 6, Day 1      26     27     28    LAB WALK IN   2:15 PM   (5 min )   BE HOSP OP LAB [facility] Laboratory Services    FOLLOW UP PG   4:25 PM   (20 min )   Saima Miguel MD   [facility] [place] 29    INF ONCOLOGY TX-TREATMENT PLAN  11:30 AM   (240 min )   AN INF CHAIR 12   [address] 30     31               Cycle 7, Day 1           Treatment Details       1/15/2020 - Cycle 5, Day 1      Chemotherapy: PERTUZUMAB IVPB, ONCBCN PROVIDER COMMUNICATION2, PACLITAXEL IVPB, ONCBCN PROVIDER COMMUNICATION2, TRASTUZUMAB INFUSION    1/22/2020 - Cycle 6, Day 1      Chemotherapy: ONCBCN PROVIDER COMMUNICATION2, PACLITAXEL IVPB, ONCBCN PROVIDER COMMUNICATION5, TRASTUZUMAB INFUSION    1/29/2020 - Cycle 7, Day 1      Chemotherapy: [facility] PROVIDER COMMUNICATION2, PACLITAXEL IVPB, ONCBCN PROVIDER COMMUNICATION5, TRASTUZUMAB INFUSION        February 2020 Sunday Monday Tuesday Wednesday Thursday Friday Saturday                                 1                2     3     4     5     6    INF ONCOLOGY TX-TREATMENT PLAN  10:30 AM   (240 min )   AL INF INJ CHAIR [facility] 7     8           Cycle 8, Day 1     9     10    INF ONCOLOGY TX-TREATMENT PLAN   9:30 AM   (210 min )   AN INF CHAIR [place] 11     12     13     14     15        Cycle 9, Day 1        16     17    INF ONCOLOGY TX-TREATMENT PLAN   1:00 PM   (210 min )   AN INF CHAIR [place] 18     19     20     21    CONSULT PG   9:45 AM   (30 min )   Sarah Nick MD   [facility] Re[facility] [place] 22        Cycle 10, Day 1        23     24     25    FOLLOW UP PG   8:25 AM   (20 min )   Mehdi Bejarano MD   [facility] [place]    INF ONCOLOGY TX-TREATMENT PLAN  10:00 AM   (240 min )   AN INF CHAIR [place] 26     27     28     29        Cycle 11, Day 1             Treatment Details       2/6/2020 - Cycle 8, Day 1      Chemotherapy: PERTUZUMAB IVPB, ONCBCN PROVIDER COMMUNICATION2, PACLITAXEL IVPB, ONCBCN PROVIDER COMMUNICATION5, TRASTUZUMAB INFUSION    2/10/2020 - Cycle 9, Day 1      Chemotherapy: ONCBCN PROVIDER COMMUNICATION2, PACLITAXEL IVPB, ONCBCN PROVIDER COMMUNICATION5, TRASTUZUMAB INFUSION    2/17/2020 - Cycle 10, Day 1      Chemotherapy: [facility] PROVIDER COMMUNICATION2, PACLITAXEL IVPB, ONCBCN PROVIDER COMMUNICATION5, TRASTUZUMAB INFUSION    2/24/2020 - Cycle 11, Day 1      Chemotherapy: PERTUZUMAB IVPB, ONCBCN PROVIDER COMMUNICATION2, PACLITAXEL IVPB, ONCBCN PROVIDER COMMUNICATION5, TRASTUZUMAB INFUSION

## 2020-02-05 ENCOUNTER — APPOINTMENT (OUTPATIENT)
Dept: LAB | Facility: HOSPITAL | Age: 40
End: 2020-02-05
Attending: INTERNAL MEDICINE
Payer: COMMERCIAL

## 2020-02-05 DIAGNOSIS — C50.812 MALIGNANT NEOPLASM OF OVERLAPPING SITES OF LEFT BREAST IN FEMALE, ESTROGEN RECEPTOR POSITIVE (HCC): ICD-10-CM

## 2020-02-05 DIAGNOSIS — Z17.0 MALIGNANT NEOPLASM OF OVERLAPPING SITES OF LEFT BREAST IN FEMALE, ESTROGEN RECEPTOR POSITIVE (HCC): ICD-10-CM

## 2020-02-05 DIAGNOSIS — D70.1 CHEMOTHERAPY INDUCED NEUTROPENIA (HCC): ICD-10-CM

## 2020-02-05 DIAGNOSIS — T45.1X5A CHEMOTHERAPY INDUCED NEUTROPENIA (HCC): ICD-10-CM

## 2020-02-05 LAB
ALBUMIN SERPL BCP-MCNC: 4.1 G/DL (ref 3.5–5)
ALP SERPL-CCNC: 58 U/L (ref 46–116)
ALT SERPL W P-5'-P-CCNC: 61 U/L (ref 12–78)
ANION GAP SERPL CALCULATED.3IONS-SCNC: 5 MMOL/L (ref 4–13)
AST SERPL W P-5'-P-CCNC: 34 U/L (ref 5–45)
BASOPHILS # BLD AUTO: 0.02 THOUSANDS/ΜL (ref 0–0.1)
BASOPHILS NFR BLD AUTO: 1 % (ref 0–1)
BILIRUB SERPL-MCNC: 0.33 MG/DL (ref 0.2–1)
BUN SERPL-MCNC: 10 MG/DL (ref 5–25)
CALCIUM SERPL-MCNC: 9 MG/DL (ref 8.3–10.1)
CHLORIDE SERPL-SCNC: 110 MMOL/L (ref 100–108)
CO2 SERPL-SCNC: 26 MMOL/L (ref 21–32)
CREAT SERPL-MCNC: 0.48 MG/DL (ref 0.6–1.3)
EOSINOPHIL # BLD AUTO: 0.18 THOUSAND/ΜL (ref 0–0.61)
EOSINOPHIL NFR BLD AUTO: 7 % (ref 0–6)
ERYTHROCYTE [DISTWIDTH] IN BLOOD BY AUTOMATED COUNT: 15.8 % (ref 11.6–15.1)
GFR SERPL CREATININE-BSD FRML MDRD: 124 ML/MIN/1.73SQ M
GLUCOSE SERPL-MCNC: 86 MG/DL (ref 65–140)
HCT VFR BLD AUTO: 29.5 % (ref 34.8–46.1)
HGB BLD-MCNC: 9.5 G/DL (ref 11.5–15.4)
IMM GRANULOCYTES # BLD AUTO: 0.01 THOUSAND/UL (ref 0–0.2)
IMM GRANULOCYTES NFR BLD AUTO: 0 % (ref 0–2)
LYMPHOCYTES # BLD AUTO: 0.69 THOUSANDS/ΜL (ref 0.6–4.47)
LYMPHOCYTES NFR BLD AUTO: 26 % (ref 14–44)
MCH RBC QN AUTO: 31.9 PG (ref 26.8–34.3)
MCHC RBC AUTO-ENTMCNC: 32.2 G/DL (ref 31.4–37.4)
MCV RBC AUTO: 99 FL (ref 82–98)
MONOCYTES # BLD AUTO: 0.32 THOUSAND/ΜL (ref 0.17–1.22)
MONOCYTES NFR BLD AUTO: 12 % (ref 4–12)
NEUTROPHILS # BLD AUTO: 1.39 THOUSANDS/ΜL (ref 1.85–7.62)
NEUTS SEG NFR BLD AUTO: 54 % (ref 43–75)
NRBC BLD AUTO-RTO: 0 /100 WBCS
PLATELET # BLD AUTO: 239 THOUSANDS/UL (ref 149–390)
PMV BLD AUTO: 10.3 FL (ref 8.9–12.7)
POTASSIUM SERPL-SCNC: 3.7 MMOL/L (ref 3.5–5.3)
PROT SERPL-MCNC: 7.7 G/DL (ref 6.4–8.2)
RBC # BLD AUTO: 2.98 MILLION/UL (ref 3.81–5.12)
SODIUM SERPL-SCNC: 141 MMOL/L (ref 136–145)
WBC # BLD AUTO: 2.61 THOUSAND/UL (ref 4.31–10.16)

## 2020-02-05 PROCEDURE — 80053 COMPREHEN METABOLIC PANEL: CPT

## 2020-02-05 PROCEDURE — 85025 COMPLETE CBC W/AUTO DIFF WBC: CPT

## 2020-02-05 PROCEDURE — 36415 COLL VENOUS BLD VENIPUNCTURE: CPT

## 2020-02-06 ENCOUNTER — HOSPITAL ENCOUNTER (OUTPATIENT)
Dept: INFUSION CENTER | Facility: CLINIC | Age: 40
Discharge: HOME | End: 2020-02-06
Payer: COMMERCIAL

## 2020-02-06 VITALS — BODY MASS INDEX: 17.63 KG/M2 | WEIGHT: 105.82 LBS | HEIGHT: 65 IN

## 2020-02-06 DIAGNOSIS — Z17.0 MALIGNANT NEOPLASM OF OVERLAPPING SITES OF LEFT BREAST IN FEMALE, ESTROGEN RECEPTOR POSITIVE (HCC): ICD-10-CM

## 2020-02-06 DIAGNOSIS — T45.1X5A CHEMOTHERAPY INDUCED NEUTROPENIA (HCC): Primary | ICD-10-CM

## 2020-02-06 DIAGNOSIS — C50.812 MALIGNANT NEOPLASM OF OVERLAPPING SITES OF LEFT BREAST IN FEMALE, ESTROGEN RECEPTOR POSITIVE (HCC): ICD-10-CM

## 2020-02-06 DIAGNOSIS — D70.1 CHEMOTHERAPY INDUCED NEUTROPENIA (HCC): Primary | ICD-10-CM

## 2020-02-06 PROCEDURE — 96417 CHEMO IV INFUS EACH ADDL SEQ: CPT

## 2020-02-06 PROCEDURE — 96413 CHEMO IV INFUSION 1 HR: CPT

## 2020-02-06 PROCEDURE — 96367 TX/PROPH/DG ADDL SEQ IV INF: CPT

## 2020-02-06 RX ORDER — SODIUM CHLORIDE 9 MG/ML
20 INJECTION, SOLUTION INTRAVENOUS ONCE
Status: COMPLETED | OUTPATIENT
Start: 2020-02-06 | End: 2020-02-06

## 2020-02-06 RX ADMIN — FAMOTIDINE 20 MG: 10 INJECTION, SOLUTION INTRAVENOUS at 12:08

## 2020-02-06 RX ADMIN — PERTUZUMAB 420 MG: 30 INJECTION, SOLUTION, CONCENTRATE INTRAVENOUS at 12:27

## 2020-02-06 RX ADMIN — DEXAMETHASONE SODIUM PHOSPHATE 4 MG: 10 INJECTION, SOLUTION INTRAMUSCULAR; INTRAVENOUS at 11:21

## 2020-02-06 RX ADMIN — TRASTUZUMAB 96 MG: 150 INJECTION, POWDER, LYOPHILIZED, FOR SOLUTION INTRAVENOUS at 13:03

## 2020-02-06 RX ADMIN — PACLITAXEL 120.6 MG: 6 INJECTION, SOLUTION INTRAVENOUS at 13:39

## 2020-02-06 RX ADMIN — SODIUM CHLORIDE 20 ML/HR: 0.9 INJECTION, SOLUTION INTRAVENOUS at 11:15

## 2020-02-06 RX ADMIN — DIPHENHYDRAMINE HYDROCHLORIDE 25 MG: 50 INJECTION, SOLUTION INTRAMUSCULAR; INTRAVENOUS at 11:39

## 2020-02-06 NOTE — PLAN OF CARE
Problem: Potential for Falls  Goal: Patient will remain free of falls  Description  INTERVENTIONS:  - Assess patient frequently for physical needs  -  Identify cognitive and physical deficits and behaviors that affect risk of falls    -  [place] fall precautions as indicated by assessment   - Educate patient/family on patient safety including physical limitations  - Instruct patient to call for assistance with activity based on assessment  - Modify environment to reduce risk of injury  - Consider OT/PT consult to assist with strengthening/mobility  Outcome: Progressing

## 2020-02-06 NOTE — PROGRESS NOTES
Pt arrived to unit without complaint, received treatment as ordered without any adverse reaction  Pt left unit in stable condition without question or concern, AVS provided with all future appts

## 2020-02-14 ENCOUNTER — APPOINTMENT (OUTPATIENT)
Dept: LAB | Facility: HOSPITAL | Age: 40
End: 2020-02-14
Attending: INTERNAL MEDICINE
Payer: COMMERCIAL

## 2020-02-14 DIAGNOSIS — D70.1 CHEMOTHERAPY INDUCED NEUTROPENIA (HCC): ICD-10-CM

## 2020-02-14 DIAGNOSIS — Z17.0 MALIGNANT NEOPLASM OF OVERLAPPING SITES OF LEFT BREAST IN FEMALE, ESTROGEN RECEPTOR POSITIVE (HCC): ICD-10-CM

## 2020-02-14 DIAGNOSIS — C50.812 MALIGNANT NEOPLASM OF OVERLAPPING SITES OF LEFT BREAST IN FEMALE, ESTROGEN RECEPTOR POSITIVE (HCC): ICD-10-CM

## 2020-02-14 DIAGNOSIS — T45.1X5A CHEMOTHERAPY INDUCED NEUTROPENIA (HCC): ICD-10-CM

## 2020-02-14 LAB
ALBUMIN SERPL BCP-MCNC: 3.9 G/DL (ref 3.5–5)
ALP SERPL-CCNC: 64 U/L (ref 46–116)
ALT SERPL W P-5'-P-CCNC: 58 U/L (ref 12–78)
ANION GAP SERPL CALCULATED.3IONS-SCNC: 5 MMOL/L (ref 4–13)
AST SERPL W P-5'-P-CCNC: 26 U/L (ref 5–45)
BASOPHILS # BLD AUTO: 0.01 THOUSANDS/ΜL (ref 0–0.1)
BASOPHILS NFR BLD AUTO: 0 % (ref 0–1)
BILIRUB SERPL-MCNC: 0.25 MG/DL (ref 0.2–1)
BUN SERPL-MCNC: 11 MG/DL (ref 5–25)
CALCIUM SERPL-MCNC: 9.4 MG/DL (ref 8.3–10.1)
CHLORIDE SERPL-SCNC: 109 MMOL/L (ref 100–108)
CO2 SERPL-SCNC: 28 MMOL/L (ref 21–32)
CREAT SERPL-MCNC: 0.55 MG/DL (ref 0.6–1.3)
EOSINOPHIL # BLD AUTO: 0.12 THOUSAND/ΜL (ref 0–0.61)
EOSINOPHIL NFR BLD AUTO: 5 % (ref 0–6)
ERYTHROCYTE [DISTWIDTH] IN BLOOD BY AUTOMATED COUNT: 14.2 % (ref 11.6–15.1)
GFR SERPL CREATININE-BSD FRML MDRD: 119 ML/MIN/1.73SQ M
GLUCOSE SERPL-MCNC: 87 MG/DL (ref 65–140)
HCT VFR BLD AUTO: 29.3 % (ref 34.8–46.1)
HGB BLD-MCNC: 9.7 G/DL (ref 11.5–15.4)
IMM GRANULOCYTES # BLD AUTO: 0.01 THOUSAND/UL (ref 0–0.2)
IMM GRANULOCYTES NFR BLD AUTO: 0 % (ref 0–2)
LYMPHOCYTES # BLD AUTO: 0.8 THOUSANDS/ΜL (ref 0.6–4.47)
LYMPHOCYTES NFR BLD AUTO: 31 % (ref 14–44)
MCH RBC QN AUTO: 32.4 PG (ref 26.8–34.3)
MCHC RBC AUTO-ENTMCNC: 33.1 G/DL (ref 31.4–37.4)
MCV RBC AUTO: 98 FL (ref 82–98)
MONOCYTES # BLD AUTO: 0.42 THOUSAND/ΜL (ref 0.17–1.22)
MONOCYTES NFR BLD AUTO: 16 % (ref 4–12)
NEUTROPHILS # BLD AUTO: 1.25 THOUSANDS/ΜL (ref 1.85–7.62)
NEUTS SEG NFR BLD AUTO: 48 % (ref 43–75)
NRBC BLD AUTO-RTO: 0 /100 WBCS
PLATELET # BLD AUTO: 260 THOUSANDS/UL (ref 149–390)
PMV BLD AUTO: 10.2 FL (ref 8.9–12.7)
POTASSIUM SERPL-SCNC: 3.5 MMOL/L (ref 3.5–5.3)
PROT SERPL-MCNC: 7.7 G/DL (ref 6.4–8.2)
RBC # BLD AUTO: 2.99 MILLION/UL (ref 3.81–5.12)
SODIUM SERPL-SCNC: 142 MMOL/L (ref 136–145)
WBC # BLD AUTO: 2.61 THOUSAND/UL (ref 4.31–10.16)

## 2020-02-14 PROCEDURE — 80053 COMPREHEN METABOLIC PANEL: CPT

## 2020-02-14 PROCEDURE — 36415 COLL VENOUS BLD VENIPUNCTURE: CPT

## 2020-02-14 PROCEDURE — 85025 COMPLETE CBC W/AUTO DIFF WBC: CPT

## 2020-02-17 ENCOUNTER — HOSPITAL ENCOUNTER (OUTPATIENT)
Dept: INFUSION CENTER | Facility: CLINIC | Age: 40
Discharge: HOME | End: 2020-02-17
Payer: COMMERCIAL

## 2020-02-17 VITALS
HEART RATE: 84 BPM | DIASTOLIC BLOOD PRESSURE: 68 MMHG | TEMPERATURE: 98.1 F | BODY MASS INDEX: 18.08 KG/M2 | RESPIRATION RATE: 18 BRPM | HEIGHT: 65 IN | SYSTOLIC BLOOD PRESSURE: 90 MMHG | WEIGHT: 108.5 LBS

## 2020-02-17 DIAGNOSIS — T45.1X5A CHEMOTHERAPY INDUCED NEUTROPENIA (HCC): Primary | ICD-10-CM

## 2020-02-17 DIAGNOSIS — C50.812 MALIGNANT NEOPLASM OF OVERLAPPING SITES OF LEFT BREAST IN FEMALE, ESTROGEN RECEPTOR POSITIVE (HCC): ICD-10-CM

## 2020-02-17 DIAGNOSIS — Z17.0 MALIGNANT NEOPLASM OF OVERLAPPING SITES OF LEFT BREAST IN FEMALE, ESTROGEN RECEPTOR POSITIVE (HCC): ICD-10-CM

## 2020-02-17 DIAGNOSIS — D70.1 CHEMOTHERAPY INDUCED NEUTROPENIA (HCC): Primary | ICD-10-CM

## 2020-02-17 PROCEDURE — 96417 CHEMO IV INFUS EACH ADDL SEQ: CPT

## 2020-02-17 PROCEDURE — 96413 CHEMO IV INFUSION 1 HR: CPT

## 2020-02-17 PROCEDURE — 96367 TX/PROPH/DG ADDL SEQ IV INF: CPT

## 2020-02-17 RX ORDER — SODIUM CHLORIDE 9 MG/ML
20 INJECTION, SOLUTION INTRAVENOUS ONCE
Status: COMPLETED | OUTPATIENT
Start: 2020-02-17 | End: 2020-02-17

## 2020-02-17 RX ADMIN — TRASTUZUMAB 96 MG: 150 INJECTION, POWDER, LYOPHILIZED, FOR SOLUTION INTRAVENOUS at 16:12

## 2020-02-17 RX ADMIN — DEXAMETHASONE SODIUM PHOSPHATE 4 MG: 10 INJECTION, SOLUTION INTRAMUSCULAR; INTRAVENOUS at 14:10

## 2020-02-17 RX ADMIN — FAMOTIDINE 20 MG: 10 INJECTION INTRAVENOUS at 14:32

## 2020-02-17 RX ADMIN — DIPHENHYDRAMINE HYDROCHLORIDE 25 MG: 50 INJECTION, SOLUTION INTRAMUSCULAR; INTRAVENOUS at 13:46

## 2020-02-17 RX ADMIN — SODIUM CHLORIDE 20 ML/HR: 0.9 INJECTION, SOLUTION INTRAVENOUS at 13:46

## 2020-02-17 RX ADMIN — PACLITAXEL 120.6 MG: 6 INJECTION, SOLUTION INTRAVENOUS at 15:12

## 2020-02-17 NOTE — PROGRESS NOTES
Pt here for chemotherapy, states that she feels well, but has concerns about a bump that she has on the L side of her chest under her L arm pit  Notified Lenore MAYER who will notify Dr Kaya Ratliff  Pt offers no complaints otherwise  Pt is resting comfortably  Vitals stable  Labs reviewed from 2/14, [address] below parameters, ok to proceed in orders, other labs within parameters for treatment  EF from 10/30 reviewed    Call bell in reach, will continue to monitor

## 2020-02-17 NOTE — PROGRESS NOTES
Per Rusty Griggs RN who s/w Dr Buddy Medina, pt is to continue to monitor and contact office with worsening pain/symptoms  Pt will be seen in the office on 2/25

## 2020-02-21 ENCOUNTER — CONSULT (OUTPATIENT)
Dept: PLASTIC SURGERY | Facility: CLINIC | Age: 40
End: 2020-02-21
Payer: COMMERCIAL

## 2020-02-21 VITALS
TEMPERATURE: 98.7 F | HEIGHT: 65 IN | DIASTOLIC BLOOD PRESSURE: 60 MMHG | HEART RATE: 97 BPM | SYSTOLIC BLOOD PRESSURE: 102 MMHG | WEIGHT: 107.25 LBS | BODY MASS INDEX: 17.87 KG/M2

## 2020-02-21 DIAGNOSIS — Z17.0 MALIGNANT NEOPLASM OF OVERLAPPING SITES OF LEFT BREAST IN FEMALE, ESTROGEN RECEPTOR POSITIVE (HCC): ICD-10-CM

## 2020-02-21 DIAGNOSIS — C50.812 MALIGNANT NEOPLASM OF OVERLAPPING SITES OF LEFT BREAST IN FEMALE, ESTROGEN RECEPTOR POSITIVE (HCC): Primary | ICD-10-CM

## 2020-02-21 DIAGNOSIS — Z17.0 MALIGNANT NEOPLASM OF OVERLAPPING SITES OF LEFT BREAST IN FEMALE, ESTROGEN RECEPTOR POSITIVE (HCC): Primary | ICD-10-CM

## 2020-02-21 DIAGNOSIS — C50.812 MALIGNANT NEOPLASM OF OVERLAPPING SITES OF LEFT BREAST IN FEMALE, ESTROGEN RECEPTOR POSITIVE (HCC): ICD-10-CM

## 2020-02-21 PROCEDURE — 99244 OFF/OP CNSLTJ NEW/EST MOD 40: CPT | Performed by: PLASTIC SURGERY

## 2020-02-21 NOTE — PROGRESS NOTES
Assessment/Plan   Diagnoses and all orders for this visit:    Malignant neoplasm of overlapping sites of left breast in female, estrogen receptor positive ([facility] [address] )  -     Ambulatory referral to Plastic Surgery    Pertinent reconstruction options were presented as outlined below  A detailed conversation was had regarding the patients options for breast reconstruction  Five main points, which are explained to all breast reconstruction patients, were discussed  1) Breast reconstruction is an optional process  In addition, breast reconstruction can be performed in an immediate and delayed fashion  Even if a patient does not opt for reconstruction now, it can performed at a later time  2) Breast reconstruction is a multi-stage process which involves multiple surgeries spaced several months apart  The entire process can take over one year  The patient can stop within this process and/or resume it again at any time  3) The major goal of breast reconstruction is to have the patient look normal in clothing  When naked, there will always be scars and other stigmata of the breast reconstruction process  4) Asymmetries are often present during the reconstruction process  Several operations may be needed, including surgery to the non-cancerous breast, to achieve satisfactory results  5) No matter the reconstructive method, there are ways that the reconstruction can fail and a secondary reconstructive plan would need to be created  Next, a general discussion regarding all available methods of breast reconstruction were discussed  The types of reconstructions described included:    1) Tissue expander and implant based reconstruction, both single and multi-stage approaches  2) Autologous only reconstructions, including free abdominal-tissue based reconstructions  3) Combination procedures, particularly latissismus dorsi flaps combined with either expanders or implants      For each of the reconstruction methods mentioned above, the risks, benefits, alternatives, scarring, and recovery time were discussed in great detail  Specific risks detailed included bleeding, infection, hematoma, seroma, scarring, pain, wound healing complications, flap loss, fat necrosis, capsular contracture, need for implant removal, donor site complications, bulge, hernia, umbilical necrosis, need for urgent reoperation, and need for dressing changes were discussed  I discussed with Mrs Rene Eckert that postmastectomy radiation can severely impact her reconstruction outcome  The patient would be a candidate for:     -Implant based reconstruction: Yes,  Immediate TE-Implant reconstruction with exchange prior to radiation therapy  We discussed the risks of capsular contracture and distortion of the implant should this be required  And given her small A cup size and desire to be same same size, direct- to permanent implant in prepectoral plane is a reasonable option as well     -Abdominally based free flap reconstruction: No, lack of adequate tissue volume for this  -LD+implant based reconstruction: Yes,  Immediate TE with LD flap + implant exchange after radiation therapy if needed  This would reduce risks of capsular contracture of the implant but require additional morbidity of the LD flap vs Delayed LD flap + TE placement in the setting of post mastectomy radiation therapy  Breast implant-associated anaplastic large cell lymphoma (GILBERTO-ALCL) is a rare and treatable type of T-cell lymphoma that can develop around breast implants  GILBERTO-ALCL is not a cancer of the breast tissue itself  According to the most recent data available, the risk of association between breast implants and ALCL is extremely low  The current lifetime risk of GILBERTO-ALCL in the [place]  is estimated to be 1:30,000 women with textured implants based upon current confirmed cases    In most cases, women observed changes in the look or feel of the area surrounding the implant after their initial surgical sites were fully healed  Most of the patients who have developed GILBERTO-ALCL receive an excellent prognosis following surgical removal of the breast implants and the surrounding scar tissue capsule  After a long discussion about these factors, the patient would like to pursue direct to implant vs tissue expander reconstruction  Patient also discussed the possibility of contralateral prophylactic mastectomy  I explained that this will need to discuss this further with her breast surgeon, but if she decides to pursuit this, reconstruction option will be provided with same volume direct-to implant vs tissue expander prepectoral approach  We will plan to see her for a preoperative visit once a surgical date has been identified  60 minutes were spent face to face with the patient, of which over half were counseling and coordination of care  Herbert [address]   [address], [address] [facility]   Office: [phone]          Subjective   Patient ID: Arian Iniguez is a 44 y o  female  Vitals:    02/21/20 1007   BP: 102/60   Pulse: 97   Temp: 98 7 °F (37 1 °C)     Mrs eRgina Parr is a 44years old female with newly diagnoses left breast cancer who presents to discuss breast reconstruction options  Patient manifest that she discovered a left breast lump that prompted medical attention  Imaging and work up biopsy revealed a left invasive ductal carcinoma, grade 2  This was ER 90 % positive, GA 70% positive, HER2 3+ disease and a biopsy from enlarged left axillary lymph node was also positive for metastatic disease  She has been evaluated by medical and surgical oncologist and was recommended neoadjuvant chemotherapy, left mastectomy with axillary node dissection and post mastectomy radiation  I was asked to evaluate and discuss options with Ori       Patient characteristics  Significant medical history: shingles  Prior abdominal surgery:  , G2 C2   Tobacco use: none  Current bra size: A  Desired bra size: same  BMI: 17 85  PMH/FH of DVT/PE: none  PMH/FH of miscarriages: none  Occupation: unemployed at the moment         The following portions of the patient's history were reviewed and updated as appropriate: allergies, current medications, past family history, past medical history, past social history, past surgical history and problem list     Review of Systems   Constitutional: Positive for fatigue  HENT: Positive for congestion  Eyes: Negative  Respiratory: Negative  Cardiovascular: Negative  Gastrointestinal: Negative  Endocrine: Negative  Genitourinary: Negative  Musculoskeletal: Positive for arthralgias  Skin: Negative  Allergic/Immunologic: Negative  Neurological: Negative  Hematological: Negative  Psychiatric/Behavioral: The patient is nervous/anxious  Objective   Physical Exam   Vitals reviewed  Constitutional  She appears well-developed and well-nourished  Eyes  Pupils are equal, round, and reactive to light  System normal      General -             Right: Right eye extraocular movements are normal              Left: Left eye extraocular movements are normal      Cardiovascular: Normal rate  Pulmonary/Chest  Effort normal      Psychiatric  She has a normal mood and affect  Her behavior is normal      Abdomen and Hips  Soft  A surgical scar is present  Patient has no excess abdominal fat  Previous incision:  scar  Rectus abdominis muscle: contracts normally bilaterally  Hernias absent  Soft tissue is inadeaquate for breast reconstruction          Breast  Breast Measurements:   Right Left   A: Sternal notch to nipple distance (cm) 17 5 17 5   B: Midsternum to nipple distance (cm)     C: Midclavicle to nipple distance (cm)     D: Nipple to inframammary fold (cm) 6 5 6   E: Base width (cm) 12 5 12 5   F: Inframammary distance (cm) 17 5      Right Left   Areolar diameter (cm) 3 5 3 75   Nipple diameter (cm)     Superior tissue pinch test (cm)     Breast ptosis (grade 0-3)                   Back and Buttocks  Her skin tone is normal  She is negative for back and buttocks lipodystrophy      - Scars: none  - Latissimus dorsi muscle: contracts normally bilaterally

## 2020-02-24 ENCOUNTER — APPOINTMENT (OUTPATIENT)
Dept: LAB | Facility: HOSPITAL | Age: 40
End: 2020-02-24
Attending: INTERNAL MEDICINE
Payer: COMMERCIAL

## 2020-02-24 DIAGNOSIS — D70.1 CHEMOTHERAPY INDUCED NEUTROPENIA (HCC): ICD-10-CM

## 2020-02-24 DIAGNOSIS — T45.1X5A CHEMOTHERAPY INDUCED NEUTROPENIA (HCC): ICD-10-CM

## 2020-02-24 DIAGNOSIS — C50.812 MALIGNANT NEOPLASM OF OVERLAPPING SITES OF LEFT BREAST IN FEMALE, ESTROGEN RECEPTOR POSITIVE (HCC): ICD-10-CM

## 2020-02-24 DIAGNOSIS — Z17.0 MALIGNANT NEOPLASM OF OVERLAPPING SITES OF LEFT BREAST IN FEMALE, ESTROGEN RECEPTOR POSITIVE (HCC): ICD-10-CM

## 2020-02-24 LAB
BASOPHILS # BLD AUTO: 0.02 THOUSANDS/ΜL (ref 0–0.1)
BASOPHILS NFR BLD AUTO: 1 % (ref 0–1)
EOSINOPHIL # BLD AUTO: 0.14 THOUSAND/ΜL (ref 0–0.61)
EOSINOPHIL NFR BLD AUTO: 5 % (ref 0–6)
ERYTHROCYTE [DISTWIDTH] IN BLOOD BY AUTOMATED COUNT: 12.7 % (ref 11.6–15.1)
HCT VFR BLD AUTO: 30 % (ref 34.8–46.1)
HGB BLD-MCNC: 9.6 G/DL (ref 11.5–15.4)
IMM GRANULOCYTES # BLD AUTO: 0.01 THOUSAND/UL (ref 0–0.2)
IMM GRANULOCYTES NFR BLD AUTO: 0 % (ref 0–2)
LYMPHOCYTES # BLD AUTO: 0.81 THOUSANDS/ΜL (ref 0.6–4.47)
LYMPHOCYTES NFR BLD AUTO: 30 % (ref 14–44)
MCH RBC QN AUTO: 31.4 PG (ref 26.8–34.3)
MCHC RBC AUTO-ENTMCNC: 32 G/DL (ref 31.4–37.4)
MCV RBC AUTO: 98 FL (ref 82–98)
MONOCYTES # BLD AUTO: 0.26 THOUSAND/ΜL (ref 0.17–1.22)
MONOCYTES NFR BLD AUTO: 10 % (ref 4–12)
NEUTROPHILS # BLD AUTO: 1.44 THOUSANDS/ΜL (ref 1.85–7.62)
NEUTS SEG NFR BLD AUTO: 54 % (ref 43–75)
NRBC BLD AUTO-RTO: 0 /100 WBCS
PLATELET # BLD AUTO: 256 THOUSANDS/UL (ref 149–390)
PMV BLD AUTO: 10.1 FL (ref 8.9–12.7)
RBC # BLD AUTO: 3.06 MILLION/UL (ref 3.81–5.12)
WBC # BLD AUTO: 2.68 THOUSAND/UL (ref 4.31–10.16)

## 2020-02-24 PROCEDURE — 36415 COLL VENOUS BLD VENIPUNCTURE: CPT

## 2020-02-24 PROCEDURE — 85025 COMPLETE CBC W/AUTO DIFF WBC: CPT

## 2020-02-25 ENCOUNTER — OFFICE VISIT (OUTPATIENT)
Dept: HEMATOLOGY ONCOLOGY | Facility: CLINIC | Age: 40
End: 2020-02-25
Payer: COMMERCIAL

## 2020-02-25 ENCOUNTER — HOSPITAL ENCOUNTER (OUTPATIENT)
Dept: INFUSION CENTER | Facility: CLINIC | Age: 40
Discharge: HOME | End: 2020-02-25
Payer: COMMERCIAL

## 2020-02-25 VITALS
HEART RATE: 86 BPM | DIASTOLIC BLOOD PRESSURE: 62 MMHG | BODY MASS INDEX: 18.49 KG/M2 | WEIGHT: 111 LBS | OXYGEN SATURATION: 99 % | HEIGHT: 65 IN | RESPIRATION RATE: 18 BRPM | TEMPERATURE: 98.6 F | SYSTOLIC BLOOD PRESSURE: 104 MMHG

## 2020-02-25 VITALS
OXYGEN SATURATION: 100 % | SYSTOLIC BLOOD PRESSURE: 90 MMHG | DIASTOLIC BLOOD PRESSURE: 54 MMHG | WEIGHT: 109.79 LBS | TEMPERATURE: 97.9 F | HEART RATE: 80 BPM | BODY MASS INDEX: 18.29 KG/M2 | HEIGHT: 65 IN | RESPIRATION RATE: 18 BRPM

## 2020-02-25 DIAGNOSIS — T45.1X5A CHEMOTHERAPY INDUCED NEUTROPENIA (HCC): Primary | ICD-10-CM

## 2020-02-25 DIAGNOSIS — C50.812 MALIGNANT NEOPLASM OF OVERLAPPING SITES OF LEFT BREAST IN FEMALE, ESTROGEN RECEPTOR POSITIVE (HCC): Primary | ICD-10-CM

## 2020-02-25 DIAGNOSIS — C50.812 MALIGNANT NEOPLASM OF OVERLAPPING SITES OF LEFT BREAST IN FEMALE, ESTROGEN RECEPTOR POSITIVE (HCC): ICD-10-CM

## 2020-02-25 DIAGNOSIS — D70.1 CHEMOTHERAPY INDUCED NEUTROPENIA (HCC): Primary | ICD-10-CM

## 2020-02-25 DIAGNOSIS — Z17.0 MALIGNANT NEOPLASM OF OVERLAPPING SITES OF LEFT BREAST IN FEMALE, ESTROGEN RECEPTOR POSITIVE (HCC): ICD-10-CM

## 2020-02-25 DIAGNOSIS — D70.1 CHEMOTHERAPY INDUCED NEUTROPENIA (HCC): ICD-10-CM

## 2020-02-25 DIAGNOSIS — Z17.0 MALIGNANT NEOPLASM OF OVERLAPPING SITES OF LEFT BREAST IN FEMALE, ESTROGEN RECEPTOR POSITIVE (HCC): Primary | ICD-10-CM

## 2020-02-25 DIAGNOSIS — T45.1X5A CHEMOTHERAPY INDUCED NEUTROPENIA (HCC): ICD-10-CM

## 2020-02-25 PROCEDURE — 99214 OFFICE O/P EST MOD 30 MIN: CPT | Performed by: INTERNAL MEDICINE

## 2020-02-25 PROCEDURE — 1036F TOBACCO NON-USER: CPT | Performed by: INTERNAL MEDICINE

## 2020-02-25 PROCEDURE — 3008F BODY MASS INDEX DOCD: CPT | Performed by: INTERNAL MEDICINE

## 2020-02-25 PROCEDURE — 96417 CHEMO IV INFUS EACH ADDL SEQ: CPT

## 2020-02-25 PROCEDURE — 96413 CHEMO IV INFUSION 1 HR: CPT

## 2020-02-25 PROCEDURE — 96367 TX/PROPH/DG ADDL SEQ IV INF: CPT

## 2020-02-25 RX ORDER — SODIUM CHLORIDE 9 MG/ML
20 INJECTION, SOLUTION INTRAVENOUS ONCE
Status: COMPLETED | OUTPATIENT
Start: 2020-02-25 | End: 2020-02-25

## 2020-02-25 RX ORDER — SODIUM CHLORIDE 9 MG/ML
20 INJECTION, SOLUTION INTRAVENOUS ONCE
Status: CANCELLED | OUTPATIENT
Start: 2020-03-23

## 2020-02-25 RX ORDER — SODIUM CHLORIDE 9 MG/ML
20 INJECTION, SOLUTION INTRAVENOUS ONCE
Status: CANCELLED | OUTPATIENT
Start: 2020-03-30

## 2020-02-25 RX ORDER — SODIUM CHLORIDE 9 MG/ML
20 INJECTION, SOLUTION INTRAVENOUS ONCE
Status: CANCELLED | OUTPATIENT
Start: 2020-03-16

## 2020-02-25 RX ADMIN — DEXAMETHASONE SODIUM PHOSPHATE 4 MG: 10 INJECTION, SOLUTION INTRAMUSCULAR; INTRAVENOUS at 10:30

## 2020-02-25 RX ADMIN — FAMOTIDINE 20 MG: 10 INJECTION INTRAVENOUS at 10:52

## 2020-02-25 RX ADMIN — TRASTUZUMAB 96 MG: 150 INJECTION, POWDER, LYOPHILIZED, FOR SOLUTION INTRAVENOUS at 12:48

## 2020-02-25 RX ADMIN — SODIUM CHLORIDE 20 ML/HR: 0.9 INJECTION, SOLUTION INTRAVENOUS at 10:30

## 2020-02-25 RX ADMIN — DIPHENHYDRAMINE HYDROCHLORIDE 25 MG: 50 INJECTION, SOLUTION INTRAMUSCULAR; INTRAVENOUS at 11:12

## 2020-02-25 RX ADMIN — PACLITAXEL 120.6 MG: 6 INJECTION, SOLUTION INTRAVENOUS at 11:35

## 2020-02-25 NOTE — PROGRESS NOTES
Pt  offers no complaints  Spoke with Kari Siddiqui re: ANC of 1400  She is putting in order ok to treat today

## 2020-03-01 ENCOUNTER — TELEPHONE (OUTPATIENT)
Dept: OTHER | Facility: OTHER | Age: 40
End: 2020-03-01

## 2020-03-01 ENCOUNTER — APPOINTMENT (OUTPATIENT)
Dept: LAB | Facility: HOSPITAL | Age: 40
End: 2020-03-01
Attending: INTERNAL MEDICINE
Payer: COMMERCIAL

## 2020-03-01 DIAGNOSIS — C50.812 MALIGNANT NEOPLASM OF OVERLAPPING SITES OF LEFT BREAST IN FEMALE, ESTROGEN RECEPTOR POSITIVE (HCC): ICD-10-CM

## 2020-03-01 DIAGNOSIS — D70.1 CHEMOTHERAPY INDUCED NEUTROPENIA (HCC): ICD-10-CM

## 2020-03-01 DIAGNOSIS — T45.1X5A CHEMOTHERAPY INDUCED NEUTROPENIA (HCC): ICD-10-CM

## 2020-03-01 DIAGNOSIS — Z17.0 MALIGNANT NEOPLASM OF OVERLAPPING SITES OF LEFT BREAST IN FEMALE, ESTROGEN RECEPTOR POSITIVE (HCC): ICD-10-CM

## 2020-03-01 LAB
BASOPHILS # BLD AUTO: 0.01 THOUSANDS/ΜL (ref 0–0.1)
BASOPHILS NFR BLD AUTO: 1 % (ref 0–1)
EOSINOPHIL # BLD AUTO: 0.13 THOUSAND/ΜL (ref 0–0.61)
EOSINOPHIL NFR BLD AUTO: 8 % (ref 0–6)
ERYTHROCYTE [DISTWIDTH] IN BLOOD BY AUTOMATED COUNT: 12.5 % (ref 11.6–15.1)
HCT VFR BLD AUTO: 31.4 % (ref 34.8–46.1)
HGB BLD-MCNC: 10.6 G/DL (ref 11.5–15.4)
IMM GRANULOCYTES # BLD AUTO: 0.02 THOUSAND/UL (ref 0–0.2)
IMM GRANULOCYTES NFR BLD AUTO: 1 % (ref 0–2)
LYMPHOCYTES # BLD AUTO: 0.69 THOUSANDS/ΜL (ref 0.6–4.47)
LYMPHOCYTES NFR BLD AUTO: 40 % (ref 14–44)
MCH RBC QN AUTO: 32.5 PG (ref 26.8–34.3)
MCHC RBC AUTO-ENTMCNC: 33.8 G/DL (ref 31.4–37.4)
MCV RBC AUTO: 96 FL (ref 82–98)
MONOCYTES # BLD AUTO: 0.19 THOUSAND/ΜL (ref 0.17–1.22)
MONOCYTES NFR BLD AUTO: 11 % (ref 4–12)
NEUTROPHILS # BLD AUTO: 0.69 THOUSANDS/ΜL (ref 1.85–7.62)
NEUTS SEG NFR BLD AUTO: 39 % (ref 43–75)
NRBC BLD AUTO-RTO: 0 /100 WBCS
PLATELET # BLD AUTO: 243 THOUSANDS/UL (ref 149–390)
PMV BLD AUTO: 10 FL (ref 8.9–12.7)
RBC # BLD AUTO: 3.26 MILLION/UL (ref 3.81–5.12)
WBC # BLD AUTO: 1.73 THOUSAND/UL (ref 4.31–10.16)

## 2020-03-01 PROCEDURE — 85025 COMPLETE CBC W/AUTO DIFF WBC: CPT

## 2020-03-01 PROCEDURE — 36415 COLL VENOUS BLD VENIPUNCTURE: CPT

## 2020-03-02 ENCOUNTER — HOSPITAL ENCOUNTER (OUTPATIENT)
Dept: INFUSION CENTER | Facility: CLINIC | Age: 40
Discharge: HOME | End: 2020-03-02
Payer: COMMERCIAL

## 2020-03-02 VITALS
WEIGHT: 109 LBS | HEART RATE: 102 BPM | OXYGEN SATURATION: 99 % | RESPIRATION RATE: 18 BRPM | SYSTOLIC BLOOD PRESSURE: 94 MMHG | HEIGHT: 65 IN | BODY MASS INDEX: 18.16 KG/M2 | TEMPERATURE: 98.2 F | DIASTOLIC BLOOD PRESSURE: 58 MMHG

## 2020-03-02 DIAGNOSIS — T45.1X5A CHEMOTHERAPY INDUCED NEUTROPENIA (HCC): Primary | ICD-10-CM

## 2020-03-02 DIAGNOSIS — Z17.0 MALIGNANT NEOPLASM OF OVERLAPPING SITES OF LEFT BREAST IN FEMALE, ESTROGEN RECEPTOR POSITIVE (HCC): ICD-10-CM

## 2020-03-02 DIAGNOSIS — D70.1 CHEMOTHERAPY INDUCED NEUTROPENIA (HCC): Primary | ICD-10-CM

## 2020-03-02 DIAGNOSIS — C50.812 MALIGNANT NEOPLASM OF OVERLAPPING SITES OF LEFT BREAST IN FEMALE, ESTROGEN RECEPTOR POSITIVE (HCC): ICD-10-CM

## 2020-03-02 LAB
ALBUMIN SERPL BCP-MCNC: 3.6 G/DL (ref 3.5–5)
ALP SERPL-CCNC: 60 U/L (ref 46–116)
ALT SERPL W P-5'-P-CCNC: 44 U/L (ref 12–78)
ANION GAP SERPL CALCULATED.3IONS-SCNC: 5 MMOL/L (ref 4–13)
AST SERPL W P-5'-P-CCNC: 19 U/L (ref 5–45)
BILIRUB SERPL-MCNC: 0.18 MG/DL (ref 0.2–1)
BUN SERPL-MCNC: 7 MG/DL (ref 5–25)
CALCIUM SERPL-MCNC: 8.5 MG/DL (ref 8.3–10.1)
CHLORIDE SERPL-SCNC: 106 MMOL/L (ref 100–108)
CO2 SERPL-SCNC: 28 MMOL/L (ref 21–32)
CREAT SERPL-MCNC: 0.63 MG/DL (ref 0.6–1.3)
GFR SERPL CREATININE-BSD FRML MDRD: 113 ML/MIN/1.73SQ M
GLUCOSE SERPL-MCNC: 100 MG/DL (ref 65–140)
POTASSIUM SERPL-SCNC: 3.2 MMOL/L (ref 3.5–5.3)
PROT SERPL-MCNC: 7.3 G/DL (ref 6.4–8.2)
SODIUM SERPL-SCNC: 139 MMOL/L (ref 136–145)

## 2020-03-02 PROCEDURE — 80053 COMPREHEN METABOLIC PANEL: CPT | Performed by: INTERNAL MEDICINE

## 2020-03-02 PROCEDURE — 96417 CHEMO IV INFUS EACH ADDL SEQ: CPT

## 2020-03-02 PROCEDURE — 96413 CHEMO IV INFUSION 1 HR: CPT

## 2020-03-02 RX ORDER — SODIUM CHLORIDE 9 MG/ML
20 INJECTION, SOLUTION INTRAVENOUS ONCE
Status: COMPLETED | OUTPATIENT
Start: 2020-03-02 | End: 2020-03-02

## 2020-03-02 RX ADMIN — SODIUM CHLORIDE 20 ML/HR: 0.9 INJECTION, SOLUTION INTRAVENOUS at 13:12

## 2020-03-02 RX ADMIN — PERTUZUMAB 420 MG: 30 INJECTION, SOLUTION, CONCENTRATE INTRAVENOUS at 13:48

## 2020-03-02 RX ADMIN — TRASTUZUMAB 96 MG: 150 INJECTION, POWDER, LYOPHILIZED, FOR SOLUTION INTRAVENOUS at 14:19

## 2020-03-02 NOTE — PLAN OF CARE
Problem: Potential for Falls  Goal: Patient will remain free of falls  Description  INTERVENTIONS:  - Assess patient frequently for physical needs  -  Identify cognitive and physical deficits and behaviors that affect risk of falls  -  Kiana fall precautions as indicated by assessment   - Educate patient/family on patient safety including physical limitations  - Instruct patient to call for assistance with activity based on assessment  - Modify environment to reduce risk of injury  - Consider OT/PT consult to assist with strengthening/mobility  Outcome: Progressing     Problem: Knowledge Deficit  Goal: Patient/family/caregiver demonstrates understanding of disease process, treatment plan, medications, and discharge instructions  Description  Complete learning assessment and assess knowledge base    Interventions:  - Provide teaching at level of understanding  - Provide teaching via preferred learning methods  Outcome: Progressing

## 2020-03-02 NOTE — PROGRESS NOTES
Pt to clinic for herceptin and perjeta infusion, pt tolerated infusions without complications, aware of next appointment, declined avs

## 2020-03-02 NOTE — PROGRESS NOTES
Spoke with Van Henderson- plan is to treat patient with herceptin only today  Taxol will be held due to [address] below parameters  Order to be placed  Pharmacy made aware

## 2020-03-03 ENCOUNTER — HOSPITAL ENCOUNTER (OUTPATIENT)
Dept: NON INVASIVE DIAGNOSTICS | Facility: CLINIC | Age: 40
Discharge: HOME | End: 2020-03-03
Payer: COMMERCIAL

## 2020-03-03 DIAGNOSIS — D70.1 CHEMOTHERAPY INDUCED NEUTROPENIA (HCC): ICD-10-CM

## 2020-03-03 DIAGNOSIS — Z17.0 MALIGNANT NEOPLASM OF OVERLAPPING SITES OF LEFT BREAST IN FEMALE, ESTROGEN RECEPTOR POSITIVE (HCC): ICD-10-CM

## 2020-03-03 DIAGNOSIS — T45.1X5A CHEMOTHERAPY INDUCED NEUTROPENIA (HCC): ICD-10-CM

## 2020-03-03 DIAGNOSIS — C50.812 MALIGNANT NEOPLASM OF OVERLAPPING SITES OF LEFT BREAST IN FEMALE, ESTROGEN RECEPTOR POSITIVE (HCC): ICD-10-CM

## 2020-03-03 PROCEDURE — 93306 TTE W/DOPPLER COMPLETE: CPT

## 2020-03-03 PROCEDURE — 93306 TTE W/DOPPLER COMPLETE: CPT | Performed by: INTERNAL MEDICINE

## 2020-03-06 ENCOUNTER — APPOINTMENT (OUTPATIENT)
Dept: LAB | Facility: HOSPITAL | Age: 40
End: 2020-03-06
Attending: INTERNAL MEDICINE
Payer: COMMERCIAL

## 2020-03-06 DIAGNOSIS — Z17.0 MALIGNANT NEOPLASM OF OVERLAPPING SITES OF LEFT BREAST IN FEMALE, ESTROGEN RECEPTOR POSITIVE (HCC): ICD-10-CM

## 2020-03-06 DIAGNOSIS — C50.812 MALIGNANT NEOPLASM OF OVERLAPPING SITES OF LEFT BREAST IN FEMALE, ESTROGEN RECEPTOR POSITIVE (HCC): ICD-10-CM

## 2020-03-06 DIAGNOSIS — D70.1 CHEMOTHERAPY INDUCED NEUTROPENIA (HCC): ICD-10-CM

## 2020-03-06 DIAGNOSIS — T45.1X5A CHEMOTHERAPY INDUCED NEUTROPENIA (HCC): ICD-10-CM

## 2020-03-06 LAB
ALBUMIN SERPL BCP-MCNC: 4.2 G/DL (ref 3.5–5)
ALP SERPL-CCNC: 75 U/L (ref 46–116)
ALT SERPL W P-5'-P-CCNC: 35 U/L (ref 12–78)
ANION GAP SERPL CALCULATED.3IONS-SCNC: 5 MMOL/L (ref 4–13)
AST SERPL W P-5'-P-CCNC: 16 U/L (ref 5–45)
BASOPHILS # BLD AUTO: 0.01 THOUSANDS/ΜL (ref 0–0.1)
BASOPHILS NFR BLD AUTO: 0 % (ref 0–1)
BILIRUB SERPL-MCNC: 0.3 MG/DL (ref 0.2–1)
BUN SERPL-MCNC: 11 MG/DL (ref 5–25)
CALCIUM SERPL-MCNC: 9.8 MG/DL (ref 8.3–10.1)
CHLORIDE SERPL-SCNC: 105 MMOL/L (ref 100–108)
CO2 SERPL-SCNC: 28 MMOL/L (ref 21–32)
CREAT SERPL-MCNC: 0.6 MG/DL (ref 0.6–1.3)
EOSINOPHIL # BLD AUTO: 0.14 THOUSAND/ΜL (ref 0–0.61)
EOSINOPHIL NFR BLD AUTO: 4 % (ref 0–6)
ERYTHROCYTE [DISTWIDTH] IN BLOOD BY AUTOMATED COUNT: 12.7 % (ref 11.6–15.1)
GFR SERPL CREATININE-BSD FRML MDRD: 115 ML/MIN/1.73SQ M
GLUCOSE SERPL-MCNC: 79 MG/DL (ref 65–140)
HCT VFR BLD AUTO: 34.4 % (ref 34.8–46.1)
HGB BLD-MCNC: 11.4 G/DL (ref 11.5–15.4)
IMM GRANULOCYTES # BLD AUTO: 0.01 THOUSAND/UL (ref 0–0.2)
IMM GRANULOCYTES NFR BLD AUTO: 0 % (ref 0–2)
LYMPHOCYTES # BLD AUTO: 0.95 THOUSANDS/ΜL (ref 0.6–4.47)
LYMPHOCYTES NFR BLD AUTO: 24 % (ref 14–44)
MCH RBC QN AUTO: 31.5 PG (ref 26.8–34.3)
MCHC RBC AUTO-ENTMCNC: 33.1 G/DL (ref 31.4–37.4)
MCV RBC AUTO: 95 FL (ref 82–98)
MONOCYTES # BLD AUTO: 0.65 THOUSAND/ΜL (ref 0.17–1.22)
MONOCYTES NFR BLD AUTO: 16 % (ref 4–12)
NEUTROPHILS # BLD AUTO: 2.24 THOUSANDS/ΜL (ref 1.85–7.62)
NEUTS SEG NFR BLD AUTO: 56 % (ref 43–75)
NRBC BLD AUTO-RTO: 0 /100 WBCS
PLATELET # BLD AUTO: 324 THOUSANDS/UL (ref 149–390)
PMV BLD AUTO: 10.1 FL (ref 8.9–12.7)
POTASSIUM SERPL-SCNC: 3.4 MMOL/L (ref 3.5–5.3)
PROT SERPL-MCNC: 8.4 G/DL (ref 6.4–8.2)
RBC # BLD AUTO: 3.62 MILLION/UL (ref 3.81–5.12)
SODIUM SERPL-SCNC: 138 MMOL/L (ref 136–145)
WBC # BLD AUTO: 4 THOUSAND/UL (ref 4.31–10.16)

## 2020-03-06 PROCEDURE — 85025 COMPLETE CBC W/AUTO DIFF WBC: CPT

## 2020-03-06 PROCEDURE — 80053 COMPREHEN METABOLIC PANEL: CPT

## 2020-03-06 PROCEDURE — 36415 COLL VENOUS BLD VENIPUNCTURE: CPT

## 2020-03-09 ENCOUNTER — HOSPITAL ENCOUNTER (OUTPATIENT)
Dept: INFUSION CENTER | Facility: CLINIC | Age: 40
Discharge: HOME | End: 2020-03-09
Payer: COMMERCIAL

## 2020-03-09 VITALS
DIASTOLIC BLOOD PRESSURE: 56 MMHG | BODY MASS INDEX: 17.58 KG/M2 | RESPIRATION RATE: 18 BRPM | TEMPERATURE: 98.1 F | WEIGHT: 105.5 LBS | HEART RATE: 96 BPM | SYSTOLIC BLOOD PRESSURE: 106 MMHG | OXYGEN SATURATION: 100 % | HEIGHT: 65 IN

## 2020-03-09 DIAGNOSIS — D70.1 CHEMOTHERAPY INDUCED NEUTROPENIA (HCC): Primary | ICD-10-CM

## 2020-03-09 DIAGNOSIS — Z17.0 MALIGNANT NEOPLASM OF OVERLAPPING SITES OF LEFT BREAST IN FEMALE, ESTROGEN RECEPTOR POSITIVE (HCC): ICD-10-CM

## 2020-03-09 DIAGNOSIS — T45.1X5A CHEMOTHERAPY INDUCED NEUTROPENIA (HCC): Primary | ICD-10-CM

## 2020-03-09 DIAGNOSIS — C50.812 MALIGNANT NEOPLASM OF OVERLAPPING SITES OF LEFT BREAST IN FEMALE, ESTROGEN RECEPTOR POSITIVE (HCC): ICD-10-CM

## 2020-03-09 PROCEDURE — 96413 CHEMO IV INFUSION 1 HR: CPT

## 2020-03-09 PROCEDURE — 96367 TX/PROPH/DG ADDL SEQ IV INF: CPT

## 2020-03-09 PROCEDURE — 96417 CHEMO IV INFUS EACH ADDL SEQ: CPT

## 2020-03-09 RX ORDER — SODIUM CHLORIDE 9 MG/ML
20 INJECTION, SOLUTION INTRAVENOUS ONCE
Status: COMPLETED | OUTPATIENT
Start: 2020-03-09 | End: 2020-03-09

## 2020-03-09 RX ADMIN — PACLITAXEL 120.6 MG: 6 INJECTION, SOLUTION INTRAVENOUS at 15:10

## 2020-03-09 RX ADMIN — DEXAMETHASONE SODIUM PHOSPHATE 4 MG: 10 INJECTION, SOLUTION INTRAMUSCULAR; INTRAVENOUS at 13:57

## 2020-03-09 RX ADMIN — FAMOTIDINE 20 MG: 10 INJECTION INTRAVENOUS at 14:39

## 2020-03-09 RX ADMIN — TRASTUZUMAB 96 MG: 150 INJECTION, POWDER, LYOPHILIZED, FOR SOLUTION INTRAVENOUS at 16:16

## 2020-03-09 RX ADMIN — SODIUM CHLORIDE 20 ML/HR: 0.9 INJECTION, SOLUTION INTRAVENOUS at 13:56

## 2020-03-09 RX ADMIN — DIPHENHYDRAMINE HYDROCHLORIDE 25 MG: 50 INJECTION, SOLUTION INTRAMUSCULAR; INTRAVENOUS at 14:17

## 2020-03-09 NOTE — PROGRESS NOTES
Pt here for chemotherapy/herceptin for breast cancer  Pt states that for the last week she has had a stuffy nose, sore throat and some chest tightness "kind of like when I have my asthma"  Pt denies any fevers, afebrile at this time  B0=950% on room air  Other vitals stable at this time  Labs reviewed from 3/6/2020-within parameters for treatment    Call bell in reach, will continue to monitor  Notified Lenore MAYER of above concerns for treatment, pending call back at this time

## 2020-03-12 ENCOUNTER — TELEPHONE (OUTPATIENT)
Dept: HEMATOLOGY ONCOLOGY | Facility: MEDICAL CENTER | Age: 40
End: 2020-03-12

## 2020-03-12 NOTE — TELEPHONE ENCOUNTER
Spoke with patient  Letter sent to her email on file and home address on file in case she doesn't receive it by Tuesday  She verbalized understanding and agreed to plan

## 2020-03-12 NOTE — TELEPHONE ENCOUNTER
patient called in stated that she needs a letter for work pertaining to diagnoses, date of when patient found out, what stage   A good call back number [phone]

## 2020-03-13 ENCOUNTER — APPOINTMENT (OUTPATIENT)
Dept: LAB | Facility: HOSPITAL | Age: 40
End: 2020-03-13
Attending: INTERNAL MEDICINE
Payer: COMMERCIAL

## 2020-03-13 DIAGNOSIS — D70.1 CHEMOTHERAPY INDUCED NEUTROPENIA (HCC): ICD-10-CM

## 2020-03-13 DIAGNOSIS — T45.1X5A CHEMOTHERAPY INDUCED NEUTROPENIA (HCC): ICD-10-CM

## 2020-03-13 DIAGNOSIS — C50.812 MALIGNANT NEOPLASM OF OVERLAPPING SITES OF LEFT BREAST IN FEMALE, ESTROGEN RECEPTOR POSITIVE (HCC): ICD-10-CM

## 2020-03-13 DIAGNOSIS — Z17.0 MALIGNANT NEOPLASM OF OVERLAPPING SITES OF LEFT BREAST IN FEMALE, ESTROGEN RECEPTOR POSITIVE (HCC): ICD-10-CM

## 2020-03-13 LAB
BASOPHILS # BLD AUTO: 0.02 THOUSANDS/ΜL (ref 0–0.1)
BASOPHILS NFR BLD AUTO: 1 % (ref 0–1)
EOSINOPHIL # BLD AUTO: 0.11 THOUSAND/ΜL (ref 0–0.61)
EOSINOPHIL NFR BLD AUTO: 4 % (ref 0–6)
ERYTHROCYTE [DISTWIDTH] IN BLOOD BY AUTOMATED COUNT: 12.6 % (ref 11.6–15.1)
HCT VFR BLD AUTO: 32.3 % (ref 34.8–46.1)
HGB BLD-MCNC: 10.6 G/DL (ref 11.5–15.4)
IMM GRANULOCYTES # BLD AUTO: 0 THOUSAND/UL (ref 0–0.2)
IMM GRANULOCYTES NFR BLD AUTO: 0 % (ref 0–2)
LYMPHOCYTES # BLD AUTO: 0.96 THOUSANDS/ΜL (ref 0.6–4.47)
LYMPHOCYTES NFR BLD AUTO: 33 % (ref 14–44)
MCH RBC QN AUTO: 31.2 PG (ref 26.8–34.3)
MCHC RBC AUTO-ENTMCNC: 32.8 G/DL (ref 31.4–37.4)
MCV RBC AUTO: 95 FL (ref 82–98)
MONOCYTES # BLD AUTO: 0.3 THOUSAND/ΜL (ref 0.17–1.22)
MONOCYTES NFR BLD AUTO: 10 % (ref 4–12)
NEUTROPHILS # BLD AUTO: 1.56 THOUSANDS/ΜL (ref 1.85–7.62)
NEUTS SEG NFR BLD AUTO: 52 % (ref 43–75)
NRBC BLD AUTO-RTO: 0 /100 WBCS
PLATELET # BLD AUTO: 275 THOUSANDS/UL (ref 149–390)
PMV BLD AUTO: 10.1 FL (ref 8.9–12.7)
RBC # BLD AUTO: 3.4 MILLION/UL (ref 3.81–5.12)
WBC # BLD AUTO: 2.95 THOUSAND/UL (ref 4.31–10.16)

## 2020-03-13 PROCEDURE — 36415 COLL VENOUS BLD VENIPUNCTURE: CPT

## 2020-03-13 PROCEDURE — 85025 COMPLETE CBC W/AUTO DIFF WBC: CPT

## 2020-03-16 ENCOUNTER — HOSPITAL ENCOUNTER (OUTPATIENT)
Dept: INFUSION CENTER | Facility: CLINIC | Age: 40
Discharge: HOME | End: 2020-03-16
Payer: COMMERCIAL

## 2020-03-16 VITALS
WEIGHT: 106 LBS | SYSTOLIC BLOOD PRESSURE: 105 MMHG | DIASTOLIC BLOOD PRESSURE: 59 MMHG | HEART RATE: 84 BPM | HEIGHT: 65 IN | RESPIRATION RATE: 16 BRPM | BODY MASS INDEX: 17.66 KG/M2 | OXYGEN SATURATION: 99 % | TEMPERATURE: 98.1 F

## 2020-03-16 DIAGNOSIS — T45.1X5A CHEMOTHERAPY INDUCED NEUTROPENIA (HCC): Primary | ICD-10-CM

## 2020-03-16 DIAGNOSIS — C50.812 MALIGNANT NEOPLASM OF OVERLAPPING SITES OF LEFT BREAST IN FEMALE, ESTROGEN RECEPTOR POSITIVE (HCC): ICD-10-CM

## 2020-03-16 DIAGNOSIS — Z17.0 MALIGNANT NEOPLASM OF OVERLAPPING SITES OF LEFT BREAST IN FEMALE, ESTROGEN RECEPTOR POSITIVE (HCC): ICD-10-CM

## 2020-03-16 DIAGNOSIS — D70.1 CHEMOTHERAPY INDUCED NEUTROPENIA (HCC): Primary | ICD-10-CM

## 2020-03-16 PROCEDURE — 96417 CHEMO IV INFUS EACH ADDL SEQ: CPT

## 2020-03-16 PROCEDURE — 96367 TX/PROPH/DG ADDL SEQ IV INF: CPT

## 2020-03-16 PROCEDURE — 96413 CHEMO IV INFUSION 1 HR: CPT

## 2020-03-16 RX ORDER — SODIUM CHLORIDE 9 MG/ML
20 INJECTION, SOLUTION INTRAVENOUS ONCE
Status: COMPLETED | OUTPATIENT
Start: 2020-03-16 | End: 2020-03-16

## 2020-03-16 RX ADMIN — SODIUM CHLORIDE 20 ML/HR: 0.9 INJECTION, SOLUTION INTRAVENOUS at 11:50

## 2020-03-16 RX ADMIN — FAMOTIDINE 20 MG: 10 INJECTION INTRAVENOUS at 12:48

## 2020-03-16 RX ADMIN — DEXAMETHASONE SODIUM PHOSPHATE 4 MG: 10 INJECTION, SOLUTION INTRAMUSCULAR; INTRAVENOUS at 12:22

## 2020-03-16 RX ADMIN — PACLITAXEL 120.6 MG: 6 INJECTION, SOLUTION INTRAVENOUS at 13:25

## 2020-03-16 RX ADMIN — TRASTUZUMAB 96 MG: 150 INJECTION, POWDER, LYOPHILIZED, FOR SOLUTION INTRAVENOUS at 14:35

## 2020-03-16 RX ADMIN — DIPHENHYDRAMINE HYDROCHLORIDE 25 MG: 50 INJECTION, SOLUTION INTRAMUSCULAR; INTRAVENOUS at 11:52

## 2020-03-21 ENCOUNTER — TELEPHONE (OUTPATIENT)
Dept: INFUSION CENTER | Facility: CLINIC | Age: 40
End: 2020-03-21

## 2020-03-23 ENCOUNTER — HOSPITAL ENCOUNTER (OUTPATIENT)
Dept: INFUSION CENTER | Facility: CLINIC | Age: 40
Discharge: HOME | End: 2020-03-23
Payer: COMMERCIAL

## 2020-03-23 VITALS
OXYGEN SATURATION: 97 % | DIASTOLIC BLOOD PRESSURE: 50 MMHG | HEIGHT: 65 IN | HEART RATE: 95 BPM | RESPIRATION RATE: 16 BRPM | WEIGHT: 109 LBS | BODY MASS INDEX: 18.16 KG/M2 | SYSTOLIC BLOOD PRESSURE: 101 MMHG | TEMPERATURE: 98.2 F

## 2020-03-23 DIAGNOSIS — Z17.0 MALIGNANT NEOPLASM OF OVERLAPPING SITES OF LEFT BREAST IN FEMALE, ESTROGEN RECEPTOR POSITIVE (HCC): ICD-10-CM

## 2020-03-23 DIAGNOSIS — D70.1 CHEMOTHERAPY INDUCED NEUTROPENIA (HCC): Primary | ICD-10-CM

## 2020-03-23 DIAGNOSIS — T45.1X5A CHEMOTHERAPY INDUCED NEUTROPENIA (HCC): Primary | ICD-10-CM

## 2020-03-23 DIAGNOSIS — C50.812 MALIGNANT NEOPLASM OF OVERLAPPING SITES OF LEFT BREAST IN FEMALE, ESTROGEN RECEPTOR POSITIVE (HCC): ICD-10-CM

## 2020-03-23 LAB
ALBUMIN SERPL BCP-MCNC: 3.5 G/DL (ref 3.5–5)
ALP SERPL-CCNC: 55 U/L (ref 46–116)
ALT SERPL W P-5'-P-CCNC: 67 U/L (ref 12–78)
ANION GAP SERPL CALCULATED.3IONS-SCNC: 8 MMOL/L (ref 4–13)
AST SERPL W P-5'-P-CCNC: 37 U/L (ref 5–45)
BASOPHILS # BLD MANUAL: 0.02 THOUSAND/UL (ref 0–0.1)
BASOPHILS NFR MAR MANUAL: 1 % (ref 0–1)
BILIRUB SERPL-MCNC: 0.16 MG/DL (ref 0.2–1)
BUN SERPL-MCNC: 13 MG/DL (ref 5–25)
CALCIUM SERPL-MCNC: 8.7 MG/DL (ref 8.3–10.1)
CHLORIDE SERPL-SCNC: 103 MMOL/L (ref 100–108)
CO2 SERPL-SCNC: 28 MMOL/L (ref 21–32)
CREAT SERPL-MCNC: 0.71 MG/DL (ref 0.6–1.3)
EOSINOPHIL # BLD MANUAL: 0.21 THOUSAND/UL (ref 0–0.4)
EOSINOPHIL NFR BLD MANUAL: 10 % (ref 0–6)
ERYTHROCYTE [DISTWIDTH] IN BLOOD BY AUTOMATED COUNT: 12.8 % (ref 11.6–15.1)
GFR SERPL CREATININE-BSD FRML MDRD: 108 ML/MIN/1.73SQ M
GLUCOSE SERPL-MCNC: 80 MG/DL (ref 65–140)
HCT VFR BLD AUTO: 32.5 % (ref 34.8–46.1)
HGB BLD-MCNC: 10.8 G/DL (ref 11.5–15.4)
LYMPHOCYTES # BLD AUTO: 0.68 THOUSAND/UL (ref 0.6–4.47)
LYMPHOCYTES # BLD AUTO: 33 % (ref 14–44)
MCH RBC QN AUTO: 31 PG (ref 26.8–34.3)
MCHC RBC AUTO-ENTMCNC: 33.2 G/DL (ref 31.4–37.4)
MCV RBC AUTO: 93 FL (ref 82–98)
MONOCYTES # BLD AUTO: 0.23 THOUSAND/UL (ref 0–1.22)
MONOCYTES NFR BLD: 11 % (ref 4–12)
NEUTROPHILS # BLD MANUAL: 0.93 THOUSAND/UL (ref 1.85–7.62)
NEUTS SEG NFR BLD AUTO: 45 % (ref 43–75)
NRBC BLD AUTO-RTO: 0 /100 WBCS
PLATELET # BLD AUTO: 298 THOUSANDS/UL (ref 149–390)
PLATELET BLD QL SMEAR: ADEQUATE
PMV BLD AUTO: 10.2 FL (ref 8.9–12.7)
POTASSIUM SERPL-SCNC: 3.5 MMOL/L (ref 3.5–5.3)
PROT SERPL-MCNC: 7.2 G/DL (ref 6.4–8.2)
RBC # BLD AUTO: 3.48 MILLION/UL (ref 3.81–5.12)
RBC MORPH BLD: NORMAL
SODIUM SERPL-SCNC: 139 MMOL/L (ref 136–145)
TOTAL CELLS COUNTED SPEC: 100
WBC # BLD AUTO: 2.07 THOUSAND/UL (ref 4.31–10.16)

## 2020-03-23 PROCEDURE — 85007 BL SMEAR W/DIFF WBC COUNT: CPT | Performed by: INTERNAL MEDICINE

## 2020-03-23 PROCEDURE — 80053 COMPREHEN METABOLIC PANEL: CPT | Performed by: INTERNAL MEDICINE

## 2020-03-23 PROCEDURE — 96417 CHEMO IV INFUS EACH ADDL SEQ: CPT

## 2020-03-23 PROCEDURE — 85027 COMPLETE CBC AUTOMATED: CPT | Performed by: INTERNAL MEDICINE

## 2020-03-23 PROCEDURE — 96413 CHEMO IV INFUSION 1 HR: CPT

## 2020-03-23 PROCEDURE — 96367 TX/PROPH/DG ADDL SEQ IV INF: CPT

## 2020-03-23 RX ORDER — SODIUM CHLORIDE 9 MG/ML
20 INJECTION, SOLUTION INTRAVENOUS ONCE
Status: COMPLETED | OUTPATIENT
Start: 2020-03-23 | End: 2020-03-23

## 2020-03-23 RX ADMIN — FAMOTIDINE 20 MG: 10 INJECTION INTRAVENOUS at 14:28

## 2020-03-23 RX ADMIN — PACLITAXEL 90.6 MG: 6 INJECTION, SOLUTION INTRAVENOUS at 16:02

## 2020-03-23 RX ADMIN — SODIUM CHLORIDE 20 ML/HR: 0.9 INJECTION, SOLUTION INTRAVENOUS at 12:30

## 2020-03-23 RX ADMIN — TRASTUZUMAB 96 MG: 150 INJECTION, POWDER, LYOPHILIZED, FOR SOLUTION INTRAVENOUS at 15:25

## 2020-03-23 RX ADMIN — PERTUZUMAB 420 MG: 30 INJECTION, SOLUTION, CONCENTRATE INTRAVENOUS at 14:51

## 2020-03-23 RX ADMIN — DEXAMETHASONE SODIUM PHOSPHATE 4 MG: 10 INJECTION, SOLUTION INTRAMUSCULAR; INTRAVENOUS at 13:45

## 2020-03-23 RX ADMIN — DIPHENHYDRAMINE HYDROCHLORIDE 25 MG: 50 INJECTION, SOLUTION INTRAMUSCULAR; INTRAVENOUS at 14:06

## 2020-03-23 NOTE — PROGRESS NOTES
Spoke with Miguel Reis RN who s/w Dr Lucia Jackson regarding pt concerns/side effects  Ok to proceed with treatment as ordered  Provider is aware

## 2020-03-23 NOTE — PROGRESS NOTES
Pt here for chemotherapy, states that she is doing well, but notices that she gets numbness and tingling on her fingers and toes  This side effect occurs usually for 3 days after treatment on her toes, it is persistent in her fingertips  Asked pt how long this has occurred and pt states "for a while now", unable to give a specific time frame  Vitals stable  STAT labs drawn and sent to lab for treatment today    Call bell in reach, will continue to monitor

## 2020-03-23 NOTE — PROGRESS NOTES
Patient tolerated treatment without incident   Port flushed and de-accessed as per protocol  Patient's next appointment confirmed  AVS printed and given to patient

## 2020-03-23 NOTE — PROGRESS NOTES
ANC-0 93, below parameters for treatment  Spoke with Autumn Rasmussen RN- Per Dr Kaya Ratliff, pt will receive reduced dose of taxol-60mg/m2 today  Ok then to proceed with treatment as ordered

## 2020-03-27 ENCOUNTER — TRANSCRIBE ORDERS (OUTPATIENT)
Dept: LAB | Facility: CLINIC | Age: 40
End: 2020-03-27

## 2020-03-27 ENCOUNTER — APPOINTMENT (OUTPATIENT)
Dept: LAB | Facility: CLINIC | Age: 40
End: 2020-03-27
Payer: COMMERCIAL

## 2020-03-27 DIAGNOSIS — Z17.0 ESTROGEN RECEPTOR POSITIVE: ICD-10-CM

## 2020-03-27 DIAGNOSIS — D70.1 CHEMOTHERAPY-INDUCED NEUTROPENIA (HCC): Primary | ICD-10-CM

## 2020-03-27 DIAGNOSIS — C50.812 MALIGNANT NEOPLASM OF OVERLAPPING SITES OF LEFT FEMALE BREAST, UNSPECIFIED ESTROGEN RECEPTOR STATUS (HCC): ICD-10-CM

## 2020-03-27 DIAGNOSIS — T45.1X5A ADVERSE EFFECT OF ANTINEOPLASTIC ANTIBIOTIC, INITIAL ENCOUNTER: ICD-10-CM

## 2020-03-27 DIAGNOSIS — T45.1X5A CHEMOTHERAPY-INDUCED NEUTROPENIA (HCC): Primary | ICD-10-CM

## 2020-03-27 DIAGNOSIS — T45.1X5A CHEMOTHERAPY-INDUCED NEUTROPENIA (HCC): ICD-10-CM

## 2020-03-27 DIAGNOSIS — D70.1 CHEMOTHERAPY-INDUCED NEUTROPENIA (HCC): ICD-10-CM

## 2020-03-27 LAB
ALBUMIN SERPL BCP-MCNC: 3.6 G/DL (ref 3.5–5)
ALP SERPL-CCNC: 56 U/L (ref 46–116)
ALT SERPL W P-5'-P-CCNC: 64 U/L (ref 12–78)
ANION GAP SERPL CALCULATED.3IONS-SCNC: 6 MMOL/L (ref 4–13)
AST SERPL W P-5'-P-CCNC: 30 U/L (ref 5–45)
BASOPHILS # BLD MANUAL: 0 THOUSAND/UL (ref 0–0.1)
BASOPHILS NFR MAR MANUAL: 0 % (ref 0–1)
BILIRUB SERPL-MCNC: 0.12 MG/DL (ref 0.2–1)
BUN SERPL-MCNC: 12 MG/DL (ref 5–25)
CALCIUM SERPL-MCNC: 8.9 MG/DL (ref 8.3–10.1)
CHLORIDE SERPL-SCNC: 104 MMOL/L (ref 100–108)
CO2 SERPL-SCNC: 29 MMOL/L (ref 21–32)
CREAT SERPL-MCNC: 0.65 MG/DL (ref 0.6–1.3)
EOSINOPHIL # BLD MANUAL: 0.07 THOUSAND/UL (ref 0–0.4)
EOSINOPHIL NFR BLD MANUAL: 3 % (ref 0–6)
ERYTHROCYTE [DISTWIDTH] IN BLOOD BY AUTOMATED COUNT: 13.1 % (ref 11.6–15.1)
GFR SERPL CREATININE-BSD FRML MDRD: 112 ML/MIN/1.73SQ M
GLUCOSE SERPL-MCNC: 92 MG/DL (ref 65–140)
HCT VFR BLD AUTO: 32.6 % (ref 34.8–46.1)
HGB BLD-MCNC: 10.9 G/DL (ref 11.5–15.4)
HYPERCHROMIA BLD QL SMEAR: PRESENT
LYMPHOCYTES # BLD AUTO: 0.71 THOUSAND/UL (ref 0.6–4.47)
LYMPHOCYTES # BLD AUTO: 30 % (ref 14–44)
MCH RBC QN AUTO: 31 PG (ref 26.8–34.3)
MCHC RBC AUTO-ENTMCNC: 33.4 G/DL (ref 31.4–37.4)
MCV RBC AUTO: 93 FL (ref 82–98)
MONOCYTES # BLD AUTO: 0.14 THOUSAND/UL (ref 0–1.22)
MONOCYTES NFR BLD: 6 % (ref 4–12)
NEUTROPHILS # BLD MANUAL: 1.44 THOUSAND/UL (ref 1.85–7.62)
NEUTS SEG NFR BLD AUTO: 61 % (ref 43–75)
NRBC BLD AUTO-RTO: 0 /100 WBCS
PLATELET # BLD AUTO: 309 THOUSANDS/UL (ref 149–390)
PLATELET BLD QL SMEAR: ADEQUATE
PMV BLD AUTO: 9.9 FL (ref 8.9–12.7)
POTASSIUM SERPL-SCNC: 4 MMOL/L (ref 3.5–5.3)
PROT SERPL-MCNC: 7.4 G/DL (ref 6.4–8.2)
RBC # BLD AUTO: 3.52 MILLION/UL (ref 3.81–5.12)
RBC MORPH BLD: PRESENT
SODIUM SERPL-SCNC: 139 MMOL/L (ref 136–145)
TOTAL CELLS COUNTED SPEC: 100
WBC # BLD AUTO: 2.36 THOUSAND/UL (ref 4.31–10.16)

## 2020-03-27 PROCEDURE — 85027 COMPLETE CBC AUTOMATED: CPT

## 2020-03-27 PROCEDURE — 80053 COMPREHEN METABOLIC PANEL: CPT

## 2020-03-27 PROCEDURE — 85007 BL SMEAR W/DIFF WBC COUNT: CPT

## 2020-03-27 PROCEDURE — 36415 COLL VENOUS BLD VENIPUNCTURE: CPT

## 2020-03-30 ENCOUNTER — DOCUMENTATION (OUTPATIENT)
Dept: HEMATOLOGY ONCOLOGY | Facility: CLINIC | Age: 40
End: 2020-03-30

## 2020-03-30 ENCOUNTER — HOSPITAL ENCOUNTER (OUTPATIENT)
Dept: INFUSION CENTER | Facility: CLINIC | Age: 40
Discharge: HOME | End: 2020-03-30
Payer: COMMERCIAL

## 2020-03-30 VITALS
BODY MASS INDEX: 18.24 KG/M2 | SYSTOLIC BLOOD PRESSURE: 93 MMHG | WEIGHT: 109.5 LBS | RESPIRATION RATE: 20 BRPM | DIASTOLIC BLOOD PRESSURE: 58 MMHG | TEMPERATURE: 98 F | HEIGHT: 65 IN | HEART RATE: 74 BPM

## 2020-03-30 DIAGNOSIS — D70.1 CHEMOTHERAPY INDUCED NEUTROPENIA (HCC): Primary | ICD-10-CM

## 2020-03-30 DIAGNOSIS — T45.1X5A CHEMOTHERAPY INDUCED NEUTROPENIA (HCC): Primary | ICD-10-CM

## 2020-03-30 DIAGNOSIS — Z17.0 MALIGNANT NEOPLASM OF OVERLAPPING SITES OF LEFT BREAST IN FEMALE, ESTROGEN RECEPTOR POSITIVE (HCC): ICD-10-CM

## 2020-03-30 DIAGNOSIS — C50.812 MALIGNANT NEOPLASM OF OVERLAPPING SITES OF LEFT BREAST IN FEMALE, ESTROGEN RECEPTOR POSITIVE (HCC): ICD-10-CM

## 2020-03-30 PROCEDURE — 96417 CHEMO IV INFUS EACH ADDL SEQ: CPT

## 2020-03-30 PROCEDURE — 96413 CHEMO IV INFUSION 1 HR: CPT

## 2020-03-30 PROCEDURE — 96367 TX/PROPH/DG ADDL SEQ IV INF: CPT

## 2020-03-30 RX ORDER — SODIUM CHLORIDE 9 MG/ML
20 INJECTION, SOLUTION INTRAVENOUS ONCE
Status: COMPLETED | OUTPATIENT
Start: 2020-03-30 | End: 2020-03-30

## 2020-03-30 RX ADMIN — PACLITAXEL 120.6 MG: 6 INJECTION, SOLUTION INTRAVENOUS at 14:12

## 2020-03-30 RX ADMIN — FAMOTIDINE 20 MG: 10 INJECTION INTRAVENOUS at 13:05

## 2020-03-30 RX ADMIN — TRASTUZUMAB 96 MG: 150 INJECTION, POWDER, LYOPHILIZED, FOR SOLUTION INTRAVENOUS at 15:22

## 2020-03-30 RX ADMIN — SODIUM CHLORIDE 20 ML/HR: 0.9 INJECTION, SOLUTION INTRAVENOUS at 12:35

## 2020-03-30 RX ADMIN — DEXAMETHASONE SODIUM PHOSPHATE 4 MG: 10 INJECTION, SOLUTION INTRAMUSCULAR; INTRAVENOUS at 12:45

## 2020-03-30 RX ADMIN — DIPHENHYDRAMINE HYDROCHLORIDE 25 MG: 50 INJECTION, SOLUTION INTRAMUSCULAR; INTRAVENOUS at 13:30

## 2020-03-30 NOTE — PROGRESS NOTES
Call from [facility] infusion/MA check w/Dr Payton Owen in reference to labs dated 3 27 20  Per Dr Adrian Lange labs/ok to treat pt in infusion center today

## 2020-03-31 ENCOUNTER — HOSPITAL ENCOUNTER (OUTPATIENT)
Dept: RADIOLOGY | Facility: HOSPITAL | Age: 40
Discharge: HOME | End: 2020-03-31
Attending: SURGERY
Payer: COMMERCIAL

## 2020-03-31 ENCOUNTER — OFFICE VISIT (OUTPATIENT)
Dept: HEMATOLOGY ONCOLOGY | Facility: CLINIC | Age: 40
End: 2020-03-31
Payer: COMMERCIAL

## 2020-03-31 VITALS
RESPIRATION RATE: 18 BRPM | TEMPERATURE: 98.3 F | HEIGHT: 65 IN | BODY MASS INDEX: 17.99 KG/M2 | WEIGHT: 108 LBS | SYSTOLIC BLOOD PRESSURE: 112 MMHG | HEART RATE: 88 BPM | OXYGEN SATURATION: 95 % | DIASTOLIC BLOOD PRESSURE: 62 MMHG

## 2020-03-31 DIAGNOSIS — Z17.0 MALIGNANT NEOPLASM OF OVERLAPPING SITES OF LEFT BREAST IN FEMALE, ESTROGEN RECEPTOR POSITIVE (HCC): ICD-10-CM

## 2020-03-31 DIAGNOSIS — Z17.0 MALIGNANT NEOPLASM OF OVERLAPPING SITES OF LEFT BREAST IN FEMALE, ESTROGEN RECEPTOR POSITIVE (HCC): Primary | ICD-10-CM

## 2020-03-31 DIAGNOSIS — C50.812 MALIGNANT NEOPLASM OF OVERLAPPING SITES OF LEFT BREAST IN FEMALE, ESTROGEN RECEPTOR POSITIVE (HCC): Primary | ICD-10-CM

## 2020-03-31 DIAGNOSIS — D70.1 CHEMOTHERAPY INDUCED NEUTROPENIA (HCC): ICD-10-CM

## 2020-03-31 DIAGNOSIS — C50.812 MALIGNANT NEOPLASM OF OVERLAPPING SITES OF LEFT BREAST IN FEMALE, ESTROGEN RECEPTOR POSITIVE (HCC): ICD-10-CM

## 2020-03-31 DIAGNOSIS — T45.1X5A CHEMOTHERAPY INDUCED NEUTROPENIA (HCC): ICD-10-CM

## 2020-03-31 PROCEDURE — 1036F TOBACCO NON-USER: CPT | Performed by: INTERNAL MEDICINE

## 2020-03-31 PROCEDURE — 77049 MRI BREAST C-+ W/CAD BI: CPT

## 2020-03-31 PROCEDURE — 3008F BODY MASS INDEX DOCD: CPT | Performed by: INTERNAL MEDICINE

## 2020-03-31 PROCEDURE — 99214 OFFICE O/P EST MOD 30 MIN: CPT | Performed by: INTERNAL MEDICINE

## 2020-03-31 PROCEDURE — C8908 MRI W/O FOL W/CONT, BREAST,: HCPCS

## 2020-03-31 PROCEDURE — A9585 GADOBUTROL INJECTION: HCPCS | Performed by: SURGERY

## 2020-03-31 RX ORDER — SODIUM CHLORIDE 9 MG/ML
20 INJECTION, SOLUTION INTRAVENOUS ONCE
Status: CANCELLED | OUTPATIENT
Start: 2020-04-07

## 2020-03-31 RX ORDER — SODIUM CHLORIDE 9 MG/ML
20 INJECTION, SOLUTION INTRAVENOUS ONCE
Status: CANCELLED | OUTPATIENT
Start: 2020-04-13

## 2020-03-31 RX ORDER — SODIUM CHLORIDE 9 MG/ML
20 INJECTION, SOLUTION INTRAVENOUS ONCE
Status: CANCELLED | OUTPATIENT
Start: 2020-05-04

## 2020-03-31 RX ORDER — SODIUM CHLORIDE 9 MG/ML
20 INJECTION, SOLUTION INTRAVENOUS ONCE
Status: CANCELLED | OUTPATIENT
Start: 2020-05-26

## 2020-03-31 RX ORDER — SODIUM CHLORIDE 9 MG/ML
20 INJECTION, SOLUTION INTRAVENOUS ONCE
Status: CANCELLED | OUTPATIENT
Start: 2020-06-15

## 2020-03-31 RX ADMIN — GADOBUTROL 5 ML: 604.72 INJECTION INTRAVENOUS at 16:55

## 2020-03-31 NOTE — PROGRESS NOTES
Hematology / Oncology Outpatient Follow Up Note    Blair Burch 44 y o  female YLB:[id number] INB:[id number]         Date:  3/31/2020    Assessment / Plan:  A 77-year-old premenopausal woman with locally advanced left breast cancer, grade 2, ER 90 % positive, ND 70% positive, HER2 3+ disease  Lavelle Jiménez has relatively large palpable left breast mass as well as palpable left axillary adenopathy which was biopsy confirmed to be metastatic disease   She is negative for BRCA gene mutation   She had 4 cycle of dose dense AC as neoadjuvant chemotherapy with partial response  Currently, she is on weekly paclitaxel, trastuzumab and try weekly pertuzumab with excellent tolerance  She has no cardiac side effect  Based on the physical examination, she appeared to have good partial response  She is going to have last cycle of paclitaxel and trastuzumab, next week  Subsequently, she will stay on trastuzumab and pertuzumab every 3 weeks   I will see her again toward the end of May 2020 after the surgical treatment to discuss pathology report and make further treatment recommendations  She is in agreement with my recommendation      Subjective:      HPI:  A 77-year-old premenopausal woman who noticed a left breast lump recently which she brought to medical attention  Lavelle Jiménez was found to have radiographic abnormality in her left breast as well as left axilla   She underwent left breast biopsy in September 10, 2019 which showed invasive ductal carcinoma, grade 2   This was ER 90 % positive, ND 70% positive, HER2 3+ disease   Biopsy from enlarged left axillary lymph node was also positive for metastatic disease   She was seen by Dr North Turner who sent her to me for neoadjuvant chemotherapy   She is negative for BRCA gene mutation   She underwent bone scan and CT scan chest abdomen pelvis which showed no evidence of distant metastasis   She presents today with her  in mother to discuss neoadjuvant chemotherapy  Lavelle Jiménez has some left breast pain   Otherwise, she feels well   She denied any respiratory symptoms   She has no complaint of bone pain   She has regular menstrual cycle   She has 2 children   She has no plan to have more children   She has no surgical history   She has no significant past medical history  [facility] is a lifetime never smoker   Her performance status is normal              Interval History:  A 60-year-old premenopausal woman with locally advanced left breast cancer, grade 2, ER 90% positive, WY 70% positive, HER2 3+ disease   She has relatively large palpable left breast mass as well as palpable left axillary adenopathy which was biopsy confirmed to be metastatic disease   She has no evidence of distant metastasis   She is negative for BRCA gene mutation   She had 4 cycle of dose dense AC with partial response   Her paclitaxel and trastuzumab was delayed by a week due to the shingle  Currently, she is on weekly paclitaxel and trastuzumab of and try weekly pertuzumab  She is going to have last cycle of neoadjuvant chemotherapy next week  She presents today for follow-up  She feels well  She has no history of neutropenic fever  She has mild neuropathy  She denied any pain  She has no respiratory symptoms  Her performance status is normal   She has an appointment with Dr Ting Kendrick next week        Objective:      Primary Diagnosis:     1  Locally advanced left breast cancer, grade 2, ER 90 % positive, WY 70 % positive, HER2 3+ disease   Diagnosed in September 2019      2  BRCA gene mutation negative      Cancer Staging:  Cancer Staging  No matching staging information was found for the patient         Previous Hematologic/ Oncologic Treatment:            Current Hematologic/ Oncologic Treatment:       Neoadjuvant chemotherapy with dose dense AC x4 followed by weekly paclitaxel, trastuzumab and try weekly pertuzumab x12 weeks   12th cycle of paclitaxel and trastuzumab to be given in April 6, 2020       Disease Status:      Clinical good partial response      Test Results:     Pathology:     Left breast biopsy showed invasive ductal carcinoma, grade 2  ER 90 % positive, NM 70 % positive, HER2 3+ disease        Radiology:     CT scan chest abdomen pelvis showed no evidence of distant metastasis      Bone scan was negative for osseous metastasis      Echocardiogram showed ejection fraction 60% in March 2020       Laboratory:     See below      Physical Exam:        General Appearance:    Alert, oriented          Eyes:    PERRL   Ears:    Normal external ear canals, both ears   Nose:   Nares normal, septum midline   Throat:   Mucosa moist  Pharynx without injection  Neck:   Supple         Lungs:     Clear to auscultation bilaterally, very localized tenderness even with light touch at left lateral lower rib cage  Chest Wall:    No tenderness or deformity    Heart:    Regular rate and rhythm         Abdomen:     Soft, non-tender, bowel sounds +, no organomegaly               Extremities:   Extremities no cyanosis or edema         Skin:   no rash or icterus   Typical shingle rash in the right shoulder  Lymph nodes:   Cervical, supraclavicular, and axillary nodes normal   Neurologic:   CNII-XII intact, normal strength, sensation and reflexes     Throughout             Breast exam:  Very vague soft tissue abnormality at 2:00 position in her left breast   Left axillary adenopathy with hardly palpable   Right breast exam is negative  ROS: Review of Systems   Neurological:        Neuropathy   All other systems reviewed and are negative  Imaging: No results found        Labs:   Lab Results   Component Value Date    WBC 2 36 (L) 03/27/2020    HGB 10 9 (L) 03/27/2020    HCT 32 6 (L) 03/27/2020    MCV 93 03/27/2020     03/27/2020     Lab Results   Component Value Date    K 4 0 03/27/2020     03/27/2020    CO2 29 03/27/2020    BUN 12 03/27/2020    CREATININE 0 65 03/27/2020    GLUF 82 11/16/2019 CALCIUM 8 9 03/27/2020    AST 30 03/27/2020    ALT 64 03/27/2020    ALKPHOS 56 03/27/2020    EGFR 112 03/27/2020         Current Medications: Reviewed  Allergies: Reviewed  PMH/FH/SH:  Reviewed      Vital Sign:    Body surface area is 1 52 meters squared      Wt Readings from Last 3 Encounters:   03/31/20 49 kg (108 lb)   03/30/20 49 7 kg (109 lb 8 oz)   03/23/20 49 4 kg (109 lb)        Temp Readings from Last 3 Encounters:   03/31/20 98 3 °F (36 8 °C) (Tympanic Core)   03/30/20 98 °F (36 7 °C) (Oral)   03/23/20 98 2 °F (36 8 °C) (Oral)        BP Readings from Last 3 Encounters:   03/31/20 112/62   03/30/20 93/58   03/23/20 101/50         Pulse Readings from Last 3 Encounters:   03/31/20 88   03/30/20 74   03/23/20 95     @LASTSAO2(3)@

## 2020-04-02 ENCOUNTER — TELEPHONE (OUTPATIENT)
Dept: SURGICAL ONCOLOGY | Facility: CLINIC | Age: 40
End: 2020-04-02

## 2020-04-04 ENCOUNTER — TELEPHONE (OUTPATIENT)
Dept: INFUSION CENTER | Facility: CLINIC | Age: 40
End: 2020-04-04

## 2020-04-06 ENCOUNTER — OFFICE VISIT (OUTPATIENT)
Dept: SURGICAL ONCOLOGY | Facility: CLINIC | Age: 40
End: 2020-04-06
Payer: COMMERCIAL

## 2020-04-06 ENCOUNTER — APPOINTMENT (OUTPATIENT)
Dept: LAB | Facility: CLINIC | Age: 40
End: 2020-04-06
Payer: COMMERCIAL

## 2020-04-06 VITALS
SYSTOLIC BLOOD PRESSURE: 80 MMHG | HEART RATE: 70 BPM | BODY MASS INDEX: 18.49 KG/M2 | DIASTOLIC BLOOD PRESSURE: 60 MMHG | WEIGHT: 111 LBS | TEMPERATURE: 97.9 F | RESPIRATION RATE: 14 BRPM | HEIGHT: 65 IN

## 2020-04-06 DIAGNOSIS — Z01.818 PREOP EXAMINATION: Primary | ICD-10-CM

## 2020-04-06 DIAGNOSIS — D70.1 CHEMOTHERAPY INDUCED NEUTROPENIA (HCC): ICD-10-CM

## 2020-04-06 DIAGNOSIS — C50.812 MALIGNANT NEOPLASM OF OVERLAPPING SITES OF LEFT BREAST IN FEMALE, ESTROGEN RECEPTOR POSITIVE (HCC): ICD-10-CM

## 2020-04-06 DIAGNOSIS — Z17.0 MALIGNANT NEOPLASM OF OVERLAPPING SITES OF LEFT BREAST IN FEMALE, ESTROGEN RECEPTOR POSITIVE (HCC): ICD-10-CM

## 2020-04-06 DIAGNOSIS — T45.1X5A CHEMOTHERAPY INDUCED NEUTROPENIA (HCC): ICD-10-CM

## 2020-04-06 PROCEDURE — 99214 OFFICE O/P EST MOD 30 MIN: CPT | Performed by: SURGERY

## 2020-04-06 PROCEDURE — 3008F BODY MASS INDEX DOCD: CPT | Performed by: SURGERY

## 2020-04-06 PROCEDURE — 1036F TOBACCO NON-USER: CPT | Performed by: SURGERY

## 2020-04-07 ENCOUNTER — HOSPITAL ENCOUNTER (OUTPATIENT)
Dept: INFUSION CENTER | Facility: CLINIC | Age: 40
Discharge: HOME | End: 2020-04-07
Payer: COMMERCIAL

## 2020-04-07 VITALS
WEIGHT: 112 LBS | RESPIRATION RATE: 18 BRPM | SYSTOLIC BLOOD PRESSURE: 88 MMHG | OXYGEN SATURATION: 100 % | HEART RATE: 93 BPM | TEMPERATURE: 97.4 F | HEIGHT: 65 IN | DIASTOLIC BLOOD PRESSURE: 56 MMHG | BODY MASS INDEX: 18.66 KG/M2

## 2020-04-07 DIAGNOSIS — D70.1 CHEMOTHERAPY INDUCED NEUTROPENIA (HCC): Primary | ICD-10-CM

## 2020-04-07 DIAGNOSIS — Z17.0 MALIGNANT NEOPLASM OF OVERLAPPING SITES OF LEFT BREAST IN FEMALE, ESTROGEN RECEPTOR POSITIVE (HCC): ICD-10-CM

## 2020-04-07 DIAGNOSIS — C50.812 MALIGNANT NEOPLASM OF OVERLAPPING SITES OF LEFT BREAST IN FEMALE, ESTROGEN RECEPTOR POSITIVE (HCC): ICD-10-CM

## 2020-04-07 DIAGNOSIS — T45.1X5A CHEMOTHERAPY INDUCED NEUTROPENIA (HCC): Primary | ICD-10-CM

## 2020-04-07 PROCEDURE — 96417 CHEMO IV INFUS EACH ADDL SEQ: CPT

## 2020-04-07 PROCEDURE — 96367 TX/PROPH/DG ADDL SEQ IV INF: CPT

## 2020-04-07 PROCEDURE — 96375 TX/PRO/DX INJ NEW DRUG ADDON: CPT

## 2020-04-07 PROCEDURE — 96413 CHEMO IV INFUSION 1 HR: CPT

## 2020-04-07 RX ORDER — LORAZEPAM 2 MG/ML
0.5 INJECTION INTRAMUSCULAR ONCE
Status: CANCELLED
Start: 2020-04-07

## 2020-04-07 RX ORDER — SODIUM CHLORIDE 9 MG/ML
20 INJECTION, SOLUTION INTRAVENOUS ONCE
Status: COMPLETED | OUTPATIENT
Start: 2020-04-07 | End: 2020-04-07

## 2020-04-07 RX ORDER — LORAZEPAM 2 MG/ML
0.5 INJECTION INTRAMUSCULAR ONCE
Status: COMPLETED | OUTPATIENT
Start: 2020-04-07 | End: 2020-04-07

## 2020-04-07 RX ADMIN — TRASTUZUMAB 96 MG: 150 INJECTION, POWDER, LYOPHILIZED, FOR SOLUTION INTRAVENOUS at 11:39

## 2020-04-07 RX ADMIN — FAMOTIDINE 20 MG: 10 INJECTION INTRAVENOUS at 09:56

## 2020-04-07 RX ADMIN — SODIUM CHLORIDE 20 ML/HR: 0.9 INJECTION, SOLUTION INTRAVENOUS at 09:10

## 2020-04-07 RX ADMIN — DIPHENHYDRAMINE HYDROCHLORIDE 25 MG: 50 INJECTION, SOLUTION INTRAMUSCULAR; INTRAVENOUS at 09:34

## 2020-04-07 RX ADMIN — PACLITAXEL 120.6 MG: 6 INJECTION, SOLUTION INTRAVENOUS at 10:26

## 2020-04-07 RX ADMIN — LORAZEPAM 0.5 MG: 2 INJECTION INTRAMUSCULAR; INTRAVENOUS at 11:33

## 2020-04-07 RX ADMIN — DEXAMETHASONE SODIUM PHOSPHATE 4 MG: 10 INJECTION, SOLUTION INTRAMUSCULAR; INTRAVENOUS at 09:12

## 2020-04-09 ENCOUNTER — HOSPITAL ENCOUNTER (OUTPATIENT)
Dept: ULTRASOUND IMAGING | Facility: CLINIC | Age: 40
Discharge: HOME | End: 2020-04-09
Payer: COMMERCIAL

## 2020-04-09 VITALS — WEIGHT: 112 LBS | BODY MASS INDEX: 18.66 KG/M2 | HEIGHT: 65 IN

## 2020-04-09 DIAGNOSIS — C50.812 MALIGNANT NEOPLASM OF OVERLAPPING SITES OF LEFT BREAST IN FEMALE, ESTROGEN RECEPTOR POSITIVE (HCC): ICD-10-CM

## 2020-04-09 DIAGNOSIS — Z17.0 MALIGNANT NEOPLASM OF OVERLAPPING SITES OF LEFT BREAST IN FEMALE, ESTROGEN RECEPTOR POSITIVE (HCC): ICD-10-CM

## 2020-04-09 PROCEDURE — 76642 ULTRASOUND BREAST LIMITED: CPT

## 2020-04-13 ENCOUNTER — HOSPITAL ENCOUNTER (OUTPATIENT)
Dept: INFUSION CENTER | Facility: CLINIC | Age: 40
Discharge: HOME | End: 2020-04-13
Payer: COMMERCIAL

## 2020-04-13 ENCOUNTER — APPOINTMENT (OUTPATIENT)
Dept: LAB | Facility: CLINIC | Age: 40
End: 2020-04-13
Payer: COMMERCIAL

## 2020-04-13 ENCOUNTER — HOSPITAL ENCOUNTER (OUTPATIENT)
Dept: RADIOLOGY | Facility: HOSPITAL | Age: 40
Discharge: HOME | End: 2020-04-13
Attending: SURGERY
Payer: COMMERCIAL

## 2020-04-13 ENCOUNTER — TELEPHONE (OUTPATIENT)
Dept: HEMATOLOGY ONCOLOGY | Facility: CLINIC | Age: 40
End: 2020-04-13

## 2020-04-13 ENCOUNTER — LAB (OUTPATIENT)
Dept: LAB | Facility: CLINIC | Age: 40
End: 2020-04-13
Payer: COMMERCIAL

## 2020-04-13 VITALS
DIASTOLIC BLOOD PRESSURE: 56 MMHG | HEART RATE: 56 BPM | RESPIRATION RATE: 20 BRPM | BODY MASS INDEX: 18.55 KG/M2 | WEIGHT: 111.5 LBS | SYSTOLIC BLOOD PRESSURE: 126 MMHG | TEMPERATURE: 98 F

## 2020-04-13 DIAGNOSIS — Z17.0 MALIGNANT NEOPLASM OF OVERLAPPING SITES OF LEFT BREAST IN FEMALE, ESTROGEN RECEPTOR POSITIVE (HCC): ICD-10-CM

## 2020-04-13 DIAGNOSIS — C50.812 MALIGNANT NEOPLASM OF OVERLAPPING SITES OF LEFT BREAST IN FEMALE, ESTROGEN RECEPTOR POSITIVE (HCC): Primary | ICD-10-CM

## 2020-04-13 DIAGNOSIS — T45.1X5A CHEMOTHERAPY-INDUCED NEUTROPENIA (HCC): ICD-10-CM

## 2020-04-13 DIAGNOSIS — C50.812 MALIGNANT NEOPLASM OF OVERLAPPING SITES OF LEFT BREAST IN FEMALE, ESTROGEN RECEPTOR POSITIVE (HCC): ICD-10-CM

## 2020-04-13 DIAGNOSIS — Z17.0 ESTROGEN RECEPTOR POSITIVE: ICD-10-CM

## 2020-04-13 DIAGNOSIS — D70.1 CHEMOTHERAPY-INDUCED NEUTROPENIA (HCC): ICD-10-CM

## 2020-04-13 DIAGNOSIS — R94.5 LIVER FUNCTION ABNORMALITY: ICD-10-CM

## 2020-04-13 DIAGNOSIS — T45.1X5A CHEMOTHERAPY INDUCED NEUTROPENIA (HCC): Primary | ICD-10-CM

## 2020-04-13 DIAGNOSIS — D70.1 CHEMOTHERAPY INDUCED NEUTROPENIA (HCC): Primary | ICD-10-CM

## 2020-04-13 DIAGNOSIS — T45.1X5A ADVERSE EFFECT OF ANTINEOPLASTIC ANTIBIOTIC, INITIAL ENCOUNTER: ICD-10-CM

## 2020-04-13 DIAGNOSIS — Z17.0 MALIGNANT NEOPLASM OF OVERLAPPING SITES OF LEFT BREAST IN FEMALE, ESTROGEN RECEPTOR POSITIVE (HCC): Primary | ICD-10-CM

## 2020-04-13 DIAGNOSIS — C50.812 MALIGNANT NEOPLASM OF OVERLAPPING SITES OF LEFT FEMALE BREAST, UNSPECIFIED ESTROGEN RECEPTOR STATUS (HCC): ICD-10-CM

## 2020-04-13 LAB
ALBUMIN SERPL BCP-MCNC: 3.6 G/DL (ref 3.5–5)
ALP SERPL-CCNC: 75 U/L (ref 46–116)
ALT SERPL W P-5'-P-CCNC: 158 U/L (ref 12–78)
ANION GAP SERPL CALCULATED.3IONS-SCNC: 9 MMOL/L (ref 4–13)
AST SERPL W P-5'-P-CCNC: 70 U/L (ref 5–45)
BASOPHILS # BLD MANUAL: 0.04 THOUSAND/UL (ref 0–0.1)
BASOPHILS NFR MAR MANUAL: 2 % (ref 0–1)
BILIRUB SERPL-MCNC: 0.26 MG/DL (ref 0.2–1)
BUN SERPL-MCNC: 13 MG/DL (ref 5–25)
CALCIUM SERPL-MCNC: 8.8 MG/DL (ref 8.3–10.1)
CHLORIDE SERPL-SCNC: 106 MMOL/L (ref 100–108)
CO2 SERPL-SCNC: 28 MMOL/L (ref 21–32)
CREAT SERPL-MCNC: 0.75 MG/DL (ref 0.6–1.3)
DACRYOCYTES BLD QL SMEAR: PRESENT
EOSINOPHIL # BLD MANUAL: 0.19 THOUSAND/UL (ref 0–0.4)
EOSINOPHIL NFR BLD MANUAL: 10 % (ref 0–6)
ERYTHROCYTE [DISTWIDTH] IN BLOOD BY AUTOMATED COUNT: 13.9 % (ref 11.6–15.1)
GFR SERPL CREATININE-BSD FRML MDRD: 101 ML/MIN/1.73SQ M
GLUCOSE SERPL-MCNC: 78 MG/DL (ref 65–140)
HCT VFR BLD AUTO: 32.6 % (ref 34.8–46.1)
HELMET CELLS BLD QL SMEAR: PRESENT
HGB BLD-MCNC: 10.6 G/DL (ref 11.5–15.4)
LYMPHOCYTES # BLD AUTO: 0.63 THOUSAND/UL (ref 0.6–4.47)
LYMPHOCYTES # BLD AUTO: 33 % (ref 14–44)
MCH RBC QN AUTO: 30.4 PG (ref 26.8–34.3)
MCHC RBC AUTO-ENTMCNC: 32.5 G/DL (ref 31.4–37.4)
MCV RBC AUTO: 93 FL (ref 82–98)
MONOCYTES # BLD AUTO: 0.27 THOUSAND/UL (ref 0–1.22)
MONOCYTES NFR BLD: 14 % (ref 4–12)
NEUTROPHILS # BLD MANUAL: 0.74 THOUSAND/UL (ref 1.85–7.62)
NEUTS BAND NFR BLD MANUAL: 3 % (ref 0–8)
NEUTS SEG NFR BLD AUTO: 36 % (ref 43–75)
NRBC BLD AUTO-RTO: 0 /100 WBCS
PLATELET # BLD AUTO: 240 THOUSANDS/UL (ref 149–390)
PLATELET BLD QL SMEAR: ADEQUATE
PMV BLD AUTO: 9.9 FL (ref 8.9–12.7)
POIKILOCYTOSIS BLD QL SMEAR: PRESENT
POTASSIUM SERPL-SCNC: 3.6 MMOL/L (ref 3.5–5.3)
PROT SERPL-MCNC: 7.2 G/DL (ref 6.4–8.2)
RBC # BLD AUTO: 3.49 MILLION/UL (ref 3.81–5.12)
RBC MORPH BLD: PRESENT
SODIUM SERPL-SCNC: 143 MMOL/L (ref 136–145)
TOTAL CELLS COUNTED SPEC: 100
VARIANT LYMPHS # BLD AUTO: 2 %
WBC # BLD AUTO: 1.9 THOUSAND/UL (ref 4.31–10.16)

## 2020-04-13 PROCEDURE — 96417 CHEMO IV INFUS EACH ADDL SEQ: CPT

## 2020-04-13 PROCEDURE — 93005 ELECTROCARDIOGRAM TRACING: CPT

## 2020-04-13 PROCEDURE — 96413 CHEMO IV INFUSION 1 HR: CPT

## 2020-04-13 PROCEDURE — 80053 COMPREHEN METABOLIC PANEL: CPT

## 2020-04-13 PROCEDURE — 85007 BL SMEAR W/DIFF WBC COUNT: CPT

## 2020-04-13 PROCEDURE — 85027 COMPLETE CBC AUTOMATED: CPT

## 2020-04-13 PROCEDURE — 36415 COLL VENOUS BLD VENIPUNCTURE: CPT

## 2020-04-13 PROCEDURE — 71046 X-RAY EXAM CHEST 2 VIEWS: CPT

## 2020-04-13 RX ORDER — SODIUM CHLORIDE 9 MG/ML
20 INJECTION, SOLUTION INTRAVENOUS ONCE
Status: COMPLETED | OUTPATIENT
Start: 2020-04-13 | End: 2020-04-13

## 2020-04-13 RX ADMIN — SODIUM CHLORIDE 20 ML/HR: 0.9 INJECTION, SOLUTION INTRAVENOUS at 12:53

## 2020-04-13 RX ADMIN — PERTUZUMAB 420 MG: 30 INJECTION, SOLUTION, CONCENTRATE INTRAVENOUS at 13:08

## 2020-04-13 RX ADMIN — TRASTUZUMAB 300 MG: 150 INJECTION, POWDER, LYOPHILIZED, FOR SOLUTION INTRAVENOUS at 13:45

## 2020-04-14 LAB
ATRIAL RATE: 86 BPM
P AXIS: 74 DEGREES
PR INTERVAL: 160 MS
QRS AXIS: 90 DEGREES
QRSD INTERVAL: 110 MS
QT INTERVAL: 388 MS
QTC INTERVAL: 464 MS
T WAVE AXIS: 73 DEGREES
VENTRICULAR RATE: 86 BPM

## 2020-04-14 PROCEDURE — 93010 ELECTROCARDIOGRAM REPORT: CPT | Performed by: INTERNAL MEDICINE

## 2020-04-23 ENCOUNTER — APPOINTMENT (OUTPATIENT)
Dept: LAB | Facility: CLINIC | Age: 40
End: 2020-04-23
Payer: COMMERCIAL

## 2020-04-23 ENCOUNTER — TELEPHONE (OUTPATIENT)
Dept: PLASTIC SURGERY | Facility: CLINIC | Age: 40
End: 2020-04-23

## 2020-04-23 DIAGNOSIS — R94.5 LIVER FUNCTION ABNORMALITY: ICD-10-CM

## 2020-04-23 DIAGNOSIS — Z17.0 MALIGNANT NEOPLASM OF OVERLAPPING SITES OF LEFT BREAST IN FEMALE, ESTROGEN RECEPTOR POSITIVE (HCC): ICD-10-CM

## 2020-04-23 DIAGNOSIS — C50.812 MALIGNANT NEOPLASM OF OVERLAPPING SITES OF LEFT BREAST IN FEMALE, ESTROGEN RECEPTOR POSITIVE (HCC): ICD-10-CM

## 2020-04-23 LAB
ALBUMIN SERPL BCP-MCNC: 3.9 G/DL (ref 3.5–5)
ALP SERPL-CCNC: 85 U/L (ref 46–116)
ALT SERPL W P-5'-P-CCNC: 67 U/L (ref 12–78)
ANION GAP SERPL CALCULATED.3IONS-SCNC: 5 MMOL/L (ref 4–13)
AST SERPL W P-5'-P-CCNC: 25 U/L (ref 5–45)
BILIRUB SERPL-MCNC: 0.31 MG/DL (ref 0.2–1)
BUN SERPL-MCNC: 17 MG/DL (ref 5–25)
CALCIUM SERPL-MCNC: 9 MG/DL (ref 8.3–10.1)
CHLORIDE SERPL-SCNC: 104 MMOL/L (ref 100–108)
CO2 SERPL-SCNC: 29 MMOL/L (ref 21–32)
CREAT SERPL-MCNC: 0.82 MG/DL (ref 0.6–1.3)
GFR SERPL CREATININE-BSD FRML MDRD: 90 ML/MIN/1.73SQ M
GLUCOSE SERPL-MCNC: 102 MG/DL (ref 65–140)
POTASSIUM SERPL-SCNC: 4.2 MMOL/L (ref 3.5–5.3)
PROT SERPL-MCNC: 7.8 G/DL (ref 6.4–8.2)
SODIUM SERPL-SCNC: 138 MMOL/L (ref 136–145)

## 2020-04-23 PROCEDURE — 80053 COMPREHEN METABOLIC PANEL: CPT

## 2020-04-23 PROCEDURE — 36415 COLL VENOUS BLD VENIPUNCTURE: CPT

## 2020-04-27 ENCOUNTER — ANESTHESIA EVENT (OUTPATIENT)
Dept: PERIOP | Facility: HOSPITAL | Age: 40
End: 2020-04-27
Payer: COMMERCIAL

## 2020-04-28 ENCOUNTER — HOSPITAL ENCOUNTER (OUTPATIENT)
Facility: HOSPITAL | Age: 40
Setting detail: OUTPATIENT SURGERY
Discharge: HOME | End: 2020-04-28
Attending: SURGERY | Admitting: SURGERY
Payer: COMMERCIAL

## 2020-04-28 ENCOUNTER — HOSPITAL ENCOUNTER (OUTPATIENT)
Dept: MAMMOGRAPHY | Facility: HOSPITAL | Age: 40
Discharge: HOME | End: 2020-04-28
Attending: SURGERY
Payer: COMMERCIAL

## 2020-04-28 ENCOUNTER — HOSPITAL ENCOUNTER (OUTPATIENT)
Dept: ULTRASOUND IMAGING | Facility: HOSPITAL | Age: 40
Discharge: HOME | End: 2020-04-28
Attending: SURGERY
Payer: COMMERCIAL

## 2020-04-28 ENCOUNTER — ANESTHESIA (OUTPATIENT)
Dept: PERIOP | Facility: HOSPITAL | Age: 40
End: 2020-04-28
Payer: COMMERCIAL

## 2020-04-28 ENCOUNTER — APPOINTMENT (OUTPATIENT)
Dept: MAMMOGRAPHY | Facility: HOSPITAL | Age: 40
End: 2020-04-28
Payer: COMMERCIAL

## 2020-04-28 VITALS
SYSTOLIC BLOOD PRESSURE: 106 MMHG | HEART RATE: 112 BPM | RESPIRATION RATE: 18 BRPM | DIASTOLIC BLOOD PRESSURE: 58 MMHG | BODY MASS INDEX: 18.66 KG/M2 | WEIGHT: 112 LBS | OXYGEN SATURATION: 99 % | TEMPERATURE: 98.7 F | HEIGHT: 65 IN

## 2020-04-28 DIAGNOSIS — Z17.0 MALIGNANT NEOPLASM OF OVERLAPPING SITES OF LEFT BREAST IN FEMALE, ESTROGEN RECEPTOR POSITIVE (HCC): ICD-10-CM

## 2020-04-28 DIAGNOSIS — C50.812 MALIGNANT NEOPLASM OF OVERLAPPING SITES OF LEFT BREAST IN FEMALE, ESTROGEN RECEPTOR POSITIVE (HCC): ICD-10-CM

## 2020-04-28 DIAGNOSIS — C50.812 MALIGNANT NEOPLASM OF OVERLAPPING SITES OF LEFT FEMALE BREAST (HCC): ICD-10-CM

## 2020-04-28 LAB
EXT PREGNANCY TEST URINE: NEGATIVE
EXT. CONTROL: NORMAL

## 2020-04-28 PROCEDURE — 88342 IMHCHEM/IMCYTCHM 1ST ANTB: CPT | Performed by: PATHOLOGY

## 2020-04-28 PROCEDURE — C9290 INJ, BUPIVACAINE LIPOSOME: HCPCS | Performed by: STUDENT IN AN ORGANIZED HEALTH CARE EDUCATION/TRAINING PROGRAM

## 2020-04-28 PROCEDURE — C1781 MESH (IMPLANTABLE): HCPCS | Performed by: SURGERY

## 2020-04-28 PROCEDURE — 15777 ACELLULAR DERM MATRIX IMPLT: CPT | Performed by: PLASTIC SURGERY

## 2020-04-28 PROCEDURE — 81025 URINE PREGNANCY TEST: CPT | Performed by: STUDENT IN AN ORGANIZED HEALTH CARE EDUCATION/TRAINING PROGRAM

## 2020-04-28 PROCEDURE — 88307 TISSUE EXAM BY PATHOLOGIST: CPT | Performed by: PATHOLOGY

## 2020-04-28 PROCEDURE — 88341 IMHCHEM/IMCYTCHM EA ADD ANTB: CPT | Performed by: PATHOLOGY

## 2020-04-28 PROCEDURE — 19285 PERQ DEV BREAST 1ST US IMAG: CPT

## 2020-04-28 PROCEDURE — 19307 MAST MOD RAD: CPT | Performed by: SURGERY

## 2020-04-28 PROCEDURE — 97605 NEG PRS WND THER DME<=50SQCM: CPT | Performed by: PLASTIC SURGERY

## 2020-04-28 PROCEDURE — 19340 INSJ BREAST IMPLT SM D MAST: CPT | Performed by: PLASTIC SURGERY

## 2020-04-28 PROCEDURE — 19307 MAST MOD RAD: CPT | Performed by: PHYSICIAN ASSISTANT

## 2020-04-28 PROCEDURE — 12034 INTMD RPR S/TR/EXT 7.6-12.5: CPT | Performed by: PLASTIC SURGERY

## 2020-04-28 PROCEDURE — C1789 PROSTHESIS, BREAST, IMP: HCPCS | Performed by: SURGERY

## 2020-04-28 DEVICE — SMOOTH ROUND MODERATE PLUS PROFILE GEL-FILLED BREAST IMPLANT, 325CC  SMOOTH ROUND SILICONE
Type: IMPLANTABLE DEVICE | Site: BREAST | Status: FUNCTIONAL
Brand: MENTOR MEMORYGEL BREAST IMPLANT

## 2020-04-28 DEVICE — (320 SQ CM) GRAFT SKIN ALLODERM 16 X 20CM THICK RTU PERF: Type: IMPLANTABLE DEVICE | Site: BREAST | Status: FUNCTIONAL

## 2020-04-28 RX ORDER — CEPHALEXIN 500 MG/1
500 CAPSULE ORAL EVERY 6 HOURS SCHEDULED
Qty: 28 CAPSULE | Refills: 0 | Status: SHIPPED | OUTPATIENT
Start: 2020-04-28 | End: 2020-05-05

## 2020-04-28 RX ORDER — MAGNESIUM HYDROXIDE 1200 MG/15ML
LIQUID ORAL AS NEEDED
Status: DISCONTINUED | OUTPATIENT
Start: 2020-04-28 | End: 2020-04-28 | Stop reason: HOSPADM

## 2020-04-28 RX ORDER — ACETAMINOPHEN 500 MG
500 TABLET ORAL EVERY 8 HOURS
Qty: 45 TABLET | Refills: 0 | Status: SHIPPED | OUTPATIENT
Start: 2020-04-28 | End: 2020-05-13

## 2020-04-28 RX ORDER — FENTANYL CITRATE 50 UG/ML
INJECTION, SOLUTION INTRAMUSCULAR; INTRAVENOUS AS NEEDED
Status: DISCONTINUED | OUTPATIENT
Start: 2020-04-28 | End: 2020-04-28 | Stop reason: SURG

## 2020-04-28 RX ORDER — LIDOCAINE HYDROCHLORIDE 10 MG/ML
5 INJECTION, SOLUTION EPIDURAL; INFILTRATION; INTRACAUDAL; PERINEURAL ONCE
Status: COMPLETED | OUTPATIENT
Start: 2020-04-28 | End: 2020-04-28

## 2020-04-28 RX ORDER — PROPOFOL 10 MG/ML
INJECTION, EMULSION INTRAVENOUS AS NEEDED
Status: DISCONTINUED | OUTPATIENT
Start: 2020-04-28 | End: 2020-04-28 | Stop reason: SURG

## 2020-04-28 RX ORDER — SUCCINYLCHOLINE/SOD CL,ISO/PF 100 MG/5ML
SYRINGE (ML) INTRAVENOUS AS NEEDED
Status: DISCONTINUED | OUTPATIENT
Start: 2020-04-28 | End: 2020-04-28 | Stop reason: SURG

## 2020-04-28 RX ORDER — FENTANYL CITRATE/PF 50 MCG/ML
25 SYRINGE (ML) INJECTION
Status: DISCONTINUED | OUTPATIENT
Start: 2020-04-28 | End: 2020-04-28 | Stop reason: HOSPADM

## 2020-04-28 RX ORDER — LIDOCAINE HYDROCHLORIDE 10 MG/ML
0.5 INJECTION, SOLUTION EPIDURAL; INFILTRATION; INTRACAUDAL; PERINEURAL ONCE AS NEEDED
Status: DISCONTINUED | OUTPATIENT
Start: 2020-04-28 | End: 2020-04-28 | Stop reason: HOSPADM

## 2020-04-28 RX ORDER — BUPIVACAINE HYDROCHLORIDE 2.5 MG/ML
INJECTION, SOLUTION EPIDURAL; INFILTRATION; INTRACAUDAL
Status: COMPLETED | OUTPATIENT
Start: 2020-04-28 | End: 2020-04-28

## 2020-04-28 RX ORDER — OXYCODONE HYDROCHLORIDE 5 MG/1
5 TABLET ORAL EVERY 4 HOURS PRN
Qty: 10 TABLET | Refills: 0 | Status: SHIPPED | OUTPATIENT
Start: 2020-04-28 | End: 2020-05-08

## 2020-04-28 RX ORDER — ONDANSETRON 2 MG/ML
4 INJECTION INTRAMUSCULAR; INTRAVENOUS ONCE AS NEEDED
Status: DISCONTINUED | OUTPATIENT
Start: 2020-04-28 | End: 2020-04-28 | Stop reason: HOSPADM

## 2020-04-28 RX ORDER — GABAPENTIN 300 MG/1
300 CAPSULE ORAL 3 TIMES DAILY
Qty: 45 CAPSULE | Refills: 0 | Status: SHIPPED | OUTPATIENT
Start: 2020-04-28 | End: 2020-06-17 | Stop reason: ALTCHOICE

## 2020-04-28 RX ORDER — BUPIVACAINE HYDROCHLORIDE 2.5 MG/ML
INJECTION, SOLUTION EPIDURAL; INFILTRATION; INTRACAUDAL AS NEEDED
Status: DISCONTINUED | OUTPATIENT
Start: 2020-04-28 | End: 2020-04-28 | Stop reason: HOSPADM

## 2020-04-28 RX ORDER — SODIUM CHLORIDE, SODIUM LACTATE, POTASSIUM CHLORIDE, CALCIUM CHLORIDE 600; 310; 30; 20 MG/100ML; MG/100ML; MG/100ML; MG/100ML
75 INJECTION, SOLUTION INTRAVENOUS CONTINUOUS
Status: DISCONTINUED | OUTPATIENT
Start: 2020-04-28 | End: 2020-04-28 | Stop reason: HOSPADM

## 2020-04-28 RX ORDER — SODIUM CHLORIDE, SODIUM LACTATE, POTASSIUM CHLORIDE, CALCIUM CHLORIDE 600; 310; 30; 20 MG/100ML; MG/100ML; MG/100ML; MG/100ML
125 INJECTION, SOLUTION INTRAVENOUS CONTINUOUS
Status: DISCONTINUED | OUTPATIENT
Start: 2020-04-28 | End: 2020-04-28 | Stop reason: HOSPADM

## 2020-04-28 RX ORDER — CEFAZOLIN SODIUM 1 G/50ML
1000 SOLUTION INTRAVENOUS ONCE
Status: COMPLETED | OUTPATIENT
Start: 2020-04-28 | End: 2020-04-28

## 2020-04-28 RX ORDER — ONDANSETRON 2 MG/ML
INJECTION INTRAMUSCULAR; INTRAVENOUS AS NEEDED
Status: DISCONTINUED | OUTPATIENT
Start: 2020-04-28 | End: 2020-04-28 | Stop reason: SURG

## 2020-04-28 RX ORDER — OXYCODONE HYDROCHLORIDE 5 MG/1
5 TABLET ORAL ONCE
Status: COMPLETED | OUTPATIENT
Start: 2020-04-28 | End: 2020-04-28

## 2020-04-28 RX ORDER — MIDAZOLAM HYDROCHLORIDE 2 MG/2ML
INJECTION, SOLUTION INTRAMUSCULAR; INTRAVENOUS AS NEEDED
Status: DISCONTINUED | OUTPATIENT
Start: 2020-04-28 | End: 2020-04-28 | Stop reason: SURG

## 2020-04-28 RX ORDER — EPHEDRINE SULFATE 50 MG/ML
INJECTION INTRAVENOUS AS NEEDED
Status: DISCONTINUED | OUTPATIENT
Start: 2020-04-28 | End: 2020-04-28 | Stop reason: SURG

## 2020-04-28 RX ORDER — HYDROMORPHONE HCL/PF 1 MG/ML
0.2 SYRINGE (ML) INJECTION
Status: DISCONTINUED | OUTPATIENT
Start: 2020-04-28 | End: 2020-04-28 | Stop reason: HOSPADM

## 2020-04-28 RX ORDER — LIDOCAINE HYDROCHLORIDE 10 MG/ML
INJECTION, SOLUTION EPIDURAL; INFILTRATION; INTRACAUDAL; PERINEURAL AS NEEDED
Status: DISCONTINUED | OUTPATIENT
Start: 2020-04-28 | End: 2020-04-28 | Stop reason: SURG

## 2020-04-28 RX ADMIN — SODIUM CHLORIDE, SODIUM LACTATE, POTASSIUM CHLORIDE, AND CALCIUM CHLORIDE: .6; .31; .03; .02 INJECTION, SOLUTION INTRAVENOUS at 13:31

## 2020-04-28 RX ADMIN — EPHEDRINE SULFATE 5 MG: 50 INJECTION, SOLUTION INTRAVENOUS at 16:37

## 2020-04-28 RX ADMIN — FENTANYL CITRATE 50 MCG: 50 INJECTION INTRAMUSCULAR; INTRAVENOUS at 14:37

## 2020-04-28 RX ADMIN — BUPIVACAINE HYDROCHLORIDE 30 ML: 2.5 INJECTION, SOLUTION EPIDURAL; INFILTRATION; INTRACAUDAL at 14:12

## 2020-04-28 RX ADMIN — MIDAZOLAM HYDROCHLORIDE 2 MG: 1 INJECTION, SOLUTION INTRAMUSCULAR; INTRAVENOUS at 13:55

## 2020-04-28 RX ADMIN — FENTANYL CITRATE 50 MCG: 50 INJECTION INTRAMUSCULAR; INTRAVENOUS at 15:54

## 2020-04-28 RX ADMIN — EPHEDRINE SULFATE 5 MG: 50 INJECTION, SOLUTION INTRAVENOUS at 16:26

## 2020-04-28 RX ADMIN — LIDOCAINE HYDROCHLORIDE 5 ML: 10 INJECTION, SOLUTION EPIDURAL; INFILTRATION; INTRACAUDAL; PERINEURAL at 11:35

## 2020-04-28 RX ADMIN — BUPIVACAINE 20 ML: 13.3 INJECTION, SUSPENSION, LIPOSOMAL INFILTRATION at 14:10

## 2020-04-28 RX ADMIN — Medication 60 MG: at 14:03

## 2020-04-28 RX ADMIN — PROPOFOL 150 MG: 10 INJECTION, EMULSION INTRAVENOUS at 14:03

## 2020-04-28 RX ADMIN — CEFAZOLIN SODIUM 1000 MG: 1 SOLUTION INTRAVENOUS at 17:37

## 2020-04-28 RX ADMIN — FENTANYL CITRATE 50 MCG: 50 INJECTION INTRAMUSCULAR; INTRAVENOUS at 17:15

## 2020-04-28 RX ADMIN — FENTANYL CITRATE 50 MCG: 50 INJECTION INTRAMUSCULAR; INTRAVENOUS at 14:03

## 2020-04-28 RX ADMIN — OXYCODONE HYDROCHLORIDE 5 MG: 5 TABLET ORAL at 19:45

## 2020-04-28 RX ADMIN — LIDOCAINE HYDROCHLORIDE 40 MG: 10 INJECTION, SOLUTION EPIDURAL; INFILTRATION; INTRACAUDAL; PERINEURAL at 14:03

## 2020-04-28 RX ADMIN — ONDANSETRON 4 MG: 2 INJECTION INTRAMUSCULAR; INTRAVENOUS at 16:55

## 2020-04-28 RX ADMIN — CEFAZOLIN SODIUM 1000 MG: 1 SOLUTION INTRAVENOUS at 13:58

## 2020-05-01 ENCOUNTER — TELEPHONE (OUTPATIENT)
Dept: SURGICAL ONCOLOGY | Facility: CLINIC | Age: 40
End: 2020-05-01

## 2020-05-04 ENCOUNTER — HOSPITAL ENCOUNTER (OUTPATIENT)
Dept: INFUSION CENTER | Facility: CLINIC | Age: 40
Discharge: HOME | End: 2020-05-04
Payer: COMMERCIAL

## 2020-05-04 VITALS
HEIGHT: 65 IN | OXYGEN SATURATION: 98 % | HEART RATE: 96 BPM | RESPIRATION RATE: 14 BRPM | TEMPERATURE: 98 F | DIASTOLIC BLOOD PRESSURE: 60 MMHG | SYSTOLIC BLOOD PRESSURE: 82 MMHG | BODY MASS INDEX: 19.08 KG/M2 | WEIGHT: 114.5 LBS

## 2020-05-04 DIAGNOSIS — T45.1X5A CHEMOTHERAPY INDUCED NEUTROPENIA (HCC): Primary | ICD-10-CM

## 2020-05-04 DIAGNOSIS — Z17.0 MALIGNANT NEOPLASM OF OVERLAPPING SITES OF LEFT BREAST IN FEMALE, ESTROGEN RECEPTOR POSITIVE (HCC): ICD-10-CM

## 2020-05-04 DIAGNOSIS — C50.812 MALIGNANT NEOPLASM OF OVERLAPPING SITES OF LEFT BREAST IN FEMALE, ESTROGEN RECEPTOR POSITIVE (HCC): ICD-10-CM

## 2020-05-04 DIAGNOSIS — D70.1 CHEMOTHERAPY INDUCED NEUTROPENIA (HCC): Primary | ICD-10-CM

## 2020-05-04 PROCEDURE — 96413 CHEMO IV INFUSION 1 HR: CPT

## 2020-05-04 PROCEDURE — 96417 CHEMO IV INFUS EACH ADDL SEQ: CPT

## 2020-05-04 RX ORDER — SODIUM CHLORIDE 9 MG/ML
20 INJECTION, SOLUTION INTRAVENOUS ONCE
Status: COMPLETED | OUTPATIENT
Start: 2020-05-04 | End: 2020-05-04

## 2020-05-04 RX ADMIN — PERTUZUMAB 420 MG: 30 INJECTION, SOLUTION, CONCENTRATE INTRAVENOUS at 14:20

## 2020-05-04 RX ADMIN — SODIUM CHLORIDE 20 ML/HR: 0.9 INJECTION, SOLUTION INTRAVENOUS at 14:02

## 2020-05-04 RX ADMIN — TRASTUZUMAB 300 MG: 150 INJECTION, POWDER, LYOPHILIZED, FOR SOLUTION INTRAVENOUS at 14:55

## 2020-05-06 ENCOUNTER — OFFICE VISIT (OUTPATIENT)
Dept: PLASTIC SURGERY | Facility: CLINIC | Age: 40
End: 2020-05-06

## 2020-05-06 DIAGNOSIS — C50.912 BREAST CANCER METASTASIZED TO AXILLARY LYMPH NODE, LEFT (HCC): Primary | ICD-10-CM

## 2020-05-06 DIAGNOSIS — C77.3 BREAST CANCER METASTASIZED TO AXILLARY LYMPH NODE, LEFT (HCC): Primary | ICD-10-CM

## 2020-05-06 PROCEDURE — 99024 POSTOP FOLLOW-UP VISIT: CPT | Performed by: PHYSICIAN ASSISTANT

## 2020-05-11 ENCOUNTER — OFFICE VISIT (OUTPATIENT)
Dept: PLASTIC SURGERY | Facility: CLINIC | Age: 40
End: 2020-05-11

## 2020-05-11 VITALS — TEMPERATURE: 97.5 F

## 2020-05-11 DIAGNOSIS — Z17.0 MALIGNANT NEOPLASM OF OVERLAPPING SITES OF LEFT BREAST IN FEMALE, ESTROGEN RECEPTOR POSITIVE (HCC): Primary | ICD-10-CM

## 2020-05-11 DIAGNOSIS — C50.812 MALIGNANT NEOPLASM OF OVERLAPPING SITES OF LEFT BREAST IN FEMALE, ESTROGEN RECEPTOR POSITIVE (HCC): Primary | ICD-10-CM

## 2020-05-11 PROCEDURE — 99024 POSTOP FOLLOW-UP VISIT: CPT | Performed by: PHYSICIAN ASSISTANT

## 2020-05-13 ENCOUNTER — OFFICE VISIT (OUTPATIENT)
Dept: SURGICAL ONCOLOGY | Facility: CLINIC | Age: 40
End: 2020-05-13

## 2020-05-13 VITALS
BODY MASS INDEX: 18.83 KG/M2 | HEIGHT: 65 IN | TEMPERATURE: 97.5 F | DIASTOLIC BLOOD PRESSURE: 76 MMHG | SYSTOLIC BLOOD PRESSURE: 102 MMHG | HEART RATE: 95 BPM | WEIGHT: 113 LBS

## 2020-05-13 DIAGNOSIS — Z90.12 STATUS POST LEFT MASTECTOMY: ICD-10-CM

## 2020-05-13 DIAGNOSIS — C50.812 MALIGNANT NEOPLASM OF OVERLAPPING SITES OF LEFT BREAST IN FEMALE, ESTROGEN RECEPTOR POSITIVE (HCC): ICD-10-CM

## 2020-05-13 DIAGNOSIS — Z71.89 OTHER SPECIFIED COUNSELING: Primary | ICD-10-CM

## 2020-05-13 DIAGNOSIS — Z17.0 MALIGNANT NEOPLASM OF OVERLAPPING SITES OF LEFT BREAST IN FEMALE, ESTROGEN RECEPTOR POSITIVE (HCC): ICD-10-CM

## 2020-05-13 PROCEDURE — 99024 POSTOP FOLLOW-UP VISIT: CPT | Performed by: SURGERY

## 2020-05-13 PROCEDURE — 3008F BODY MASS INDEX DOCD: CPT | Performed by: SURGERY

## 2020-05-14 ENCOUNTER — TELEPHONE (OUTPATIENT)
Dept: INFUSION CENTER | Facility: HOSPITAL | Age: 40
End: 2020-05-14

## 2020-05-14 ENCOUNTER — TELEPHONE (OUTPATIENT)
Dept: PLASTIC SURGERY | Facility: CLINIC | Age: 40
End: 2020-05-14

## 2020-05-21 ENCOUNTER — EVALUATION (OUTPATIENT)
Dept: PHYSICAL THERAPY | Facility: CLINIC | Age: 40
End: 2020-05-21
Payer: COMMERCIAL

## 2020-05-21 DIAGNOSIS — G89.28 POST-MASTECTOMY PAIN SYNDROME: Primary | ICD-10-CM

## 2020-05-21 DIAGNOSIS — Z17.0 MALIGNANT NEOPLASM OF OVERLAPPING SITES OF LEFT BREAST IN FEMALE, ESTROGEN RECEPTOR POSITIVE (HCC): ICD-10-CM

## 2020-05-21 DIAGNOSIS — C50.812 MALIGNANT NEOPLASM OF OVERLAPPING SITES OF LEFT BREAST IN FEMALE, ESTROGEN RECEPTOR POSITIVE (HCC): ICD-10-CM

## 2020-05-21 DIAGNOSIS — Z90.12 STATUS POST LEFT MASTECTOMY: ICD-10-CM

## 2020-05-21 PROCEDURE — 97110 THERAPEUTIC EXERCISES: CPT | Performed by: PHYSICAL THERAPIST

## 2020-05-21 PROCEDURE — 97162 PT EVAL MOD COMPLEX 30 MIN: CPT | Performed by: PHYSICAL THERAPIST

## 2020-05-22 ENCOUNTER — TELEMEDICINE (OUTPATIENT)
Dept: RADIATION ONCOLOGY | Facility: HOSPITAL | Age: 40
End: 2020-05-22
Attending: RADIOLOGY
Payer: COMMERCIAL

## 2020-05-22 DIAGNOSIS — C50.812 MALIGNANT NEOPLASM OF OVERLAPPING SITES OF LEFT BREAST IN FEMALE, ESTROGEN RECEPTOR POSITIVE (HCC): Primary | ICD-10-CM

## 2020-05-22 DIAGNOSIS — Z17.0 MALIGNANT NEOPLASM OF OVERLAPPING SITES OF LEFT BREAST IN FEMALE, ESTROGEN RECEPTOR POSITIVE (HCC): Primary | ICD-10-CM

## 2020-05-22 DIAGNOSIS — Z17.0 MALIGNANT NEOPLASM OF OVERLAPPING SITES OF LEFT BREAST IN FEMALE, ESTROGEN RECEPTOR POSITIVE (HCC): ICD-10-CM

## 2020-05-22 DIAGNOSIS — Z90.12 STATUS POST LEFT MASTECTOMY: ICD-10-CM

## 2020-05-22 DIAGNOSIS — C50.812 MALIGNANT NEOPLASM OF OVERLAPPING SITES OF LEFT BREAST IN FEMALE, ESTROGEN RECEPTOR POSITIVE (HCC): ICD-10-CM

## 2020-05-22 PROCEDURE — 99211 OFF/OP EST MAY X REQ PHY/QHP: CPT | Performed by: RADIOLOGY

## 2020-05-26 ENCOUNTER — HOSPITAL ENCOUNTER (OUTPATIENT)
Dept: INFUSION CENTER | Facility: CLINIC | Age: 40
Discharge: HOME | End: 2020-05-26
Payer: COMMERCIAL

## 2020-05-26 ENCOUNTER — OFFICE VISIT (OUTPATIENT)
Dept: HEMATOLOGY ONCOLOGY | Facility: CLINIC | Age: 40
End: 2020-05-26
Payer: COMMERCIAL

## 2020-05-26 VITALS
HEART RATE: 78 BPM | BODY MASS INDEX: 18.99 KG/M2 | TEMPERATURE: 98.6 F | RESPIRATION RATE: 18 BRPM | WEIGHT: 114 LBS | OXYGEN SATURATION: 99 % | DIASTOLIC BLOOD PRESSURE: 58 MMHG | HEIGHT: 65 IN | SYSTOLIC BLOOD PRESSURE: 92 MMHG

## 2020-05-26 VITALS
RESPIRATION RATE: 14 BRPM | DIASTOLIC BLOOD PRESSURE: 56 MMHG | OXYGEN SATURATION: 100 % | SYSTOLIC BLOOD PRESSURE: 82 MMHG | WEIGHT: 114 LBS | HEART RATE: 70 BPM | TEMPERATURE: 97.9 F | HEIGHT: 65 IN | BODY MASS INDEX: 18.99 KG/M2

## 2020-05-26 DIAGNOSIS — T45.1X5A CHEMOTHERAPY INDUCED NEUTROPENIA (HCC): Primary | ICD-10-CM

## 2020-05-26 DIAGNOSIS — T45.1X5A CHEMOTHERAPY INDUCED NEUTROPENIA (HCC): ICD-10-CM

## 2020-05-26 DIAGNOSIS — D70.1 CHEMOTHERAPY INDUCED NEUTROPENIA (HCC): Primary | ICD-10-CM

## 2020-05-26 DIAGNOSIS — C50.812 MALIGNANT NEOPLASM OF OVERLAPPING SITES OF LEFT BREAST IN FEMALE, ESTROGEN RECEPTOR POSITIVE (HCC): ICD-10-CM

## 2020-05-26 DIAGNOSIS — D70.1 CHEMOTHERAPY INDUCED NEUTROPENIA (HCC): ICD-10-CM

## 2020-05-26 DIAGNOSIS — Z17.0 MALIGNANT NEOPLASM OF OVERLAPPING SITES OF LEFT BREAST IN FEMALE, ESTROGEN RECEPTOR POSITIVE (HCC): ICD-10-CM

## 2020-05-26 DIAGNOSIS — C50.812 MALIGNANT NEOPLASM OF OVERLAPPING SITES OF LEFT BREAST IN FEMALE, ESTROGEN RECEPTOR POSITIVE (HCC): Primary | ICD-10-CM

## 2020-05-26 DIAGNOSIS — Z17.0 MALIGNANT NEOPLASM OF OVERLAPPING SITES OF LEFT BREAST IN FEMALE, ESTROGEN RECEPTOR POSITIVE (HCC): Primary | ICD-10-CM

## 2020-05-26 PROCEDURE — 96417 CHEMO IV INFUS EACH ADDL SEQ: CPT

## 2020-05-26 PROCEDURE — 1036F TOBACCO NON-USER: CPT | Performed by: INTERNAL MEDICINE

## 2020-05-26 PROCEDURE — 99215 OFFICE O/P EST HI 40 MIN: CPT | Performed by: INTERNAL MEDICINE

## 2020-05-26 PROCEDURE — 3008F BODY MASS INDEX DOCD: CPT | Performed by: INTERNAL MEDICINE

## 2020-05-26 PROCEDURE — 96413 CHEMO IV INFUSION 1 HR: CPT

## 2020-05-26 RX ORDER — SODIUM CHLORIDE 9 MG/ML
20 INJECTION, SOLUTION INTRAVENOUS ONCE
Status: CANCELLED | OUTPATIENT
Start: 2020-09-11

## 2020-05-26 RX ORDER — SODIUM CHLORIDE 9 MG/ML
20 INJECTION, SOLUTION INTRAVENOUS ONCE
Status: CANCELLED | OUTPATIENT
Start: 2020-07-06

## 2020-05-26 RX ORDER — SODIUM CHLORIDE 9 MG/ML
20 INJECTION, SOLUTION INTRAVENOUS ONCE
Status: COMPLETED | OUTPATIENT
Start: 2020-05-26 | End: 2020-05-26

## 2020-05-26 RX ORDER — TAMOXIFEN CITRATE 20 MG/1
20 TABLET ORAL DAILY
Qty: 90 TABLET | Refills: 1 | Status: SHIPPED | OUTPATIENT
Start: 2020-05-26 | End: 2020-11-10 | Stop reason: SDUPTHER

## 2020-05-26 RX ORDER — SODIUM CHLORIDE 9 MG/ML
20 INJECTION, SOLUTION INTRAVENOUS ONCE
Status: CANCELLED | OUTPATIENT
Start: 2020-08-17

## 2020-05-26 RX ORDER — SODIUM CHLORIDE 9 MG/ML
20 INJECTION, SOLUTION INTRAVENOUS ONCE
Status: CANCELLED | OUTPATIENT
Start: 2020-07-27

## 2020-05-26 RX ADMIN — TRASTUZUMAB 300 MG: 150 INJECTION, POWDER, LYOPHILIZED, FOR SOLUTION INTRAVENOUS at 15:20

## 2020-05-26 RX ADMIN — PERTUZUMAB 420 MG: 30 INJECTION, SOLUTION, CONCENTRATE INTRAVENOUS at 14:43

## 2020-05-26 RX ADMIN — SODIUM CHLORIDE 20 ML/HR: 0.9 INJECTION, SOLUTION INTRAVENOUS at 14:20

## 2020-05-27 ENCOUNTER — TELEPHONE (OUTPATIENT)
Dept: OTHER | Facility: HOSPITAL | Age: 40
End: 2020-05-27

## 2020-05-27 ENCOUNTER — OFFICE VISIT (OUTPATIENT)
Dept: PHYSICAL THERAPY | Facility: CLINIC | Age: 40
End: 2020-05-27
Payer: COMMERCIAL

## 2020-05-27 DIAGNOSIS — G89.28 POST-MASTECTOMY PAIN SYNDROME: Primary | ICD-10-CM

## 2020-05-27 PROCEDURE — 97110 THERAPEUTIC EXERCISES: CPT | Performed by: PHYSICAL THERAPIST

## 2020-05-27 PROCEDURE — 97140 MANUAL THERAPY 1/> REGIONS: CPT | Performed by: PHYSICAL THERAPIST

## 2020-05-28 ENCOUNTER — OFFICE VISIT (OUTPATIENT)
Dept: PHYSICAL THERAPY | Facility: CLINIC | Age: 40
End: 2020-05-28
Payer: COMMERCIAL

## 2020-05-28 DIAGNOSIS — G89.28 POST-MASTECTOMY PAIN SYNDROME: Primary | ICD-10-CM

## 2020-05-28 PROCEDURE — 97140 MANUAL THERAPY 1/> REGIONS: CPT | Performed by: PHYSICAL THERAPIST

## 2020-05-28 PROCEDURE — 97110 THERAPEUTIC EXERCISES: CPT | Performed by: PHYSICAL THERAPIST

## 2020-06-01 ENCOUNTER — OFFICE VISIT (OUTPATIENT)
Dept: PHYSICAL THERAPY | Facility: CLINIC | Age: 40
End: 2020-06-01
Payer: COMMERCIAL

## 2020-06-01 DIAGNOSIS — G89.28 POST-MASTECTOMY PAIN SYNDROME: Primary | ICD-10-CM

## 2020-06-01 PROCEDURE — 97110 THERAPEUTIC EXERCISES: CPT | Performed by: PHYSICAL THERAPIST

## 2020-06-01 PROCEDURE — 97140 MANUAL THERAPY 1/> REGIONS: CPT | Performed by: PHYSICAL THERAPIST

## 2020-06-03 ENCOUNTER — OFFICE VISIT (OUTPATIENT)
Dept: PHYSICAL THERAPY | Facility: CLINIC | Age: 40
End: 2020-06-03
Payer: COMMERCIAL

## 2020-06-03 DIAGNOSIS — G89.28 POST-MASTECTOMY PAIN SYNDROME: Primary | ICD-10-CM

## 2020-06-03 PROCEDURE — 97110 THERAPEUTIC EXERCISES: CPT | Performed by: PHYSICAL THERAPIST

## 2020-06-03 PROCEDURE — 97140 MANUAL THERAPY 1/> REGIONS: CPT | Performed by: PHYSICAL THERAPIST

## 2020-06-04 DIAGNOSIS — C50.812 MALIGNANT NEOPLASM OF OVERLAPPING SITES OF LEFT BREAST IN FEMALE, ESTROGEN RECEPTOR POSITIVE (HCC): Primary | ICD-10-CM

## 2020-06-04 DIAGNOSIS — Z17.0 MALIGNANT NEOPLASM OF OVERLAPPING SITES OF LEFT BREAST IN FEMALE, ESTROGEN RECEPTOR POSITIVE (HCC): Primary | ICD-10-CM

## 2020-06-08 ENCOUNTER — OFFICE VISIT (OUTPATIENT)
Dept: PHYSICAL THERAPY | Facility: CLINIC | Age: 40
End: 2020-06-08
Payer: COMMERCIAL

## 2020-06-08 ENCOUNTER — TELEPHONE (OUTPATIENT)
Dept: INFUSION CENTER | Facility: HOSPITAL | Age: 40
End: 2020-06-08

## 2020-06-08 DIAGNOSIS — G89.28 POST-MASTECTOMY PAIN SYNDROME: Primary | ICD-10-CM

## 2020-06-08 PROCEDURE — 97110 THERAPEUTIC EXERCISES: CPT | Performed by: PHYSICAL THERAPIST

## 2020-06-08 PROCEDURE — 97140 MANUAL THERAPY 1/> REGIONS: CPT | Performed by: PHYSICAL THERAPIST

## 2020-06-10 ENCOUNTER — OFFICE VISIT (OUTPATIENT)
Dept: PHYSICAL THERAPY | Facility: CLINIC | Age: 40
End: 2020-06-10
Payer: COMMERCIAL

## 2020-06-10 DIAGNOSIS — G89.28 POST-MASTECTOMY PAIN SYNDROME: Primary | ICD-10-CM

## 2020-06-10 PROCEDURE — 97110 THERAPEUTIC EXERCISES: CPT | Performed by: PHYSICAL THERAPIST

## 2020-06-10 PROCEDURE — 97140 MANUAL THERAPY 1/> REGIONS: CPT | Performed by: PHYSICAL THERAPIST

## 2020-06-11 ENCOUNTER — OFFICE VISIT (OUTPATIENT)
Dept: PLASTIC SURGERY | Facility: CLINIC | Age: 40
End: 2020-06-11

## 2020-06-11 VITALS — BODY MASS INDEX: 18.72 KG/M2 | HEIGHT: 65 IN | WEIGHT: 112.38 LBS

## 2020-06-11 DIAGNOSIS — C50.812 MALIGNANT NEOPLASM OF OVERLAPPING SITES OF LEFT BREAST IN FEMALE, ESTROGEN RECEPTOR POSITIVE (HCC): ICD-10-CM

## 2020-06-11 DIAGNOSIS — C50.912 BREAST CANCER METASTASIZED TO AXILLARY LYMPH NODE, LEFT (HCC): ICD-10-CM

## 2020-06-11 DIAGNOSIS — C77.3 BREAST CANCER METASTASIZED TO AXILLARY LYMPH NODE, LEFT (HCC): ICD-10-CM

## 2020-06-11 DIAGNOSIS — Z98.890 POST-OPERATIVE STATE: Primary | ICD-10-CM

## 2020-06-11 DIAGNOSIS — Z17.0 MALIGNANT NEOPLASM OF OVERLAPPING SITES OF LEFT BREAST IN FEMALE, ESTROGEN RECEPTOR POSITIVE (HCC): ICD-10-CM

## 2020-06-11 PROCEDURE — 99024 POSTOP FOLLOW-UP VISIT: CPT | Performed by: PHYSICIAN ASSISTANT

## 2020-06-11 PROCEDURE — 3008F BODY MASS INDEX DOCD: CPT | Performed by: PHYSICIAN ASSISTANT

## 2020-06-11 RX ORDER — SULFAMETHOXAZOLE AND TRIMETHOPRIM 800; 160 MG/1; MG/1
1 TABLET ORAL EVERY 12 HOURS SCHEDULED
Qty: 20 TABLET | Refills: 0 | Status: SHIPPED | OUTPATIENT
Start: 2020-06-11 | End: 2020-06-17 | Stop reason: ALTCHOICE

## 2020-06-12 ENCOUNTER — HOSPITAL ENCOUNTER (INPATIENT)
Facility: HOSPITAL | Age: 40
LOS: 2 days | Discharge: HOME | DRG: 909 | End: 2020-06-14
Attending: SURGERY | Admitting: PLASTIC SURGERY
Payer: COMMERCIAL

## 2020-06-12 ENCOUNTER — ANESTHESIA EVENT (INPATIENT)
Dept: PERIOP | Facility: HOSPITAL | Age: 40
DRG: 909 | End: 2020-06-12
Payer: COMMERCIAL

## 2020-06-12 DIAGNOSIS — T81.31XA DEHISCENCE OF OPERATIVE WOUND, INITIAL ENCOUNTER: Primary | ICD-10-CM

## 2020-06-12 DIAGNOSIS — Z98.890 POST-OPERATIVE STATE: ICD-10-CM

## 2020-06-12 LAB
ANION GAP SERPL CALCULATED.3IONS-SCNC: 3 MMOL/L (ref 4–13)
BASOPHILS # BLD AUTO: 0.03 THOUSANDS/ΜL (ref 0–0.1)
BASOPHILS NFR BLD AUTO: 1 % (ref 0–1)
BUN SERPL-MCNC: 15 MG/DL (ref 5–25)
CALCIUM SERPL-MCNC: 9.6 MG/DL (ref 8.3–10.1)
CHLORIDE SERPL-SCNC: 107 MMOL/L (ref 100–108)
CO2 SERPL-SCNC: 28 MMOL/L (ref 21–32)
CREAT SERPL-MCNC: 0.76 MG/DL (ref 0.6–1.3)
EOSINOPHIL # BLD AUTO: 0.51 THOUSAND/ΜL (ref 0–0.61)
EOSINOPHIL NFR BLD AUTO: 11 % (ref 0–6)
ERYTHROCYTE [DISTWIDTH] IN BLOOD BY AUTOMATED COUNT: 13.5 % (ref 11.6–15.1)
GFR SERPL CREATININE-BSD FRML MDRD: 98 ML/MIN/1.73SQ M
GLUCOSE SERPL-MCNC: 76 MG/DL (ref 65–140)
HCT VFR BLD AUTO: 32.2 % (ref 34.8–46.1)
HGB BLD-MCNC: 10.7 G/DL (ref 11.5–15.4)
IMM GRANULOCYTES # BLD AUTO: 0.01 THOUSAND/UL (ref 0–0.2)
IMM GRANULOCYTES NFR BLD AUTO: 0 % (ref 0–2)
LYMPHOCYTES # BLD AUTO: 1.58 THOUSANDS/ΜL (ref 0.6–4.47)
LYMPHOCYTES NFR BLD AUTO: 33 % (ref 14–44)
MCH RBC QN AUTO: 30.7 PG (ref 26.8–34.3)
MCHC RBC AUTO-ENTMCNC: 33.2 G/DL (ref 31.4–37.4)
MCV RBC AUTO: 92 FL (ref 82–98)
MONOCYTES # BLD AUTO: 0.48 THOUSAND/ΜL (ref 0.17–1.22)
MONOCYTES NFR BLD AUTO: 10 % (ref 4–12)
NEUTROPHILS # BLD AUTO: 2.21 THOUSANDS/ΜL (ref 1.85–7.62)
NEUTS SEG NFR BLD AUTO: 45 % (ref 43–75)
NRBC BLD AUTO-RTO: 0 /100 WBCS
PLATELET # BLD AUTO: 251 THOUSANDS/UL (ref 149–390)
PMV BLD AUTO: 9.9 FL (ref 8.9–12.7)
POTASSIUM SERPL-SCNC: 3.3 MMOL/L (ref 3.5–5.3)
RBC # BLD AUTO: 3.49 MILLION/UL (ref 3.81–5.12)
SODIUM SERPL-SCNC: 138 MMOL/L (ref 136–145)
WBC # BLD AUTO: 4.82 THOUSAND/UL (ref 4.31–10.16)

## 2020-06-12 PROCEDURE — 85025 COMPLETE CBC W/AUTO DIFF WBC: CPT | Performed by: SURGERY

## 2020-06-12 PROCEDURE — 36415 COLL VENOUS BLD VENIPUNCTURE: CPT | Performed by: SURGERY

## 2020-06-12 PROCEDURE — 99284 EMERGENCY DEPT VISIT MOD MDM: CPT

## 2020-06-12 PROCEDURE — NC001 PR NO CHARGE: Performed by: PLASTIC SURGERY

## 2020-06-12 PROCEDURE — 87040 BLOOD CULTURE FOR BACTERIA: CPT | Performed by: SURGERY

## 2020-06-12 PROCEDURE — 80048 BASIC METABOLIC PNL TOTAL CA: CPT | Performed by: SURGERY

## 2020-06-12 RX ORDER — SODIUM CHLORIDE 9 MG/ML
125 INJECTION, SOLUTION INTRAVENOUS CONTINUOUS
Status: DISCONTINUED | OUTPATIENT
Start: 2020-06-13 | End: 2020-06-13

## 2020-06-12 RX ORDER — FLUTICASONE PROPIONATE 50 MCG
2 SPRAY, SUSPENSION (ML) NASAL DAILY PRN
Status: DISCONTINUED | OUTPATIENT
Start: 2020-06-12 | End: 2020-06-14 | Stop reason: HOSPADM

## 2020-06-12 RX ORDER — MONTELUKAST SODIUM 10 MG/1
10 TABLET ORAL
Status: DISCONTINUED | OUTPATIENT
Start: 2020-06-12 | End: 2020-06-14 | Stop reason: HOSPADM

## 2020-06-12 RX ORDER — ALBUTEROL SULFATE 90 UG/1
2 AEROSOL, METERED RESPIRATORY (INHALATION) EVERY 6 HOURS PRN
Status: DISCONTINUED | OUTPATIENT
Start: 2020-06-12 | End: 2020-06-14 | Stop reason: HOSPADM

## 2020-06-12 RX ORDER — ACETAMINOPHEN 325 MG/1
975 TABLET ORAL EVERY 6 HOURS PRN
Status: DISCONTINUED | OUTPATIENT
Start: 2020-06-12 | End: 2020-06-14 | Stop reason: HOSPADM

## 2020-06-12 RX ORDER — DIPHENHYDRAMINE HYDROCHLORIDE 50 MG/ML
25 INJECTION INTRAMUSCULAR; INTRAVENOUS EVERY 6 HOURS PRN
Status: DISCONTINUED | OUTPATIENT
Start: 2020-06-12 | End: 2020-06-14 | Stop reason: HOSPADM

## 2020-06-12 RX ORDER — LORATADINE 10 MG/1
10 TABLET ORAL DAILY PRN
Status: DISCONTINUED | OUTPATIENT
Start: 2020-06-12 | End: 2020-06-14 | Stop reason: HOSPADM

## 2020-06-12 RX ORDER — CYCLOBENZAPRINE HCL 10 MG
5 TABLET ORAL 3 TIMES DAILY PRN
Status: DISCONTINUED | OUTPATIENT
Start: 2020-06-12 | End: 2020-06-14 | Stop reason: HOSPADM

## 2020-06-12 RX ORDER — HEPARIN SODIUM 5000 [USP'U]/ML
5000 INJECTION, SOLUTION INTRAVENOUS; SUBCUTANEOUS EVERY 8 HOURS SCHEDULED
Status: DISCONTINUED | OUTPATIENT
Start: 2020-06-12 | End: 2020-06-14 | Stop reason: HOSPADM

## 2020-06-12 RX ADMIN — SODIUM CHLORIDE 125 ML/HR: 0.9 INJECTION, SOLUTION INTRAVENOUS at 23:34

## 2020-06-12 RX ADMIN — MONTELUKAST 10 MG: 10 TABLET, FILM COATED ORAL at 21:02

## 2020-06-12 RX ADMIN — HEPARIN SODIUM 5000 UNITS: 5000 INJECTION INTRAVENOUS; SUBCUTANEOUS at 21:02

## 2020-06-12 RX ADMIN — DIPHENHYDRAMINE HYDROCHLORIDE 25 MG: 50 INJECTION, SOLUTION INTRAMUSCULAR; INTRAVENOUS at 22:30

## 2020-06-12 RX ADMIN — PIPERACILLIN SODIUM AND TAZOBACTAM SODIUM 3.38 G: 36; 4.5 INJECTION, POWDER, FOR SOLUTION INTRAVENOUS at 23:33

## 2020-06-12 RX ADMIN — VANCOMYCIN HYDROCHLORIDE 750 MG: 750 INJECTION, SOLUTION INTRAVENOUS at 21:00

## 2020-06-13 ENCOUNTER — ANESTHESIA (INPATIENT)
Dept: PERIOP | Facility: HOSPITAL | Age: 40
DRG: 909 | End: 2020-06-13
Payer: COMMERCIAL

## 2020-06-13 VITALS
OXYGEN SATURATION: 99 % | HEIGHT: 65 IN | TEMPERATURE: 97.7 F | DIASTOLIC BLOOD PRESSURE: 53 MMHG | BODY MASS INDEX: 18.66 KG/M2 | SYSTOLIC BLOOD PRESSURE: 101 MMHG | WEIGHT: 112 LBS | RESPIRATION RATE: 18 BRPM | HEART RATE: 70 BPM

## 2020-06-13 LAB
ANION GAP SERPL CALCULATED.3IONS-SCNC: 7 MMOL/L (ref 4–13)
BASOPHILS # BLD AUTO: 0.03 THOUSANDS/ΜL (ref 0–0.1)
BASOPHILS NFR BLD AUTO: 1 % (ref 0–1)
BUN SERPL-MCNC: 13 MG/DL (ref 5–25)
CALCIUM SERPL-MCNC: 8.6 MG/DL (ref 8.3–10.1)
CHLORIDE SERPL-SCNC: 109 MMOL/L (ref 100–108)
CO2 SERPL-SCNC: 27 MMOL/L (ref 21–32)
CREAT SERPL-MCNC: 0.89 MG/DL (ref 0.6–1.3)
EOSINOPHIL # BLD AUTO: 0.45 THOUSAND/ΜL (ref 0–0.61)
EOSINOPHIL NFR BLD AUTO: 9 % (ref 0–6)
ERYTHROCYTE [DISTWIDTH] IN BLOOD BY AUTOMATED COUNT: 13.2 % (ref 11.6–15.1)
GFR SERPL CREATININE-BSD FRML MDRD: 81 ML/MIN/1.73SQ M
GLUCOSE SERPL-MCNC: 77 MG/DL (ref 65–140)
HCT VFR BLD AUTO: 33.1 % (ref 34.8–46.1)
HGB BLD-MCNC: 10.7 G/DL (ref 11.5–15.4)
IMM GRANULOCYTES # BLD AUTO: 0.01 THOUSAND/UL (ref 0–0.2)
IMM GRANULOCYTES NFR BLD AUTO: 0 % (ref 0–2)
LYMPHOCYTES # BLD AUTO: 0.82 THOUSANDS/ΜL (ref 0.6–4.47)
LYMPHOCYTES NFR BLD AUTO: 16 % (ref 14–44)
MCH RBC QN AUTO: 30.3 PG (ref 26.8–34.3)
MCHC RBC AUTO-ENTMCNC: 32.3 G/DL (ref 31.4–37.4)
MCV RBC AUTO: 94 FL (ref 82–98)
MONOCYTES # BLD AUTO: 0.65 THOUSAND/ΜL (ref 0.17–1.22)
MONOCYTES NFR BLD AUTO: 12 % (ref 4–12)
NEUTROPHILS # BLD AUTO: 3.3 THOUSANDS/ΜL (ref 1.85–7.62)
NEUTS SEG NFR BLD AUTO: 62 % (ref 43–75)
NRBC BLD AUTO-RTO: 0 /100 WBCS
PLATELET # BLD AUTO: 227 THOUSANDS/UL (ref 149–390)
PMV BLD AUTO: 10 FL (ref 8.9–12.7)
POTASSIUM SERPL-SCNC: 3.8 MMOL/L (ref 3.5–5.3)
RBC # BLD AUTO: 3.53 MILLION/UL (ref 3.81–5.12)
SARS-COV-2 RNA RESP QL NAA+PROBE: NEGATIVE
SODIUM SERPL-SCNC: 143 MMOL/L (ref 136–145)
WBC # BLD AUTO: 5.26 THOUSAND/UL (ref 4.31–10.16)

## 2020-06-13 PROCEDURE — 12031 INTMD RPR S/A/T/EXT 2.5 CM/<: CPT | Performed by: PLASTIC SURGERY

## 2020-06-13 PROCEDURE — 87116 MYCOBACTERIA CULTURE: CPT | Performed by: PLASTIC SURGERY

## 2020-06-13 PROCEDURE — 87205 SMEAR GRAM STAIN: CPT | Performed by: PLASTIC SURGERY

## 2020-06-13 PROCEDURE — 87102 FUNGUS ISOLATION CULTURE: CPT | Performed by: PLASTIC SURGERY

## 2020-06-13 PROCEDURE — 87075 CULTR BACTERIA EXCEPT BLOOD: CPT | Performed by: PLASTIC SURGERY

## 2020-06-13 PROCEDURE — 80048 BASIC METABOLIC PNL TOTAL CA: CPT | Performed by: SURGERY

## 2020-06-13 PROCEDURE — 0HBU0ZZ EXCISION OF LEFT BREAST, OPEN APPROACH: ICD-10-PCS | Performed by: PLASTIC SURGERY

## 2020-06-13 PROCEDURE — U0003 INFECTIOUS AGENT DETECTION BY NUCLEIC ACID (DNA OR RNA); SEVERE ACUTE RESPIRATORY SYNDROME CORONAVIRUS 2 (SARS-COV-2) (CORONAVIRUS DISEASE [COVID-19]), AMPLIFIED PROBE TECHNIQUE, MAKING USE OF HIGH THROUGHPUT TECHNOLOGIES AS DESCRIBED BY CMS-2020-01-R: HCPCS | Performed by: STUDENT IN AN ORGANIZED HEALTH CARE EDUCATION/TRAINING PROGRAM

## 2020-06-13 PROCEDURE — 87176 TISSUE HOMOGENIZATION CULTR: CPT | Performed by: PLASTIC SURGERY

## 2020-06-13 PROCEDURE — 11404 EXC TR-EXT B9+MARG 3.1-4 CM: CPT | Performed by: PLASTIC SURGERY

## 2020-06-13 PROCEDURE — 87070 CULTURE OTHR SPECIMN AEROBIC: CPT | Performed by: PLASTIC SURGERY

## 2020-06-13 PROCEDURE — 85025 COMPLETE CBC W/AUTO DIFF WBC: CPT | Performed by: SURGERY

## 2020-06-13 PROCEDURE — 99024 POSTOP FOLLOW-UP VISIT: CPT | Performed by: PLASTIC SURGERY

## 2020-06-13 PROCEDURE — 87206 SMEAR FLUORESCENT/ACID STAI: CPT | Performed by: PLASTIC SURGERY

## 2020-06-13 RX ORDER — POTASSIUM CHLORIDE 20 MEQ/1
40 TABLET, EXTENDED RELEASE ORAL ONCE
Status: COMPLETED | OUTPATIENT
Start: 2020-06-13 | End: 2020-06-13

## 2020-06-13 RX ORDER — HYDROMORPHONE HCL/PF 1 MG/ML
0.2 SYRINGE (ML) INJECTION
Status: DISCONTINUED | OUTPATIENT
Start: 2020-06-13 | End: 2020-06-13 | Stop reason: HOSPADM

## 2020-06-13 RX ORDER — ONDANSETRON 2 MG/ML
INJECTION INTRAMUSCULAR; INTRAVENOUS AS NEEDED
Status: DISCONTINUED | OUTPATIENT
Start: 2020-06-13 | End: 2020-06-13 | Stop reason: SURG

## 2020-06-13 RX ORDER — FENTANYL CITRATE/PF 50 MCG/ML
25 SYRINGE (ML) INJECTION
Status: DISCONTINUED | OUTPATIENT
Start: 2020-06-13 | End: 2020-06-13 | Stop reason: HOSPADM

## 2020-06-13 RX ORDER — PROPOFOL 10 MG/ML
INJECTION, EMULSION INTRAVENOUS AS NEEDED
Status: DISCONTINUED | OUTPATIENT
Start: 2020-06-13 | End: 2020-06-13 | Stop reason: SURG

## 2020-06-13 RX ORDER — LIDOCAINE HYDROCHLORIDE 10 MG/ML
INJECTION, SOLUTION EPIDURAL; INFILTRATION; INTRACAUDAL; PERINEURAL AS NEEDED
Status: DISCONTINUED | OUTPATIENT
Start: 2020-06-13 | End: 2020-06-13 | Stop reason: SURG

## 2020-06-13 RX ORDER — POTASSIUM CHLORIDE 20 MEQ/1
20 TABLET, EXTENDED RELEASE ORAL ONCE
Status: COMPLETED | OUTPATIENT
Start: 2020-06-13 | End: 2020-06-13

## 2020-06-13 RX ORDER — ONDANSETRON 2 MG/ML
4 INJECTION INTRAMUSCULAR; INTRAVENOUS ONCE AS NEEDED
Status: DISCONTINUED | OUTPATIENT
Start: 2020-06-13 | End: 2020-06-13 | Stop reason: HOSPADM

## 2020-06-13 RX ORDER — FENTANYL CITRATE 50 UG/ML
INJECTION, SOLUTION INTRAMUSCULAR; INTRAVENOUS AS NEEDED
Status: DISCONTINUED | OUTPATIENT
Start: 2020-06-13 | End: 2020-06-13 | Stop reason: SURG

## 2020-06-13 RX ORDER — POTASSIUM CHLORIDE 14.9 MG/ML
20 INJECTION INTRAVENOUS ONCE
Status: COMPLETED | OUTPATIENT
Start: 2020-06-13 | End: 2020-06-13

## 2020-06-13 RX ORDER — MIDAZOLAM HYDROCHLORIDE 2 MG/2ML
INJECTION, SOLUTION INTRAMUSCULAR; INTRAVENOUS AS NEEDED
Status: DISCONTINUED | OUTPATIENT
Start: 2020-06-13 | End: 2020-06-13 | Stop reason: SURG

## 2020-06-13 RX ORDER — VANCOMYCIN HYDROCHLORIDE 1 G/20ML
INJECTION, POWDER, LYOPHILIZED, FOR SOLUTION INTRAVENOUS AS NEEDED
Status: DISCONTINUED | OUTPATIENT
Start: 2020-06-13 | End: 2020-06-13 | Stop reason: HOSPADM

## 2020-06-13 RX ORDER — MAGNESIUM HYDROXIDE 1200 MG/15ML
LIQUID ORAL AS NEEDED
Status: DISCONTINUED | OUTPATIENT
Start: 2020-06-13 | End: 2020-06-13 | Stop reason: HOSPADM

## 2020-06-13 RX ADMIN — POTASSIUM CHLORIDE 40 MEQ: 1500 TABLET, EXTENDED RELEASE ORAL at 02:10

## 2020-06-13 RX ADMIN — HEPARIN SODIUM 5000 UNITS: 5000 INJECTION INTRAVENOUS; SUBCUTANEOUS at 05:34

## 2020-06-13 RX ADMIN — HEPARIN SODIUM 5000 UNITS: 5000 INJECTION INTRAVENOUS; SUBCUTANEOUS at 22:31

## 2020-06-13 RX ADMIN — MONTELUKAST 10 MG: 10 TABLET, FILM COATED ORAL at 22:31

## 2020-06-13 RX ADMIN — PROPOFOL 150 MG: 10 INJECTION, EMULSION INTRAVENOUS at 09:40

## 2020-06-13 RX ADMIN — PROPOFOL 20 MG: 10 INJECTION, EMULSION INTRAVENOUS at 09:50

## 2020-06-13 RX ADMIN — ONDANSETRON 4 MG: 2 INJECTION INTRAMUSCULAR; INTRAVENOUS at 09:50

## 2020-06-13 RX ADMIN — FENTANYL CITRATE 25 MCG: 50 INJECTION, SOLUTION INTRAMUSCULAR; INTRAVENOUS at 10:08

## 2020-06-13 RX ADMIN — LIDOCAINE HYDROCHLORIDE 50 MG: 10 INJECTION, SOLUTION EPIDURAL; INFILTRATION; INTRACAUDAL; PERINEURAL at 09:40

## 2020-06-13 RX ADMIN — VANCOMYCIN HYDROCHLORIDE 750 MG: 750 INJECTION, SOLUTION INTRAVENOUS at 09:35

## 2020-06-13 RX ADMIN — PIPERACILLIN SODIUM AND TAZOBACTAM SODIUM 3.38 G: 36; 4.5 INJECTION, POWDER, FOR SOLUTION INTRAVENOUS at 11:54

## 2020-06-13 RX ADMIN — FENTANYL CITRATE 25 MCG: 50 INJECTION, SOLUTION INTRAMUSCULAR; INTRAVENOUS at 09:50

## 2020-06-13 RX ADMIN — VANCOMYCIN HYDROCHLORIDE 750 MG: 750 INJECTION, SOLUTION INTRAVENOUS at 20:27

## 2020-06-13 RX ADMIN — MIDAZOLAM 2 MG: 1 INJECTION INTRAMUSCULAR; INTRAVENOUS at 09:35

## 2020-06-13 RX ADMIN — POTASSIUM CHLORIDE 20 MEQ: 1500 TABLET, EXTENDED RELEASE ORAL at 02:10

## 2020-06-13 RX ADMIN — PIPERACILLIN SODIUM AND TAZOBACTAM SODIUM 3.38 G: 36; 4.5 INJECTION, POWDER, FOR SOLUTION INTRAVENOUS at 17:02

## 2020-06-13 RX ADMIN — POTASSIUM CHLORIDE 20 MEQ: 14.9 INJECTION, SOLUTION INTRAVENOUS at 07:24

## 2020-06-13 RX ADMIN — PIPERACILLIN SODIUM AND TAZOBACTAM SODIUM 3.38 G: 36; 4.5 INJECTION, POWDER, FOR SOLUTION INTRAVENOUS at 23:38

## 2020-06-13 RX ADMIN — PIPERACILLIN SODIUM AND TAZOBACTAM SODIUM 3.38 G: 36; 4.5 INJECTION, POWDER, FOR SOLUTION INTRAVENOUS at 05:34

## 2020-06-14 PROCEDURE — NC001 PR NO CHARGE: Performed by: PLASTIC SURGERY

## 2020-06-14 RX ORDER — AMOXICILLIN AND CLAVULANATE POTASSIUM 875; 125 MG/1; MG/1
1 TABLET, FILM COATED ORAL EVERY 12 HOURS SCHEDULED
Qty: 14 TABLET | Refills: 0 | Status: SHIPPED | OUTPATIENT
Start: 2020-06-14 | End: 2020-06-21

## 2020-06-14 RX ADMIN — PIPERACILLIN SODIUM AND TAZOBACTAM SODIUM 3.38 G: 36; 4.5 INJECTION, POWDER, FOR SOLUTION INTRAVENOUS at 10:48

## 2020-06-14 RX ADMIN — VANCOMYCIN HYDROCHLORIDE 750 MG: 750 INJECTION, SOLUTION INTRAVENOUS at 09:08

## 2020-06-14 RX ADMIN — CYCLOBENZAPRINE HYDROCHLORIDE 5 MG: 10 TABLET, FILM COATED ORAL at 09:11

## 2020-06-14 RX ADMIN — PIPERACILLIN SODIUM AND TAZOBACTAM SODIUM 3.38 G: 36; 4.5 INJECTION, POWDER, FOR SOLUTION INTRAVENOUS at 05:50

## 2020-06-14 RX ADMIN — HEPARIN SODIUM 5000 UNITS: 5000 INJECTION INTRAVENOUS; SUBCUTANEOUS at 05:50

## 2020-06-14 RX ADMIN — ACETAMINOPHEN 975 MG: 325 TABLET ORAL at 09:11

## 2020-06-14 RX ADMIN — DIPHENHYDRAMINE HYDROCHLORIDE 25 MG: 50 INJECTION, SOLUTION INTRAMUSCULAR; INTRAVENOUS at 09:08

## 2020-06-15 ENCOUNTER — HOSPITAL ENCOUNTER (OUTPATIENT)
Dept: INFUSION CENTER | Facility: CLINIC | Age: 40
Discharge: HOME | End: 2020-06-15
Payer: COMMERCIAL

## 2020-06-15 ENCOUNTER — OFFICE VISIT (OUTPATIENT)
Dept: PHYSICAL THERAPY | Facility: CLINIC | Age: 40
End: 2020-06-15
Payer: COMMERCIAL

## 2020-06-15 ENCOUNTER — TRANSITIONAL CARE MANAGEMENT (OUTPATIENT)
Dept: FAMILY MEDICINE CLINIC | Facility: CLINIC | Age: 40
End: 2020-06-15

## 2020-06-15 VITALS — HEIGHT: 65 IN | WEIGHT: 115 LBS | BODY MASS INDEX: 19.16 KG/M2 | TEMPERATURE: 98.2 F

## 2020-06-15 DIAGNOSIS — D70.1 CHEMOTHERAPY INDUCED NEUTROPENIA (HCC): Primary | ICD-10-CM

## 2020-06-15 DIAGNOSIS — C50.812 MALIGNANT NEOPLASM OF OVERLAPPING SITES OF LEFT BREAST IN FEMALE, ESTROGEN RECEPTOR POSITIVE (HCC): ICD-10-CM

## 2020-06-15 DIAGNOSIS — T45.1X5A CHEMOTHERAPY INDUCED NEUTROPENIA (HCC): Primary | ICD-10-CM

## 2020-06-15 DIAGNOSIS — G89.28 POST-MASTECTOMY PAIN SYNDROME: Primary | ICD-10-CM

## 2020-06-15 DIAGNOSIS — Z17.0 MALIGNANT NEOPLASM OF OVERLAPPING SITES OF LEFT BREAST IN FEMALE, ESTROGEN RECEPTOR POSITIVE (HCC): ICD-10-CM

## 2020-06-15 PROCEDURE — 96413 CHEMO IV INFUSION 1 HR: CPT

## 2020-06-15 PROCEDURE — 96417 CHEMO IV INFUS EACH ADDL SEQ: CPT

## 2020-06-15 RX ORDER — SODIUM CHLORIDE 9 MG/ML
20 INJECTION, SOLUTION INTRAVENOUS ONCE
Status: COMPLETED | OUTPATIENT
Start: 2020-06-15 | End: 2020-06-15

## 2020-06-15 RX ADMIN — TRASTUZUMAB 300 MG: 150 INJECTION, POWDER, LYOPHILIZED, FOR SOLUTION INTRAVENOUS at 15:30

## 2020-06-15 RX ADMIN — SODIUM CHLORIDE 20 ML/HR: 0.9 INJECTION, SOLUTION INTRAVENOUS at 14:26

## 2020-06-15 RX ADMIN — PERTUZUMAB 420 MG: 30 INJECTION, SOLUTION, CONCENTRATE INTRAVENOUS at 14:51

## 2020-06-16 LAB
BACTERIA SPEC ANAEROBE CULT: NO GROWTH
BACTERIA TISS AEROBE CULT: NO GROWTH
GRAM STN SPEC: NORMAL

## 2020-06-17 ENCOUNTER — OFFICE VISIT (OUTPATIENT)
Dept: FAMILY MEDICINE CLINIC | Facility: CLINIC | Age: 40
End: 2020-06-17
Payer: COMMERCIAL

## 2020-06-17 ENCOUNTER — APPOINTMENT (OUTPATIENT)
Dept: PHYSICAL THERAPY | Facility: CLINIC | Age: 40
End: 2020-06-17
Payer: COMMERCIAL

## 2020-06-17 VITALS
SYSTOLIC BLOOD PRESSURE: 100 MMHG | TEMPERATURE: 98.7 F | BODY MASS INDEX: 19.49 KG/M2 | WEIGHT: 117 LBS | OXYGEN SATURATION: 98 % | HEART RATE: 103 BPM | HEIGHT: 65 IN | RESPIRATION RATE: 17 BRPM | DIASTOLIC BLOOD PRESSURE: 60 MMHG

## 2020-06-17 DIAGNOSIS — T81.31XA DEHISCENCE OF OPERATIVE WOUND, INITIAL ENCOUNTER: ICD-10-CM

## 2020-06-17 DIAGNOSIS — C50.812 MALIGNANT NEOPLASM OF OVERLAPPING SITES OF LEFT BREAST IN FEMALE, ESTROGEN RECEPTOR POSITIVE (HCC): ICD-10-CM

## 2020-06-17 DIAGNOSIS — Z17.0 MALIGNANT NEOPLASM OF OVERLAPPING SITES OF LEFT BREAST IN FEMALE, ESTROGEN RECEPTOR POSITIVE (HCC): ICD-10-CM

## 2020-06-17 DIAGNOSIS — J45.20 MILD INTERMITTENT ASTHMA WITHOUT COMPLICATION: ICD-10-CM

## 2020-06-17 DIAGNOSIS — J30.1 SEASONAL ALLERGIC RHINITIS DUE TO POLLEN: ICD-10-CM

## 2020-06-17 DIAGNOSIS — Z09 HOSPITAL DISCHARGE FOLLOW-UP: Primary | ICD-10-CM

## 2020-06-17 LAB
BACTERIA BLD CULT: NORMAL
BACTERIA BLD CULT: NORMAL

## 2020-06-17 PROCEDURE — 1111F DSCHRG MED/CURRENT MED MERGE: CPT | Performed by: FAMILY MEDICINE

## 2020-06-17 PROCEDURE — 99495 TRANSJ CARE MGMT MOD F2F 14D: CPT | Performed by: FAMILY MEDICINE

## 2020-06-17 RX ORDER — ALBUTEROL SULFATE 90 UG/1
2 AEROSOL, METERED RESPIRATORY (INHALATION) EVERY 6 HOURS PRN
Qty: 1 INHALER | Refills: 3 | Status: SHIPPED | OUTPATIENT
Start: 2020-06-17 | End: 2022-05-19 | Stop reason: SDUPTHER

## 2020-06-17 RX ORDER — MONTELUKAST SODIUM 10 MG/1
10 TABLET ORAL
Qty: 90 TABLET | Refills: 1 | Status: SHIPPED | OUTPATIENT
Start: 2020-06-17 | End: 2022-05-19

## 2020-06-17 RX ORDER — MOMETASONE FUROATE 50 UG/1
2 SPRAY, METERED NASAL DAILY
Qty: 3 ACT | Refills: 1 | Status: SHIPPED | OUTPATIENT
Start: 2020-06-17 | End: 2022-05-19

## 2020-06-17 RX ORDER — LORATADINE 10 MG/1
10 TABLET ORAL DAILY
Qty: 90 TABLET | Refills: 1 | Status: SHIPPED | OUTPATIENT
Start: 2020-06-17

## 2020-06-22 ENCOUNTER — APPOINTMENT (OUTPATIENT)
Dept: PHYSICAL THERAPY | Facility: CLINIC | Age: 40
End: 2020-06-22
Payer: COMMERCIAL

## 2020-06-23 ENCOUNTER — HOSPITAL ENCOUNTER (OUTPATIENT)
Dept: NON INVASIVE DIAGNOSTICS | Facility: CLINIC | Age: 40
Discharge: HOME | End: 2020-06-23
Payer: COMMERCIAL

## 2020-06-23 DIAGNOSIS — Z17.0 MALIGNANT NEOPLASM OF OVERLAPPING SITES OF LEFT BREAST IN FEMALE, ESTROGEN RECEPTOR POSITIVE (HCC): ICD-10-CM

## 2020-06-23 DIAGNOSIS — C50.812 MALIGNANT NEOPLASM OF OVERLAPPING SITES OF LEFT BREAST IN FEMALE, ESTROGEN RECEPTOR POSITIVE (HCC): ICD-10-CM

## 2020-06-23 DIAGNOSIS — D70.1 CHEMOTHERAPY INDUCED NEUTROPENIA (HCC): ICD-10-CM

## 2020-06-23 DIAGNOSIS — T45.1X5A CHEMOTHERAPY INDUCED NEUTROPENIA (HCC): ICD-10-CM

## 2020-06-23 PROCEDURE — 93306 TTE W/DOPPLER COMPLETE: CPT | Performed by: INTERNAL MEDICINE

## 2020-06-23 PROCEDURE — 93306 TTE W/DOPPLER COMPLETE: CPT

## 2020-06-24 ENCOUNTER — APPOINTMENT (OUTPATIENT)
Dept: PHYSICAL THERAPY | Facility: CLINIC | Age: 40
End: 2020-06-24
Payer: COMMERCIAL

## 2020-06-25 ENCOUNTER — OFFICE VISIT (OUTPATIENT)
Dept: PLASTIC SURGERY | Facility: CLINIC | Age: 40
End: 2020-06-25

## 2020-06-25 VITALS — HEIGHT: 65 IN | BODY MASS INDEX: 19.16 KG/M2 | WEIGHT: 115 LBS | TEMPERATURE: 98.9 F

## 2020-06-25 DIAGNOSIS — C50.812 MALIGNANT NEOPLASM OF OVERLAPPING SITES OF LEFT BREAST IN FEMALE, ESTROGEN RECEPTOR POSITIVE (HCC): Primary | ICD-10-CM

## 2020-06-25 DIAGNOSIS — Z17.0 MALIGNANT NEOPLASM OF OVERLAPPING SITES OF LEFT BREAST IN FEMALE, ESTROGEN RECEPTOR POSITIVE (HCC): Primary | ICD-10-CM

## 2020-06-25 PROCEDURE — 3008F BODY MASS INDEX DOCD: CPT | Performed by: PLASTIC SURGERY

## 2020-06-25 PROCEDURE — 99024 POSTOP FOLLOW-UP VISIT: CPT | Performed by: PLASTIC SURGERY

## 2020-06-25 PROCEDURE — 1111F DSCHRG MED/CURRENT MED MERGE: CPT | Performed by: PLASTIC SURGERY

## 2020-06-29 ENCOUNTER — APPOINTMENT (OUTPATIENT)
Dept: PHYSICAL THERAPY | Facility: CLINIC | Age: 40
End: 2020-06-29
Payer: COMMERCIAL

## 2020-07-01 ENCOUNTER — OFFICE VISIT (OUTPATIENT)
Dept: PLASTIC SURGERY | Facility: CLINIC | Age: 40
End: 2020-07-01

## 2020-07-01 ENCOUNTER — DOCUMENTATION (OUTPATIENT)
Dept: PLASTIC SURGERY | Facility: CLINIC | Age: 40
End: 2020-07-01

## 2020-07-01 DIAGNOSIS — Z98.890 POST-OPERATIVE STATE: Primary | ICD-10-CM

## 2020-07-01 PROCEDURE — 99024 POSTOP FOLLOW-UP VISIT: CPT | Performed by: PLASTIC SURGERY

## 2020-07-01 PROCEDURE — 1111F DSCHRG MED/CURRENT MED MERGE: CPT | Performed by: PLASTIC SURGERY

## 2020-07-01 NOTE — PROGRESS NOTES
Aquilesgila Jeovanny is a 36 y o  Female status post INCISION AND DRAINAGE (I&D) BREAST (Left) done 06/13/2020  Patient in the office for suture removal  Sutures removed  Incision is dry and intact  Patient will follow up in 2 weeks to ensure that the incision continues to heal appropriately with plans to start radiation at the end of July per Dr Patsy Orozco note

## 2020-07-06 ENCOUNTER — HOSPITAL ENCOUNTER (OUTPATIENT)
Dept: INFUSION CENTER | Facility: CLINIC | Age: 40
Discharge: HOME | End: 2020-07-06
Payer: COMMERCIAL

## 2020-07-06 VITALS
SYSTOLIC BLOOD PRESSURE: 104 MMHG | TEMPERATURE: 97.9 F | OXYGEN SATURATION: 96 % | BODY MASS INDEX: 18.3 KG/M2 | HEART RATE: 83 BPM | WEIGHT: 110 LBS | DIASTOLIC BLOOD PRESSURE: 54 MMHG | RESPIRATION RATE: 18 BRPM

## 2020-07-06 DIAGNOSIS — Z17.0 MALIGNANT NEOPLASM OF OVERLAPPING SITES OF LEFT BREAST IN FEMALE, ESTROGEN RECEPTOR POSITIVE (HCC): ICD-10-CM

## 2020-07-06 DIAGNOSIS — D70.1 CHEMOTHERAPY INDUCED NEUTROPENIA (HCC): Primary | ICD-10-CM

## 2020-07-06 DIAGNOSIS — T45.1X5A CHEMOTHERAPY INDUCED NEUTROPENIA (HCC): Primary | ICD-10-CM

## 2020-07-06 DIAGNOSIS — C50.812 MALIGNANT NEOPLASM OF OVERLAPPING SITES OF LEFT BREAST IN FEMALE, ESTROGEN RECEPTOR POSITIVE (HCC): ICD-10-CM

## 2020-07-06 PROCEDURE — 96413 CHEMO IV INFUSION 1 HR: CPT

## 2020-07-06 PROCEDURE — 96417 CHEMO IV INFUS EACH ADDL SEQ: CPT

## 2020-07-06 RX ORDER — SODIUM CHLORIDE 9 MG/ML
20 INJECTION, SOLUTION INTRAVENOUS ONCE
Status: COMPLETED | OUTPATIENT
Start: 2020-07-06 | End: 2020-07-06

## 2020-07-06 RX ADMIN — PERTUZUMAB 420 MG: 30 INJECTION, SOLUTION, CONCENTRATE INTRAVENOUS at 14:54

## 2020-07-06 RX ADMIN — SODIUM CHLORIDE 20 ML/HR: 0.9 INJECTION, SOLUTION INTRAVENOUS at 14:45

## 2020-07-06 RX ADMIN — TRASTUZUMAB 300 MG: 150 INJECTION, POWDER, LYOPHILIZED, FOR SOLUTION INTRAVENOUS at 15:27

## 2020-07-06 NOTE — PROGRESS NOTES
Pt here for Perjeta/Herceptin for treatment of breast ca  EF from 6/23 55-65%  Vitals stable  Callbell within reach; will continue to monitor

## 2020-07-13 LAB — FUNGUS SPEC CULT: NORMAL

## 2020-07-16 ENCOUNTER — OFFICE VISIT (OUTPATIENT)
Dept: PLASTIC SURGERY | Facility: CLINIC | Age: 40
End: 2020-07-16

## 2020-07-16 VITALS — WEIGHT: 110 LBS | BODY MASS INDEX: 18.33 KG/M2 | TEMPERATURE: 98.5 F | HEIGHT: 65 IN

## 2020-07-16 DIAGNOSIS — C50.812 MALIGNANT NEOPLASM OF OVERLAPPING SITES OF LEFT BREAST IN FEMALE, ESTROGEN RECEPTOR POSITIVE (HCC): ICD-10-CM

## 2020-07-16 DIAGNOSIS — Z98.82 S/P LEFT BREAST IMPLANT: ICD-10-CM

## 2020-07-16 DIAGNOSIS — T81.31XD DEHISCENCE OF OPERATIVE WOUND, SUBSEQUENT ENCOUNTER: Primary | ICD-10-CM

## 2020-07-16 DIAGNOSIS — Z17.0 MALIGNANT NEOPLASM OF OVERLAPPING SITES OF LEFT BREAST IN FEMALE, ESTROGEN RECEPTOR POSITIVE (HCC): ICD-10-CM

## 2020-07-16 PROCEDURE — 1111F DSCHRG MED/CURRENT MED MERGE: CPT | Performed by: PHYSICIAN ASSISTANT

## 2020-07-16 PROCEDURE — 3008F BODY MASS INDEX DOCD: CPT | Performed by: PHYSICIAN ASSISTANT

## 2020-07-16 PROCEDURE — 99024 POSTOP FOLLOW-UP VISIT: CPT | Performed by: PHYSICIAN ASSISTANT

## 2020-07-16 NOTE — PROGRESS NOTES
Plastic SURGERY FOLLOW-UP PROGRESS NOTE  Gracie Leonardo [id number] 1980 7/16/2020    Assessment/Plan   Diagnoses and all orders for this visit:    Dehiscence of operative wound, subsequent encounter    S/P left breast implant    Malignant neoplasm of overlapping sites of left breast in female, estrogen receptor positive ([address] )    Return in 2 weeks with Dr Davon Glover   Patient is a 36 y o  female who presents for routine post-operative follow up  She denies any present complaints  Activity level has been appropriate  Objective   Temp 98 5 °F (36 9 °C)   Ht 5' 5" (1 651 m)   Wt 49 9 kg (110 lb)   BMI 18 30 kg/m²   Physical Exam   Constitutional: She appears well-developed and well-nourished  Cardiovascular: Normal rate  Pulmonary/Chest: Effort normal    Skin: Skin is warm  Psychiatric: She has a normal mood and affect  Physical Exam   Constitutional  She appears well-developed and well-nourished  Eyes      General -             Right: Right eye extraocular movements are normal              Left: Left eye extraocular movements are normal      Cardiovascular: Normal rate  Pulmonary/Chest  Effort normal      Skin  Skin is warm  Psychiatric  She has a normal mood and affect  Breast            Review of Systems   Constitutional: Negative  HENT: Negative  Eyes: Negative  Respiratory: Negative  Cardiovascular: Negative  Gastrointestinal: Negative  Endocrine: Negative  Genitourinary: Negative  Musculoskeletal: Negative  Allergic/Immunologic: Negative  Neurological: Negative  Psychiatric/Behavioral: Negative          No text in SmartText         Allergies   Allergen Reactions    Aspirin Edema, Eye Swelling and Facial Swelling    Other Swelling     Seafood    Codeine Rash      Current Outpatient Medications   Medication Sig Dispense Refill    albuterol (PROVENTIL HFA,VENTOLIN HFA) 90 mcg/act inhaler Inhale 2 puffs every 6 (six) hours as needed for wheezing or shortness of breath 1 Inhaler 3    loratadine (CLARITIN) 10 mg tablet Take 1 tablet (10 mg total) by mouth daily 90 tablet 1    mometasone (NASONEX) 50 mcg/act nasal spray 2 sprays into each nostril daily 3 Act 1    montelukast (SINGULAIR) 10 mg tablet Take 1 tablet (10 mg total) by mouth daily at bedtime 90 tablet 1    multivitamin (THERAGRAN) TABS Take 1 tablet by mouth daily      tamoxifen (NOLVADEX) 20 mg tablet Take 1 tablet (20 mg total) by mouth daily 90 tablet 1     No current facility-administered medications for this visit  Past Medical History:   Diagnosis Date    Asthma     BRCA gene mutation negative 10/2019    [facility]    Breast cancer ([facility] [address] ) 09/10/2019    [facility]    History of chemotherapy 10/2019     Past Surgical History:   Procedure Laterality Date    BREAST BIOPSY Left 09/10/2019    BREAST CYST INCISION AND DRAINAGE Left 2020    Procedure: INCISION AND DRAINAGE (I&D) BREAST;  Surgeon: Eyal March MD;  [facility];  Service: Plastics    BREAST RECONSTRUCTION Left 2020    Procedure: BREAST IMMEDIATE RECONSTRUCTION WITH IMPLANT VERSUS TISSUE EXPANDER; ACELLULAR DERMAL MATRIX (ADM); Surgeon: Eyal March MD;  [facility];  Service: Plastics     SECTION      IR PORT PLACEMENT  10/29/2019    VT MASTECTOMY, MODIFIED RADICAL Left 2020    Procedure: BREAST MODIFIED RADICAL MASTECTOMY WITH AXILLARY LYMPH NODE NEEDLE LOCALIZATION (NEEDLE LOC AT 1130);   Surgeon: Maggi Bryan MD;  [facility];  Service: Surgical Oncology    US BREAST NEEDLE LOC LEFT Left 2020    US GUIDANCE BREAST BIOPSY LEFT EACH ADDITIONAL Left 9/10/2019    US GUIDED BREAST BIOPSY LEFT COMPLETE Left 9/10/2019    US GUIDED BREAST LYMPH NODE BIOPSY LEFT Left 9/10/2019     Social History   Family History   Problem Relation Age of Onset    Diabetes Father     Asthma Father     No Known Problems Daughter     No Known Problems Maternal Aunt     No Known Problems Maternal Aunt     No Known Problems Maternal Aunt     Breast cancer Paternal Aunt         age at dx unk    Stomach cancer Paternal Aunt     Pancreatic cancer Maternal Grandmother         age at dx unk    Cancer Paternal Uncle         type and age [place], [place]

## 2020-07-22 ENCOUNTER — OFFICE VISIT (OUTPATIENT)
Dept: PHYSICAL THERAPY | Facility: CLINIC | Age: 40
End: 2020-07-22
Payer: COMMERCIAL

## 2020-07-22 DIAGNOSIS — I89.0 LYMPHEDEMA: Primary | ICD-10-CM

## 2020-07-22 DIAGNOSIS — G89.28 POST-MASTECTOMY PAIN SYNDROME: ICD-10-CM

## 2020-07-22 PROCEDURE — 97110 THERAPEUTIC EXERCISES: CPT | Performed by: PHYSICAL THERAPIST

## 2020-07-22 PROCEDURE — 97140 MANUAL THERAPY 1/> REGIONS: CPT | Performed by: PHYSICAL THERAPIST

## 2020-07-22 NOTE — PROGRESS NOTES
PT Evaluation     Today's date: 2020  Patient name: Zion Orozco  : 1980  MRN: [id number]  Referring provider: Dylan Martin MD  Dx:   Encounter Diagnosis     ICD-10-CM    1  Status post left mastectomy Z90 12 Ambulatory referral to Physical Therapy   2  Malignant ne[facility] of overlapping sites of left breast in female, [facility] C50 812 Ambulatory referral to Physical Therapy    Z17 0                   Sheila Nicholson returns to physical therapy after treatment of infection at her breast implant  She presents with decreased ROM and function of the UE  She will continue to benefit from skilled physical therapy to increase her ROM and function and to be prepared for radiation  She is in agreement with the poc  Thank you for your referral       Assessment details: Zion Orozco is a 36 y o  female with 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  She  presents with pain, decreased strength, decreased ROM, impaired sensation,  postural  dysfunction as well as visible axillary cording  Due to these impairments, Patient has difficulty performing a/iadls, recreational activities and engaging in social activities  Patient's clinical presentation is consistent with their referring diagnosis  Patient would benefit from skilled physical therapy to address their aforementioned impairments, improve their level of function and to improve their overall quality of life   has been given a home exercise program and is in agreement with the plan of care  She would benefit from a compression sleeve of 20-30 mmHG for use when she returns to work  Thank you for your referral   Impairments: abnormal or restricted ROM, activity intolerance, impaired physical strength, lacks appropriate home exercise program and pain with function    Symptom irritability: moderateUnderstanding of Dx/Px/POC: excellent  Goals  ST Goals - 2-4 weeks  1   Patient will report decreased pain with activity by at least 2 points within 4 weeks  2  Patient will improve ROM 5-10 degrees within 4 weeks  3  Patient will demonstrate ability to actively correct posture without cueing within 4 weeks  4  Patient will perform IADLs without pain in 2 weeks  5  Patient will increase strength by 25% in 4 weeks    LT Goals - Discharge  1  Patient will improve FOTO score to maximum stated or greater by discharge  2  Patient will return to preferred recreational activity without significant pain increase by discharge   3  Patient will return to all work related activities without pain by discharge       Plan  Patient would benefit from: skilled physical therapy  Planned therapy interventions: joint mobilization, manual therapy, stretching, therapeutic activities, home exercise program and functional ROM exercises  Frequency: 2x week  Duration in visits: 20  Duration in weeks: 20  Plan of Care beginning date: 5/21/2020  Plan of Care expiration date: 10/21/2020  Treatment plan discussed with: patient        Subjective Evaluation    History of Present Illness  Date of surgery: 4/20/2020  Mechanism of injury: surgery  Mechanism of injury: Patient reports that she had L mastectomy axillary dissection and  expander placement on 4/20/2020  She  had  enoadjuvent chemotherapy and will be assessed by radiation therapy on 5/22/2020  S/S:  Patient reports that she has a feeling of pulling and pain from the axilla down her UE  She notes that any time she tries to stretch her elbow she has this discomfort  She notes that she cannot reach up overhead  The most she can reach is directly in front of her but her elbow does not extend  Function:  She reports that she is a   She reports that she needs to lift, pull and move her UE quite a bit  She states that she may have to lift up to 50#  She works for [employer]  She is able to perform ADLs  She is not yet driving  She has teenage children      7/22/20:  Patient returns to physical therapy after treatment for infection at the implant site  She reports mainly tightness vs pain in the axilla, UE and chest wall              Recurrent probem    Quality of life: excellent    Pain  Current pain rating: 3  At worst pain ratin / 2-3  Quality: sharp and radiating  Aggravating factors: lifting and overhead activity  Progression: improved    Hand dominance: right          Objective     Observations     Additional Observation Details  + Axillary Web Syndrome    Active Range of Motion   Left Shoulder   Flexion: 73 degrees    140 degrees  Abduction: 55 degrees   120 degrees  External Rotation:     75 degrees    Right Shoulder   Flexion: 160 degrees   Abduction: 160 degrees   External rotation BTH: WFL  Internal rotation BTB: [facility]    Strength/Myotome Testing     Right Shoulder   Normal muscle strength    Swelling   Left shoulder swelling details: Lymphedema MMTs     RIGHT    palm          18                  wrist          14 2  Wrist + 10 cm         16 5   Wrist + 16 cm          20 5   Elbow           21 5  Wrist + 35          23 5   Wrist + 40           25 3       Right shoulder swelling details: Lymphedema MMTs                            LEFT                   20    palm                          18                        17 2  wrist               14 5                       14 5  Wrist + 10 cm               18                    17  Wrist + 16 cm                20 5                     20  Elbow                 21 5                     20 5  Wrist + 33                24 5                    24 5  Wrist + 40                      26         25 5      Length:  47 cm

## 2020-07-24 ENCOUNTER — TELEPHONE (OUTPATIENT)
Dept: INFUSION CENTER | Facility: CLINIC | Age: 40
End: 2020-07-24

## 2020-07-27 ENCOUNTER — HOSPITAL ENCOUNTER (OUTPATIENT)
Dept: INFUSION CENTER | Facility: CLINIC | Age: 40
Discharge: HOME | End: 2020-07-27
Payer: COMMERCIAL

## 2020-07-27 ENCOUNTER — OFFICE VISIT (OUTPATIENT)
Dept: PHYSICAL THERAPY | Facility: CLINIC | Age: 40
End: 2020-07-27
Payer: COMMERCIAL

## 2020-07-27 VITALS
OXYGEN SATURATION: 99 % | SYSTOLIC BLOOD PRESSURE: 98 MMHG | TEMPERATURE: 97.9 F | RESPIRATION RATE: 14 BRPM | BODY MASS INDEX: 18.74 KG/M2 | HEIGHT: 65 IN | DIASTOLIC BLOOD PRESSURE: 60 MMHG | HEART RATE: 82 BPM | WEIGHT: 112.5 LBS

## 2020-07-27 DIAGNOSIS — I89.0 LYMPHEDEMA: Primary | ICD-10-CM

## 2020-07-27 DIAGNOSIS — D70.1 CHEMOTHERAPY INDUCED NEUTROPENIA (HCC): Primary | ICD-10-CM

## 2020-07-27 DIAGNOSIS — C50.812 MALIGNANT NEOPLASM OF OVERLAPPING SITES OF LEFT BREAST IN FEMALE, ESTROGEN RECEPTOR POSITIVE (HCC): ICD-10-CM

## 2020-07-27 DIAGNOSIS — T45.1X5A CHEMOTHERAPY INDUCED NEUTROPENIA (HCC): Primary | ICD-10-CM

## 2020-07-27 DIAGNOSIS — Z17.0 MALIGNANT NEOPLASM OF OVERLAPPING SITES OF LEFT BREAST IN FEMALE, ESTROGEN RECEPTOR POSITIVE (HCC): ICD-10-CM

## 2020-07-27 DIAGNOSIS — G89.28 POST-MASTECTOMY PAIN SYNDROME: ICD-10-CM

## 2020-07-27 PROCEDURE — 96413 CHEMO IV INFUSION 1 HR: CPT

## 2020-07-27 PROCEDURE — 97112 NEUROMUSCULAR REEDUCATION: CPT | Performed by: PHYSICAL THERAPIST

## 2020-07-27 PROCEDURE — 97110 THERAPEUTIC EXERCISES: CPT | Performed by: PHYSICAL THERAPIST

## 2020-07-27 PROCEDURE — 96417 CHEMO IV INFUS EACH ADDL SEQ: CPT

## 2020-07-27 PROCEDURE — 97140 MANUAL THERAPY 1/> REGIONS: CPT | Performed by: PHYSICAL THERAPIST

## 2020-07-27 RX ORDER — SODIUM CHLORIDE 9 MG/ML
20 INJECTION, SOLUTION INTRAVENOUS ONCE
Status: COMPLETED | OUTPATIENT
Start: 2020-07-27 | End: 2020-07-27

## 2020-07-27 RX ADMIN — PERTUZUMAB 420 MG: 30 INJECTION, SOLUTION, CONCENTRATE INTRAVENOUS at 09:37

## 2020-07-27 RX ADMIN — SODIUM CHLORIDE 20 ML/HR: 0.9 INJECTION, SOLUTION INTRAVENOUS at 08:48

## 2020-07-27 RX ADMIN — TRASTUZUMAB 300 MG: 150 INJECTION, POWDER, LYOPHILIZED, FOR SOLUTION INTRAVENOUS at 10:14

## 2020-07-27 NOTE — PROGRESS NOTES
Daily Note     Today's date: 2020  Patient name: Nasima Reynolds  : 1980  MRN: [id number]  Referring provider: Louann James MD  Dx:   Encounter Diagnosis     ICD-10-CM    1  Lymphedema I89 0    2  Post-mastectomy pain syndrome G89 28                   Subjective: Patient reports that her arm feels improved, however, her axilla and lateral breast area feel tight and painful  Objective: See treatment diary below      Assessment: Tolerated treatment well  Patient would benefit from continued PT      Plan: Continue per plan of care        Precautions: BCA      Manuals  5/27 5/28 6/1 6/3 6/8 6/10 7/22 7/27    PROM - flexion and abd  10 10 10 7 10 10 10 15'    MFR  15 10 10 10 10 10      REV        10                  Neuro Re-Ed                          SBS       10 x :05  10 x :05                                                                     Ther Ex             pulley  5' 5' 5' 5' 5' 8' 5' 5    Table slides 5x 5x :10 5 x :10 5 x :10 10 x :10 10x :10 10 x :10 np     Elbow ext x5 x10 10 x :10 X 10 X 15 x15 hep      Wrist ext  x5 10 x :05 10 x :05 10 x :05 15 x :05 15 x :05 hep      Ladder climb flex/scap     5 x :10 10 x :10 10 x :10 10 x :10 10 x :10    Doorway ER       nv 5 x :20 5 x :20                              Ther Activity                                       Gait Training                                       Modalities

## 2020-07-28 LAB
MYCOBACTERIUM SPEC CULT: NORMAL
RHODAMINE-AURAMINE STN SPEC: NORMAL

## 2020-07-29 ENCOUNTER — OFFICE VISIT (OUTPATIENT)
Dept: PHYSICAL THERAPY | Facility: CLINIC | Age: 40
End: 2020-07-29
Payer: COMMERCIAL

## 2020-07-29 DIAGNOSIS — I89.0 LYMPHEDEMA: Primary | ICD-10-CM

## 2020-07-29 DIAGNOSIS — G89.28 POST-MASTECTOMY PAIN SYNDROME: ICD-10-CM

## 2020-07-29 PROCEDURE — 97140 MANUAL THERAPY 1/> REGIONS: CPT | Performed by: PHYSICAL THERAPIST

## 2020-07-29 PROCEDURE — 97112 NEUROMUSCULAR REEDUCATION: CPT | Performed by: PHYSICAL THERAPIST

## 2020-07-29 PROCEDURE — 97110 THERAPEUTIC EXERCISES: CPT | Performed by: PHYSICAL THERAPIST

## 2020-07-29 NOTE — PROGRESS NOTES
Daily Note     Today's date: 2020  Patient name: Daysi Maya  : 1980  MRN: [id number]  Referring provider: Vandana Coreas MD  Dx:   Encounter Diagnosis     ICD-10-CM    1  Lymphedema I89 0    2  Post-mastectomy pain syndrome G89 28                   Subjective: Patient reports that she has some soreness in the axilla and lateral border of the scapula just lateral to her scar  Objective: See treatment diary below      Assessment: Tolerated treatment well  Patient would benefit from continued PT      Plan: Continue per plan of care        Precautions: BCA      Manuals  5/27 5/28 6/1 6/3 6/8 6/10 7/22 7/27 7/29   PROM - flexion and abd  10 10 10 7 10 10 10 15' 15   MFR/iastm scar  15 10 10 10 10 10   10   REV        10                  Neuro Re-Ed                          SBS       10 x :05  10 x :05 10 x :05                                                                    Ther Ex             pulley  5' 5' 5' 5' 5' 8' 5' 5 5'   Table slides 5x 5x :10 5 x :10 5 x :10 10 x :10 10x :10 10 x :10 np     Elbow ext x5 x10 10 x :10 X 10 X 15 x15 hep      Wrist ext  x5 10 x :05 10 x :05 10 x :05 15 x :05 15 x :05 hep      Ladder climb flex/scap     5 x :10 10 x :10 10 x :10 10 x :10 10 x :10 10 x :10   Doorway ER       nv 5 x :20 5 x :20 5 x :20                             Ther Activity                                       Gait Training                                       Modalities

## 2020-08-03 ENCOUNTER — OFFICE VISIT (OUTPATIENT)
Dept: PHYSICAL THERAPY | Facility: CLINIC | Age: 40
End: 2020-08-03
Payer: COMMERCIAL

## 2020-08-03 DIAGNOSIS — I89.0 LYMPHEDEMA: Primary | ICD-10-CM

## 2020-08-03 DIAGNOSIS — G89.28 POST-MASTECTOMY PAIN SYNDROME: ICD-10-CM

## 2020-08-03 PROCEDURE — 97110 THERAPEUTIC EXERCISES: CPT

## 2020-08-03 PROCEDURE — 97140 MANUAL THERAPY 1/> REGIONS: CPT

## 2020-08-03 NOTE — PROGRESS NOTES
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                   Subjective: Patient states that she has noticed an improvement with ROM however skin on lateral side of L breast and surrounding scar remains "tight" according to patient  Objective: See treatment diary below      Assessment: Tolerated treatment fair  Patient exhibited good technique with therapeutic exercises      Plan: Continue per plan of care        Precautions: BCA      Manuals 8/3 5/27 5/28 6/1 6/3 6/8 6/10 7/22 7/27 7/29   PROM - flexion and abd 15 10 10 10 7 10 10 10 15' 15   MFR/iastm scar 10 15 10 10 10 10 10   10   REV        10                  Neuro Re-Ed                          SBS       10 x :05  10 x :05 10 x :05                                                                    Ther Ex             pulley 5 min  5' 5' 5' 5' 5' 8' 5' 5 5'   Table slides  5x :10 5 x :10 5 x :10 10 x :10 10x :10 10 x :10 np     Elbow ext  x10 10 x :10 X 10 X 15 x15 hep      Wrist ext   10 x :05 10 x :05 10 x :05 15 x :05 15 x :05 hep      Ladder climb flex/scap :10x10     5 x :10 10 x :10 10 x :10 10 x :10 10 x :10 10 x :10   Doorway ER 5 x :20      nv 5 x :20 5 x :20 5 x :20                             Ther Activity                                       Gait Training                                       Modalities

## 2020-08-05 ENCOUNTER — OFFICE VISIT (OUTPATIENT)
Dept: PHYSICAL THERAPY | Facility: CLINIC | Age: 40
End: 2020-08-05
Payer: COMMERCIAL

## 2020-08-05 DIAGNOSIS — I89.0 LYMPHEDEMA: Primary | ICD-10-CM

## 2020-08-05 DIAGNOSIS — G89.28 POST-MASTECTOMY PAIN SYNDROME: ICD-10-CM

## 2020-08-05 PROCEDURE — 97110 THERAPEUTIC EXERCISES: CPT | Performed by: PHYSICAL THERAPIST

## 2020-08-05 PROCEDURE — 97112 NEUROMUSCULAR REEDUCATION: CPT | Performed by: PHYSICAL THERAPIST

## 2020-08-05 PROCEDURE — 97140 MANUAL THERAPY 1/> REGIONS: CPT | Performed by: PHYSICAL THERAPIST

## 2020-08-05 NOTE — PROGRESS NOTES
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                   Subjective: Patient reports that MD states that her infection/wound is healed and she is able to start radiation  Objective: See treatment diary below      Assessment: Tolerated treatment well  Patient would benefit from continued PT      Plan: Continue per plan of care        Precautions: BCA      Manuals 8/3 8/5 5/28 6/1 6/3 6/8 6/10 7/22 7/27 7/29   PROM - flexion and abd 15 10 10 10 7 10 10 10 15' 15   MFR/iastm scar 10 15 10 10 10 10 10   10   REV        10                  Neuro Re-Ed                          SBS  10 x :05     10 x :05  10 x :05 10 x :05                                                                    Ther Ex             pulley 5 min  5' 5' 5' 5' 5' 8' 5' 5 5'   Table slides   5 x :10 5 x :10 10 x :10 10x :10 10 x :10 np/dc     Elbow ext   10 x :10 X 10 X 15 x15 hep      Wrist ext    10 x :05 10 x :05 15 x :05 15 x :05 hep      Ladder climb flex/scap :10x10  10 x :10   5 x :10 10 x :10 10 x :10 10 x :10 10 x :10 10 x :10   Doorway ER 5 x :20 5 x :20     nv 5 x :20 5 x :20 5 x :20   TB                           Ther Activity                                       Gait Training                                       Modalities

## 2020-08-10 ENCOUNTER — OFFICE VISIT (OUTPATIENT)
Dept: PHYSICAL THERAPY | Facility: CLINIC | Age: 40
End: 2020-08-10
Payer: COMMERCIAL

## 2020-08-10 DIAGNOSIS — I89.0 LYMPHEDEMA: Primary | ICD-10-CM

## 2020-08-10 DIAGNOSIS — G89.28 POST-MASTECTOMY PAIN SYNDROME: ICD-10-CM

## 2020-08-10 PROCEDURE — 97110 THERAPEUTIC EXERCISES: CPT | Performed by: PHYSICAL THERAPIST

## 2020-08-10 PROCEDURE — 97112 NEUROMUSCULAR REEDUCATION: CPT | Performed by: PHYSICAL THERAPIST

## 2020-08-10 PROCEDURE — 97140 MANUAL THERAPY 1/> REGIONS: CPT | Performed by: PHYSICAL THERAPIST

## 2020-08-10 NOTE — PROGRESS NOTES
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                   Subjective: Patient reports that she is feeling well  She has not gotten a start time for radiation yet  Patient reports fatigue when using her l UE      Objective: See treatment diary below      Assessment: Tolerated treatment well  Patient would benefit from continued PT  Plan: Continue per plan of care        Precautions: BCA      Manuals 8/3 8/5 810          PROM - flexion and abd 15 10 10          MFR/iastm scar 10 15 10          REV                          Neuro Re-Ed                          SBS  10 x :05 hep                                                                           Ther Ex             pulley 5 min  5' 5'          Cane B ER supine  10 x :10 10 x :10                                    Ladder climb flex/scap :10x10  10 x :10 10 x :10          Doorway ER 5 x :20 5 x :20 5 x :20          TB  EXT/ROW/ER   RTB x 10                       Ther Activity                                       Gait Training                                       Modalities

## 2020-08-12 ENCOUNTER — OFFICE VISIT (OUTPATIENT)
Dept: PHYSICAL THERAPY | Facility: CLINIC | Age: 40
End: 2020-08-12
Payer: COMMERCIAL

## 2020-08-12 DIAGNOSIS — G89.28 POST-MASTECTOMY PAIN SYNDROME: ICD-10-CM

## 2020-08-12 DIAGNOSIS — I89.0 LYMPHEDEMA: Primary | ICD-10-CM

## 2020-08-12 PROCEDURE — 97112 NEUROMUSCULAR REEDUCATION: CPT | Performed by: PHYSICAL THERAPIST

## 2020-08-12 PROCEDURE — 97140 MANUAL THERAPY 1/> REGIONS: CPT | Performed by: PHYSICAL THERAPIST

## 2020-08-12 PROCEDURE — 97110 THERAPEUTIC EXERCISES: CPT | Performed by: PHYSICAL THERAPIST

## 2020-08-12 NOTE — PROGRESS NOTES
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                   Subjective: Patient reports that her UE is feeling better  She does indicate that one cord persists that she feels  Objective: See treatment diary below      Assessment: Tolerated treatment well  Patient would benefit from continued PT Patient feeling more stretch in the chest wall today less in UE      Plan: Continue per plan of care        Precautions: BCA      Manuals 8/3 8/5 8/10 8/12         PROM - flexion and abd 15 10 10 10         MFR/iastm scar 10 15 10 10         REV                          Neuro Re-Ed                          SBS  10 x :05 hep                                                                           Ther Ex             pulley 5 min  5' 5' 5'         Cane B ER supine  10 x :10 10 x :10 10 x :10                                   Ladder climb flex/scap :10x10  10 x :10 10 x :10 10 x :10         Doorway ER 5 x :20 5 x :20 5 x :20 5 x :20         TB  EXT/ROW/ER   RTB x 10 RTB x 10                      Ther Activity                                       Gait Training                                       Modalities

## 2020-08-17 ENCOUNTER — OFFICE VISIT (OUTPATIENT)
Dept: PHYSICAL THERAPY | Facility: CLINIC | Age: 40
End: 2020-08-17
Payer: COMMERCIAL

## 2020-08-17 ENCOUNTER — HOSPITAL ENCOUNTER (OUTPATIENT)
Dept: INFUSION CENTER | Facility: CLINIC | Age: 40
Discharge: HOME | End: 2020-08-17
Payer: COMMERCIAL

## 2020-08-17 VITALS
DIASTOLIC BLOOD PRESSURE: 57 MMHG | BODY MASS INDEX: 18.72 KG/M2 | HEART RATE: 85 BPM | WEIGHT: 112.5 LBS | TEMPERATURE: 97.1 F | RESPIRATION RATE: 20 BRPM | SYSTOLIC BLOOD PRESSURE: 97 MMHG

## 2020-08-17 DIAGNOSIS — Z17.0 MALIGNANT NEOPLASM OF OVERLAPPING SITES OF LEFT BREAST IN FEMALE, ESTROGEN RECEPTOR POSITIVE (HCC): ICD-10-CM

## 2020-08-17 DIAGNOSIS — C50.812 MALIGNANT NEOPLASM OF OVERLAPPING SITES OF LEFT BREAST IN FEMALE, ESTROGEN RECEPTOR POSITIVE (HCC): ICD-10-CM

## 2020-08-17 DIAGNOSIS — G89.28 POST-MASTECTOMY PAIN SYNDROME: Primary | ICD-10-CM

## 2020-08-17 DIAGNOSIS — T45.1X5A CHEMOTHERAPY INDUCED NEUTROPENIA (HCC): Primary | ICD-10-CM

## 2020-08-17 DIAGNOSIS — D70.1 CHEMOTHERAPY INDUCED NEUTROPENIA (HCC): Primary | ICD-10-CM

## 2020-08-17 DIAGNOSIS — I89.0 LYMPHEDEMA: ICD-10-CM

## 2020-08-17 PROCEDURE — 96413 CHEMO IV INFUSION 1 HR: CPT

## 2020-08-17 PROCEDURE — 97112 NEUROMUSCULAR REEDUCATION: CPT | Performed by: PHYSICAL THERAPIST

## 2020-08-17 PROCEDURE — 97110 THERAPEUTIC EXERCISES: CPT | Performed by: PHYSICAL THERAPIST

## 2020-08-17 PROCEDURE — 96417 CHEMO IV INFUS EACH ADDL SEQ: CPT

## 2020-08-17 PROCEDURE — 97140 MANUAL THERAPY 1/> REGIONS: CPT | Performed by: PHYSICAL THERAPIST

## 2020-08-17 RX ORDER — SODIUM CHLORIDE 9 MG/ML
20 INJECTION, SOLUTION INTRAVENOUS ONCE
Status: COMPLETED | OUTPATIENT
Start: 2020-08-17 | End: 2020-08-17

## 2020-08-17 RX ADMIN — TRASTUZUMAB 300 MG: 150 INJECTION, POWDER, LYOPHILIZED, FOR SOLUTION INTRAVENOUS at 14:29

## 2020-08-17 RX ADMIN — PERTUZUMAB 420 MG: 30 INJECTION, SOLUTION, CONCENTRATE INTRAVENOUS at 13:54

## 2020-08-17 RX ADMIN — SODIUM CHLORIDE 20 ML/HR: 0.9 INJECTION, SOLUTION INTRAVENOUS at 13:15

## 2020-08-19 ENCOUNTER — OFFICE VISIT (OUTPATIENT)
Dept: PHYSICAL THERAPY | Facility: CLINIC | Age: 40
End: 2020-08-19
Payer: COMMERCIAL

## 2020-08-19 DIAGNOSIS — G89.28 POST-MASTECTOMY PAIN SYNDROME: Primary | ICD-10-CM

## 2020-08-19 DIAGNOSIS — I89.0 LYMPHEDEMA: ICD-10-CM

## 2020-08-19 PROCEDURE — 97112 NEUROMUSCULAR REEDUCATION: CPT | Performed by: PHYSICAL THERAPIST

## 2020-08-19 PROCEDURE — 97140 MANUAL THERAPY 1/> REGIONS: CPT | Performed by: PHYSICAL THERAPIST

## 2020-08-19 PROCEDURE — 97110 THERAPEUTIC EXERCISES: CPT | Performed by: PHYSICAL THERAPIST

## 2020-08-19 NOTE — PROGRESS NOTES
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    2  Lymphedema  I89 0                   Subjective: See REV      Objective: See treatment diary below      Assessment: Tolerated treatment well  Patient would benefit from continued PT      Plan: Continue per plan of care        Precautions: BCA      Manuals 8/3 8/5 8/10 8/12 8/17 8/19       PROM - flexion and abd 15 10 10 10 10 10       MFR/iastm scar 10 15 10 10 dc        REV      10                    Neuro Re-Ed                          SBS  10 x :05 hep                                                                           Ther Ex             pulley 5 min  5' 5' 5' 5' 5'       Cane B ER supine  10 x :10 10 x :10 10 x :10 10 x :10 10 x :10                                 Ladder climb flex/scap :10x10  10 x :10 10 x :10 10 x :10 10 x :10 10 x :10       Doorway ER 5 x :20 5 x :20 5 x :20 5 x :20         TB  EXT/ROW/ER   RTB x 10 RTB x 10 RTB 2 x 10 RTB 2 x 10                    Ther Activity                                       Gait Training                                       Modalities

## 2020-08-19 NOTE — PROGRESS NOTES
PT Re-evaluation     Today's date: 2020  Patient name: Jayden Garsia  : 1980  MRN: [id number]  Referring provider: Hank Cervantes MD  Dx:   Encounter Diagnosis     ICD-10-CM    1  Status post left mastectomy Z90 12 Ambulatory referral to Physical Therapy   2  Malignant neoplasm of overlapping sites of left breast in female, estrogen receptor positive [facility]) C50 812 Ambulatory referral to Physical Therapy    Z17 0                   Assessment    20:  Gabriel Soto has attended physical therapy for post mastectomy pain syndrome  She has made excellent progress in decreasing pain and increasing strength, ROM and function  She is able to begin radiation at this time  We will continue to monitor her progress when she begins radiation therapy in the coming weeks  Thank you for your referral       Assessment details: Jayden Garsia is a 36 y o  female with 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  She  presents with pain, decreased strength, decreased ROM, impaired sensation,  postural  dysfunction as well as visible axillary cording  Due to these impairments, Patient has difficulty performing a/iadls, recreational activities and engaging in social activities  Patient's clinical presentation is consistent with their referring diagnosis  Patient would benefit from skilled physical therapy to address their aforementioned impairments, improve their level of function and to improve their overall quality of life   has been given a home exercise program and is in agreement with the plan of care  She would benefit from a compression sleeve of 20-30 mmHG for use when she returns to work  Thank you for your referral   Impairments: abnormal or restricted ROM, activity intolerance, impaired physical strength, lacks appropriate home exercise program and pain with function    Symptom irritability: moderateUnderstanding of Dx/Px/POC: excellent  Goals  ST Goals - 2-4 weeks  1   Patient will report decreased pain with activity by at least 2 points within 4 weeks - partially  met  2  Patient will improve ROM 5-10 degrees within 4 weeks - met  3  Patient will demonstrate ability to actively correct posture without cueing within 4 weeks - met  4  Patient will perform IADLs without pain in 2 weeks - met  5  Patient will increase strength by 25% in 4 weeks - met    LT Goals - Discharge  1  Patient will improve FOTO score to maximum stated or greater by discharge  2  Patient will return to preferred recreational activity without significant pain increase by discharge   3  Patient will return to all work related activities without pain by discharge       Plan  Patient would benefit from: skilled physical therapy  Planned therapy interventions: joint mobilization, manual therapy, stretching, therapeutic activities, home exercise program and functional ROM exercises  Frequency: 2x week  Duration in visits: 20  Duration in weeks: 20  Plan of Care beginning date: 8/19/2020  Plan of Care expiration date: 11/19/2020  Treatment plan discussed with: patient        Subjective Evaluation    History of Present Illness  Date of surgery: 4/20/2020  Mechanism of injury: surgery  Mechanism of injury: Patient reports that she had L mastectomy axillary dissection and  expander placement on 4/20/2020  She  had  enoadjuvent chemotherapy and will be assessed by radiation therapy on 5/22/2020  S/S:  Patient reports that she has a feeling of pulling and pain from the axilla down her UE  She notes that any time she tries to stretch her elbow she has this discomfort  She notes that she cannot reach up overhead  The most she can reach is directly in front of her but her elbow does not extend  Function:  She reports that she is a   She reports that she needs to lift, pull and move her UE quite a bit  She states that she may have to lift up to 50#  She works for [employer]  She is able to perform ADLs    She is not yet driving  She has teenage children            Recurrent probem    Quality of life: excellent    Pain  Current pain rating: 3 / 1  At worst pain ratin / 2  Quality: sharp and radiating  Aggravating factors: lifting and overhead activity  Progression: improved    Hand dominance: right          Objective     Observations     Additional Observation Details  + Axillary Web Syndrome    Active Range of Motion     Left Shoulder   Flexion: 73 degrees  / 140  Abduction: 55 degrees  118  External Rotation:  T2  Internal Rotation;  L4    Right Shoulder   Flexion: 160 degrees   Abduction: 160 degrees   External rotation BTH: WFL  Internal rotation BTB: [facility]    Strength/Myotome Testing     Right Shoulder   Normal muscle strength    Swelling   Left shoulder swelling details: Lymphedema MMTs     RIGHT    palm          18            wrist          14 2  Wrist + 10 cm         16 5   Wrist + 16 cm          20 5   Elbow           21 5  Wrist + 35          23 5   Wrist + 40           25 3       Right shoulder swelling details: Lymphedema MMTs                            LEFT                             /    palm                          18                                    18 2  wrist               14 5                                  14 5  Wrist + 10 cm               18                                16  Wrist + 16 cm                20 5                                20  Elbow                 21 5                                  21 2  Wrist + 33                24 5                                 24 5  Wrist + 40                      26                                 26 5       Length:  47 cm

## 2020-08-24 ENCOUNTER — OFFICE VISIT (OUTPATIENT)
Dept: PHYSICAL THERAPY | Facility: CLINIC | Age: 40
End: 2020-08-24
Payer: COMMERCIAL

## 2020-08-24 DIAGNOSIS — G89.28 POST-MASTECTOMY PAIN SYNDROME: Primary | ICD-10-CM

## 2020-08-24 DIAGNOSIS — I89.0 LYMPHEDEMA: ICD-10-CM

## 2020-08-24 PROCEDURE — 97110 THERAPEUTIC EXERCISES: CPT | Performed by: PHYSICAL THERAPIST

## 2020-08-24 PROCEDURE — 97112 NEUROMUSCULAR REEDUCATION: CPT | Performed by: PHYSICAL THERAPIST

## 2020-08-24 PROCEDURE — 97140 MANUAL THERAPY 1/> REGIONS: CPT | Performed by: PHYSICAL THERAPIST

## 2020-08-24 PROCEDURE — 97530 THERAPEUTIC ACTIVITIES: CPT | Performed by: PHYSICAL THERAPIST

## 2020-08-24 NOTE — PROGRESS NOTES
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    2  Lymphedema  I89 0                   Subjective: Patient reports some different sensatiosn in her axilla which could be nerve regeneration  Otherwise she feels well  Objective: See treatment diary below      Assessment: Tolerated treatment well  Patient would benefit from continued PT      Plan: Continue per plan of care        Precautions: BCA      Manuals 8/3 8/5 8/10 8/12 8/17 8/19 8/24      PROM - flexion and abd 15 10 10 10 10 10 10      MFR/iastm scar 10 15 10 10 dc  5      REV      10                                              SBS  10 x :05 hep                                                                                        pulley 5 min  5' 5' 5' 5' 5' 5'      Cane B ER supine  10 x :10 10 x :10 10 x :10 10 x :10 10 x :10 10 x :10                   Supine flexion for lifting       1# x 10      Ladder climb flex/scap reachhing :10x10  10 x :10 10 x :10 10 x :10 10 x :10 10 x :10 10 x :10      Doorway ER 5 x :20 5 x :20 5 x :20 5 x :20 5 x :20 5 x :20 5 x :20      TB  EXT/ROW/ER   RTB x 10 RTB x 10 RTB 2 x 10 RTB 2 x 10 RTB 2 x 10                                                          Gait Training                                       Modalities

## 2020-08-26 ENCOUNTER — OFFICE VISIT (OUTPATIENT)
Dept: PHYSICAL THERAPY | Facility: CLINIC | Age: 40
End: 2020-08-26
Payer: COMMERCIAL

## 2020-08-26 ENCOUNTER — OFFICE VISIT (OUTPATIENT)
Dept: HEMATOLOGY ONCOLOGY | Facility: CLINIC | Age: 40
End: 2020-08-26
Payer: COMMERCIAL

## 2020-08-26 VITALS
HEART RATE: 88 BPM | RESPIRATION RATE: 18 BRPM | BODY MASS INDEX: 19.16 KG/M2 | TEMPERATURE: 96.5 F | HEIGHT: 65 IN | DIASTOLIC BLOOD PRESSURE: 64 MMHG | WEIGHT: 115 LBS | SYSTOLIC BLOOD PRESSURE: 100 MMHG | OXYGEN SATURATION: 94 %

## 2020-08-26 DIAGNOSIS — D70.1 CHEMOTHERAPY INDUCED NEUTROPENIA (HCC): ICD-10-CM

## 2020-08-26 DIAGNOSIS — Z17.0 MALIGNANT NEOPLASM OF OVERLAPPING SITES OF LEFT BREAST IN FEMALE, ESTROGEN RECEPTOR POSITIVE (HCC): Primary | ICD-10-CM

## 2020-08-26 DIAGNOSIS — T45.1X5A CHEMOTHERAPY INDUCED NEUTROPENIA (HCC): ICD-10-CM

## 2020-08-26 DIAGNOSIS — C50.812 MALIGNANT NEOPLASM OF OVERLAPPING SITES OF LEFT BREAST IN FEMALE, ESTROGEN RECEPTOR POSITIVE (HCC): Primary | ICD-10-CM

## 2020-08-26 DIAGNOSIS — G89.28 POST-MASTECTOMY PAIN SYNDROME: Primary | ICD-10-CM

## 2020-08-26 PROCEDURE — 97110 THERAPEUTIC EXERCISES: CPT | Performed by: PHYSICAL THERAPIST

## 2020-08-26 PROCEDURE — 1036F TOBACCO NON-USER: CPT | Performed by: INTERNAL MEDICINE

## 2020-08-26 PROCEDURE — 99214 OFFICE O/P EST MOD 30 MIN: CPT | Performed by: INTERNAL MEDICINE

## 2020-08-26 PROCEDURE — 97140 MANUAL THERAPY 1/> REGIONS: CPT | Performed by: PHYSICAL THERAPIST

## 2020-08-26 PROCEDURE — 3008F BODY MASS INDEX DOCD: CPT | Performed by: INTERNAL MEDICINE

## 2020-08-26 PROCEDURE — 97530 THERAPEUTIC ACTIVITIES: CPT | Performed by: PHYSICAL THERAPIST

## 2020-08-26 PROCEDURE — 97112 NEUROMUSCULAR REEDUCATION: CPT | Performed by: PHYSICAL THERAPIST

## 2020-08-26 RX ORDER — SODIUM CHLORIDE 9 MG/ML
20 INJECTION, SOLUTION INTRAVENOUS ONCE
Status: CANCELLED | OUTPATIENT
Start: 2020-12-01

## 2020-08-26 RX ORDER — SODIUM CHLORIDE 9 MG/ML
20 INJECTION, SOLUTION INTRAVENOUS ONCE
Status: CANCELLED | OUTPATIENT
Start: 2020-11-09

## 2020-08-26 RX ORDER — SODIUM CHLORIDE 9 MG/ML
20 INJECTION, SOLUTION INTRAVENOUS ONCE
Status: CANCELLED | OUTPATIENT
Start: 2020-10-19

## 2020-08-26 RX ORDER — SODIUM CHLORIDE 9 MG/ML
20 INJECTION, SOLUTION INTRAVENOUS ONCE
Status: CANCELLED | OUTPATIENT
Start: 2020-12-22

## 2020-08-26 RX ORDER — SODIUM CHLORIDE 9 MG/ML
20 INJECTION, SOLUTION INTRAVENOUS ONCE
Status: CANCELLED | OUTPATIENT
Start: 2020-09-29

## 2020-08-26 NOTE — LETTER
August 26, 2020     Quyen Cochran MD  [address]    Patient: Daysi Maya   YOB: 1980   Date of Visit: 8/26/2020       Dear Dr Logan Villafuerte: Thank you for referring Daysi Maya to me for evaluation  Below are my notes for this consultation  If you have questions, please do not hesitate to call me  I look forward to following your patient along with you  Sincerely,        Bria Cabello MD        CC: MD Bria Arvizu MD  8/26/2020 10:13 AM  Sign when Signing Visit  Hematology / Oncology Outpatient Follow Up Note    Daysi Maya 36 y o  female GGO:[birth year] RLE:[id number]         Date:  8/26/2020    Assessment / Plan:  A 77-year-old premenopausal woman with locally advanced left breast cancer, grade 2, ER 90% positive, PR70% positive, HER2 3+ disease  Mikey Dougherty has relatively large palpable left breast mass as well as palpable left axillary adenopathy which was biopsy confirmed to be metastatic disease   She is negative for BRCA gene mutation  She was treated with neoadjuvant chemotherapy with dose dense AC followed by paclitaxel, trastuzumab and pertuzumab, resulting in very good clinical partial response followed by mastectomy and axillary lymph node dissection which showed no evidence of residual carcinoma  This is consistent with complete pathological response which give her very good prognosis  She is currently on adjuvant trastuzumab and pertuzumab with no cardiac toxicity  For some reason, she has not started postmastectomy radiation therapy, which was previously recommended by Dr Eduar Peña  I will set her up with Dr Eduar Peña as soon as possible  I recommended her to start tamoxifen after the postmastectomy radiation therapy  She will stay on trastuzumab and pertuzumab until December 2020  I will order echocardiogram in September 2020 for cardiac monitoring    I will see her again in 3 months for routine follow-up  She is in agreement with my recommendations         Subjective:      HPI:  A 77-year-old premenopausal woman who noticed a left breast lump recently which she brought to medical attention  Susan Bernstein was found to have radiographic abnormality in her left breast as well as left axilla   She underwent left breast biopsy in September 10, 2019 which showed invasive ductal carcinoma, grade 2  This was ER 90 % positive, AL 70% positive, HER2 3+ disease   Biopsy from enlarged left axillary lymph node was also positive for metastatic disease   She was seen by Dr Agustin Washington who sent her to me for neoadjuvant chemotherapy   She is negative for BRCA gene mutation   She underwent bone scan and CT scan chest abdomen pelvis which showed no evidence of distant metastasis   She presents today with her  in mother to discuss neoadjuvant chemotherapy   She has some left breast pain   Otherwise, she feels well   She denied any respiratory symptoms   She has no complaint of bone pain   She has regular menstrual cycle   She has 2 children   She has no plan to have more children   She has no surgical history   She has no significant past medical history  Susan Bernstein is a lifetime never smoker   Her performance status is normal              Interval History:  A 77-year-old premenopausal woman with locally advanced left breast cancer, grade 2, ER 90% positive, AL 70% positive, HER2 3+ disease   She has relatively large palpable left breast mass as well as palpable left axillary adenopathy which was biopsy confirmed to be metastatic disease   She has no evidence of distant metastasis   She is negative for BRCA gene mutation   She had 4 cycle of dose dense AC followed by weekly paclitaxel, trastuzumab and try weekly pertuzumab which was completed in February 2020  She achieved at least clinical good partial response    Subsequently, she underwent left mastectomy and axillary lymph node dissection by Dr Agustin Washington in April 28, 2020 which showed no evidence of residual carcinoma with 11 negative axillary lymph nodes, consistent with complete pathological response  She had immediate reconstruction  Subsequently, she had some infection in her left breast for which she underwent minor plastic surgical procedure  She has no open wound  For some reason, she has not started postmastectomy radiation therapy  She is stain on trastuzumab and pertuzumab with no significant toxicity  Her ejection fraction is normal based on echocardiogram in June 2020  She feels well  She has no complaint of pain  She has no respiratory symptoms  Her weight is stable  Her performance status is normal         Objective:      Primary Diagnosis:     1  Locally advanced left breast cancer, grade 2, ER 90 % positive, TN 70 % positive, HER2 3+ disease   Diagnosed in September 2019       2  BRCA gene mutation negative      Cancer Staging:  Cancer Staging  No matching staging information was found for the patient         Previous Hematologic/ Oncologic Treatment:       Neoadjuvant chemotherapy with dose dense AC x4 followed by weekly paclitaxel, try weekly trastuzumab, pertuzumab times 12 weeks  Completed in April 2020       Current Hematologic/ Oncologic Treatment:       1  Adjuvant therapy with trastuzumab and pertuzumab every 3 weeks  2  Adjuvant hormonal therapy with tamoxifen will be started after the postmastectomy radiation therapy      Disease Status:      1  Clinical good partial response  2  Complete pathological response  3  PELON status post mastectomy and axillary lymph node dissection      Test Results:     Pathology:     Left breast biopsy showed invasive ductal carcinoma, grade 2  ER 90 % positive, TN 70 % positive, HER2 3+ disease        Left mastectomy showed no evidence of residual carcinoma    11 axillary lymph nodes were all negative for metastatic disease in April 2020       Radiology:     CT scan chest abdomen pelvis showed no evidence of distant metastasis      Bone scan was negative for osseous metastasis      Echocardiogram in June 2020 showed ejection fraction 55 to 60%     Laboratory:     See below      Physical Exam:        General Appearance:    Alert, oriented          Eyes:    PERRL   Ears:    Normal external ear canals, both ears   Nose:   Nares normal, septum midline   Throat:   Mucosa moist  Pharynx without injection  Neck:   Supple         Lungs:     Clear to auscultation bilaterally, very localized tenderness even with light touch at left lateral lower rib cage  Chest Wall:    No tenderness or deformity    Heart:    Regular rate and rhythm         Abdomen:     Soft, non-tender, bowel sounds +, no organomegaly               Extremities:   Extremities no cyanosis or edema         Skin:   no rash or icterus   Typical shingle rash in the right shoulder  Lymph nodes:   Cervical, supraclavicular, and axillary nodes normal   Neurologic:   CNII-XII intact, normal strength, sensation and reflexes     Throughout             Breast exam:  Status post left mastectomy with reconstruction  No palpable abnormality in her left reconstructed breast   No palpable left axillary adenopathy   Right breast exam is negative  ROS: Review of Systems   All other systems reviewed and are negative  Imaging: No results found  Labs:   Lab Results   Component Value Date    WBC 5 26 06/13/2020    HGB 10 7 (L) 06/13/2020    HCT 33 1 (L) 06/13/2020    MCV 94 06/13/2020     06/13/2020     Lab Results   Component Value Date    K 3 8 06/13/2020     (H) 06/13/2020    CO2 27 06/13/2020    BUN 13 06/13/2020    CREATININE 0 89 06/13/2020    GLUF 82 11/16/2019    CALCIUM 8 6 06/13/2020    AST 25 04/23/2020    ALT 67 04/23/2020    ALKPHOS 85 04/23/2020    EGFR 81 06/13/2020           Current Medications: Reviewed  Allergies: Reviewed  PMH/FH/SH:  Reviewed      Vital Sign:    Body surface area is 1 56 meters squared      Wt Readings from Last 3 Encounters:   08/26/20 52 2 kg (115 lb)   08/17/20 51 kg (112 lb 8 oz)   07/30/20 51 kg (112 lb 8 oz)        Temp Readings from Last 3 Encounters:   08/26/20 (!) 96 5 °F (35 8 °C) (Tympanic Core)   08/17/20 (!) 97 1 °F (36 2 °C) (Temporal)   07/30/20 98 2 °F (36 8 °C)        BP Readings from Last 3 Encounters:   08/26/20 100/64   08/17/20 97/57   07/27/20 98/60         Pulse Readings from Last 3 Encounters:   08/26/20 88   08/17/20 85   07/27/20 82     @LASTSAO2(3)@

## 2020-08-26 NOTE — PROGRESS NOTES
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                   Subjective: Patient reports that she has fatigue at the end of a work day  Objective: See treatment diary below      Assessment: Tolerated treatment well  Patient would benefit from continued PT      Plan: Continue per plan of care        Precautions: BCA      Manuals 8/3 8/5 8/10 8/12 8/17 8/19 8/24 8/26     PROM - flexion and abd 15 10 10 10 10 10 10      MFR/iastm scar 10 15 10 10 dc  5      REV      10                                              SBS  10 x :05 hep                                                                                        pulley 5 min  5' 5' 5' 5' 5' 5' 5'     Cane B ER supine  10 x :10 10 x :10 10 x :10 10 x :10 10 x :10 10 x :10                   Supine flexion for lifting       1# x 10      Ladder climb flex/scap reachhing :10x10  10 x :10 10 x :10 10 x :10 10 x :10 10 x :10 10 x :10      Doorway ER 5 x :20 5 x :20 5 x :20 5 x :20 5 x :20 5 x :20 5 x :20      TB  EXT/ROW/ER   RTB x 10 RTB x 10 RTB 2 x 10 RTB 2 x 10 RTB 2 x 10                   Bicep curl       1# 2 x 10      DB Rowing       2# x 10      Tricep Pushing back       2# x 10      Gait Training                                       Modalities

## 2020-08-26 NOTE — PROGRESS NOTES
Hematology / Oncology Outpatient Follow Up Note    Asia Coronel 36 y o  female QEH:[birth year] B:[id number]         Date:  8/26/2020    Assessment / Plan:  A 59-year-old premenopausal woman with locally advanced left breast cancer, grade 2, ER 90% positive, PR70% positive, HER2 3+ disease  Iglesia Mead has relatively large palpable left breast mass as well as palpable left axillary adenopathy which was biopsy confirmed to be metastatic disease   She is negative for BRCA gene mutation  She was treated with neoadjuvant chemotherapy with dose dense AC followed by paclitaxel, trastuzumab and pertuzumab, resulting in very good clinical partial response followed by mastectomy and axillary lymph node dissection which showed no evidence of residual carcinoma  This is consistent with complete pathological response which give her very good prognosis  She is currently on adjuvant trastuzumab and pertuzumab with no cardiac toxicity  For some reason, she has not started postmastectomy radiation therapy, which was previously recommended by Dr Toshia Snell  I will set her up with Dr Toshia Snell as soon as possible  I recommended her to start tamoxifen after the postmastectomy radiation therapy  She will stay on trastuzumab and pertuzumab until December 2020  I will order echocardiogram in September 2020 for cardiac monitoring  I will see her again in 3 months for routine follow-up  She is in agreement with my recommendations         Subjective:      HPI:  A 59-year-old premenopausal woman who noticed a left breast lump recently which she brought to medical attention  Iglesia Mead was found to have radiographic abnormality in her left breast as well as left axilla   She underwent left breast biopsy in September 10, 2019 which showed invasive ductal carcinoma, grade 2   This was ER 90 % positive, [place] 70% positive, HER2 3+ disease   Biopsy from enlarged left axillary lymph node was also positive for metastatic disease   She was seen by   Jin Saba who sent her to me for neoadjuvant chemotherapy  Chantelle Macias is negative for BRCA gene mutation   She underwent bone scan and CT scan chest abdomen pelvis which showed no evidence of distant metastasis   She presents today with her  in mother to discuss neoadjuvant chemotherapy   She has some left breast pain   Otherwise, she feels well   She denied any respiratory symptoms   She has no complaint of bone pain   She has regular menstrual cycle   She has 2 children   She has no plan to have more children   She has no surgical history   She has no significant past medical history  Chantelle Macias is a lifetime never smoker   Her performance status is normal              Interval History:  A 59-year-old premenopausal woman with locally advanced left breast cancer, grade 2, ER 90% positive, [place] 70% positive, HER2 3+ disease   She has relatively large palpable left breast mass as well as palpable left axillary adenopathy which was biopsy confirmed to be metastatic disease   She has no evidence of distant metastasis   She is negative for BRCA gene mutation   She had 4 cycle of dose dense AC followed by weekly paclitaxel, trastuzumab and try weekly pertuzumab which was completed in February 2020  She achieved at least clinical good partial response  Subsequently, she underwent left mastectomy and axillary lymph node dissection by Dr Jin Saba in April 28, 2020 which showed no evidence of residual carcinoma with 11 negative axillary lymph nodes, consistent with complete pathological response  She had immediate reconstruction  Subsequently, she had some infection in her left breast for which she underwent minor plastic surgical procedure  She has no open wound  For some reason, she has not started postmastectomy radiation therapy  She is stain on trastuzumab and pertuzumab with no significant toxicity  Her ejection fraction is normal based on echocardiogram in June 2020  She feels well  She has no complaint of pain    She has no respiratory symptoms  Her weight is stable  Her performance status is normal         Objective:      Primary Diagnosis:     1  Locally advanced left breast cancer, grade 2, ER 90 % positive, [place] 70 % positive, HER2 3+ disease   Diagnosed in September 2019       2  BRCA gene mutation negative      Cancer Staging:  Cancer Staging  No matching staging information was found for the patient         Previous Hematologic/ Oncologic Treatment:       Neoadjuvant chemotherapy with dose dense AC x4 followed by weekly paclitaxel, try weekly trastuzumab, pertuzumab times 12 weeks  Completed in April 2020       Current Hematologic/ Oncologic Treatment:       1  Adjuvant therapy with trastuzumab and pertuzumab every 3 weeks  2  Adjuvant hormonal therapy with tamoxifen will be started after the postmastectomy radiation therapy      Disease Status:      1  Clinical good partial response  2  Complete pathological response  3  PELON status post mastectomy and axillary lymph node dissection      Test Results:     Pathology:     Left breast biopsy showed invasive ductal carcinoma, grade 2  ER 90 % positive, GA 70 % positive, HER2 3+ disease        Left mastectomy showed no evidence of residual carcinoma  11 axillary lymph nodes were all negative for metastatic disease in April 2020       Radiology:     CT scan chest abdomen pelvis showed no evidence of distant metastasis      Bone scan was negative for osseous metastasis      Echocardiogram in June 2020 showed ejection fraction 55 to 60%     Laboratory:     See below      Physical Exam:        General Appearance:    Alert, oriented          Eyes:    PERRL   Ears:    Normal external ear canals, both ears   Nose:   Nares normal, septum midline   Throat:   Mucosa moist  Pharynx without injection  Neck:   Supple         Lungs:     Clear to auscultation bilaterally, very localized tenderness even with light touch at left lateral lower rib cage     Chest Wall:    No tenderness or deformity    Heart:    Regular rate and rhythm         Abdomen:     Soft, non-tender, bowel sounds +, no organomegaly               Extremities:   Extremities no cyanosis or edema         Skin:   no rash or icterus   Typical shingle rash in the right shoulder  Lymph nodes:   Cervical, supraclavicular, and axillary nodes normal   Neurologic:   CNII-XII intact, normal strength, sensation and reflexes     Throughout             Breast exam:  Status post left mastectomy with reconstruction  No palpable abnormality in her left reconstructed breast   No palpable left axillary adenopathy   Right breast exam is negative  ROS: Review of Systems   All other systems reviewed and are negative  Imaging: No results found  Labs:   Lab Results   Component Value Date    WBC 5 26 06/13/2020    HGB 10 7 (L) 06/13/2020    HCT 33 1 (L) 06/13/2020    MCV 94 06/13/2020     06/13/2020     Lab Results   Component Value Date    K 3 8 06/13/2020     (H) 06/13/2020    CO2 27 06/13/2020    BUN 13 06/13/2020    CREATININE 0 89 06/13/2020    GLUF 82 11/16/2019    CALCIUM 8 6 06/13/2020    AST 25 04/23/2020    ALT 67 04/23/2020    ALKPHOS 85 04/23/2020    EGFR 81 06/13/2020           Current Medications: Reviewed  Allergies: Reviewed  PMH/FH/SH:  Reviewed      Vital Sign:    Body surface area is 1 56 meters squared      Wt Readings from Last 3 Encounters:   08/26/20 52 2 kg (115 lb)   08/17/20 51 kg (112 lb 8 oz)   07/30/20 51 kg (112 lb 8 oz)        Temp Readings from Last 3 Encounters:   08/26/20 (!) 96 5 °F (35 8 °C) (Tympanic Core)   08/17/20 (!) 97 1 °F (36 2 °C) (Temporal)   07/30/20 98 2 °F (36 8 °C)        BP Readings from Last 3 Encounters:   08/26/20 100/64   08/17/20 97/57   07/27/20 98/60         Pulse Readings from Last 3 Encounters:   08/26/20 88   08/17/20 85   07/27/20 82     @LASTSAO2(3)@

## 2020-08-28 ENCOUNTER — HOSPITAL ENCOUNTER (OUTPATIENT)
Dept: NON INVASIVE DIAGNOSTICS | Facility: HOSPITAL | Age: 40
Discharge: HOME | End: 2020-08-28
Attending: INTERNAL MEDICINE
Payer: COMMERCIAL

## 2020-08-28 DIAGNOSIS — C50.812 MALIGNANT NEOPLASM OF OVERLAPPING SITES OF LEFT BREAST IN FEMALE, ESTROGEN RECEPTOR POSITIVE (HCC): ICD-10-CM

## 2020-08-28 DIAGNOSIS — T45.1X5A CHEMOTHERAPY INDUCED NEUTROPENIA (HCC): ICD-10-CM

## 2020-08-28 DIAGNOSIS — Z17.0 MALIGNANT NEOPLASM OF OVERLAPPING SITES OF LEFT BREAST IN FEMALE, ESTROGEN RECEPTOR POSITIVE (HCC): ICD-10-CM

## 2020-08-28 DIAGNOSIS — D70.1 CHEMOTHERAPY INDUCED NEUTROPENIA (HCC): ICD-10-CM

## 2020-08-28 PROCEDURE — 93308 TTE F-UP OR LMTD: CPT | Performed by: INTERNAL MEDICINE

## 2020-08-28 PROCEDURE — 93321 DOPPLER ECHO F-UP/LMTD STD: CPT | Performed by: INTERNAL MEDICINE

## 2020-08-28 PROCEDURE — 93308 TTE F-UP OR LMTD: CPT

## 2020-08-28 PROCEDURE — 93325 DOPPLER ECHO COLOR FLOW MAPG: CPT | Performed by: INTERNAL MEDICINE

## 2020-08-31 ENCOUNTER — TRANSCRIBE ORDERS (OUTPATIENT)
Dept: LAB | Facility: CLINIC | Age: 40
End: 2020-08-31

## 2020-08-31 ENCOUNTER — RADIATION ONCOLOGY CONSULT (OUTPATIENT)
Dept: RADIATION ONCOLOGY | Facility: HOSPITAL | Age: 40
End: 2020-08-31
Attending: RADIOLOGY
Payer: COMMERCIAL

## 2020-08-31 ENCOUNTER — APPOINTMENT (OUTPATIENT)
Dept: PHYSICAL THERAPY | Facility: CLINIC | Age: 40
End: 2020-08-31
Payer: COMMERCIAL

## 2020-08-31 ENCOUNTER — APPOINTMENT (OUTPATIENT)
Dept: LAB | Facility: CLINIC | Age: 40
End: 2020-08-31
Payer: COMMERCIAL

## 2020-08-31 VITALS
RESPIRATION RATE: 16 BRPM | HEIGHT: 65 IN | SYSTOLIC BLOOD PRESSURE: 98 MMHG | HEART RATE: 70 BPM | WEIGHT: 116 LBS | DIASTOLIC BLOOD PRESSURE: 60 MMHG | TEMPERATURE: 97.9 F | BODY MASS INDEX: 19.33 KG/M2

## 2020-08-31 DIAGNOSIS — C50.812 MALIGNANT NEOPLASM OF OVERLAPPING SITES OF LEFT BREAST IN FEMALE, ESTROGEN RECEPTOR POSITIVE (HCC): Primary | ICD-10-CM

## 2020-08-31 DIAGNOSIS — C50.812 MALIGNANT NEOPLASM OF OVERLAPPING SITES OF LEFT BREAST IN FEMALE, ESTROGEN RECEPTOR POSITIVE (HCC): ICD-10-CM

## 2020-08-31 DIAGNOSIS — Z17.0 MALIGNANT NEOPLASM OF OVERLAPPING SITES OF LEFT BREAST IN FEMALE, ESTROGEN RECEPTOR POSITIVE (HCC): Primary | ICD-10-CM

## 2020-08-31 DIAGNOSIS — Z17.0 MALIGNANT NEOPLASM OF OVERLAPPING SITES OF LEFT BREAST IN FEMALE, ESTROGEN RECEPTOR POSITIVE (HCC): ICD-10-CM

## 2020-08-31 LAB — HCG SERPL QL: NEGATIVE

## 2020-08-31 PROCEDURE — 84703 CHORIONIC GONADOTROPIN ASSAY: CPT

## 2020-08-31 PROCEDURE — 77334 RADIATION TREATMENT AID(S): CPT | Performed by: RADIOLOGY

## 2020-08-31 PROCEDURE — 36415 COLL VENOUS BLD VENIPUNCTURE: CPT

## 2020-08-31 PROCEDURE — 77290 THER RAD SIMULAJ FIELD CPLX: CPT | Performed by: RADIOLOGY

## 2020-08-31 PROCEDURE — 99211 OFF/OP EST MAY X REQ PHY/QHP: CPT | Performed by: RADIOLOGY

## 2020-08-31 PROCEDURE — 99215 OFFICE O/P EST HI 40 MIN: CPT | Performed by: RADIOLOGY

## 2020-08-31 NOTE — PROGRESS NOTES
Ariel Sorenson 1980 is a 36 y o  female     Follow up visit   Patient presents today for limited radiation consult for left breast cancer, she is referred by Dr Roman Lozano  She was last seen in consult via telemedicine on 5/22/2020      36year old female presented with a palpable mass in left breast  She was sent for diagnostic mammogram and US that demonstrated 2 masses in the left breast and multiple suspicious axillary lymph nodes  Biopsies of the 2 breast masses on 9/10/19 demonstrated invasive ductal carcinoma the 1st measuring approximately 2 3-2 8 cm and the 2nd 1 2 cm both tumors were grade 2 ER strongly positive RI strongly positive and HER2 3+  Left axillary lymph node was biopsied and confirmed metastatic disease  Genetic testing was negative  Bone scan and CT scan chest abdomen pelvis showed no evidence of distant metastasis  11/6/19 Started neoadjuvant chemotherapy with dose dense AC x4 followed by weekly paclitaxel, herceptin and perjeta  3/31/20 MRI breast bilateral   IMPRESSION:  Dramatic (near complete) interval reduction in left breast enhancement and axillary lymphadenopathy  4/7/20 Last cycle of neoadjuvant Paclitaxel  Plan to continue Herceptin and Perjeta every 3 weeks  She will proceed with surgery  4/28/20 Left breast modified radical mastectomy (Dr Roman Lozano)  Immediate reconstruction with tissue expander (Dr Archana Gonzalez)  No residual invasive carcinoma present  Background focal atypical ductal hyperplasia  0/11 Lymph nodes  Complete response to neoadjuvant chemotherapy      5/26/2020-f/u med onc Dr Diogenes Garza  Clinically PELON  Discussed Tamoxifen, to begin once radiation therapy complete  6/12/2020-Pt went to [facility] ED with small area of incisional dehiscence over the implant on L breast     6/13/2020-Open infected wound on L breast repaired and closed  Pt Dcd from hospital on 6/14/2020 6/25/2020-f/u plastic surg Dr Archana Gonzalez  Two week post visit  Healing well    Defer radiation therapy until end of July to avoid wound related complications    3/03/0332-W/E med onc Dr Claude Junes  Refer to rad onc  Continue with Herceptin and Perjeta until December 2020  Start Tamoxifen once radiation therapy complete  Return in three months    Pt continues with post mastectomy pain and attends physical therapy    Upcoming  9/14/2020-gyn Dr Job Wang  10/28/2020-f/u plastic surgeon Dr Tracee Edge  11/18/20 Surg Onc follow-up [facility] Jackson  12/2/2020-f/u med onc Dr Claude Junes    Oncology History   Malignant neoplasm of overlapping sites of left breast in female, estrogen receptor positive ([address] )   9/10/2019 Biopsy    Left breast ultrasound-guided biopsy  A  2 o'clock, 2 cm from nipple  Invasive breast carcinoma of no special type (ductal, NST)  Grade 2  ER 90  VT 70  HER2 3+    B  3 o'clock, 1 cm from nipple  Invasive breast carcinoma of no special type (ductal NST)  Grade 2  ER 70  VT 60  HER2 3+    C  Left axillary lymph node  Metastatic adenocarcinoma     9/23/2019 Genetic Testing    Panel of 19 genes were evaluated  Negative result   No pathogenic sequence variants or deletions/dupllications identified  [employer]     11/6/2019 -  Chemotherapy    DOXOrubicin (ADRIAMYCIN) injection 96 mg, 60 mg/m2 = 96 mg, Intravenous, Once, 4 of 4 cycles  Administration: 96 mg (11/6/2019), 96 mg (11/20/2019), 96 mg (12/4/2019), 96 mg (12/18/2019)  pegfilgrastim-jmdb (FULPHILA) subcutaneous injection 6 mg, 6 mg, Subcutaneous, Once, 4 of 4 cycles  Administration: 6 mg (11/7/2019), 6 mg (11/21/2019), 6 mg (12/5/2019), 6 mg (12/19/2019)  cyclophosphamide (CYTOXAN) 1,000 mg in sodium chloride 0 9 % 250 mL IVPB, 960 mg, Intravenous, Once, 4 of 4 cycles  Administration: 1,000 mg (11/6/2019), 1,000 mg (11/20/2019), 1,000 mg (12/4/2019), 1,000 mg (12/18/2019)  fosaprepitant (EMEND) 150 mg in sodium chloride 0 9 % 255 mL IVPB, 150 mg, Intravenous, Once, 4 of 4 cycles  Administration: 150 mg (11/6/2019), 150 mg (11/20/2019), 150 mg (12/4/2019), 150 mg (12/18/2019)  pertuzumab (PERJETA) 840 mg in sodium chloride 0 9 % 250 mL IVPB, 840 mg (100 % of original dose 840 mg), Intravenous, Once, 11 of 17 cycles  Dose modification: 840 mg (original dose 840 mg, Cycle 5), 420 mg (original dose 420 mg, Cycle 8), 420 mg (original dose 420 mg, Cycle 17)  Administration: 840 mg (1/15/2020), 420 mg (2/6/2020), 420 mg (3/2/2020), 420 mg (3/23/2020), 420 mg (4/13/2020), 420 mg (5/4/2020), 420 mg (5/26/2020), 420 mg (6/15/2020), 420 mg (7/6/2020), 420 mg (7/27/2020), 420 mg (8/17/2020)  PACLitaxel (TAXOL) 127 8 mg in sodium chloride 0 9 % 250 mL chemo IVPB, 80 mg/m2 = 127 8 mg, Intravenous, Once, 11 of 11 cycles  Dose modification: 60 mg/m2 (original dose 80 mg/m2, Cycle 14, Reason: Dose Not Tolerated)  Administration: 127 8 mg (1/15/2020), 120 6 mg (1/22/2020), 120 6 mg (2/6/2020), 120 6 mg (2/17/2020), 120 6 mg (3/9/2020), 90 6 mg (3/23/2020), 120 6 mg (3/30/2020), 120 6 mg (1/29/2020), 120 6 mg (2/25/2020), 120 6 mg (3/16/2020), 120 6 mg (4/7/2020)  trastuzumab (HERCEPTIN) 214 mg in sodium chloride 0 9 % 250 mL chemo infusion, 4 mg/kg = 214 mg, Intravenous, Once, 19 of 25 cycles  Administration: 214 mg (1/15/2020), 96 mg (1/22/2020), 96 mg (2/6/2020), 96 mg (2/17/2020), 96 mg (3/2/2020), 96 mg (3/9/2020), 96 mg (3/23/2020), 96 mg (3/30/2020), 300 mg (4/13/2020), 96 mg (1/29/2020), 96 mg (2/25/2020), 96 mg (3/16/2020), 96 mg (4/7/2020), 300 mg (5/4/2020), 300 mg (5/26/2020), 300 mg (6/15/2020), 300 mg (7/6/2020), 300 mg (7/27/2020), 300 mg (8/17/2020)     4/28/2020 Surgery    Left breast modified radical mastectomy  No residual invasive carcinoma present  Background focal atypical ductal hyperplasia  0/11 Lymph nodes  Complete response to neoadjuvant chemotherapy    Immediate reconstruction with tissue expander (Dr Ashlee Cartagena)     5/22/2020 -  Cancer Staged    Staging form: Breast, AJCC 8th Edition  - Pathologic: No Stage Recommended (ypT0, pN0, cM0, G2, ER+, DC+, HER2+) - Signed by Chas Austin MD on 5/22/2020  Stage prefix: y  Neoadjuvant therapy: Yes  Response to neoadjuvant therapy: Complete response  Laterality: Left  Multigene prognostic tests performed: None  Histologic grading system: 3 grade system       5/22/2020 -  Cancer Staged    Staging form: Breast, AJCC 8th Edition  - Clinical: Stage IB (cT2, cN1, cM0, G2, ER+, DC+, HER2+) - Signed by Chas Austin MD on 5/22/2020  Laterality: Left  Histologic grading system: 3 grade system       Clinical Trial: no    Health Maintenance   Topic Date Due    Pneumococcal Vaccine: Pediatrics (0 to 5 Years) and At-Risk Patients (6 to 59 Years) (1 of 3 - PCV13) 05/20/1986    HIV Screening  05/20/1995    DTaP,Tdap,and Td Vaccines (1 - Tdap) 05/20/2001    Influenza Vaccine  07/01/2020    MAMMOGRAM  09/03/2020    Annual Physical  10/02/2020    PT PLAN OF CARE  09/18/2020    Depression Screening PHQ  06/17/2021    BMI: Adult  08/26/2021    Cervical Cancer Screening  09/11/2024    HIB Vaccine  Aged Out    Hepatitis B Vaccine  Aged Out    IPV Vaccine  Aged Out    Hepatitis A Vaccine  Aged Out    Meningococcal ACWY Vaccine  Aged Out    HPV Vaccine  Aged Out       Patient Active Problem List   Diagnosis    Malignant neoplasm of overlapping sites of left breast in female, estrogen receptor positive ([address] )    Varicose veins of left lower extremity with pain    Mild intermittent asthma without complication    Seasonal allergic rhinitis due to pollen    Chemotherapy induced neutropenia ([address] )    Muscle spasm    Dehiscence of incision    S/P left breast implant     Past Medical History:   Diagnosis Date    Asthma     BRCA gene mutation negative 10/2019    [facility]    Breast cancer ([address] ) 09/10/2019    [facility]    History of chemotherapy 10/2019     Past Surgical History:   Procedure Laterality Date    BREAST BIOPSY Left 09/10/2019    BREAST CYST INCISION AND DRAINAGE Left 6/13/2020    Procedure: INCISION AND DRAINAGE (I&D) BREAST;  Surgeon: Jai Gary MD;  [facility]: [facility];  Service: Plastics    BREAST RECONSTRUCTION Left 2020    Procedure: BREAST IMMEDIATE RECONSTRUCTION WITH IMPLANT VERSUS TISSUE EXPANDER; ACELLULAR DERMAL MATRIX (ADM); Surgeon: Jai Gary MD;  [facility];  Service: Plastics     SECTION      IR PORT PLACEMENT  10/29/2019    IN MASTECTOMY, MODIFIED RADICAL Left 2020    Procedure: BREAST MODIFIED RADICAL MASTECTOMY WITH AXILLARY LYMPH NODE NEEDLE LOCALIZATION (NEEDLE LOC AT 1130);   Surgeon: Karl Whitman MD;  [facility];  Service: Surgical Oncology    US BREAST NEEDLE LOC LEFT Left 2020    US GUIDANCE BREAST BIOPSY LEFT EACH ADDITIONAL Left 9/10/2019    US GUIDED BREAST BIOPSY LEFT COMPLETE Left 9/10/2019    US GUIDED BREAST LYMPH NODE BIOPSY LEFT Left 9/10/2019     Family History   Problem Relation Age of Onset    Diabetes Father     Asthma Father     No Known Problems Daughter     No Known Problems Maternal Aunt     No Known Problems Maternal Aunt     No Known Problems Maternal Aunt     Breast cancer Paternal Aunt         age at dx unk    Stomach cancer Paternal Aunt     Pancreatic cancer Maternal Grandmother         age at dx unk    Cancer Paternal Uncle         type and age unk     Social History     Socioeconomic History    Marital status:      Spouse name: Not on file    Number of children: 2    Years of education: Not on file    Highest education level: Not on file   Occupational History    Not on file   Social Needs    Financial resource strain: Not hard at all   Emma-Hali insecurity     Worry: Never true     Inability: Never true    Transportation needs     Medical: No     Non-medical: No   Tobacco Use    Smoking status: Never Smoker    Smokeless tobacco: Never Used   Substance and Sexual Activity    Alcohol use: Never     Frequency: Never     Binge frequency: Never    Drug use: Never    Sexual activity: Yes   Lifestyle    Physical activity     Days per week: 0 days     Minutes per session: 0 min    Stress: Only a little   Relationships    Social connections     Talks on phone: Patient refused     Gets together: Patient refused     Attends Taoism service: Patient refused     Active member of club or organization: Patient refused     Attends meetings of clubs or organizations: Patient refused     Relationship status: Patient refused    Intimate partner violence     Fear of current or ex partner: Patient refused     Emotionally abused: Patient refused     Physically abused: Patient refused     Forced sexual activity: Patient refused   Other Topics Concern    Not on file   Social History Narrative    Not on file       Current Outpatient Medications:     albuterol (PROVENTIL HFA,VENTOLIN HFA) 90 mcg/act inhaler, Inhale 2 puffs every 6 (six) hours as needed for wheezing or shortness of breath, Disp: 1 Inhaler, Rfl: 3    loratadine (CLARITIN) 10 mg tablet, Take 1 tablet (10 mg total) by mouth daily, Disp: 90 tablet, Rfl: 1    mometasone (NASONEX) 50 mcg/act nasal spray, 2 sprays into each nostril daily, Disp: 3 Act, Rfl: 1    montelukast (SINGULAIR) 10 mg tablet, Take 1 tablet (10 mg total) by mouth daily at bedtime, Disp: 90 tablet, Rfl: 1    multivitamin (THERAGRAN) TABS, Take 1 tablet by mouth daily, Disp: , Rfl:     tamoxifen (NOLVADEX) 20 mg tablet, Take 1 tablet (20 mg total) by mouth daily (Patient not taking: Reported on 8/31/2020), Disp: 90 tablet, Rfl: 1  Allergies   Allergen Reactions    Aspirin Edema, Eye Swelling and Facial Swelling    Other Swelling     Seafood    Codeine Rash     Review of Systems:  Review of Systems   Constitutional: Positive for fatigue (Improving)  HENT: Negative  Eyes: Negative  Dry eyes   Respiratory: Negative  History of asthma   Cardiovascular: Negative  Gastrointestinal: Negative  Endocrine: Negative      Genitourinary: Negative  LMP 11/2019   Musculoskeletal: Positive for arthralgias and myalgias  Skin:        Dry skin   Allergic/Immunologic: Negative  Neurological: Positive for weakness (some weakness since surgery in left arm)  Negative for headaches  Hematological: Negative  Psychiatric/Behavioral: The patient is nervous/anxious (Due to diagnosis and treamtent)  Vitals:    08/31/20 1310   BP: 98/60   BP Location: Right arm   Patient Position: Sitting   Cuff Size: Standard   Pulse: 70   Resp: 16   Temp: 97 9 °F (36 6 °C)   TempSrc: Temporal   Weight: 52 6 kg (116 lb)   Height: 5' 5" (1 651 m)      Pain Score:   6      Imaging:No results found

## 2020-08-31 NOTE — PROGRESS NOTES
Follow-up - Radiation Oncology   Michell Carcamo 1980 36 y o  female [id number]      History of Present Illness   Cancer Staging  Malignant neoplasm of overlapping sites of left breast in female, estrogen receptor positive ([facility] [address] )  Staging form: Breast, AJCC 8th Edition  - Pathologic: No Stage Recommended - Unsigned  Stage prefix: Post-therapy  Neoadjuvant therapy: Yes  Response to neoadjuvant therapy: Complete response  - Pathologic: No Stage Recommended (ypT0, pN0, cM0, G2, ER+, WI+, HER2+) - Signed by Belen Shukla MD on 5/22/2020  Stage prefix: Post-therapy  Neoadjuvant therapy: Yes  Response to neoadjuvant therapy: Complete response  Laterality: Left  Multigene prognostic tests performed: None  Histologic grading system: 3 grade system  - Clinical: Stage IB (cT2, cN1, cM0, G2, ER+, WI+, HER2+) - Signed by Belen Shukla MD on 5/22/2020  Laterality: Left  Histologic grading system: 3 grade system      Michell Carcamo returns today in order to proceed with CT planning for postmastectomy radiation therapy directed at the left chest wall and regional lymphatics  Her postmastectomy radiation was initially delayed secondary to limited left shoulder mobility  She was referred to physical therapy and subsequently developed dehiscence of the left mastectomy scar requiring surgical closure  She has now fully healed and has achieved excellent left shoulder range of motion and is prepared to proceed with postmastectomy radiation  Interval history as below:      Interval History:  Patient presents today for limited radiation consult for left breast cancer, she is referred by Dr Lauren Vazquez  She was last seen in consult via telemedicine on 5/22/2020      36year old female presented with a palpable mass in left breast  She was sent for diagnostic mammogram and US that demonstrated 2 masses in the left breast and multiple suspicious axillary lymph nodes   Biopsies of the 2 breast masses on 9/10/19 demonstrated invasive ductal carcinoma the 1st measuring approximately 2 3-2 8 cm and the 2nd 1 2 cm both tumors were grade 2 ER strongly positive DC strongly positive and HER2 3+  Left axillary lymph node was biopsied and confirmed metastatic disease  Genetic testing was negative  Bone scan and CT scan chest abdomen pelvis showed no evidence of distant metastasis  11/6/19 Started neoadjuvant chemotherapy with dose dense AC x4 followed by weekly paclitaxel, herceptin and perjeta  3/31/20 MRI breast bilateral   IMPRESSION:  Dramatic (near complete) interval reduction in left breast enhancement and axillary lymphadenopathy  4/7/20 Last cycle of neoadjuvant Paclitaxel  Plan to continue Herceptin and Perjeta every 3 weeks  She will proceed with surgery  4/28/20 Left breast modified radical mastectomy (Dr Roman Lozano)  Immediate reconstruction with tissue expander (Dr Archana Gonzalez)  No residual invasive carcinoma present  Background focal atypical ductal hyperplasia  0/11 Lymph nodes  Complete response to neoadjuvant chemotherapy      5/26/2020-f/u med onc Dr Diogenes Garza  Clinically [facility]  Discussed Tamoxifen, to begin once radiation therapy complete  6/12/2020-Pt went [facility] with small area of incisional dehiscence over the implant on L breast     6/13/2020-Open infected wound on L breast repaired and closed  Pt Dcd from hospital on 6/14/2020 6/25/2020-f/u plastic surg Dr Archana Gonzalez  Two week post visit  Healing well  Defer radiation therapy until end of July to avoid wound related complications    1/66/9106-U/L med onc Dr Diogenes Garza  Refer to rad onc  Continue with Herceptin and Perjeta until December 2020    Start Tamoxifen once radiation therapy complete  Return in three months    Pt continues with post mastectomy pain and attends physical therapy    Upcoming  9/14/2020-gyn Dr Erin Oreilly  10/28/2020-f/u plastic surgeon Dr Archana Gonzalez  11/18/20 Surg Onc follow-up MAGGY Jackson  12/2/2020-f/u med onc  Regine Epps          Historical Information   Oncology History   Malignant neoplasm of overlapping sites of left breast in female, estrogen receptor positive ([facility] [address] )   9/10/2019 Biopsy    Left breast ultrasound-guided biopsy  A  2 o'clock, 2 cm from nipple  Invasive breast carcinoma of no special type (ductal, NST)  Grade 2  ER 90  WV 70  HER2 3+    B  3 o'clock, 1 cm from nipple  Invasive breast carcinoma of no special type (ductal NST)  Grade 2  ER 70  WV 60  HER2 3+    C  Left axillary lymph node  Metastatic adenocarcinoma     9/23/2019 Genetic Testing    Panel of 19 genes were evaluated  Negative result   No pathogenic sequence variants or deletions/dupllications identified  [employer]     11/6/2019 -  Chemotherapy    DOXOrubicin (ADRIAMYCIN) injection 96 mg, 60 mg/m2 = 96 mg, Intravenous, Once, 4 of 4 cycles  Administration: 96 mg (11/6/2019), 96 mg (11/20/2019), 96 mg (12/4/2019), 96 mg (12/18/2019)  pegfilgrastim-jmdb (FULPHILA) subcutaneous injection 6 mg, 6 mg, Subcutaneous, Once, 4 of 4 cycles  Administration: 6 mg (11/7/2019), 6 mg (11/21/2019), 6 mg (12/5/2019), 6 mg (12/19/2019)  cyclophosphamide (CYTOXAN) 1,000 mg in sodium chloride 0 9 % 250 mL IVPB, 960 mg, Intravenous, Once, 4 of 4 cycles  Administration: 1,000 mg (11/6/2019), 1,000 mg (11/20/2019), 1,000 mg (12/4/2019), 1,000 mg (12/18/2019)  fosaprepitant (EMEND) 150 mg in sodium chloride 0 9 % 255 mL IVPB, 150 mg, Intravenous, Once, 4 of 4 cycles  Administration: 150 mg (11/6/2019), 150 mg (11/20/2019), 150 mg (12/4/2019), 150 mg (12/18/2019)  pertuzumab (PERJETA) 840 mg in sodium chloride 0 9 % 250 mL IVPB, 840 mg (100 % of original dose 840 mg), Intravenous, Once, 11 of 17 cycles  Dose modification: 840 mg (original dose 840 mg, Cycle 5), 420 mg (original dose 420 mg, Cycle 8), 420 mg (original dose 420 mg, Cycle 17)  Administration: 840 mg (1/15/2020), 420 mg (2/6/2020), 420 mg (3/2/2020), 420 mg (3/23/2020), 420 mg (4/13/2020), 420 mg (5/4/2020), 420 mg (5/26/2020), 420 mg (6/15/2020), 420 mg (7/6/2020), 420 mg (7/27/2020), 420 mg (8/17/2020)  PACLitaxel (TAXOL) 127 8 mg in sodium chloride 0 9 % 250 mL chemo IVPB, 80 mg/m2 = 127 8 mg, Intravenous, Once, 11 of 11 cycles  Dose modification: 60 mg/m2 (original dose 80 mg/m2, Cycle 14, Reason: Dose Not Tolerated)  Administration: 127 8 mg (1/15/2020), 120 6 mg (1/22/2020), 120 6 mg (2/6/2020), 120 6 mg (2/17/2020), 120 6 mg (3/9/2020), 90 6 mg (3/23/2020), 120 6 mg (3/30/2020), 120 6 mg (1/29/2020), 120 6 mg (2/25/2020), 120 6 mg (3/16/2020), 120 6 mg (4/7/2020)  trastuzumab (HERCEPTIN) 214 mg in sodium chloride 0 9 % 250 mL chemo infusion, 4 mg/kg = 214 mg, Intravenous, Once, 19 of 25 cycles  Administration: 214 mg (1/15/2020), 96 mg (1/22/2020), 96 mg (2/6/2020), 96 mg (2/17/2020), 96 mg (3/2/2020), 96 mg (3/9/2020), 96 mg (3/23/2020), 96 mg (3/30/2020), 300 mg (4/13/2020), 96 mg (1/29/2020), 96 mg (2/25/2020), 96 mg (3/16/2020), 96 mg (4/7/2020), 300 mg (5/4/2020), 300 mg (5/26/2020), 300 mg (6/15/2020), 300 mg (7/6/2020), 300 mg (7/27/2020), 300 mg (8/17/2020)     4/28/2020 Surgery    Left breast modified radical mastectomy  No residual invasive carcinoma present  Background focal atypical ductal hyperplasia  0/11 Lymph nodes  Complete response to neoadjuvant chemotherapy    Immediate reconstruction with tissue expander (Dr Jonathan Quiñones)     5/22/2020 -  Cancer Staged    Staging form: Breast, AJCC 8th Edition  - Pathologic: No Stage Recommended (ypT0, pN0, cM0, G2, ER+, ND+, HER2+) - Signed by Alphonso Blackmon MD on 5/22/2020  Stage prefix: y  Neoadjuvant therapy: Yes  Response to neoadjuvant therapy: Complete response  Laterality: Left  Multigene prognostic tests performed: None  Histologic grading system: 3 grade system       5/22/2020 -  Cancer Staged    Staging form: Breast, AJCC 8th Edition  - Clinical: Stage IB (cT2, cN1, cM0, G2, ER+, ND+, HER2+) - Signed by Alphonso Blackmon MD on 5/22/2020  Laterality: Left  Histologic grading system: 3 grade system           Past Medical History:   Diagnosis Date    Asthma     BRCA gene mutation negative 10/2019    [employer]    Breast cancer ([address] ) 09/10/2019    [facility]    History of chemotherapy 10/2019     Past Surgical History:   Procedure Laterality Date    BREAST BIOPSY Left 09/10/2019    BREAST CYST INCISION AND DRAINAGE Left 2020    Procedure: INCISION AND DRAINAGE (I&D) BREAST;  Surgeon: Kenneth Dodd MD;  [facility];  Service: Plastics    BREAST RECONSTRUCTION Left 2020    Procedure: BREAST IMMEDIATE RECONSTRUCTION WITH IMPLANT VERSUS TISSUE EXPANDER; ACELLULAR DERMAL MATRIX (ADM); Surgeon: Kenneth Dodd MD;  [facility];  Service: Plastics     SECTION      IR PORT PLACEMENT  10/29/2019    NJ MASTECTOMY, MODIFIED RADICAL Left 2020    Procedure: BREAST MODIFIED RADICAL MASTECTOMY WITH AXILLARY LYMPH NODE NEEDLE LOCALIZATION (NEEDLE LOC AT 1130);   Surgeon: Jillian Ko MD;  [facility];  Service: Surgical Oncology    US BREAST NEEDLE LOC LEFT Left 2020    US GUIDANCE BREAST BIOPSY LEFT EACH ADDITIONAL Left 9/10/2019    US GUIDED BREAST BIOPSY LEFT COMPLETE Left 9/10/2019    US GUIDED BREAST LYMPH NODE BIOPSY LEFT Left 9/10/2019       Social History   Social History     Substance and Sexual Activity   Alcohol Use Never    Frequency: Never    Binge frequency: Never     Social History     Substance and Sexual Activity   Drug Use Never     Social History     Tobacco Use   Smoking Status Never Smoker   Smokeless Tobacco Never Used         Meds/Allergies     Current Outpatient Medications:     albuterol (PROVENTIL HFA,VENTOLIN HFA) 90 mcg/act inhaler, Inhale 2 puffs every 6 (six) hours as needed for wheezing or shortness of breath, Disp: 1 Inhaler, Rfl: 3    loratadine (CLARITIN) 10 mg tablet, Take 1 tablet (10 mg total) by mouth daily, Disp: 90 tablet, Rfl: 1    mometasone (NASONEX) 50 mcg/act nasal spray, 2 sprays into each nostril daily, Disp: 3 Act, Rfl: 1    montelukast (SINGULAIR) 10 mg tablet, Take 1 tablet (10 mg total) by mouth daily at bedtime, Disp: 90 tablet, Rfl: 1    multivitamin (THERAGRAN) TABS, Take 1 tablet by mouth daily, Disp: , Rfl:     tamoxifen (NOLVADEX) 20 mg tablet, Take 1 tablet (20 mg total) by mouth daily (Patient not taking: Reported on 8/31/2020), Disp: 90 tablet, Rfl: 1  Allergies   Allergen Reactions    Aspirin Edema, Eye Swelling and Facial Swelling    Other Swelling     Seafood    Codeine Rash         Review of Systems   Review of Systems   Constitutional: Positive for fatigue (Improving)  HENT: Negative  Eyes: Negative  Dry eyes   Respiratory: Negative  History of asthma   Cardiovascular: Negative  Gastrointestinal: Negative  Endocrine: Negative  Genitourinary: Negative  LMP 11/2019   Musculoskeletal: Positive for arthralgias and myalgias  Skin:        Dry skin   Allergic/Immunologic: Negative  Neurological: Positive for weakness (some weakness since surgery in left arm)  Negative for headaches  Hematological: Negative  Psychiatric/Behavioral: The patient is nervous/anxious (Due to diagnosis and treamtent)  OBJECTIVE:   BP 98/60 (BP Location: Right arm, Patient Position: Sitting, Cuff Size: Standard)   Pulse 70   Temp 97 9 °F (36 6 °C) (Temporal)   Resp 16   Ht 5' 5" (1 651 m)   Wt 52 6 kg (116 lb)   BMI 19 30 kg/m²   Pain Assessment:  0  Karnofsky: 90 - Able to carry on normal activity; minor signs or symptoms of disease     Physical Exam   Patient presents today no apparent distress  Sclera anicteric  No palpable cervical supraclavicular axillary lymphadenopathy  Normal respiratory effort  The left mastectomy scar is now extremely well-healed  An expander remains in place  Right breast within normal limits  No lymphedema          RESULTS    Lab Results: No results found for this or any previous visit (from the past 672 hour(s))  Imaging Studies:No results found  Assessment/Plan:  No orders of the defined types were placed in this encounter  Zev Gomez is a 36y o  year old female with recently diagnosed invasive mammary carcinoma of the left breast, Stage IB (cT2, cN1, cM0, G2, ER+, ND+, HER2+), status post neoadjuvant systemic therapy followed by mastectomy, axillary dissection, and immediate reconstruction with expander placement, demonstrating a complete pathologic response, (ypT0, pN0, cM0, G2, ER+, ND+, HER2+)  She remains on adjuvant Herceptin  She was initially consulted in May, but treatment was delayed due to left shoulder immobility and subsequently dehiscence of the left mastectomy scar requiring repeat surgical closure  Her left shoulder mobility is now dramatically improved and the mastectomy scar is well healed  She will now proceed with CT planning with treatment to begin shortly  We anticipate delivering a dose of 5000 cGy in 25 fractions to the left chest wall and regional lymphatics  The associated risks and toxicities of treatment were again discussed with the patient in detail  She will require a pregnancy test prior to the initiation of treatment  Larry Jackson MD  8/31/2020,2:19 PM    Portions of the record may have been created with voice recognition software   Occasional wrong word or "sound a like" substitutions may have occurred due to the inherent limitations of voice recognition software   Read the chart carefully and recognize, using context, where substitutions have occurred

## 2020-09-01 ENCOUNTER — APPOINTMENT (OUTPATIENT)
Dept: RADIATION ONCOLOGY | Facility: HOSPITAL | Age: 40
End: 2020-09-01
Attending: RADIOLOGY
Payer: COMMERCIAL

## 2020-09-02 ENCOUNTER — OFFICE VISIT (OUTPATIENT)
Dept: PHYSICAL THERAPY | Facility: CLINIC | Age: 40
End: 2020-09-02
Payer: COMMERCIAL

## 2020-09-02 DIAGNOSIS — G89.28 POST-MASTECTOMY PAIN SYNDROME: Primary | ICD-10-CM

## 2020-09-02 PROCEDURE — 97530 THERAPEUTIC ACTIVITIES: CPT | Performed by: PHYSICAL THERAPIST

## 2020-09-02 PROCEDURE — 97112 NEUROMUSCULAR REEDUCATION: CPT | Performed by: PHYSICAL THERAPIST

## 2020-09-02 PROCEDURE — 97110 THERAPEUTIC EXERCISES: CPT | Performed by: PHYSICAL THERAPIST

## 2020-09-02 PROCEDURE — 97140 MANUAL THERAPY 1/> REGIONS: CPT | Performed by: PHYSICAL THERAPIST

## 2020-09-02 NOTE — PROGRESS NOTES
Daily Note and Discharge    Today's date: 2020  Patient name: Sintia Velarde  : 1980  MRN: [id number]  Referring provider: Jeison Reid MD  Dx:   Encounter Diagnosis     ICD-10-CM    1  Post-mastectomy pain syndrome  G89 28                   Subjective: Patient reports that she is feeling well  She was able to tolerate the radiation mapping yesterday and will begin radiation next week  Objective: See treatment diary below    Assessment: Tolerated treatment well  Patient would benefit from continued PT      Plan: Patient will return midway through radiation treatment for re-assessment unless she has any complications with axillary web syndrome or loss of ROM or function         Precautions: BCA      Manuals 8/3 8/5 8/10 8/12 8/17 8/19 8/24 8/26 9/1    PROM - flexion and abd 15 10 10 10 10 10 10  10    MFR/iastm scar 10 15 10 10 dc  5  5    REV      10                                              SBS  10 x :05 hep                                                                                        pulley 5 min  5' 5' 5' 5' 5' 5' 5' 5'    Cane B ER supine  10 x :10 10 x :10 10 x :10 10 x :10 10 x :10 10 x :10  10 x :10                 Supine flexion for lifting       1# x 10  2# 2 x 10    Ladder climb flex/scap reachhing :10x10  10 x :10 10 x :10 10 x :10 10 x :10 10 x :10 10 x :10  10 x :10    Doorway ER 5 x :20 5 x :20 5 x :20 5 x :20 5 x :20 5 x :20 5 x :20  5 x :20    TB  EXT/ROW/ER   RTB x 10 RTB x 10 RTB 2 x 10 RTB 2 x 10 RTB 2 x 10  RTB 2x10                 Bicep curl       1# 2 x 10  2# x 10    DB Rowing       2# x 10  2# 2 x10    Tricep Pushing back       2# x 10  2# 2 x 10    Gait Training                                       Modalities

## 2020-09-04 PROCEDURE — 77334 RADIATION TREATMENT AID(S): CPT | Performed by: RADIOLOGY

## 2020-09-04 PROCEDURE — 77295 3-D RADIOTHERAPY PLAN: CPT | Performed by: RADIOLOGY

## 2020-09-04 PROCEDURE — 77300 RADIATION THERAPY DOSE PLAN: CPT | Performed by: RADIOLOGY

## 2020-09-08 ENCOUNTER — TELEPHONE (OUTPATIENT)
Dept: INFUSION CENTER | Facility: CLINIC | Age: 40
End: 2020-09-08

## 2020-09-09 ENCOUNTER — APPOINTMENT (OUTPATIENT)
Dept: RADIATION ONCOLOGY | Facility: HOSPITAL | Age: 40
End: 2020-09-09
Attending: RADIOLOGY
Payer: COMMERCIAL

## 2020-09-09 PROCEDURE — 77280 THER RAD SIMULAJ FIELD SMPL: CPT | Performed by: RADIOLOGY

## 2020-09-10 PROCEDURE — 77387 GUIDANCE FOR RADJ TX DLVR: CPT | Performed by: RADIOLOGY

## 2020-09-10 PROCEDURE — 77412 RADIATION TX DELIVERY LVL 3: CPT | Performed by: RADIOLOGY

## 2020-09-10 PROCEDURE — 77331 SPECIAL RADIATION DOSIMETRY: CPT | Performed by: RADIOLOGY

## 2020-09-11 ENCOUNTER — HOSPITAL ENCOUNTER (OUTPATIENT)
Dept: INFUSION CENTER | Facility: CLINIC | Age: 40
Discharge: HOME | End: 2020-09-11
Payer: COMMERCIAL

## 2020-09-11 ENCOUNTER — APPOINTMENT (OUTPATIENT)
Dept: LAB | Facility: HOSPITAL | Age: 40
End: 2020-09-11
Attending: INTERNAL MEDICINE
Payer: COMMERCIAL

## 2020-09-11 ENCOUNTER — APPOINTMENT (OUTPATIENT)
Dept: RADIATION ONCOLOGY | Facility: HOSPITAL | Age: 40
End: 2020-09-11
Attending: RADIOLOGY
Payer: COMMERCIAL

## 2020-09-11 VITALS
DIASTOLIC BLOOD PRESSURE: 56 MMHG | SYSTOLIC BLOOD PRESSURE: 88 MMHG | TEMPERATURE: 97 F | BODY MASS INDEX: 19.47 KG/M2 | OXYGEN SATURATION: 99 % | RESPIRATION RATE: 14 BRPM | HEART RATE: 75 BPM | WEIGHT: 117 LBS

## 2020-09-11 DIAGNOSIS — T45.1X5A CHEMOTHERAPY INDUCED NEUTROPENIA (HCC): Primary | ICD-10-CM

## 2020-09-11 DIAGNOSIS — Z17.0 MALIGNANT NEOPLASM OF OVERLAPPING SITES OF LEFT BREAST IN FEMALE, ESTROGEN RECEPTOR POSITIVE (HCC): Primary | ICD-10-CM

## 2020-09-11 DIAGNOSIS — C50.812 MALIGNANT NEOPLASM OF OVERLAPPING SITES OF LEFT BREAST IN FEMALE, ESTROGEN RECEPTOR POSITIVE (HCC): Primary | ICD-10-CM

## 2020-09-11 DIAGNOSIS — C50.812 MALIGNANT NEOPLASM OF OVERLAPPING SITES OF LEFT BREAST IN FEMALE, ESTROGEN RECEPTOR POSITIVE (HCC): ICD-10-CM

## 2020-09-11 DIAGNOSIS — D70.1 CHEMOTHERAPY INDUCED NEUTROPENIA (HCC): Primary | ICD-10-CM

## 2020-09-11 DIAGNOSIS — Z17.0 MALIGNANT NEOPLASM OF OVERLAPPING SITES OF LEFT BREAST IN FEMALE, ESTROGEN RECEPTOR POSITIVE (HCC): ICD-10-CM

## 2020-09-11 PROCEDURE — 96413 CHEMO IV INFUSION 1 HR: CPT

## 2020-09-11 PROCEDURE — 77387 GUIDANCE FOR RADJ TX DLVR: CPT | Performed by: RADIOLOGY

## 2020-09-11 PROCEDURE — 77331 SPECIAL RADIATION DOSIMETRY: CPT | Performed by: RADIOLOGY

## 2020-09-11 PROCEDURE — 96417 CHEMO IV INFUS EACH ADDL SEQ: CPT

## 2020-09-11 PROCEDURE — 77412 RADIATION TX DELIVERY LVL 3: CPT | Performed by: RADIOLOGY

## 2020-09-11 RX ORDER — SODIUM CHLORIDE 9 MG/ML
20 INJECTION, SOLUTION INTRAVENOUS ONCE
Status: COMPLETED | OUTPATIENT
Start: 2020-09-11 | End: 2020-09-11

## 2020-09-11 RX ADMIN — SODIUM CHLORIDE 20 ML/HR: 0.9 INJECTION, SOLUTION INTRAVENOUS at 14:25

## 2020-09-11 RX ADMIN — TRASTUZUMAB 318 MG: 150 INJECTION, POWDER, LYOPHILIZED, FOR SOLUTION INTRAVENOUS at 15:12

## 2020-09-11 RX ADMIN — PERTUZUMAB 420 MG: 30 INJECTION, SOLUTION, CONCENTRATE INTRAVENOUS at 14:35

## 2020-09-14 PROCEDURE — 77412 RADIATION TX DELIVERY LVL 3: CPT | Performed by: RADIOLOGY

## 2020-09-14 PROCEDURE — 77387 GUIDANCE FOR RADJ TX DLVR: CPT | Performed by: RADIOLOGY

## 2020-09-15 PROCEDURE — 77412 RADIATION TX DELIVERY LVL 3: CPT | Performed by: RADIOLOGY

## 2020-09-15 PROCEDURE — 77387 GUIDANCE FOR RADJ TX DLVR: CPT | Performed by: RADIOLOGY

## 2020-09-16 ENCOUNTER — ANNUAL EXAM (OUTPATIENT)
Dept: OBGYN CLINIC | Facility: CLINIC | Age: 40
End: 2020-09-16
Payer: COMMERCIAL

## 2020-09-16 ENCOUNTER — APPOINTMENT (OUTPATIENT)
Dept: RADIATION ONCOLOGY | Facility: HOSPITAL | Age: 40
End: 2020-09-16
Attending: RADIOLOGY
Payer: COMMERCIAL

## 2020-09-16 VITALS
SYSTOLIC BLOOD PRESSURE: 90 MMHG | WEIGHT: 115 LBS | BODY MASS INDEX: 19.16 KG/M2 | DIASTOLIC BLOOD PRESSURE: 60 MMHG | TEMPERATURE: 97.6 F | HEIGHT: 65 IN

## 2020-09-16 DIAGNOSIS — Z01.419 ENCNTR FOR GYN EXAM (GENERAL) (ROUTINE) W/O ABN FINDINGS: Primary | ICD-10-CM

## 2020-09-16 DIAGNOSIS — Z01.419 PAP SMEAR, AS PART OF ROUTINE GYNECOLOGICAL EXAMINATION: ICD-10-CM

## 2020-09-16 PROCEDURE — G0145 SCR C/V CYTO,THINLAYER,RESCR: HCPCS | Performed by: OBSTETRICS & GYNECOLOGY

## 2020-09-16 PROCEDURE — 99396 PREV VISIT EST AGE 40-64: CPT | Performed by: OBSTETRICS & GYNECOLOGY

## 2020-09-16 PROCEDURE — 1036F TOBACCO NON-USER: CPT | Performed by: OBSTETRICS & GYNECOLOGY

## 2020-09-16 PROCEDURE — 77387 GUIDANCE FOR RADJ TX DLVR: CPT | Performed by: RADIOLOGY

## 2020-09-16 PROCEDURE — 77336 RADIATION PHYSICS CONSULT: CPT | Performed by: RADIOLOGY

## 2020-09-16 PROCEDURE — 77412 RADIATION TX DELIVERY LVL 3: CPT | Performed by: RADIOLOGY

## 2020-09-16 NOTE — PROGRESS NOTES
Assessment/Plan:    No problem-specific Assessment & Plan notes found for this encounter  Diagnoses and all orders for this visit:    Encntr for gyn exam (general) (routine) w/o abn findings  -     Liquid-based pap, screening    Pap smear, as part of routine gynecological examination          Normal gynecological physical examination  Self-breast examination stressed  Mammogram ordered as per oncology  Discussed regular exercise, healthy diet, importance of vitamin D and calcium supplements  Discussed importance of sun block use during periods of prolonged sun exposure  Patient will be seen in 1 year for routine gynecologic and medical examination  Patient will call office for any problems, concerns, or issues which may arise during the interim  Subjective:      Patient ID: Dulce Abreu is a 36 y o  female  Patient is a 20-year-old female who presents today for her annual gynecologic and medical examination  Past medical history includes asthma and breast cancer, for which she is currently undergoing eight weeks of radiation  The patient is premenopausal, her LMP was one year ago  She complains of vaginal dryness, vasomotor symptoms  She denies bleeding, discharge, or pelvic or abdominal pain  She denies changes in bowel habit, N/V/D, dysuria, increased frequency, hematuria  Her asthma is well controlled, rarely needing to use her inhaler  She complains of some chest pain occurring with radiation, but denies sob, palpitations, f/c  No COVID-19 symptoms  Patient was advised to use lubrication and organic coconut oil for vaginal dryness  The following portions of the patient's history were reviewed and updated as appropriate: allergies, current medications, past family history, past medical history, past social history, past surgical history and problem list     Review of Systems   Constitutional: Negative    Negative for appetite change, diaphoresis, fatigue, fever and unexpected weight change  HENT: Negative  Eyes: Negative  Respiratory: Negative  Negative for cough and shortness of breath  Followed for asthma    Cardiovascular: Positive for chest pain (with radiation)  Negative for palpitations and leg swelling  Gastrointestinal: Negative  Negative for abdominal pain, blood in stool, constipation, diarrhea, nausea and vomiting  Endocrine: Negative  Negative for cold intolerance and heat intolerance  Genitourinary: Negative  Negative for dysuria, frequency, hematuria, urgency, vaginal bleeding, vaginal discharge and vaginal pain  Musculoskeletal: Negative  Skin: Negative  Allergic/Immunologic: Negative  Neurological: Negative  Negative for light-headedness and headaches  Hematological: Negative  Negative for adenopathy  Psychiatric/Behavioral: Negative  Objective:      BP 90/60   Temp 97 6 °F (36 4 °C)   Ht 5' 5" (1 651 m)   Wt 52 2 kg (115 lb)   BMI 19 14 kg/m²          Physical Exam  Constitutional:       General: She is not in acute distress  Appearance: Normal appearance  She is well-developed  She is not diaphoretic  HENT:      Head: Normocephalic and atraumatic  Eyes:      Pupils: Pupils are equal, round, and reactive to light  Neck:      Musculoskeletal: Normal range of motion and neck supple  Cardiovascular:      Rate and Rhythm: Normal rate and regular rhythm  Heart sounds: Normal heart sounds  No murmur  No friction rub  No gallop  Pulmonary:      Effort: Pulmonary effort is normal       Breath sounds: Normal breath sounds  Chest:      Breasts: Breasts are symmetrical          Right: No inverted nipple, mass, nipple discharge, skin change or tenderness  Left: No inverted nipple, mass, nipple discharge, skin change or tenderness  Abdominal:      General: Bowel sounds are normal       Palpations: Abdomen is soft  Genitourinary:     Exam position: Supine  Labia:         Right: No rash or lesion  Left: No rash or lesion  Vagina: Normal  No vaginal discharge, erythema, tenderness or bleeding  Cervix: No discharge or friability  Uterus: Not enlarged and not tender  Adnexa:         Right: No mass, tenderness or fullness  Left: No mass, tenderness or fullness  Rectum: Normal  Guaiac result negative  Comments: Pelvic exam reveals good pelvic support  Musculoskeletal: Normal range of motion  Lymphadenopathy:      Cervical: No cervical adenopathy  Upper Body:      Right upper body: No supraclavicular adenopathy  Left upper body: No supraclavicular adenopathy  Skin:     General: Skin is warm and dry  Findings: No rash  Neurological:      Mental Status: She is alert and oriented to person, place, and time  Psychiatric:         Speech: Speech normal          Behavior: Behavior normal          Thought Content:  Thought content normal          Judgment: Judgment normal

## 2020-09-16 NOTE — PATIENT INSTRUCTIONS
Normal gynecological physical examination  Self-breast examination stressed  Mammogram ordered as per oncology  Discussed regular exercise, healthy diet, importance of vitamin D and calcium supplements  Discussed importance of sun block use during periods of prolonged sun exposure  Patient will be seen in 1 year for routine gynecologic and medical examination  Patient will call office for any problems, concerns, or issues which may arise during the interim

## 2020-09-17 PROCEDURE — 77387 GUIDANCE FOR RADJ TX DLVR: CPT | Performed by: RADIOLOGY

## 2020-09-17 PROCEDURE — 77412 RADIATION TX DELIVERY LVL 3: CPT | Performed by: RADIOLOGY

## 2020-09-18 ENCOUNTER — APPOINTMENT (OUTPATIENT)
Dept: RADIATION ONCOLOGY | Facility: HOSPITAL | Age: 40
End: 2020-09-18
Attending: RADIOLOGY
Payer: COMMERCIAL

## 2020-09-18 PROCEDURE — 77412 RADIATION TX DELIVERY LVL 3: CPT | Performed by: RADIOLOGY

## 2020-09-18 PROCEDURE — 77387 GUIDANCE FOR RADJ TX DLVR: CPT | Performed by: RADIOLOGY

## 2020-09-21 LAB
LAB AP GYN PRIMARY INTERPRETATION: NORMAL
Lab: NORMAL

## 2020-09-21 PROCEDURE — 77387 GUIDANCE FOR RADJ TX DLVR: CPT | Performed by: RADIOLOGY

## 2020-09-21 PROCEDURE — 77412 RADIATION TX DELIVERY LVL 3: CPT | Performed by: RADIOLOGY

## 2020-09-22 ENCOUNTER — APPOINTMENT (OUTPATIENT)
Dept: RADIATION ONCOLOGY | Facility: HOSPITAL | Age: 40
End: 2020-09-22
Attending: RADIOLOGY
Payer: COMMERCIAL

## 2020-09-22 PROCEDURE — 77412 RADIATION TX DELIVERY LVL 3: CPT | Performed by: RADIOLOGY

## 2020-09-22 PROCEDURE — 77387 GUIDANCE FOR RADJ TX DLVR: CPT | Performed by: RADIOLOGY

## 2020-09-23 ENCOUNTER — APPOINTMENT (OUTPATIENT)
Dept: RADIATION ONCOLOGY | Facility: HOSPITAL | Age: 40
End: 2020-09-23
Attending: RADIOLOGY
Payer: COMMERCIAL

## 2020-09-23 PROCEDURE — 77336 RADIATION PHYSICS CONSULT: CPT | Performed by: RADIOLOGY

## 2020-09-23 PROCEDURE — 77412 RADIATION TX DELIVERY LVL 3: CPT | Performed by: RADIOLOGY

## 2020-09-23 PROCEDURE — 77387 GUIDANCE FOR RADJ TX DLVR: CPT | Performed by: RADIOLOGY

## 2020-09-24 PROCEDURE — 77387 GUIDANCE FOR RADJ TX DLVR: CPT | Performed by: RADIOLOGY

## 2020-09-24 PROCEDURE — 77412 RADIATION TX DELIVERY LVL 3: CPT | Performed by: RADIOLOGY

## 2020-09-25 PROCEDURE — 77387 GUIDANCE FOR RADJ TX DLVR: CPT | Performed by: RADIOLOGY

## 2020-09-25 PROCEDURE — 77412 RADIATION TX DELIVERY LVL 3: CPT | Performed by: RADIOLOGY

## 2020-09-26 ENCOUNTER — APPOINTMENT (OUTPATIENT)
Dept: LAB | Facility: HOSPITAL | Age: 40
End: 2020-09-26
Attending: INTERNAL MEDICINE
Payer: COMMERCIAL

## 2020-09-28 ENCOUNTER — APPOINTMENT (OUTPATIENT)
Dept: RADIATION ONCOLOGY | Facility: HOSPITAL | Age: 40
End: 2020-09-28
Attending: RADIOLOGY
Payer: COMMERCIAL

## 2020-09-28 PROCEDURE — 77387 GUIDANCE FOR RADJ TX DLVR: CPT | Performed by: RADIOLOGY

## 2020-09-28 PROCEDURE — 77412 RADIATION TX DELIVERY LVL 3: CPT | Performed by: RADIOLOGY

## 2020-09-29 ENCOUNTER — APPOINTMENT (OUTPATIENT)
Dept: RADIATION ONCOLOGY | Facility: HOSPITAL | Age: 40
End: 2020-09-29
Attending: RADIOLOGY
Payer: COMMERCIAL

## 2020-09-29 ENCOUNTER — HOSPITAL ENCOUNTER (OUTPATIENT)
Dept: INFUSION CENTER | Facility: CLINIC | Age: 40
Discharge: HOME | End: 2020-09-29
Payer: COMMERCIAL

## 2020-09-29 VITALS
TEMPERATURE: 96.4 F | SYSTOLIC BLOOD PRESSURE: 94 MMHG | RESPIRATION RATE: 14 BRPM | HEART RATE: 99 BPM | DIASTOLIC BLOOD PRESSURE: 50 MMHG | OXYGEN SATURATION: 99 % | WEIGHT: 116 LBS | BODY MASS INDEX: 19.3 KG/M2

## 2020-09-29 DIAGNOSIS — C50.812 MALIGNANT NEOPLASM OF OVERLAPPING SITES OF LEFT BREAST IN FEMALE, ESTROGEN RECEPTOR POSITIVE (HCC): ICD-10-CM

## 2020-09-29 DIAGNOSIS — T45.1X5A CHEMOTHERAPY INDUCED NEUTROPENIA (HCC): Primary | ICD-10-CM

## 2020-09-29 DIAGNOSIS — Z17.0 MALIGNANT NEOPLASM OF OVERLAPPING SITES OF LEFT BREAST IN FEMALE, ESTROGEN RECEPTOR POSITIVE (HCC): ICD-10-CM

## 2020-09-29 DIAGNOSIS — D70.1 CHEMOTHERAPY INDUCED NEUTROPENIA (HCC): Primary | ICD-10-CM

## 2020-09-29 PROCEDURE — 96413 CHEMO IV INFUSION 1 HR: CPT

## 2020-09-29 PROCEDURE — 77387 GUIDANCE FOR RADJ TX DLVR: CPT | Performed by: RADIOLOGY

## 2020-09-29 PROCEDURE — 77412 RADIATION TX DELIVERY LVL 3: CPT | Performed by: RADIOLOGY

## 2020-09-29 PROCEDURE — 96417 CHEMO IV INFUS EACH ADDL SEQ: CPT

## 2020-09-29 RX ORDER — SODIUM CHLORIDE 9 MG/ML
20 INJECTION, SOLUTION INTRAVENOUS ONCE
Status: COMPLETED | OUTPATIENT
Start: 2020-09-29 | End: 2020-09-29

## 2020-09-29 RX ADMIN — PERTUZUMAB 420 MG: 30 INJECTION, SOLUTION, CONCENTRATE INTRAVENOUS at 13:55

## 2020-09-29 RX ADMIN — TRASTUZUMAB 318 MG: 150 INJECTION, POWDER, LYOPHILIZED, FOR SOLUTION INTRAVENOUS at 14:29

## 2020-09-29 RX ADMIN — SODIUM CHLORIDE 20 ML/HR: 0.9 INJECTION, SOLUTION INTRAVENOUS at 13:38

## 2020-09-30 ENCOUNTER — APPOINTMENT (OUTPATIENT)
Dept: RADIATION ONCOLOGY | Facility: HOSPITAL | Age: 40
End: 2020-09-30
Attending: RADIOLOGY
Payer: COMMERCIAL

## 2020-09-30 PROCEDURE — 77387 GUIDANCE FOR RADJ TX DLVR: CPT | Performed by: RADIOLOGY

## 2020-09-30 PROCEDURE — 77412 RADIATION TX DELIVERY LVL 3: CPT | Performed by: RADIOLOGY

## 2020-09-30 PROCEDURE — 77336 RADIATION PHYSICS CONSULT: CPT | Performed by: RADIOLOGY

## 2020-10-01 ENCOUNTER — APPOINTMENT (OUTPATIENT)
Dept: RADIATION ONCOLOGY | Facility: HOSPITAL | Age: 40
End: 2020-10-01
Attending: RADIOLOGY
Payer: COMMERCIAL

## 2020-10-01 PROCEDURE — 77412 RADIATION TX DELIVERY LVL 3: CPT | Performed by: RADIOLOGY

## 2020-10-01 PROCEDURE — 77417 THER RADIOLOGY PORT IMAGE(S): CPT | Performed by: RADIOLOGY

## 2020-10-01 PROCEDURE — 77387 GUIDANCE FOR RADJ TX DLVR: CPT | Performed by: RADIOLOGY

## 2020-10-02 ENCOUNTER — APPOINTMENT (OUTPATIENT)
Dept: RADIATION ONCOLOGY | Facility: HOSPITAL | Age: 40
End: 2020-10-02
Attending: RADIOLOGY
Payer: COMMERCIAL

## 2020-10-02 PROCEDURE — 77387 GUIDANCE FOR RADJ TX DLVR: CPT | Performed by: RADIOLOGY

## 2020-10-02 PROCEDURE — 77412 RADIATION TX DELIVERY LVL 3: CPT | Performed by: RADIOLOGY

## 2020-10-05 ENCOUNTER — APPOINTMENT (OUTPATIENT)
Dept: RADIATION ONCOLOGY | Facility: HOSPITAL | Age: 40
End: 2020-10-05
Attending: RADIOLOGY
Payer: COMMERCIAL

## 2020-10-05 PROCEDURE — 77387 GUIDANCE FOR RADJ TX DLVR: CPT | Performed by: RADIOLOGY

## 2020-10-05 PROCEDURE — 77412 RADIATION TX DELIVERY LVL 3: CPT | Performed by: RADIOLOGY

## 2020-10-06 ENCOUNTER — APPOINTMENT (OUTPATIENT)
Dept: RADIATION ONCOLOGY | Facility: HOSPITAL | Age: 40
End: 2020-10-06
Attending: RADIOLOGY
Payer: COMMERCIAL

## 2020-10-06 PROCEDURE — 77412 RADIATION TX DELIVERY LVL 3: CPT | Performed by: RADIOLOGY

## 2020-10-06 PROCEDURE — 77387 GUIDANCE FOR RADJ TX DLVR: CPT | Performed by: RADIOLOGY

## 2020-10-07 ENCOUNTER — APPOINTMENT (OUTPATIENT)
Dept: RADIATION ONCOLOGY | Facility: HOSPITAL | Age: 40
End: 2020-10-07
Attending: RADIOLOGY
Payer: COMMERCIAL

## 2020-10-07 ENCOUNTER — TELEPHONE (OUTPATIENT)
Dept: RADIATION ONCOLOGY | Facility: HOSPITAL | Age: 40
End: 2020-10-07

## 2020-10-07 DIAGNOSIS — Z01.818 PRE-PROCEDURAL EXAMINATION: ICD-10-CM

## 2020-10-07 DIAGNOSIS — Z01.818 PRE-PROCEDURAL EXAMINATION: Primary | ICD-10-CM

## 2020-10-07 PROCEDURE — U0003 INFECTIOUS AGENT DETECTION BY NUCLEIC ACID (DNA OR RNA); SEVERE ACUTE RESPIRATORY SYNDROME CORONAVIRUS 2 (SARS-COV-2) (CORONAVIRUS DISEASE [COVID-19]), AMPLIFIED PROBE TECHNIQUE, MAKING USE OF HIGH THROUGHPUT TECHNOLOGIES AS DESCRIBED BY CMS-2020-01-R: HCPCS | Performed by: RADIOLOGY

## 2020-10-07 PROCEDURE — 77336 RADIATION PHYSICS CONSULT: CPT | Performed by: RADIOLOGY

## 2020-10-07 PROCEDURE — 77387 GUIDANCE FOR RADJ TX DLVR: CPT | Performed by: RADIOLOGY

## 2020-10-07 PROCEDURE — 77412 RADIATION TX DELIVERY LVL 3: CPT | Performed by: RADIOLOGY

## 2020-10-08 ENCOUNTER — APPOINTMENT (OUTPATIENT)
Dept: RADIATION ONCOLOGY | Facility: HOSPITAL | Age: 40
End: 2020-10-08
Attending: RADIOLOGY
Payer: COMMERCIAL

## 2020-10-08 LAB — SARS-COV-2 RNA SPEC QL NAA+PROBE: NOT DETECTED

## 2020-10-08 PROCEDURE — 77412 RADIATION TX DELIVERY LVL 3: CPT | Performed by: RADIOLOGY

## 2020-10-08 PROCEDURE — 77387 GUIDANCE FOR RADJ TX DLVR: CPT | Performed by: RADIOLOGY

## 2020-10-09 PROCEDURE — 77387 GUIDANCE FOR RADJ TX DLVR: CPT | Performed by: RADIOLOGY

## 2020-10-09 PROCEDURE — 77417 THER RADIOLOGY PORT IMAGE(S): CPT | Performed by: RADIOLOGY

## 2020-10-09 PROCEDURE — 77412 RADIATION TX DELIVERY LVL 3: CPT | Performed by: RADIOLOGY

## 2020-10-12 ENCOUNTER — APPOINTMENT (OUTPATIENT)
Dept: RADIATION ONCOLOGY | Facility: HOSPITAL | Age: 40
End: 2020-10-12
Attending: RADIOLOGY
Payer: COMMERCIAL

## 2020-10-12 PROCEDURE — 77412 RADIATION TX DELIVERY LVL 3: CPT | Performed by: RADIOLOGY

## 2020-10-12 PROCEDURE — 77387 GUIDANCE FOR RADJ TX DLVR: CPT | Performed by: RADIOLOGY

## 2020-10-13 PROCEDURE — 77387 GUIDANCE FOR RADJ TX DLVR: CPT | Performed by: RADIOLOGY

## 2020-10-13 PROCEDURE — 77412 RADIATION TX DELIVERY LVL 3: CPT | Performed by: RADIOLOGY

## 2020-10-14 ENCOUNTER — APPOINTMENT (OUTPATIENT)
Dept: RADIATION ONCOLOGY | Facility: HOSPITAL | Age: 40
End: 2020-10-14
Attending: RADIOLOGY
Payer: COMMERCIAL

## 2020-10-14 PROCEDURE — 77387 GUIDANCE FOR RADJ TX DLVR: CPT | Performed by: RADIOLOGY

## 2020-10-14 PROCEDURE — 77336 RADIATION PHYSICS CONSULT: CPT | Performed by: RADIOLOGY

## 2020-10-14 PROCEDURE — 77412 RADIATION TX DELIVERY LVL 3: CPT | Performed by: RADIOLOGY

## 2020-10-19 ENCOUNTER — TELEPHONE (OUTPATIENT)
Dept: HEMATOLOGY ONCOLOGY | Facility: CLINIC | Age: 40
End: 2020-10-19

## 2020-10-20 ENCOUNTER — HOSPITAL ENCOUNTER (OUTPATIENT)
Dept: INFUSION CENTER | Facility: CLINIC | Age: 40
Discharge: HOME | End: 2020-10-20
Payer: COMMERCIAL

## 2020-10-20 VITALS
DIASTOLIC BLOOD PRESSURE: 56 MMHG | HEART RATE: 88 BPM | RESPIRATION RATE: 18 BRPM | BODY MASS INDEX: 19.3 KG/M2 | OXYGEN SATURATION: 99 % | WEIGHT: 116 LBS | TEMPERATURE: 97.3 F | SYSTOLIC BLOOD PRESSURE: 101 MMHG

## 2020-10-20 DIAGNOSIS — Z17.0 MALIGNANT NEOPLASM OF OVERLAPPING SITES OF LEFT BREAST IN FEMALE, ESTROGEN RECEPTOR POSITIVE (HCC): ICD-10-CM

## 2020-10-20 DIAGNOSIS — C50.812 MALIGNANT NEOPLASM OF OVERLAPPING SITES OF LEFT BREAST IN FEMALE, ESTROGEN RECEPTOR POSITIVE (HCC): ICD-10-CM

## 2020-10-20 DIAGNOSIS — T45.1X5A CHEMOTHERAPY INDUCED NEUTROPENIA (HCC): Primary | ICD-10-CM

## 2020-10-20 DIAGNOSIS — C50.812 MALIGNANT NEOPLASM OF OVERLAPPING SITES OF LEFT BREAST IN FEMALE, ESTROGEN RECEPTOR POSITIVE (HCC): Primary | ICD-10-CM

## 2020-10-20 DIAGNOSIS — D70.1 CHEMOTHERAPY INDUCED NEUTROPENIA (HCC): Primary | ICD-10-CM

## 2020-10-20 DIAGNOSIS — Z17.0 MALIGNANT NEOPLASM OF OVERLAPPING SITES OF LEFT BREAST IN FEMALE, ESTROGEN RECEPTOR POSITIVE (HCC): Primary | ICD-10-CM

## 2020-10-20 PROCEDURE — 96417 CHEMO IV INFUS EACH ADDL SEQ: CPT

## 2020-10-20 PROCEDURE — 96413 CHEMO IV INFUSION 1 HR: CPT

## 2020-10-20 RX ORDER — SODIUM CHLORIDE 9 MG/ML
20 INJECTION, SOLUTION INTRAVENOUS ONCE
Status: COMPLETED | OUTPATIENT
Start: 2020-10-20 | End: 2020-10-20

## 2020-10-20 RX ADMIN — SODIUM CHLORIDE 20 ML/HR: 0.9 INJECTION, SOLUTION INTRAVENOUS at 14:03

## 2020-10-20 RX ADMIN — PERTUZUMAB 420 MG: 30 INJECTION, SOLUTION, CONCENTRATE INTRAVENOUS at 14:46

## 2020-10-20 RX ADMIN — TRASTUZUMAB 318 MG: 150 INJECTION, POWDER, LYOPHILIZED, FOR SOLUTION INTRAVENOUS at 15:22

## 2020-10-28 ENCOUNTER — PATIENT OUTREACH (OUTPATIENT)
Dept: CASE MANAGEMENT | Facility: HOSPITAL | Age: 40
End: 2020-10-28

## 2020-11-10 ENCOUNTER — HOSPITAL ENCOUNTER (OUTPATIENT)
Dept: INFUSION CENTER | Facility: CLINIC | Age: 40
Discharge: HOME | End: 2020-11-10
Payer: COMMERCIAL

## 2020-11-10 VITALS
WEIGHT: 115 LBS | BODY MASS INDEX: 19.14 KG/M2 | HEART RATE: 80 BPM | OXYGEN SATURATION: 98 % | DIASTOLIC BLOOD PRESSURE: 60 MMHG | SYSTOLIC BLOOD PRESSURE: 88 MMHG | TEMPERATURE: 98.3 F | RESPIRATION RATE: 18 BRPM

## 2020-11-10 DIAGNOSIS — C50.812 MALIGNANT NEOPLASM OF OVERLAPPING SITES OF LEFT BREAST IN FEMALE, ESTROGEN RECEPTOR POSITIVE (HCC): ICD-10-CM

## 2020-11-10 DIAGNOSIS — D70.1 CHEMOTHERAPY INDUCED NEUTROPENIA (HCC): ICD-10-CM

## 2020-11-10 DIAGNOSIS — D70.1 CHEMOTHERAPY INDUCED NEUTROPENIA (HCC): Primary | ICD-10-CM

## 2020-11-10 DIAGNOSIS — T45.1X5A CHEMOTHERAPY INDUCED NEUTROPENIA (HCC): Primary | ICD-10-CM

## 2020-11-10 DIAGNOSIS — Z17.0 MALIGNANT NEOPLASM OF OVERLAPPING SITES OF LEFT BREAST IN FEMALE, ESTROGEN RECEPTOR POSITIVE (HCC): ICD-10-CM

## 2020-11-10 DIAGNOSIS — T45.1X5A CHEMOTHERAPY INDUCED NEUTROPENIA (HCC): ICD-10-CM

## 2020-11-10 PROCEDURE — 96413 CHEMO IV INFUSION 1 HR: CPT

## 2020-11-10 PROCEDURE — 96417 CHEMO IV INFUS EACH ADDL SEQ: CPT

## 2020-11-10 RX ORDER — TAMOXIFEN CITRATE 20 MG/1
20 TABLET ORAL DAILY
Qty: 90 TABLET | Refills: 1 | Status: SHIPPED | OUTPATIENT
Start: 2020-11-10 | End: 2021-06-08 | Stop reason: SDUPTHER

## 2020-11-10 RX ORDER — SODIUM CHLORIDE 9 MG/ML
20 INJECTION, SOLUTION INTRAVENOUS ONCE
Status: COMPLETED | OUTPATIENT
Start: 2020-11-10 | End: 2020-11-10

## 2020-11-10 RX ADMIN — SODIUM CHLORIDE 20 ML/HR: 0.9 INJECTION, SOLUTION INTRAVENOUS at 13:20

## 2020-11-10 RX ADMIN — PERTUZUMAB 420 MG: 30 INJECTION, SOLUTION, CONCENTRATE INTRAVENOUS at 14:01

## 2020-11-10 RX ADMIN — TRASTUZUMAB 318 MG: 150 INJECTION, POWDER, LYOPHILIZED, FOR SOLUTION INTRAVENOUS at 14:40

## 2020-11-17 ENCOUNTER — TELEPHONE (OUTPATIENT)
Dept: SURGICAL ONCOLOGY | Facility: CLINIC | Age: 40
End: 2020-11-17

## 2020-11-18 ENCOUNTER — TELEPHONE (OUTPATIENT)
Dept: RADIATION ONCOLOGY | Facility: HOSPITAL | Age: 40
End: 2020-11-18

## 2020-11-18 ENCOUNTER — OFFICE VISIT (OUTPATIENT)
Dept: SURGICAL ONCOLOGY | Facility: CLINIC | Age: 40
End: 2020-11-18
Payer: COMMERCIAL

## 2020-11-18 ENCOUNTER — TELEMEDICINE (OUTPATIENT)
Dept: RADIATION ONCOLOGY | Facility: HOSPITAL | Age: 40
End: 2020-11-18
Attending: RADIOLOGY

## 2020-11-18 VITALS
RESPIRATION RATE: 14 BRPM | SYSTOLIC BLOOD PRESSURE: 118 MMHG | HEIGHT: 65 IN | HEART RATE: 82 BPM | WEIGHT: 115 LBS | TEMPERATURE: 97.8 F | BODY MASS INDEX: 19.16 KG/M2 | DIASTOLIC BLOOD PRESSURE: 70 MMHG

## 2020-11-18 DIAGNOSIS — Z17.0 MALIGNANT NEOPLASM OF OVERLAPPING SITES OF LEFT BREAST IN FEMALE, ESTROGEN RECEPTOR POSITIVE (HCC): Primary | ICD-10-CM

## 2020-11-18 DIAGNOSIS — C50.812 MALIGNANT NEOPLASM OF OVERLAPPING SITES OF LEFT BREAST IN FEMALE, ESTROGEN RECEPTOR POSITIVE (HCC): Primary | ICD-10-CM

## 2020-11-18 DIAGNOSIS — Z79.810 USE OF TAMOXIFEN (NOLVADEX): ICD-10-CM

## 2020-11-18 PROCEDURE — 1036F TOBACCO NON-USER: CPT | Performed by: NURSE PRACTITIONER

## 2020-11-18 PROCEDURE — 99214 OFFICE O/P EST MOD 30 MIN: CPT | Performed by: NURSE PRACTITIONER

## 2020-11-18 PROCEDURE — 3008F BODY MASS INDEX DOCD: CPT | Performed by: NURSE PRACTITIONER

## 2020-11-20 ENCOUNTER — PATIENT OUTREACH (OUTPATIENT)
Dept: CASE MANAGEMENT | Facility: HOSPITAL | Age: 40
End: 2020-11-20

## 2020-11-24 ENCOUNTER — HOSPITAL ENCOUNTER (OUTPATIENT)
Dept: MAMMOGRAPHY | Facility: CLINIC | Age: 40
Discharge: HOME | End: 2020-11-24
Payer: COMMERCIAL

## 2020-11-24 VITALS — HEIGHT: 65 IN | WEIGHT: 115 LBS | BODY MASS INDEX: 19.16 KG/M2

## 2020-11-24 DIAGNOSIS — C50.812 MALIGNANT NEOPLASM OF OVERLAPPING SITES OF LEFT BREAST IN FEMALE, ESTROGEN RECEPTOR POSITIVE (HCC): ICD-10-CM

## 2020-11-24 DIAGNOSIS — Z17.0 MALIGNANT NEOPLASM OF OVERLAPPING SITES OF LEFT BREAST IN FEMALE, ESTROGEN RECEPTOR POSITIVE (HCC): ICD-10-CM

## 2020-11-24 PROCEDURE — G0279 TOMOSYNTHESIS, MAMMO: HCPCS

## 2020-11-24 PROCEDURE — 77065 DX MAMMO INCL CAD UNI: CPT

## 2020-12-01 ENCOUNTER — HOSPITAL ENCOUNTER (OUTPATIENT)
Dept: INFUSION CENTER | Facility: CLINIC | Age: 40
Discharge: HOME | End: 2020-12-01
Payer: COMMERCIAL

## 2020-12-01 VITALS
RESPIRATION RATE: 18 BRPM | SYSTOLIC BLOOD PRESSURE: 90 MMHG | WEIGHT: 115.52 LBS | DIASTOLIC BLOOD PRESSURE: 80 MMHG | HEART RATE: 87 BPM | TEMPERATURE: 96.6 F | OXYGEN SATURATION: 98 % | BODY MASS INDEX: 19.22 KG/M2

## 2020-12-01 DIAGNOSIS — D70.1 CHEMOTHERAPY INDUCED NEUTROPENIA (HCC): Primary | ICD-10-CM

## 2020-12-01 DIAGNOSIS — J30.1 SEASONAL ALLERGIC RHINITIS DUE TO POLLEN: ICD-10-CM

## 2020-12-01 DIAGNOSIS — T45.1X5A CHEMOTHERAPY INDUCED NEUTROPENIA (HCC): Primary | ICD-10-CM

## 2020-12-01 DIAGNOSIS — C50.812 MALIGNANT NEOPLASM OF OVERLAPPING SITES OF LEFT BREAST IN FEMALE, ESTROGEN RECEPTOR POSITIVE (HCC): ICD-10-CM

## 2020-12-01 DIAGNOSIS — Z17.0 MALIGNANT NEOPLASM OF OVERLAPPING SITES OF LEFT BREAST IN FEMALE, ESTROGEN RECEPTOR POSITIVE (HCC): ICD-10-CM

## 2020-12-01 PROCEDURE — 96417 CHEMO IV INFUS EACH ADDL SEQ: CPT

## 2020-12-01 PROCEDURE — 96413 CHEMO IV INFUSION 1 HR: CPT

## 2020-12-01 RX ORDER — SODIUM CHLORIDE 9 MG/ML
20 INJECTION, SOLUTION INTRAVENOUS ONCE
Status: COMPLETED | OUTPATIENT
Start: 2020-12-01 | End: 2020-12-01

## 2020-12-01 RX ADMIN — PERTUZUMAB 420 MG: 30 INJECTION, SOLUTION, CONCENTRATE INTRAVENOUS at 13:47

## 2020-12-01 RX ADMIN — TRASTUZUMAB 318 MG: 150 INJECTION, POWDER, LYOPHILIZED, FOR SOLUTION INTRAVENOUS at 14:21

## 2020-12-01 RX ADMIN — SODIUM CHLORIDE 20 ML/HR: 0.9 INJECTION, SOLUTION INTRAVENOUS at 13:35

## 2020-12-02 ENCOUNTER — OFFICE VISIT (OUTPATIENT)
Dept: HEMATOLOGY ONCOLOGY | Facility: CLINIC | Age: 40
End: 2020-12-02
Payer: COMMERCIAL

## 2020-12-02 ENCOUNTER — PATIENT OUTREACH (OUTPATIENT)
Dept: CASE MANAGEMENT | Facility: HOSPITAL | Age: 40
End: 2020-12-02

## 2020-12-02 VITALS
HEART RATE: 87 BPM | WEIGHT: 115.52 LBS | HEIGHT: 65 IN | TEMPERATURE: 96.5 F | RESPIRATION RATE: 18 BRPM | OXYGEN SATURATION: 99 % | DIASTOLIC BLOOD PRESSURE: 80 MMHG | BODY MASS INDEX: 19.25 KG/M2 | SYSTOLIC BLOOD PRESSURE: 144 MMHG

## 2020-12-02 DIAGNOSIS — Z17.0 MALIGNANT NEOPLASM OF OVERLAPPING SITES OF LEFT BREAST IN FEMALE, ESTROGEN RECEPTOR POSITIVE (HCC): Primary | ICD-10-CM

## 2020-12-02 DIAGNOSIS — C50.812 MALIGNANT NEOPLASM OF OVERLAPPING SITES OF LEFT BREAST IN FEMALE, ESTROGEN RECEPTOR POSITIVE (HCC): Primary | ICD-10-CM

## 2020-12-02 PROCEDURE — 99214 OFFICE O/P EST MOD 30 MIN: CPT | Performed by: INTERNAL MEDICINE

## 2020-12-02 PROCEDURE — 3008F BODY MASS INDEX DOCD: CPT | Performed by: NURSE PRACTITIONER

## 2020-12-02 RX ORDER — MOMETASONE FUROATE 50 UG/1
2 SPRAY, METERED NASAL DAILY
Qty: 3 ACT | Refills: 0 | Status: CANCELLED | OUTPATIENT
Start: 2020-12-02

## 2020-12-10 ENCOUNTER — PATIENT OUTREACH (OUTPATIENT)
Dept: CASE MANAGEMENT | Facility: HOSPITAL | Age: 40
End: 2020-12-10

## 2020-12-11 ENCOUNTER — TELEPHONE (OUTPATIENT)
Dept: FAMILY MEDICINE CLINIC | Facility: CLINIC | Age: 40
End: 2020-12-11

## 2020-12-11 ENCOUNTER — TELEPHONE (OUTPATIENT)
Dept: HEMATOLOGY ONCOLOGY | Facility: MEDICAL CENTER | Age: 40
End: 2020-12-11

## 2020-12-11 DIAGNOSIS — Z20.822 EXPOSURE TO COVID-19 VIRUS: Primary | ICD-10-CM

## 2020-12-12 DIAGNOSIS — Z20.822 EXPOSURE TO COVID-19 VIRUS: ICD-10-CM

## 2020-12-12 PROCEDURE — U0003 INFECTIOUS AGENT DETECTION BY NUCLEIC ACID (DNA OR RNA); SEVERE ACUTE RESPIRATORY SYNDROME CORONAVIRUS 2 (SARS-COV-2) (CORONAVIRUS DISEASE [COVID-19]), AMPLIFIED PROBE TECHNIQUE, MAKING USE OF HIGH THROUGHPUT TECHNOLOGIES AS DESCRIBED BY CMS-2020-01-R: HCPCS | Performed by: FAMILY MEDICINE

## 2020-12-13 LAB — SARS-COV-2 RNA SPEC QL NAA+PROBE: DETECTED

## 2020-12-14 ENCOUNTER — TELEPHONE (OUTPATIENT)
Dept: FAMILY MEDICINE CLINIC | Facility: CLINIC | Age: 40
End: 2020-12-14

## 2020-12-15 ENCOUNTER — TELEMEDICINE (OUTPATIENT)
Dept: FAMILY MEDICINE CLINIC | Facility: CLINIC | Age: 40
End: 2020-12-15
Payer: COMMERCIAL

## 2020-12-15 ENCOUNTER — TELEPHONE (OUTPATIENT)
Dept: FAMILY MEDICINE CLINIC | Facility: CLINIC | Age: 40
End: 2020-12-15

## 2020-12-15 DIAGNOSIS — U07.1 COVID-19 VIRUS DETECTED: Primary | ICD-10-CM

## 2020-12-15 PROCEDURE — 1036F TOBACCO NON-USER: CPT | Performed by: NURSE PRACTITIONER

## 2020-12-15 PROCEDURE — 99213 OFFICE O/P EST LOW 20 MIN: CPT | Performed by: NURSE PRACTITIONER

## 2020-12-22 ENCOUNTER — HOSPITAL ENCOUNTER (OUTPATIENT)
Dept: INFUSION CENTER | Facility: CLINIC | Age: 40
Discharge: HOME | End: 2020-12-22
Payer: COMMERCIAL

## 2020-12-22 VITALS
HEART RATE: 76 BPM | WEIGHT: 112.88 LBS | RESPIRATION RATE: 18 BRPM | TEMPERATURE: 97.1 F | OXYGEN SATURATION: 98 % | BODY MASS INDEX: 18.78 KG/M2 | SYSTOLIC BLOOD PRESSURE: 102 MMHG | DIASTOLIC BLOOD PRESSURE: 52 MMHG

## 2020-12-22 DIAGNOSIS — Z17.0 MALIGNANT NEOPLASM OF OVERLAPPING SITES OF LEFT BREAST IN FEMALE, ESTROGEN RECEPTOR POSITIVE (HCC): ICD-10-CM

## 2020-12-22 DIAGNOSIS — T45.1X5A CHEMOTHERAPY INDUCED NEUTROPENIA (HCC): Primary | ICD-10-CM

## 2020-12-22 DIAGNOSIS — D70.1 CHEMOTHERAPY INDUCED NEUTROPENIA (HCC): Primary | ICD-10-CM

## 2020-12-22 DIAGNOSIS — C50.812 MALIGNANT NEOPLASM OF OVERLAPPING SITES OF LEFT BREAST IN FEMALE, ESTROGEN RECEPTOR POSITIVE (HCC): ICD-10-CM

## 2020-12-22 PROCEDURE — 96413 CHEMO IV INFUSION 1 HR: CPT

## 2020-12-22 PROCEDURE — 96417 CHEMO IV INFUS EACH ADDL SEQ: CPT

## 2020-12-22 RX ORDER — SODIUM CHLORIDE 9 MG/ML
20 INJECTION, SOLUTION INTRAVENOUS ONCE
Status: COMPLETED | OUTPATIENT
Start: 2020-12-22 | End: 2020-12-22

## 2020-12-22 RX ADMIN — SODIUM CHLORIDE 20 ML/HR: 0.9 INJECTION, SOLUTION INTRAVENOUS at 14:11

## 2020-12-22 RX ADMIN — TRASTUZUMAB 318 MG: 150 INJECTION, POWDER, LYOPHILIZED, FOR SOLUTION INTRAVENOUS at 15:02

## 2020-12-22 RX ADMIN — PERTUZUMAB 420 MG: 30 INJECTION, SOLUTION, CONCENTRATE INTRAVENOUS at 14:25

## 2020-12-24 ENCOUNTER — TELEPHONE (OUTPATIENT)
Dept: RADIOLOGY | Facility: HOSPITAL | Age: 40
End: 2020-12-24

## 2020-12-24 RX ORDER — SODIUM CHLORIDE 9 MG/ML
75 INJECTION, SOLUTION INTRAVENOUS CONTINUOUS
Status: CANCELLED | OUTPATIENT
Start: 2020-12-24

## 2020-12-28 ENCOUNTER — TELEPHONE (OUTPATIENT)
Dept: HEMATOLOGY ONCOLOGY | Facility: CLINIC | Age: 40
End: 2020-12-28

## 2020-12-29 ENCOUNTER — TELEPHONE (OUTPATIENT)
Dept: SURGERY | Facility: HOSPITAL | Age: 40
End: 2020-12-29

## 2020-12-30 ENCOUNTER — HOSPITAL ENCOUNTER (OUTPATIENT)
Dept: RADIOLOGY | Facility: HOSPITAL | Age: 40
Discharge: HOME | End: 2020-12-30
Attending: INTERNAL MEDICINE | Admitting: STUDENT IN AN ORGANIZED HEALTH CARE EDUCATION/TRAINING PROGRAM
Payer: COMMERCIAL

## 2020-12-30 VITALS
RESPIRATION RATE: 16 BRPM | HEART RATE: 71 BPM | SYSTOLIC BLOOD PRESSURE: 99 MMHG | DIASTOLIC BLOOD PRESSURE: 54 MMHG | HEIGHT: 65 IN | OXYGEN SATURATION: 100 % | TEMPERATURE: 97.9 F | WEIGHT: 112 LBS | BODY MASS INDEX: 18.66 KG/M2

## 2020-12-30 DIAGNOSIS — Z17.0 MALIGNANT NEOPLASM OF OVERLAPPING SITES OF LEFT BREAST IN FEMALE, ESTROGEN RECEPTOR POSITIVE (HCC): ICD-10-CM

## 2020-12-30 DIAGNOSIS — C50.812 MALIGNANT NEOPLASM OF OVERLAPPING SITES OF LEFT BREAST IN FEMALE, ESTROGEN RECEPTOR POSITIVE (HCC): ICD-10-CM

## 2020-12-30 LAB
INR PPP: 1.09 (ref 0.84–1.19)
PROTHROMBIN TIME: 13.9 SECONDS (ref 11.6–14.5)

## 2020-12-30 PROCEDURE — 36590 REMOVAL TUNNELED CV CATH: CPT | Performed by: STUDENT IN AN ORGANIZED HEALTH CARE EDUCATION/TRAINING PROGRAM

## 2020-12-30 PROCEDURE — 36590 REMOVAL TUNNELED CV CATH: CPT

## 2020-12-30 PROCEDURE — 99152 MOD SED SAME PHYS/QHP 5/>YRS: CPT | Performed by: STUDENT IN AN ORGANIZED HEALTH CARE EDUCATION/TRAINING PROGRAM

## 2020-12-30 PROCEDURE — 85610 PROTHROMBIN TIME: CPT | Performed by: STUDENT IN AN ORGANIZED HEALTH CARE EDUCATION/TRAINING PROGRAM

## 2020-12-30 RX ORDER — DIPHENHYDRAMINE HYDROCHLORIDE 50 MG/ML
INJECTION INTRAMUSCULAR; INTRAVENOUS CODE/TRAUMA/SEDATION MEDICATION
Status: COMPLETED | OUTPATIENT
Start: 2020-12-30 | End: 2020-12-30

## 2020-12-30 RX ORDER — LIDOCAINE HYDROCHLORIDE AND EPINEPHRINE 10; 10 MG/ML; UG/ML
INJECTION, SOLUTION INFILTRATION; PERINEURAL CODE/TRAUMA/SEDATION MEDICATION
Status: COMPLETED | OUTPATIENT
Start: 2020-12-30 | End: 2020-12-30

## 2020-12-30 RX ORDER — FENTANYL CITRATE 50 UG/ML
INJECTION, SOLUTION INTRAMUSCULAR; INTRAVENOUS CODE/TRAUMA/SEDATION MEDICATION
Status: COMPLETED | OUTPATIENT
Start: 2020-12-30 | End: 2020-12-30

## 2020-12-30 RX ORDER — MIDAZOLAM HYDROCHLORIDE 2 MG/2ML
INJECTION, SOLUTION INTRAMUSCULAR; INTRAVENOUS CODE/TRAUMA/SEDATION MEDICATION
Status: COMPLETED | OUTPATIENT
Start: 2020-12-30 | End: 2020-12-30

## 2020-12-30 RX ORDER — SODIUM CHLORIDE 9 MG/ML
75 INJECTION, SOLUTION INTRAVENOUS CONTINUOUS
Status: DISCONTINUED | OUTPATIENT
Start: 2020-12-30 | End: 2020-12-31 | Stop reason: HOSPADM

## 2020-12-30 RX ADMIN — DIPHENHYDRAMINE HYDROCHLORIDE 50 MG: 50 INJECTION, SOLUTION INTRAMUSCULAR; INTRAVENOUS at 10:02

## 2020-12-30 RX ADMIN — LIDOCAINE HYDROCHLORIDE,EPINEPHRINE BITARTRATE 4 ML: 10; .01 INJECTION, SOLUTION INFILTRATION; PERINEURAL at 09:42

## 2020-12-30 RX ADMIN — FENTANYL CITRATE 25 MCG: 50 INJECTION INTRAMUSCULAR; INTRAVENOUS at 09:41

## 2020-12-30 RX ADMIN — FENTANYL CITRATE 50 MCG: 50 INJECTION INTRAMUSCULAR; INTRAVENOUS at 09:32

## 2020-12-30 RX ADMIN — FENTANYL CITRATE 50 MCG: 50 INJECTION INTRAMUSCULAR; INTRAVENOUS at 09:49

## 2020-12-30 RX ADMIN — MIDAZOLAM 1 MG: 1 INJECTION INTRAMUSCULAR; INTRAVENOUS at 09:32

## 2020-12-30 RX ADMIN — FENTANYL CITRATE 25 MCG: 50 INJECTION INTRAMUSCULAR; INTRAVENOUS at 09:39

## 2020-12-30 RX ADMIN — MIDAZOLAM 0.5 MG: 1 INJECTION INTRAMUSCULAR; INTRAVENOUS at 09:38

## 2020-12-30 RX ADMIN — MIDAZOLAM 0.5 MG: 1 INJECTION INTRAMUSCULAR; INTRAVENOUS at 09:41

## 2020-12-30 RX ADMIN — SODIUM CHLORIDE 75 ML/HR: 0.9 INJECTION, SOLUTION INTRAVENOUS at 08:49

## 2020-12-31 ENCOUNTER — TELEPHONE (OUTPATIENT)
Dept: FAMILY MEDICINE CLINIC | Facility: CLINIC | Age: 40
End: 2020-12-31

## 2020-12-31 ENCOUNTER — TELEPHONE (OUTPATIENT)
Dept: HEMATOLOGY ONCOLOGY | Facility: CLINIC | Age: 40
End: 2020-12-31

## 2021-01-06 ENCOUNTER — OFFICE VISIT (OUTPATIENT)
Dept: PLASTIC SURGERY | Facility: CLINIC | Age: 41
End: 2021-01-06
Payer: COMMERCIAL

## 2021-01-06 VITALS — WEIGHT: 110.31 LBS | TEMPERATURE: 97.3 F | HEIGHT: 65 IN | BODY MASS INDEX: 18.38 KG/M2

## 2021-01-06 DIAGNOSIS — Z17.0 MALIGNANT NEOPLASM OF OVERLAPPING SITES OF LEFT BREAST IN FEMALE, ESTROGEN RECEPTOR POSITIVE (HCC): Primary | ICD-10-CM

## 2021-01-06 DIAGNOSIS — C50.812 MALIGNANT NEOPLASM OF OVERLAPPING SITES OF LEFT BREAST IN FEMALE, ESTROGEN RECEPTOR POSITIVE (HCC): Primary | ICD-10-CM

## 2021-01-06 PROCEDURE — 3008F BODY MASS INDEX DOCD: CPT | Performed by: PLASTIC SURGERY

## 2021-01-06 PROCEDURE — 99214 OFFICE O/P EST MOD 30 MIN: CPT | Performed by: PLASTIC SURGERY

## 2021-01-06 PROCEDURE — 1036F TOBACCO NON-USER: CPT | Performed by: PLASTIC SURGERY

## 2021-01-06 NOTE — PROGRESS NOTES
Assessment/Plan:      Diagnoses and all orders for this visit:    Malignant neoplasm of overlapping sites of left breast in female, estrogen receptor positive ([facility] [address] )        I discussed that I felt that she is recovering well from her radiation  I explained that final outcome will continue to improve with time once tissue recover but some damage will be permanent  Patient has micromastia on contralateral right breast, which she would like to match to avoid size discrepancy and better fit of her bra  Recent mammogram on 11/24/2020 with no evidence of malignancy  I discussed contralateral right breast augmentation for symmetry purposes with the patient  I discussed that this could be performed as outpatient  Surgery     I discussed risks including but not limited to: bleeding, infection, hematoma, seroma, wound breakdown, scar, need for further surgery, deformity, pain, numbness, weakness, asymmetry, injury to structures, and medical complications  I discussed that the scar involved would be larger than the maximal dimension of the lesion itself  The patient would like to proceed and therefore we will initiate the insurance approval process  A total of 40 minutes of face-to-face time was spent in this encounter, of which >50% was spent in counseling  Tigre Licea    [address], [address]    Office: [phone]          Subjective:     Patient ID: Jaya Oconnell is a 36 y o  female  Mrs Brittney Ashley is a 36years old female 8 months s/p:   BREAST MODIFIED RADICAL MASTECTOMY WITH AXILLARY LYMPH NODE NEEDLE LOCALIZATION (NEEDLE LOC AT 1130) (Left)  BREAST IMMEDIATE RECONSTRUCTION WITH IMPLANT ; ACELLULAR DERMAL MATRIX (ADM) (Left)  APPLICATION OF NEGATIVE PRESSURE THERAPY   LEFT AXILLA COMPLEX CLOSURE 8 CM    She manifest doing well, minimal discomfort in her axillar arm extension but improving   She finished her radiation therapy in Oct 2020 and just had her port removed end of December  Today she presents for routine follow and discuss next steps  Review of Systems   Constitutional: Negative  HENT: Negative  Eyes: Negative  Respiratory: Negative  Cardiovascular: Negative  Gastrointestinal: Negative  Endocrine: Negative  Genitourinary: Negative  Musculoskeletal: Negative  Skin: Positive for color change  Neurological: Negative  Hematological: Negative  Objective:     Physical Exam  Constitutional:       Appearance: Normal appearance  HENT:      Head: Normocephalic  Eyes:      Pupils: Pupils are equal, round, and reactive to light  Neck:      Musculoskeletal: Normal range of motion  Cardiovascular:      Rate and Rhythm: Normal rate  Pulmonary:      Effort: Pulmonary effort is normal    Chest:       Musculoskeletal: Normal range of motion  Neurological:      Mental Status: She is alert

## 2021-01-21 ENCOUNTER — PATIENT OUTREACH (OUTPATIENT)
Dept: CASE MANAGEMENT | Facility: HOSPITAL | Age: 41
End: 2021-01-21

## 2021-01-21 NOTE — PROGRESS NOTES
LSW contacted pt for follow up  PT shared she is doing well, completed treatment and is having reconstructive surgery next month  Pt stated she is back to work and will soon be able to look for housing for her family, she shared they are still living with friends but it is working out for now  Pt was advised to contact LSW if needs arise, she agreed  Taryn Diggs  : 1941  Age 68year old  male      CC--Patient presents with:  Nursing Home: Patient admitted from [facility] yesterday 17  Congestive Heart Failure (cardiovascular): Breath on minimal exertion, valvular dysfunction constric CPAP   • Type II or unspecified type diabetes mellitus without mention of complication, not stated as uncontrolled    • Unspecified essential hypertension    • Unspecified intestinal obstruction (HCC)    • Visual impairment      Past Surgical History: no heartburn  :no dysuria, urgency or frequency; no hematuria  MUSCULOSKELETAL:no joint complaints upper or lower extremities   NEURO:denies seizures, denies vertigo, denies tinnitus and denies tremors  PSYCHE: no symptoms of depression or anxiety   HEMA [facility])  Chronic obstructive pulmonary disease, unspecified COPD type ([facility] [address]  Peripheral vascular disease, unspecified ([facility] [address]  BPH  Records and medication list from [address] [place] [address],[address] reviewed  Plan is to admit him for subacute rehab and start physical therapy/[facility]

## 2021-04-03 ENCOUNTER — APPOINTMENT (OUTPATIENT)
Dept: LAB | Facility: HOSPITAL | Age: 41
End: 2021-04-03
Attending: RADIOLOGY
Payer: COMMERCIAL

## 2021-04-03 DIAGNOSIS — Z85.3 PERSONAL HISTORY OF BREAST CANCER: ICD-10-CM

## 2021-04-21 NOTE — PRE-PROCEDURE INSTRUCTIONS
Pre-Surgery Instructions:   Medication Instructions    albuterol (PROVENTIL HFA,VENTOLIN HFA) 90 mcg/act inhaler PRN    loratadine (CLARITIN) 10 mg tablet ok take morning day of surgery with sips of water    mometasone (NASONEX) 50 mcg/act nasal spray PRN    montelukast (SINGULAIR) 10 mg tablet PRN    tamoxifen (NOLVADEX) 20 mg tablet take at HS   Have you had / have a sore throat? No  have you had / have a cough less than 1 week? No  Have you had / have a fever greater than 100 0 - 100  4? No  Are you experiencing any shortness of breath? No    Review with patient via phone medications and showering instructions  Advised don't take NSAID's, ok tylenol products  Advised ASC call with surgery schedule time, nothing eat or drink after midnight  Verbalized understanding  Inhalational Med Class  Continue to take these inhaler medications on your normal schedule up to and including the day of surgery  Leukotriene Inhibitor Med Class  Continue to take this medication on your normal schedule  If this is an oral medication and you take it in the morning, then you may take this medicine with a sip of water  Selective Estrogen Mod Med Class  Continue to take this medication on your normal schedule  If this is an oral medication and you take it in the morning, then you may take this medicine with a sip of water  This medication may need to be discontinued for a least 4 weeks prior to your surgery if the surgical procedure is associated with a high risk of blod clots as in hip or knee replacement for example  Please consult with your Surgeon to discuss discontinuing this medication before your surgery  Vitamin Med Class  You may continue to take any vitamin that your surgeon has prescribed to you up to the day before surgery   If your surgeon has not specifically prescribed this vitamin or instructed you to continue then stop taking 7 days prior to surgery

## 2021-04-25 ENCOUNTER — ANESTHESIA EVENT (OUTPATIENT)
Dept: PERIOP | Facility: HOSPITAL | Age: 41
End: 2021-04-25
Payer: COMMERCIAL

## 2021-04-25 NOTE — ANESTHESIA PREPROCEDURE EVALUATION
Procedure:  RIGHT BREAST AUGMENTATION (Right Breast)    Relevant Problems   ANESTHESIA (within normal limits)   (-) History of anesthesia complications      CARDIO   (-) Chest pain   (-) NATION (dyspnea on exertion)      GI/HEPATIC   (-) Gastroesophageal reflux disease      /RENAL (within normal limits)      GYN   (+) Malignant neoplasm of overlapping sites of left breast in female, estrogen receptor positive (HCC)      NEURO/PSYCH (within normal limits)      PULMONARY   (+) Mild intermittent asthma without complication (well controlled, inhaler use 1/wk, no recent steroids, Abx, exacerbations)   (-) Shortness of breath   (-) URI (upper respiratory infection)        Physical Exam    Airway    Mallampati score: I  TM Distance: >3 FB  Neck ROM: full     Dental   No notable dental hx     Cardiovascular      Pulmonary      Other Findings        Anesthesia Plan  ASA Score- 2     Anesthesia Type- general with ASA Monitors  Additional Monitors:   Airway Plan: LMA  Plan Factors-Exercise tolerance (METS): >4 METS  Chart reviewed  EKG reviewed  Existing labs reviewed  Induction- intravenous  Postoperative Plan-     Informed Consent- Anesthetic plan and risks discussed with patient  I personally reviewed this patient with the CRNA  Discussed and agreed on the Anesthesia Plan with the WILMA Stephen

## 2021-04-26 ENCOUNTER — ANESTHESIA (OUTPATIENT)
Dept: PERIOP | Facility: HOSPITAL | Age: 41
End: 2021-04-26
Payer: COMMERCIAL

## 2021-04-26 ENCOUNTER — HOSPITAL ENCOUNTER (OUTPATIENT)
Facility: HOSPITAL | Age: 41
Setting detail: OUTPATIENT SURGERY
Discharge: HOME | End: 2021-04-26
Attending: PLASTIC SURGERY | Admitting: PLASTIC SURGERY
Payer: COMMERCIAL

## 2021-04-26 VITALS
BODY MASS INDEX: 19.16 KG/M2 | WEIGHT: 115 LBS | DIASTOLIC BLOOD PRESSURE: 61 MMHG | SYSTOLIC BLOOD PRESSURE: 107 MMHG | RESPIRATION RATE: 18 BRPM | TEMPERATURE: 97.2 F | HEIGHT: 65 IN | OXYGEN SATURATION: 98 % | HEART RATE: 87 BPM

## 2021-04-26 DIAGNOSIS — Z98.82 S/P LEFT BREAST IMPLANT: ICD-10-CM

## 2021-04-26 DIAGNOSIS — Z17.0 MALIGNANT NEOPLASM OF OVERLAPPING SITES OF LEFT BREAST IN FEMALE, ESTROGEN RECEPTOR POSITIVE (HCC): Primary | ICD-10-CM

## 2021-04-26 DIAGNOSIS — C50.812 MALIGNANT NEOPLASM OF OVERLAPPING SITES OF LEFT BREAST IN FEMALE, ESTROGEN RECEPTOR POSITIVE (HCC): Primary | ICD-10-CM

## 2021-04-26 PROCEDURE — 19325 BREAST AUGMENTATION W/IMPLT: CPT | Performed by: PLASTIC SURGERY

## 2021-04-26 PROCEDURE — 99024 POSTOP FOLLOW-UP VISIT: CPT | Performed by: PLASTIC SURGERY

## 2021-04-26 PROCEDURE — 19325 BREAST AUGMENTATION W/IMPLT: CPT | Performed by: PHYSICIAN ASSISTANT

## 2021-04-26 PROCEDURE — C1789 PROSTHESIS, BREAST, IMP: HCPCS | Performed by: PLASTIC SURGERY

## 2021-04-26 DEVICE — SMOOTH MODERATE PLUS PROFILE XTRA 215CC  SMOOTH ROUND SILICONE
Type: IMPLANTABLE DEVICE | Site: BREAST | Status: FUNCTIONAL
Brand: MENTOR MEMORYGEL XTRA BREAST IMPLANT

## 2021-04-26 RX ORDER — GABAPENTIN 100 MG/1
100 CAPSULE ORAL 3 TIMES DAILY
Qty: 30 CAPSULE | Refills: 0 | Status: SHIPPED | OUTPATIENT
Start: 2021-04-26 | End: 2021-12-23

## 2021-04-26 RX ORDER — SODIUM CHLORIDE, SODIUM LACTATE, POTASSIUM CHLORIDE, CALCIUM CHLORIDE 600; 310; 30; 20 MG/100ML; MG/100ML; MG/100ML; MG/100ML
125 INJECTION, SOLUTION INTRAVENOUS CONTINUOUS
Status: DISCONTINUED | OUTPATIENT
Start: 2021-04-26 | End: 2021-04-26 | Stop reason: HOSPADM

## 2021-04-26 RX ORDER — NEOSTIGMINE METHYLSULFATE 1 MG/ML
INJECTION INTRAVENOUS AS NEEDED
Status: DISCONTINUED | OUTPATIENT
Start: 2021-04-26 | End: 2021-04-26

## 2021-04-26 RX ORDER — ONDANSETRON 2 MG/ML
4 INJECTION INTRAMUSCULAR; INTRAVENOUS ONCE AS NEEDED
Status: DISCONTINUED | OUTPATIENT
Start: 2021-04-26 | End: 2021-04-26 | Stop reason: HOSPADM

## 2021-04-26 RX ORDER — EPHEDRINE SULFATE 50 MG/ML
INJECTION INTRAVENOUS AS NEEDED
Status: DISCONTINUED | OUTPATIENT
Start: 2021-04-26 | End: 2021-04-26

## 2021-04-26 RX ORDER — MIDAZOLAM HYDROCHLORIDE 2 MG/2ML
INJECTION, SOLUTION INTRAMUSCULAR; INTRAVENOUS AS NEEDED
Status: DISCONTINUED | OUTPATIENT
Start: 2021-04-26 | End: 2021-04-26

## 2021-04-26 RX ORDER — GLYCOPYRROLATE 0.2 MG/ML
INJECTION INTRAMUSCULAR; INTRAVENOUS AS NEEDED
Status: DISCONTINUED | OUTPATIENT
Start: 2021-04-26 | End: 2021-04-26

## 2021-04-26 RX ORDER — DEXAMETHASONE SODIUM PHOSPHATE 10 MG/ML
INJECTION, SOLUTION INTRAMUSCULAR; INTRAVENOUS AS NEEDED
Status: DISCONTINUED | OUTPATIENT
Start: 2021-04-26 | End: 2021-04-26

## 2021-04-26 RX ORDER — HYDROMORPHONE HCL/PF 1 MG/ML
SYRINGE (ML) INJECTION AS NEEDED
Status: DISCONTINUED | OUTPATIENT
Start: 2021-04-26 | End: 2021-04-26

## 2021-04-26 RX ORDER — MAGNESIUM HYDROXIDE 1200 MG/15ML
LIQUID ORAL AS NEEDED
Status: DISCONTINUED | OUTPATIENT
Start: 2021-04-26 | End: 2021-04-26 | Stop reason: HOSPADM

## 2021-04-26 RX ORDER — FENTANYL CITRATE/PF 50 MCG/ML
25 SYRINGE (ML) INJECTION
Status: DISCONTINUED | OUTPATIENT
Start: 2021-04-26 | End: 2021-04-26 | Stop reason: HOSPADM

## 2021-04-26 RX ORDER — FENTANYL CITRATE 50 UG/ML
INJECTION, SOLUTION INTRAMUSCULAR; INTRAVENOUS AS NEEDED
Status: DISCONTINUED | OUTPATIENT
Start: 2021-04-26 | End: 2021-04-26

## 2021-04-26 RX ORDER — OXYCODONE HYDROCHLORIDE 5 MG/1
5 TABLET ORAL EVERY 6 HOURS PRN
Qty: 28 TABLET | Refills: 0 | Status: SHIPPED | OUTPATIENT
Start: 2021-04-26 | End: 2021-05-03

## 2021-04-26 RX ORDER — CEFAZOLIN SODIUM 1 G/50ML
1000 SOLUTION INTRAVENOUS ONCE
Status: COMPLETED | OUTPATIENT
Start: 2021-04-26 | End: 2021-04-26

## 2021-04-26 RX ORDER — GABAPENTIN 300 MG/1
300 CAPSULE ORAL ONCE
Status: COMPLETED | OUTPATIENT
Start: 2021-04-26 | End: 2021-04-26

## 2021-04-26 RX ORDER — METOCLOPRAMIDE HYDROCHLORIDE 5 MG/ML
10 INJECTION INTRAMUSCULAR; INTRAVENOUS ONCE AS NEEDED
Status: DISCONTINUED | OUTPATIENT
Start: 2021-04-26 | End: 2021-04-26 | Stop reason: HOSPADM

## 2021-04-26 RX ORDER — LIDOCAINE HYDROCHLORIDE 10 MG/ML
0.5 INJECTION, SOLUTION EPIDURAL; INFILTRATION; INTRACAUDAL; PERINEURAL ONCE AS NEEDED
Status: DISCONTINUED | OUTPATIENT
Start: 2021-04-26 | End: 2021-04-26 | Stop reason: HOSPADM

## 2021-04-26 RX ORDER — SODIUM CHLORIDE, SODIUM LACTATE, POTASSIUM CHLORIDE, CALCIUM CHLORIDE 600; 310; 30; 20 MG/100ML; MG/100ML; MG/100ML; MG/100ML
50 INJECTION, SOLUTION INTRAVENOUS CONTINUOUS
Status: DISCONTINUED | OUTPATIENT
Start: 2021-04-26 | End: 2021-04-26 | Stop reason: CLARIF

## 2021-04-26 RX ORDER — ONDANSETRON 2 MG/ML
INJECTION INTRAMUSCULAR; INTRAVENOUS AS NEEDED
Status: DISCONTINUED | OUTPATIENT
Start: 2021-04-26 | End: 2021-04-26

## 2021-04-26 RX ORDER — ROCURONIUM BROMIDE 10 MG/ML
INJECTION, SOLUTION INTRAVENOUS AS NEEDED
Status: DISCONTINUED | OUTPATIENT
Start: 2021-04-26 | End: 2021-04-26

## 2021-04-26 RX ORDER — PROPOFOL 10 MG/ML
INJECTION, EMULSION INTRAVENOUS AS NEEDED
Status: DISCONTINUED | OUTPATIENT
Start: 2021-04-26 | End: 2021-04-26

## 2021-04-26 RX ORDER — LIDOCAINE HYDROCHLORIDE 10 MG/ML
INJECTION, SOLUTION EPIDURAL; INFILTRATION; INTRACAUDAL; PERINEURAL AS NEEDED
Status: DISCONTINUED | OUTPATIENT
Start: 2021-04-26 | End: 2021-04-26

## 2021-04-26 RX ORDER — HYDROMORPHONE HCL IN WATER/PF 6 MG/30 ML
0.2 PATIENT CONTROLLED ANALGESIA SYRINGE INTRAVENOUS
Status: DISCONTINUED | OUTPATIENT
Start: 2021-04-26 | End: 2021-04-26 | Stop reason: HOSPADM

## 2021-04-26 RX ORDER — DIPHENHYDRAMINE HYDROCHLORIDE 50 MG/ML
12.5 INJECTION INTRAMUSCULAR; INTRAVENOUS
Status: DISCONTINUED | OUTPATIENT
Start: 2021-04-26 | End: 2021-04-26 | Stop reason: HOSPADM

## 2021-04-26 RX ORDER — OXYCODONE HYDROCHLORIDE 5 MG/1
5 TABLET ORAL EVERY 4 HOURS PRN
Status: DISCONTINUED | OUTPATIENT
Start: 2021-04-26 | End: 2021-04-26 | Stop reason: HOSPADM

## 2021-04-26 RX ORDER — BUPIVACAINE HYDROCHLORIDE 2.5 MG/ML
INJECTION, SOLUTION EPIDURAL; INFILTRATION; INTRACAUDAL AS NEEDED
Status: DISCONTINUED | OUTPATIENT
Start: 2021-04-26 | End: 2021-04-26 | Stop reason: HOSPADM

## 2021-04-26 RX ORDER — ACETAMINOPHEN 325 MG/1
975 TABLET ORAL ONCE
Status: COMPLETED | OUTPATIENT
Start: 2021-04-26 | End: 2021-04-26

## 2021-04-26 RX ADMIN — PHENYLEPHRINE HYDROCHLORIDE 100 MCG: 10 INJECTION INTRAVENOUS at 11:58

## 2021-04-26 RX ADMIN — GLYCOPYRROLATE 0.1 MG: 0.2 INJECTION, SOLUTION INTRAMUSCULAR; INTRAVENOUS at 11:17

## 2021-04-26 RX ADMIN — EPHEDRINE SULFATE 5 MG: 50 INJECTION, SOLUTION INTRAVENOUS at 11:34

## 2021-04-26 RX ADMIN — Medication 25 MCG: at 13:02

## 2021-04-26 RX ADMIN — Medication 25 MCG: at 13:10

## 2021-04-26 RX ADMIN — ONDANSETRON 4 MG: 2 INJECTION INTRAMUSCULAR; INTRAVENOUS at 11:17

## 2021-04-26 RX ADMIN — HYDROMORPHONE HYDROCHLORIDE 0.25 MG: 1 INJECTION, SOLUTION INTRAMUSCULAR; INTRAVENOUS; SUBCUTANEOUS at 11:47

## 2021-04-26 RX ADMIN — LIDOCAINE HYDROCHLORIDE 50 MG: 10 INJECTION, SOLUTION EPIDURAL; INFILTRATION; INTRACAUDAL; PERINEURAL at 11:04

## 2021-04-26 RX ADMIN — HYDROMORPHONE HYDROCHLORIDE 0.2 MG: 0.2 INJECTION, SOLUTION INTRAMUSCULAR; INTRAVENOUS; SUBCUTANEOUS at 13:28

## 2021-04-26 RX ADMIN — DEXAMETHASONE SODIUM PHOSPHATE 10 MG: 10 INJECTION, SOLUTION INTRAMUSCULAR; INTRAVENOUS at 11:17

## 2021-04-26 RX ADMIN — NEOSTIGMINE METHYLSULFATE 3 MG: 1 INJECTION INTRAVENOUS at 12:17

## 2021-04-26 RX ADMIN — HYDROMORPHONE HYDROCHLORIDE 0.25 MG: 1 INJECTION, SOLUTION INTRAMUSCULAR; INTRAVENOUS; SUBCUTANEOUS at 12:17

## 2021-04-26 RX ADMIN — PROPOFOL 150 MG: 10 INJECTION, EMULSION INTRAVENOUS at 11:04

## 2021-04-26 RX ADMIN — MIDAZOLAM 2 MG: 1 INJECTION INTRAMUSCULAR; INTRAVENOUS at 10:57

## 2021-04-26 RX ADMIN — FENTANYL CITRATE 50 MCG: 50 INJECTION INTRAMUSCULAR; INTRAVENOUS at 11:04

## 2021-04-26 RX ADMIN — EPHEDRINE SULFATE 5 MG: 50 INJECTION, SOLUTION INTRAVENOUS at 11:15

## 2021-04-26 RX ADMIN — GLYCOPYRROLATE 0.6 MG: 0.2 INJECTION, SOLUTION INTRAMUSCULAR; INTRAVENOUS at 12:17

## 2021-04-26 RX ADMIN — ACETAMINOPHEN 975 MG: 325 TABLET ORAL at 08:21

## 2021-04-26 RX ADMIN — CEFAZOLIN SODIUM 1000 MG: 1 SOLUTION INTRAVENOUS at 10:59

## 2021-04-26 RX ADMIN — SODIUM CHLORIDE, SODIUM LACTATE, POTASSIUM CHLORIDE, AND CALCIUM CHLORIDE 125 ML/HR: .6; .31; .03; .02 INJECTION, SOLUTION INTRAVENOUS at 08:42

## 2021-04-26 RX ADMIN — GABAPENTIN 300 MG: 300 CAPSULE ORAL at 08:21

## 2021-04-26 RX ADMIN — ROCURONIUM BROMIDE 50 MG: 50 INJECTION, SOLUTION INTRAVENOUS at 11:04

## 2021-04-26 RX ADMIN — DIPHENHYDRAMINE HYDROCHLORIDE 12.5 MG: 50 INJECTION, SOLUTION INTRAMUSCULAR; INTRAVENOUS at 13:41

## 2021-04-26 RX ADMIN — FENTANYL CITRATE 50 MCG: 50 INJECTION INTRAMUSCULAR; INTRAVENOUS at 11:10

## 2021-04-26 NOTE — INTERVAL H&P NOTE
H&P reviewed  After examining the patient I find no changes in the patients condition since the H&P had been written      Vitals:    04/26/21 0825   BP: 101/63   Pulse: 80   Resp: 20   Temp: 98 °F (36 7 °C)   SpO2: 99%

## 2021-04-26 NOTE — OP NOTE
OPERATIVE REPORT  PATIENT NAME: Ariel Sorenson    :  1980  MRN: [id number]  Pt Location: [facility] OR ROOM 06    SURGERY DATE: 2021    Surgeon(s) and Role:     * Re Mahmood MD - Primary     * Zahra Berger PA-C - Assisting    Preop Diagnosis:  Personal history of breast cancer [Z85 3]    Post-Op Diagnosis Codes:     * Personal history of breast cancer [Z85 3]    Procedure(s) (LRB):  RIGHT BREAST AUGMENTATION (Right)    Specimen(s):  * No specimens in log *    Estimated Blood Loss:   Minimal    Drains:  * No LDAs found *    Anesthesia Type:   General    Operative Indications:  Personal history of breast cancer [Z85 3]    A statement of medical necessity:   Mrs Nery Harrell is a 44-year-old female with history of acquired absence of the left breast who presents for right breast augmentation for symmetry purposes  Risks and benefits of the procedure were discussed in detail during our preoperative encounter before signing informed consent  Implant:   [place] (Xtra, moderate plus 215 c c )  Ref: [id number]  LOT [id number]   [id number]    Operative Findings:  Right subpectoral / Dual plane 1 implant placement    Complications:   None    Procedure and Technique:  The patient was met in the preoperative holding area and all the last minute questions were properly answered and addressed  Preoperative markings were then made in the standing position she was then taken to the operative room theater placed in supine position  After smooth anesthesia was then induced the chest was then prepped and draped in the usual sterile fashion, preoperative antibiotics were given and a surgical time-out was then made  Procedure started by 1st marking a 4 cm incision along our proposed lower breast footprint along the IMF off centered laterally along the breast meridian  This was infiltrated with 5 mL of 1% lidocaine with epinephrine and then incised with a scalpel into the subcutaneous tissue    Using electrocautery the subcutaneous tissue and the breast was then dissected down until the inferolateral border of the pectoralis major muscle was then encounter  Subglandular dissection was performed along the lower pole off pectoralis major fascia and the subpectoral plane was then developed with assistant of the lighted retractor around the clock mostly around the proposed implant footprint  Careful hemostasis of any encounter perforators under direct visualization  Dual plane pectoralis muscle release was performed in the standard fashion pocket dissection was then completed  Implant Sizer was then brought into the field and introduced into the breast pocket after medically and the skin was then tailor tacked with staples  Patient was then placed in a reflex position and confirmed to be of adequate size  The temporary implant was then removed from the breast pocket was then irrigated with 1 L of triple antibiotic irrigation hemostasis was again confirmed  The skin was then reprepped and draped usual sterile fashion and all the members of the staff exchange gloves  Final implant selected was then brought to the field and submerged in antibiotic irrigation and this was then delivered into the subpectoral plane using the [employer] with satisfactory volume mammary tension and symmetry under breasts confirmed  10 mL of 0 5% Marcaine plain was then infiltrated along the breast pocket and progressive tension suture using 2-0 Vicryl sutures along the breast parenchyma down to the IMF was then performed and the skin was then closed with deep dermal 3-0 Monocryl and a running 4-0 Monocryl subcuticular stitch  Surgical glue and Steri-Strips was applied for dressing as well as a surgical bra       I was present for all critical portions of the procedure    Patient Disposition:  PACU  and hemodynamically stable    SIGNATURE: Gurpreet Espinal MD  DATE: April 26, 2021  TIME: 12:51 PM

## 2021-04-26 NOTE — H&P
Assessment/Plan:       Diagnoses and all orders for this visit:     Malignant neoplasm of overlapping sites of left breast in female, estrogen receptor positive ([facility] [address] )         I discussed that I felt that she is recovering well from her radiation  I explained that final outcome will continue to improve with time once tissue recover but some damage will be permanent  Patient has micromastia on contralateral right breast, which she would like to match to avoid size discrepancy and better fit of her bra  Recent mammogram on 11/24/2020 with no evidence of malignancy       I discussed contralateral right breast augmentation for symmetry purposes with the patient  I discussed that this could be performed as outpatient  Surgery      I discussed risks including but not limited to: bleeding, infection, hematoma, seroma, wound breakdown, scar, need for further surgery, deformity, pain, numbness, weakness, asymmetry, injury to structures, and medical complications      I discussed that the scar involved would be larger than the maximal dimension of the lesion itself      The patient would like to proceed and therefore we will initiate the insurance approval process      A total of 40 minutes of face-to-face time was spent in this encounter, of which >50% was spent in counseling   [address]   Office: [phone]              Subjective:      Patient ID: Gemini Scott is a 36 y o  female      Mrs Tyesha Martines is a 36years old female 8 months s/p:   BREAST MODIFIED RADICAL MASTECTOMY WITH AXILLARY LYMPH NODE NEEDLE LOCALIZATION (NEEDLE LOC AT 1130) (Left)  BREAST IMMEDIATE RECONSTRUCTION WITH IMPLANT ; ACELLULAR DERMAL MATRIX (ADM) (Left)  APPLICATION OF NEGATIVE PRESSURE THERAPY   LEFT AXILLA COMPLEX CLOSURE 8 CM     She manifest doing well, minimal discomfort in her axillar arm extension but improving  She finished her radiation therapy in Oct 2020 and just had her port removed end of December  Today she presents for routine follow and discuss next steps          Review of Systems   Constitutional: Negative  HENT: Negative  Eyes: Negative  Respiratory: Negative  Cardiovascular: Negative  Gastrointestinal: Negative  Endocrine: Negative  Genitourinary: Negative  Musculoskeletal: Negative  Skin: Positive for color change  Neurological: Negative  Hematological: Negative            Objective:      Physical Exam  Constitutional:       Appearance: Normal appearance  HENT:      Head: Normocephalic  Eyes:      Pupils: Pupils are equal, round, and reactive to light  Neck:      Musculoskeletal: Normal range of motion  Cardiovascular:      Rate and Rhythm: Normal rate  Pulmonary:      Effort: Pulmonary effort is normal    Chest:       Musculoskeletal: Normal range of motion  Neurological:      Mental Status: She is alert              Electronically signed by Phu Streeter MD at

## 2021-04-26 NOTE — ANESTHESIA POSTPROCEDURE EVALUATION
Post-Op Assessment Note    CV Status:  Stable  Pain Score: 0    Pain management: adequate     Mental Status:  Alert and awake   Hydration Status:  Euvolemic and stable   PONV Controlled:  Controlled   Airway Patency:  Patent and adequate      Post Op Vitals Reviewed: Yes      Staff: CRNA         No complications documented      BP   109/50   Temp (!) (P) 96 8 °F (36 °C) (04/26/21 1239)    Pulse (P) 102 (04/26/21 1239)   Resp (P) 18 (04/26/21 1239)    SpO2 (P) 100 % (04/26/21 1239)

## 2021-04-26 NOTE — DISCHARGE INSTRUCTIONS
[facility] Plastic and Reconstructive Surgery  [address]  Phone: [phone]     Postoperative Instructions for Outpatient Surgery     These instructions are being provided by your doctor to give you basic guidelines during your post-op recovery  Please let our office know if your contact information has changed  Please call the office today for an appointment in one week for your first postoperative care visit  Please call my office at any time if you have drainage from incisions, increased redness or swelling, or a fever greater than 100 5  Dressings: Please keep dressing clean, dry  Wear ABD dressing and surgical bra  Activity Restrictions: No strenuous activities  Bathing: You may shower in 48 hours  Medications:    Resume pre-op medications  You may take tylenol, aleve, or ibuprofen for pain control  You also have prescriptions for pain medications

## 2021-05-05 PROBLEM — Z98.890: Status: ACTIVE | Noted: 2021-05-05

## 2021-05-13 ENCOUNTER — OFFICE VISIT (OUTPATIENT)
Dept: PLASTIC SURGERY | Facility: CLINIC | Age: 41
End: 2021-05-13

## 2021-05-13 VITALS — TEMPERATURE: 97.4 F | BODY MASS INDEX: 19.16 KG/M2 | WEIGHT: 115 LBS | HEIGHT: 65 IN

## 2021-05-13 DIAGNOSIS — Z98.82 S/P LEFT BREAST IMPLANT: ICD-10-CM

## 2021-05-13 DIAGNOSIS — C50.812 MALIGNANT NEOPLASM OF OVERLAPPING SITES OF LEFT BREAST IN FEMALE, ESTROGEN RECEPTOR POSITIVE (HCC): Primary | ICD-10-CM

## 2021-05-13 DIAGNOSIS — Z17.0 MALIGNANT NEOPLASM OF OVERLAPPING SITES OF LEFT BREAST IN FEMALE, ESTROGEN RECEPTOR POSITIVE (HCC): Primary | ICD-10-CM

## 2021-05-13 DIAGNOSIS — Z98.890 HISTORY OF AUGMENTATION OF RIGHT BREAST: ICD-10-CM

## 2021-05-13 PROCEDURE — 99024 POSTOP FOLLOW-UP VISIT: CPT | Performed by: PHYSICIAN ASSISTANT

## 2021-05-13 NOTE — PROGRESS NOTES
POST-OP EVALUATION  2021    Blake Silveira is a 36 y o  female is here today for routine post-operative follow up  Pre-operatively she presented with the following symptoms:  21 1059         Procedure: RIGHT BREAST AUGMENTATION (Right Breast)     Pt feels better, although with more activity, she has some more pain  She hasn't needed much pain medicaiton  Past Medical History:   Diagnosis Date    Asthma     BRCA gene mutation negative 10/2019    [employer]    Breast cancer ([facility] [address] ) 09/10/2019    [facility]    Cancer ([facility] [address]     breast    COVID-19 2020    History of chemotherapy 10/2019    Pneumonia      Past Surgical History:   Procedure Laterality Date    AUGMENTATION BREAST Right 2021    Procedure: RIGHT BREAST AUGMENTATION;  Surgeon: Eyal March MD;  Location: [facility];  Service: Plastics    BREAST BIOPSY Left 09/10/2019    inv br ca   Misael Valdezt BREAST CYST INCISION AND DRAINAGE Left 2020    Procedure: INCISION AND DRAINAGE (I&D) BREAST;  Surgeon: Eyal March MD;  Location: [facility];  Service: Plastics    BREAST RECONSTRUCTION Left 2020    Procedure: BREAST IMMEDIATE RECONSTRUCTION WITH IMPLANT VERSUS TISSUE EXPANDER; ACELLULAR DERMAL MATRIX (ADM); Surgeon: Eyal March MD;  Location: [facility];  Service: Plastics     SECTION      IR PORT PLACEMENT  10/29/2019    IR PORT REMOVAL  2020    MASTECTOMY Left 2020    [place] MASTECTOMY, MODIFIED RADICAL Left 2020    Procedure: BREAST MODIFIED RADICAL MASTECTOMY WITH AXILLARY LYMPH NODE NEEDLE LOCALIZATION (NEEDLE LOC AT 1130);   Surgeon: Maggi Bryan MD;  Location: [facility];  Service: Surgical Oncology    US BREAST NEEDLE LOC LEFT Left 2020    US GUIDANCE BREAST BIOPSY LEFT EACH ADDITIONAL Left 9/10/2019    US GUIDED BREAST BIOPSY LEFT COMPLETE Left 9/10/2019    US GUIDED BREAST LYMPH NODE BIOPSY LEFT Left 9/10/2019        Current Outpatient Medications:   acetaminophen (TYLENOL) 500 MG chewable tablet, Chew 1 tablet (500 mg total) every 6 (six) hours, Disp: 30 tablet, Rfl: 0    albuterol (PROVENTIL HFA,VENTOLIN HFA) 90 mcg/act inhaler, Inhale 2 puffs every 6 (six) hours as needed for wheezing or shortness of breath, Disp: 1 Inhaler, Rfl: 3    gabapentin (NEURONTIN) 100 mg capsule, Take 1 capsule (100 mg total) by mouth 3 (three) times a day for 10 days, Disp: 30 capsule, Rfl: 0    gabapentin (NEURONTIN) 300 mg capsule, Take 1 capsule (300 mg total) by mouth 3 (three) times a day for 10 days, Disp: 30 capsule, Rfl: 0    loratadine (CLARITIN) 10 mg tablet, Take 1 tablet (10 mg total) by mouth daily, Disp: 90 tablet, Rfl: 1    mometasone (NASONEX) 50 mcg/act nasal spray, 2 sprays into each nostril daily (Patient taking differently: 2 sprays into each nostril as needed ), Disp: 3 Act, Rfl: 1    montelukast (SINGULAIR) 10 mg tablet, Take 1 tablet (10 mg total) by mouth daily at bedtime (Patient taking differently: Take 10 mg by mouth as needed ), Disp: 90 tablet, Rfl: 1    multivitamin (THERAGRAN) TABS, Take 1 tablet by mouth daily, Disp: , Rfl:     tamoxifen (NOLVADEX) 20 mg tablet, Take 1 tablet (20 mg total) by mouth daily (Patient taking differently: Take 20 mg by mouth daily at bedtime ), Disp: 90 tablet, Rfl: 1  Allergies: Shellfish-derived products - food allergy, Aspirin, Codeine, and Fentanyl    Objective    Implant properly positioned  Incision is clean, dry, intact  No/minimal sensation of right nipple  Good symmetry  Assessment/Plan   Diagnoses and all orders for this visit:    Malignant neoplasm of overlapping sites of left breast in female, estrogen receptor positive ([address] )    S/P left breast implant    History of augmentation of right breast    Aquaphor to incision  Continue with ROM exercises  OK to wear something other than sports bra  No underwire    Followup in 2 months with Dr Salas Jain PA-C

## 2021-05-19 ENCOUNTER — RADIATION ONCOLOGY FOLLOW-UP (OUTPATIENT)
Dept: RADIATION ONCOLOGY | Facility: HOSPITAL | Age: 41
End: 2021-05-19
Attending: RADIOLOGY
Payer: COMMERCIAL

## 2021-05-19 VITALS — TEMPERATURE: 97.4 F | HEART RATE: 76 BPM | OXYGEN SATURATION: 98 % | RESPIRATION RATE: 18 BRPM

## 2021-05-19 DIAGNOSIS — C50.812 MALIGNANT NEOPLASM OF OVERLAPPING SITES OF LEFT BREAST IN FEMALE, ESTROGEN RECEPTOR POSITIVE (HCC): Primary | ICD-10-CM

## 2021-05-19 DIAGNOSIS — C50.912 BREAST CANCER METASTASIZED TO AXILLARY LYMPH NODE, LEFT (HCC): Primary | ICD-10-CM

## 2021-05-19 DIAGNOSIS — Z17.0 MALIGNANT NEOPLASM OF OVERLAPPING SITES OF LEFT BREAST IN FEMALE, ESTROGEN RECEPTOR POSITIVE (HCC): Primary | ICD-10-CM

## 2021-05-19 DIAGNOSIS — C77.3 BREAST CANCER METASTASIZED TO AXILLARY LYMPH NODE, LEFT (HCC): Primary | ICD-10-CM

## 2021-05-19 PROCEDURE — 99211 OFF/OP EST MAY X REQ PHY/QHP: CPT | Performed by: RADIOLOGY

## 2021-05-19 NOTE — PROGRESS NOTES
Hiram Diaz 1980 is a 36 y o  female       Follow up visit       Oncology History Overview Note   44-year-old female with invasive mammary carcinoma the left breast, T2 N1 M0 grade 2 ER positive OH positive HER2 positive status post neoadjuvant systemic therapy followed by mastectomy, axillary dissection, and immediate reconstruction with expander placement demonstrating a complete pathologic response  She has completed a course of postmastectomy radiation therapy on 10/14/20  She started Tamoxifen therapy in November 2020  Last follow-up with Rad Onc on 11/18/20 11/18/20 Surg Onc follow-up  Pt reported lingering arthralgias since her chemotherapy and also reports intermittent headaches and nausea, which she is attributing to starting the Tamoxifen  Instructed her to monitor and call with persistent symptoms  There are no concerns on her exam today  Her skin has healed very well from radiation therapy  She will be following up with her plastic surgeon in the next few months as well to finalize her reconstructive plans  right diagnostic mammogram to be performed now  11/24/20 Mammo diagnostic right w 3d & cad  No evidence of malignancy  ASSESSMENT/BI-RADS CATEGORY:  Right: 1 - Negative  Overall: 1 - Negative      12/1/20 Infusion, herceptin/perjeta      12/2/20 Dr Regine Epps follow-up  Currently on adjuvant herceptin/perjeta with last dose in late Dec 2020  She is also on adjuvant hormonal therapy with tamoxifen which she is tolerating well  Clinically, she has no evidence recurrent disease  12/23/20 Plastic surgeon Dr Mayra Chester follow-up  Discussed contralateral right breast augmentation for symmetry purposes  Pt would like to proceed  12/30/20 [place]       4/26/21 Plastic surgery - Right breast augmentation   Operative Findings:  Right subpectoral / Dual plane 1 implant placement       5/13/21 Plastic surgery f/u  Apply aquaphor to incision, continue ROM exercises   F/U in 2 months        5/20/21 Surg Onc follow-up  6/8/21 Med Onc follow-up  7/22/21 Plastic surg follow-up         Malignant neoplasm of overlapping sites of left breast in female, estrogen receptor positive ([facility] [address] )   9/10/2019 Biopsy    Left breast ultrasound-guided biopsy  A  2 o'clock, 2 cm from nipple  Invasive breast carcinoma of no special type (ductal, NST)  Grade 2  ER 90  NH 70  HER2 3+    B  3 o'clock, 1 cm from nipple  Invasive breast carcinoma of no special type (ductal NST)  Grade 2  ER 70  NH 60  HER2 3+    C  Left axillary lymph node  Metastatic adenocarcinoma     9/23/2019 Genetic Testing    Panel of 19 genes were evaluated  Negative result   No pathogenic sequence variants or deletions/dupllications identified  [employer]     11/6/2019 -  Chemotherapy    DOXOrubicin (ADRIAMYCIN) injection 96 mg, 60 mg/m2 = 96 mg, Intravenous, Once, 4 of 4 cycles  Administration: 96 mg (11/6/2019), 96 mg (11/20/2019), 96 mg (12/4/2019), 96 mg (12/18/2019)  pegfilgrastim-jmdb (FULPHILA) subcutaneous injection 6 mg, 6 mg, Subcutaneous, Once, 4 of 4 cycles  Administration: 6 mg (11/7/2019), 6 mg (11/21/2019), 6 mg (12/5/2019), 6 mg (12/19/2019)  cyclophosphamide (CYTOXAN) 1,000 mg in sodium chloride 0 9 % 250 mL IVPB, 960 mg, Intravenous, Once, 4 of 4 cycles  Administration: 1,000 mg (11/6/2019), 1,000 mg (11/20/2019), 1,000 mg (12/4/2019), 1,000 mg (12/18/2019)  fosaprepitant (EMEND) 150 mg in sodium chloride 0 9 % 255 mL IVPB, 150 mg, Intravenous, Once, 4 of 4 cycles  Administration: 150 mg (11/6/2019), 150 mg (11/20/2019), 150 mg (12/4/2019), 150 mg (12/18/2019)  pertuzumab (PERJETA) 840 mg in sodium chloride 0 9 % 250 mL IVPB, 840 mg (100 % of original dose 840 mg), Intravenous, Once, 17 of 17 cycles  Dose modification: 840 mg (original dose 840 mg, Cycle 5), 420 mg (original dose 420 mg, Cycle 8), 420 mg (original dose 420 mg, Cycle 17)  Administration: 840 mg (1/15/2020), 420 mg (2/6/2020), 420 mg (3/2/2020), 420 mg (3/23/2020), 420 mg (4/13/2020), 420 mg (5/4/2020), 420 mg (5/26/2020), 420 mg (6/15/2020), 420 mg (7/6/2020), 420 mg (7/27/2020), 420 mg (8/17/2020), 420 mg (9/11/2020), 420 mg (9/29/2020), 420 mg (11/10/2020), 420 mg (12/1/2020), 420 mg (12/22/2020), 420 mg (10/20/2020)  PACLitaxel (TAXOL) 127 8 mg in sodium chloride 0 9 % 250 mL chemo IVPB, 80 mg/m2 = 127 8 mg, Intravenous, Once, 11 of 11 cycles  Dose modification: 60 mg/m2 (original dose 80 mg/m2, Cycle 14, Reason: Dose Not Tolerated)  Administration: 127 8 mg (1/15/2020), 120 6 mg (1/22/2020), 120 6 mg (2/6/2020), 120 6 mg (2/17/2020), 120 6 mg (3/9/2020), 90 6 mg (3/23/2020), 120 6 mg (3/30/2020), 120 6 mg (1/29/2020), 120 6 mg (2/25/2020), 120 6 mg (3/16/2020), 120 6 mg (4/7/2020)  trastuzumab (HERCEPTIN) 214 mg in sodium chloride 0 9 % 250 mL chemo infusion, 4 mg/kg = 214 mg, Intravenous, Once, 25 of 25 cycles  Administration: 214 mg (1/15/2020), 96 mg (1/22/2020), 96 mg (2/6/2020), 96 mg (2/17/2020), 96 mg (3/2/2020), 96 mg (3/9/2020), 96 mg (3/23/2020), 96 mg (3/30/2020), 300 mg (4/13/2020), 96 mg (1/29/2020), 96 mg (2/25/2020), 96 mg (3/16/2020), 96 mg (4/7/2020), 300 mg (5/4/2020), 300 mg (5/26/2020), 300 mg (6/15/2020), 300 mg (7/6/2020), 300 mg (7/27/2020), 300 mg (8/17/2020), 318 mg (9/11/2020), 318 mg (9/29/2020), 318 mg (11/10/2020), 318 mg (12/1/2020), 318 mg (12/22/2020), 318 mg (10/20/2020)     4/28/2020 Surgery    Left breast modified radical mastectomy  No residual invasive carcinoma present  Background focal atypical ductal hyperplasia  0/11 Lymph nodes  Complete response to neoadjuvant chemotherapy    Immediate reconstruction with tissue expander (Dr Haseeb Allen)     5/22/2020 -  Cancer Staged    Staging form: Breast, AJCC 8th Edition  - Pathologic: No Stage Recommended (ypT0, pN0, cM0, G2, ER+, AK+, HER2+) - Signed by Tony Egan MD on 5/22/2020  Stage prefix: y  Neoadjuvant therapy: Yes  Response to neoadjuvant therapy: Complete response  Laterality: Left  Multigene prognostic tests performed: None  Histologic grading system: 3 grade system       5/22/2020 -  Cancer Staged    Staging form: Breast, AJCC 8th Edition  - Clinical: Stage IB (cT2, cN1, cM0, G2, ER+, ID+, HER2+) - Signed by Chilo Hurst MD on 5/22/2020  Laterality: Left  Histologic grading system: 3 grade system       9/10/2020 - 10/14/2020 Radiation    Course: C1    Plan ID Energy Fractions Dose per Fraction (cGy) Dose Correction (cGy) Total Dose Delivered (cGy) Elapsed Days   BH L CW 6X 13 / 13 200 0 2,600 34   BH L CW BOLUS 6X 12 / 12 200 0 2,400 32   BH L PAB 10X 25 / 25 18 0 450 34   BH L Sclav 6X 25 / 25 200 0 5,000 29      Dr Talha Sun       11/2020 -  Hormone Therapy    Tamoxifen 20 mg PO daily         Clinical Trial: no      Health Maintenance   Topic Date Due    Pneumococcal Vaccine: Pediatrics (0 to 5 Years) and At-Risk Patients (6 to 59 Years) (1 of 3 - PCV13) Never done    COVID-19 Vaccine (1) Never done    HIV Screening  Never done    DTaP,Tdap,and Td Vaccines (1 - Tdap) Never done    Influenza Vaccine (Season Ended) 09/01/2021    Annual Physical  09/16/2021    MAMMOGRAM  11/24/2021    Depression Screening PHQ  12/15/2021    BMI: Adult  05/13/2022    Cervical Cancer Screening  09/16/2025    HIB Vaccine  Aged Out    Hepatitis B Vaccine  Aged Out    IPV Vaccine  Aged Out    Hepatitis A Vaccine  Aged Out    Meningococcal ACWY Vaccine  Aged Out    HPV Vaccine  Aged Out       Patient Active Problem List   Diagnosis    Malignant neoplasm of overlapping sites of left breast in female, estrogen receptor positive ([address] )    Varicose veins of left lower extremity with pain    Mild intermittent asthma without complication    Seasonal allergic rhinitis due to pollen    Chemotherapy induced neutropenia ([address] )    Muscle spasm    Dehiscence of incision    S/P left breast implant    Use of tamoxifen (Nolvadex)    COVID-19 virus detected    History of augmentation of right breast     Past Medical History:   Diagnosis Date    Asthma     BRCA gene mutation negative 10/2019    [employer]    Breast cancer ([facility] [address] ) 09/10/2019    [facility]    Cancer ([facility] [address] )     breast    COVID-19 2020    History of chemotherapy 10/2019    Pneumonia      Past Surgical History:   Procedure Laterality Date    AUGMENTATION BREAST Right 2021    Procedure: RIGHT BREAST AUGMENTATION;  Surgeon: Aviva Haney MD;  [facility];  Service: Plastics    BREAST BIOPSY Left 09/10/2019    inv br ca    BREAST CYST INCISION AND DRAINAGE Left 2020    Procedure: INCISION AND DRAINAGE (I&D) BREAST;  Surgeon: Aviva Haney MD;  [facility];  Service: Plastics    BREAST RECONSTRUCTION Left 2020    Procedure: BREAST IMMEDIATE RECONSTRUCTION WITH IMPLANT VERSUS TISSUE EXPANDER; ACELLULAR DERMAL MATRIX (ADM); Surgeon: Aviva Haney MD;  [facility];  Service: Plastics     SECTION      IR PORT PLACEMENT  10/29/2019    IR PORT REMOVAL  2020    MASTECTOMY Left 2020    [place] MASTECTOMY, MODIFIED RADICAL Left 2020    Procedure: BREAST MODIFIED RADICAL MASTECTOMY WITH AXILLARY LYMPH NODE NEEDLE LOCALIZATION (NEEDLE LOC AT 1130);   Surgeon: Pam Cui MD;  [facility];  Service: Surgical Oncology    US BREAST NEEDLE LOC LEFT Left 2020    US GUIDANCE BREAST BIOPSY LEFT EACH ADDITIONAL Left 9/10/2019    US GUIDED BREAST BIOPSY LEFT COMPLETE Left 9/10/2019    US GUIDED BREAST LYMPH NODE BIOPSY LEFT Left 9/10/2019     Family History   Problem Relation Age of Onset    Diabetes Father     Asthma Father     No Known Problems Daughter     No Known Problems Maternal Aunt     No Known Problems Maternal Aunt     No Known Problems Maternal Aunt     Breast cancer Paternal Aunt         age at dx unk    Stomach cancer Paternal Aunt     Pancreatic cancer Maternal Grandmother         age at dx unk    Cancer Paternal Uncle         type and age unk     Social History     Socioeconomic History    Marital status:      Spouse name: Not on file    Number of children: 2    Years of education: Not on file    Highest education level: Not on file   Occupational History    Not on file   Social Needs    Financial resource strain: Not hard at all   [place] insecurity     Worry: Never true     Inability: Never true   [employer] needs     Medical: No     Non-medical: No   Tobacco Use    Smoking status: Never Smoker    Smokeless tobacco: Never Used   Substance and Sexual Activity    Alcohol use: Never     Frequency: Never     Binge frequency: Never    Drug use: Never    Sexual activity: Yes     Partners: Male   Lifestyle    Physical activity     Days per week: 0 days     Minutes per session: 0 min    Stress:  Only a little   Relationships    Social connections     Talks on phone: Patient refused     Gets together: Patient refused     Attends [employer] service: Patient refused     Active member of club or organization: Patient refused     Attends meetings of clubs or organizations: Patient refused     Relationship status: Patient refused    Intimate partner violence     Fear of current or ex partner: Patient refused     Emotionally abused: Patient refused     Physically abused: Patient refused     Forced sexual activity: Patient refused   Other Topics Concern    Not on file   Social History Narrative    Not on file       Current Outpatient Medications:     acetaminophen (TYLENOL) 500 MG chewable tablet, Chew 1 tablet (500 mg total) every 6 (six) hours, Disp: 30 tablet, Rfl: 0    albuterol (PROVENTIL HFA,VENTOLIN HFA) 90 mcg/act inhaler, Inhale 2 puffs every 6 (six) hours as needed for wheezing or shortness of breath, Disp: 1 Inhaler, Rfl: 3    gabapentin (NEURONTIN) 300 mg capsule, Take 1 capsule (300 mg total) by mouth 3 (three) times a day for 10 days, Disp: 30 capsule, Rfl: 0    loratadine (CLARITIN) 10 mg tablet, Take 1 tablet (10 mg total) by mouth daily, Disp: 90 tablet, Rfl: 1    mometasone (NASONEX) 50 mcg/act nasal spray, 2 sprays into each nostril daily (Patient taking differently: 2 sprays into each nostril as needed ), Disp: 3 Act, Rfl: 1    montelukast (SINGULAIR) 10 mg tablet, Take 1 tablet (10 mg total) by mouth daily at bedtime (Patient taking differently: Take 10 mg by mouth as needed ), Disp: 90 tablet, Rfl: 1    multivitamin (THERAGRAN) TABS, Take 1 tablet by mouth daily, Disp: , Rfl:     tamoxifen (NOLVADEX) 20 mg tablet, Take 1 tablet (20 mg total) by mouth daily (Patient taking differently: Take 20 mg by mouth daily at bedtime ), Disp: 90 tablet, Rfl: 1    gabapentin (NEURONTIN) 100 mg capsule, Take 1 capsule (100 mg total) by mouth 3 (three) times a day for 10 days, Disp: 30 capsule, Rfl: 0  Allergies   Allergen Reactions    Shellfish-Derived Products - Food Allergy Anaphylaxis     Facial swelling/itchy throat    Aspirin Edema, Eye Swelling and Facial Swelling    Codeine Rash    Fentanyl Itching       Review of Systems:  Review of Systems   Constitutional: Negative  HENT: Negative  Eyes: Negative  Respiratory: Negative  Cardiovascular: Negative  Gastrointestinal: Negative  Endocrine: Negative  Genitourinary: Negative  Musculoskeletal: Negative  Skin:        Healing from reconstruction surgery, right breast  She reports some tenderness and stiffness at left axilla and arm   Allergic/Immunologic: Positive for environmental allergies  Neurological: Positive for numbness (fingers neuropathy, started gabapentin) and headaches (three times weekly (pressure) taking tylenol prn)  Hematological: Negative  Psychiatric/Behavioral: Negative  Vitals:    05/19/21 1100   Pulse: 76   Resp: 18   Temp: (!) 97 4 °F (36 3 °C)   TempSrc: Temporal   SpO2: 98%               Imaging:No results found

## 2021-05-19 NOTE — PROGRESS NOTES
Follow-up - Radiation Oncology   Seven Tinajero 1980 36 y o  female [phone]      History of Present Illness   Cancer Staging  Malignant neoplasm of overlapping sites of left breast in female, estrogen receptor positive ([facility] [address] )  Staging form: Breast, AJCC 8th Edition  - Pathologic: No Stage Recommended - Unsigned  Stage prefix: Post-therapy  Neoadjuvant therapy: Yes  Response to neoadjuvant therapy: Complete response  - Pathologic: No Stage Recommended (ypT0, pN0, cM0, G2, ER+, AL+, HER2+) - Signed by Betzy Best MD on 5/22/2020  Stage prefix: Post-therapy  Neoadjuvant therapy: Yes  Response to neoadjuvant therapy: Complete response  Multigene prognostic tests performed: None  Histologic grading system: 3 grade system  Laterality: Left  - Clinical: Stage IB (cT2, cN1, cM0, G2, ER+, AL+, HER2+) - Signed by Betzy Best MD on 5/22/2020  Histologic grading system: 3 grade system  Laterality: Left      [facility] today for routine scheduled follow-up visit approximately 7 months status post completion of postmastectomy radiation therapy  Overall she feels well  She does have some mildly limited range of motion of the left shoulder but has recently re-initiated her stretching exercises home  She reports persistent numbness in the axillary region  She denies any lymphedema  She recently underwent breast augmentation for the contralateral right breast       Interval History:  59-year-old female with invasive mammary carcinoma the left breast, T2 N1 M0 grade 2 ER positive AL positive HER2 positive status post neoadjuvant systemic therapy followed by mastectomy, axillary dissection, and immediate reconstruction with expander placement demonstrating a complete pathologic response  She has completed a course of postmastectomy radiation therapy on 10/14/20  She started Tamoxifen therapy in November 2020   Last follow-up with Rad Onc on 11/18/20 11/18/20 Surg Onc follow-up  Pt reported lingering arthralgias since her chemotherapy and also reports intermittent headaches and nausea, which she is attributing to starting the Tamoxifen  Instructed her to monitor and call with persistent symptoms  There are no concerns on her exam today  Her skin has healed very well from radiation therapy  She will be following up with her plastic surgeon in the next few months as well to finalize her reconstructive plans  right diagnostic mammogram to be performed now  11/24/20 Mammo diagnostic right w 3d & cad  No evidence of malignancy  ASSESSMENT/BI-RADS CATEGORY:  Right: 1 - Negative  Overall: 1 - Negative      12/1/20 Infusion, herceptin/perjeta      12/2/20 Dr Diogenes Garza follow-up  Currently on adjuvant herceptin/perjeta with last dose in late Dec 2020  She is also on adjuvant hormonal therapy with tamoxifen which she is tolerating well  Clinically, she has no evidence recurrent disease  12/23/20 Plastic surgeon Dr Archana Gonzalez follow-up  Discussed contralateral right breast augmentation for symmetry purposes  Pt would like to proceed  12/30/20 [place]       4/26/21 Plastic surgery - Right breast augmentation   Operative Findings:  Right subpectoral / Dual plane 1 implant placement       5/13/21 Plastic surgery f/u  Apply aquaphor to incision, continue ROM exercises  F/U in 2 months        5/20/21 Surg Onc follow-up  6/8/21 Med Onc follow-up  7/22/21 Plastic surg follow-up          Historical Information   Oncology History   Malignant neoplasm of overlapping sites of left breast in female, estrogen receptor positive ([facility] [address] )   9/10/2019 Biopsy    Left breast ultrasound-guided biopsy  A  2 o'clock, 2 cm from nipple  Invasive breast carcinoma of no special type (ductal, NST)  Grade 2  ER 90  NH 70  HER2 3+    B  3 o'clock, 1 cm from nipple  Invasive breast carcinoma of no special type (ductal NST)  Grade 2  ER 70  NH 60  HER2 3+    C   Left axillary lymph node  Metastatic adenocarcinoma 9/23/2019 Genetic Testing    Panel of 19 genes were evaluated  Negative result   No pathogenic sequence variants or deletions/dupllications identified  [facility]     11/6/2019 -  Chemotherapy    DOXOrubicin (ADRIAMYCIN) injection 96 mg, 60 mg/m2 = 96 mg, Intravenous, Once, 4 of 4 cycles  Administration: 96 mg (11/6/2019), 96 mg (11/20/2019), 96 mg (12/4/2019), 96 mg (12/18/2019)  pegfilgrastim-jmdb (FULPHILA) subcutaneous injection 6 mg, 6 mg, Subcutaneous, Once, 4 of 4 cycles  Administration: 6 mg (11/7/2019), 6 mg (11/21/2019), 6 mg (12/5/2019), 6 mg (12/19/2019)  cyclophosphamide (CYTOXAN) 1,000 mg in sodium chloride 0 9 % 250 mL IVPB, 960 mg, Intravenous, Once, 4 of 4 cycles  Administration: 1,000 mg (11/6/2019), 1,000 mg (11/20/2019), 1,000 mg (12/4/2019), 1,000 mg (12/18/2019)  fosaprepitant (EMEND) 150 mg in sodium chloride 0 9 % 255 mL IVPB, 150 mg, Intravenous, Once, 4 of 4 cycles  Administration: 150 mg (11/6/2019), 150 mg (11/20/2019), 150 mg (12/4/2019), 150 mg (12/18/2019)  pertuzumab (PERJETA) 840 mg in sodium chloride 0 9 % 250 mL IVPB, 840 mg (100 % of original dose 840 mg), Intravenous, Once, 17 of 17 cycles  Dose modification: 840 mg (original dose 840 mg, Cycle 5), 420 mg (original dose 420 mg, Cycle 8), 420 mg (original dose 420 mg, Cycle 17)  Administration: 840 mg (1/15/2020), 420 mg (2/6/2020), 420 mg (3/2/2020), 420 mg (3/23/2020), 420 mg (4/13/2020), 420 mg (5/4/2020), 420 mg (5/26/2020), 420 mg (6/15/2020), 420 mg (7/6/2020), 420 mg (7/27/2020), 420 mg (8/17/2020), 420 mg (9/11/2020), 420 mg (9/29/2020), 420 mg (11/10/2020), 420 mg (12/1/2020), 420 mg (12/22/2020), 420 mg (10/20/2020)  PACLitaxel (TAXOL) 127 8 mg in sodium chloride 0 9 % 250 mL chemo IVPB, 80 mg/m2 = 127 8 mg, Intravenous, Once, 11 of 11 cycles  Dose modification: 60 mg/m2 (original dose 80 mg/m2, Cycle 14, Reason: Dose Not Tolerated)  Administration: 127 8 mg (1/15/2020), 120 6 mg (1/22/2020), 120 6 mg (2/6/2020), 120 6 mg (2/17/2020), 120 6 mg (3/9/2020), 90 6 mg (3/23/2020), 120 6 mg (3/30/2020), 120 6 mg (1/29/2020), 120 6 mg (2/25/2020), 120 6 mg (3/16/2020), 120 6 mg (4/7/2020)  trastuzumab (HERCEPTIN) 214 mg in sodium chloride 0 9 % 250 mL chemo infusion, 4 mg/kg = 214 mg, Intravenous, Once, 25 of 25 cycles  Administration: 214 mg (1/15/2020), 96 mg (1/22/2020), 96 mg (2/6/2020), 96 mg (2/17/2020), 96 mg (3/2/2020), 96 mg (3/9/2020), 96 mg (3/23/2020), 96 mg (3/30/2020), 300 mg (4/13/2020), 96 mg (1/29/2020), 96 mg (2/25/2020), 96 mg (3/16/2020), 96 mg (4/7/2020), 300 mg (5/4/2020), 300 mg (5/26/2020), 300 mg (6/15/2020), 300 mg (7/6/2020), 300 mg (7/27/2020), 300 mg (8/17/2020), 318 mg (9/11/2020), 318 mg (9/29/2020), 318 mg (11/10/2020), 318 mg (12/1/2020), 318 mg (12/22/2020), 318 mg (10/20/2020)     4/28/2020 Surgery    Left breast modified radical mastectomy  No residual invasive carcinoma present  Background focal atypical ductal hyperplasia  0/11 Lymph nodes  Complete response to neoadjuvant chemotherapy    Immediate reconstruction with tissue expander (Dr Charley Pope)     5/22/2020 -  Cancer Staged    Staging form: Breast, AJCC 8th Edition  - Pathologic: No Stage Recommended (ypT0, pN0, cM0, G2, ER+, NV+, HER2+) - Signed by Monica Huerta MD on 5/22/2020  Stage prefix: y  Neoadjuvant therapy: Yes  Response to neoadjuvant therapy: Complete response  Laterality: Left  Multigene prognostic tests performed: None  Histologic grading system: 3 grade system       5/22/2020 -  Cancer Staged    Staging form: Breast, AJCC 8th Edition  - Clinical: Stage IB (cT2, cN1, cM0, G2, ER+, NV+, HER2+) - Signed by Monica Huerta MD on 5/22/2020  Laterality: Left  Histologic grading system: 3 grade system       9/10/2020 - 10/14/2020 Radiation    Course: C1    Plan ID Energy Fractions Dose per Fraction (cGy) Dose Correction (cGy) Total Dose Delivered (cGy) Elapsed Days   BH L CW 6X 13 / 13 200 0 2,600 34   BH L CW BOLUS 6X 12 / 12 200 0 2,400 32   BH L PAB 10X 25 / 25 18 0 450 34   BH L Sclav 6X 25 / 25 200 0 5,000 34      Dr Silvano Waddell       2020 -  Hormone Therapy    Tamoxifen 20 mg PO daily         Past Medical History:   Diagnosis Date    Asthma     BRCA gene mutation negative 10/2019    [facility]    Breast cancer ([facility] [address] 09/10/2019    ID[facility]    Cancer ([facility] [address] )     breast    COVID-19 2020    History of chemotherapy 10/2019    Pneumonia      Past Surgical History:   Procedure Laterality Date    AUGMENTATION BREAST Right 2021    Procedure: RIGHT BREAST AUGMENTATION;  Surgeon: Erich Dior MD;  [facility];  Service: Plastics    BREAST BIOPSY Left 09/10/2019    [facility]    BREAST CYST INCISION AND DRAINAGE Left 2020    Procedure: INCISION AND DRAINAGE (I&D) BREAST;  Surgeon: Erich Dior MD;  [facility];  Service: Plastics    BREAST RECONSTRUCTION Left 2020    Procedure: BREAST IMMEDIATE RECONSTRUCTION WITH IMPLANT VERSUS TISSUE EXPANDER; ACELLULAR DERMAL MATRIX (ADM); Surgeon: Erich Dior MD;  [facility];  Service: Plastics     SECTION      IR PORT PLACEMENT  10/29/2019    IR PORT REMOVAL  2020    MASTECTOMY Left 2020    KS MASTECTOMY, MODIFIED RADICAL Left 2020    Procedure: BREAST MODIFIED RADICAL MASTECTOMY WITH AXILLARY LYMPH NODE NEEDLE LOCALIZATION (NEEDLE LOC AT 1130);   Surgeon: Stephan Vega MD;  [facility];  Service: Surgical Oncology    US BREAST NEEDLE LOC LEFT Left 2020    US GUIDANCE BREAST BIOPSY LEFT EACH ADDITIONAL Left 9/10/2019    US GUIDED BREAST BIOPSY LEFT COMPLETE Left 9/10/2019    US GUIDED BREAST LYMPH NODE BIOPSY LEFT Left 9/10/2019       Social History   Social History     Substance and Sexual Activity   Alcohol Use Never    Frequency: Never    Binge frequency: Never     Social History     Substance and Sexual Activity   Drug Use Never     Social History     Tobacco Use   Smoking Status Never Smoker   Smokeless Tobacco Never Used         Meds/Allergies     Current Outpatient Medications:     acetaminophen (TYLENOL) 500 MG chewable tablet, Chew 1 tablet (500 mg total) every 6 (six) hours, Disp: 30 tablet, Rfl: 0    albuterol (PROVENTIL HFA,VENTOLIN HFA) 90 mcg/act inhaler, Inhale 2 puffs every 6 (six) hours as needed for wheezing or shortness of breath, Disp: 1 Inhaler, Rfl: 3    gabapentin (NEURONTIN) 300 mg capsule, Take 1 capsule (300 mg total) by mouth 3 (three) times a day for 10 days, Disp: 30 capsule, Rfl: 0    loratadine (CLARITIN) 10 mg tablet, Take 1 tablet (10 mg total) by mouth daily, Disp: 90 tablet, Rfl: 1    mometasone (NASONEX) 50 mcg/act nasal spray, 2 sprays into each nostril daily (Patient taking differently: 2 sprays into each nostril as needed ), Disp: 3 Act, Rfl: 1    montelukast (SINGULAIR) 10 mg tablet, Take 1 tablet (10 mg total) by mouth daily at bedtime (Patient taking differently: Take 10 mg by mouth as needed ), Disp: 90 tablet, Rfl: 1    multivitamin (THERAGRAN) TABS, Take 1 tablet by mouth daily, Disp: , Rfl:     tamoxifen (NOLVADEX) 20 mg tablet, Take 1 tablet (20 mg total) by mouth daily (Patient taking differently: Take 20 mg by mouth daily at bedtime ), Disp: 90 tablet, Rfl: 1    gabapentin (NEURONTIN) 100 mg capsule, Take 1 capsule (100 mg total) by mouth 3 (three) times a day for 10 days, Disp: 30 capsule, Rfl: 0  Allergies   Allergen Reactions    Shellfish-Derived Products - Food Allergy Anaphylaxis     Facial swelling/itchy throat    Aspirin Edema, Eye Swelling and Facial Swelling    Codeine Rash    Fentanyl Itching         Review of Systems   Review of Systems   Constitutional: Negative  HENT: Negative  Eyes: Negative  Respiratory: Negative  Cardiovascular: Negative  Gastrointestinal: Negative  Endocrine: Negative  Genitourinary: Negative  Musculoskeletal: Negative      Skin:        Healing from reconstruction surgery, right breast  She reports some tenderness and stiffness at left axilla and arm   Allergic/Immunologic: Positive for environmental allergies  Neurological: Positive for numbness (fingers neuropathy, started gabapentin) and headaches (three times weekly (pressure) taking tylenol prn)  Hematological: Negative  Psychiatric/Behavioral: Negative  OBJECTIVE:   Pulse 76   Temp (!) 97 4 °F (36 3 °C) (Temporal)   Resp 18   LMP  (LMP Unknown)   SpO2 98%   Pain Assessment:  0  Karnofsky: 90 - Able to carry on normal activity; minor signs or symptoms of disease     Physical Exam     Patient presents today no apparent distress  Sclera anicteric  No palpable cervical or supraclavicular lymphadenopathy  Normal respiratory effort  The right breast is soft, nontender, without visible findings  The left reconstructed breast is also soft, nontender, without visible or palpable suspicious findings  Her skin is quite well healed with minimal if any obvious post radiation change  No axillary lymphadenopathy or lymphedema  RESULTS    Lab Results: No results found for this or any previous visit (from the past 672 hour(s))  Imaging Studies:No results found  Assessment/Plan:  No orders of the defined types were placed in this encounter  Aisha Curiel  has done well in follow-up and has no clinical evidence of recurrent disease at this time  She will continue to follow closely both Medical and Surgical Oncology will return to our department in 1 year for routine follow-up  She was encouraged to perform her stretching exercises on a daily basis        Andrae Edwards MD  5/19/2021,11:34 AM    Portions of the record may have been created with voice recognition software   Occasional wrong word or "sound a like" substitutions may have occurred due to the inherent limitations of voice recognition software   Read the chart carefully and recognize, using context, where substitutions have occurred

## 2021-05-20 ENCOUNTER — OFFICE VISIT (OUTPATIENT)
Dept: SURGICAL ONCOLOGY | Facility: CLINIC | Age: 41
End: 2021-05-20
Payer: COMMERCIAL

## 2021-05-20 VITALS
WEIGHT: 119 LBS | DIASTOLIC BLOOD PRESSURE: 80 MMHG | TEMPERATURE: 97.5 F | RESPIRATION RATE: 16 BRPM | OXYGEN SATURATION: 99 % | BODY MASS INDEX: 19.83 KG/M2 | HEIGHT: 65 IN | SYSTOLIC BLOOD PRESSURE: 100 MMHG | HEART RATE: 61 BPM

## 2021-05-20 DIAGNOSIS — C50.812 MALIGNANT NEOPLASM OF OVERLAPPING SITES OF LEFT BREAST IN FEMALE, ESTROGEN RECEPTOR POSITIVE (HCC): Primary | ICD-10-CM

## 2021-05-20 DIAGNOSIS — Z17.0 MALIGNANT NEOPLASM OF OVERLAPPING SITES OF LEFT BREAST IN FEMALE, ESTROGEN RECEPTOR POSITIVE (HCC): Primary | ICD-10-CM

## 2021-05-20 DIAGNOSIS — Z12.39 ENCOUNTER FOR BREAST CANCER SCREENING OTHER THAN MAMMOGRAM: ICD-10-CM

## 2021-05-20 DIAGNOSIS — R92.2 DENSE BREASTS: ICD-10-CM

## 2021-05-20 DIAGNOSIS — Z79.810 USE OF TAMOXIFEN (NOLVADEX): ICD-10-CM

## 2021-05-20 PROCEDURE — 99214 OFFICE O/P EST MOD 30 MIN: CPT | Performed by: NURSE PRACTITIONER

## 2021-05-20 NOTE — PROGRESS NOTES
Surgical Oncology Follow Up       [address] [facility] SURGICAL ONCOLOGY [place]  [address] [id number]    Nate Jeovanny  1980  [id number]  [address]  [facility] SURGICAL ONCOLOGY [place]  [address] [id number]    Chief Complaint   Patient presents with    Breast Cancer     Pt is here for 6 month f/u        Assessment/Plan:  1  Malignant neoplasm of overlapping sites of left breast in female, estrogen receptor positive ([address] )  - Mammo diagnostic right w 3d & cad; Future  - 6 mo f/u visit    2  Use of tamoxifen (Nolvadex)  - Continue use per medical oncology    3  Dense breasts  - US breast right complete (abus); Future    4  Encounter for breast cancer screening other than mammogram  - US breast right complete (abus); Future      Discussion/Summary: Patient is a 55-year-old female who presents today for six-month follow-up visit for left breast cancer diagnosed in September of 2019  Her pathology revealed invasive ductal carcinoma, ER positive, WI positive, HER2 positive  She had left axillary lymph node involvement  She underwent genetic testing which was negative  She underwent neoadjuvant chemotherapy and underwent a left modified radical mastectomy with Dr Jana Patel  She had a complete pathological response  She had immediate reconstruction with Dr Genoveva Juan  She completed adjuvant radiation therapy last month and is now taking tamoxifen  She completed trastuzumab and pertuzumab therapy in December   She had a right 3D diagnostic mammogram on November 24, 2020 which was BI-RADS 1, category 4 density  She underwent a right breast augmentation with implant placement last month  She offers no new complaints today and there are no concerns on today's exam   I am recommending an automated breast ultrasound given the patient's extremely dense breast tissue    Patient prefers to stagger this with her mammogram  I will therefore order that to be completed now and order her mammogram for November  I will plan to see her back in 6 months or sooner should the need arise  She was instructed to call with any new concerns or symptoms prior to that time  All of her questions were answered today  History of Present Illness:     Oncology History   Malignant neoplasm of overlapping sites of left breast in female, estrogen receptor positive ([facility] [address] )   9/10/2019 Biopsy    Left breast ultrasound-guided biopsy  A  2 o'clock, 2 cm from nipple  Invasive breast carcinoma of no special type (ductal, NST)  Grade 2  ER 90  NV 70  HER2 3+    B  3 o'clock, 1 cm from nipple  Invasive breast carcinoma of no special type (ductal NST)  Grade 2  ER 70  NV 60  HER2 3+    C  Left axillary lymph node  Metastatic adenocarcinoma     9/23/2019 Genetic Testing    Panel of 19 genes were evaluated  Negative result   No pathogenic sequence variants or deletions/dupllications identified  [employer]     11/6/2019 -  Chemotherapy    DOXOrubicin (ADRIAMYCIN) injection 96 mg, 60 mg/m2 = 96 mg, Intravenous, Once, 4 of 4 cycles  Administration: 96 mg (11/6/2019), 96 mg (11/20/2019), 96 mg (12/4/2019), 96 mg (12/18/2019)  pegfilgrastim-jmdb (FULPHILA) subcutaneous injection 6 mg, 6 mg, Subcutaneous, Once, 4 of 4 cycles  Administration: 6 mg (11/7/2019), 6 mg (11/21/2019), 6 mg (12/5/2019), 6 mg (12/19/2019)  cyclophosphamide (CYTOXAN) 1,000 mg in sodium chloride 0 9 % 250 mL IVPB, 960 mg, Intravenous, Once, 4 of 4 cycles  Administration: 1,000 mg (11/6/2019), 1,000 mg (11/20/2019), 1,000 mg (12/4/2019), 1,000 mg (12/18/2019)  fosaprepitant (EMEND) 150 mg in sodium chloride 0 9 % 255 mL IVPB, 150 mg, Intravenous, Once, 4 of 4 cycles  Administration: 150 mg (11/6/2019), 150 mg (11/20/2019), 150 mg (12/4/2019), 150 mg (12/18/2019)  pertuzumab (PERJETA) 840 mg in sodium chloride 0 9 % 250 mL IVPB, 840 mg (100 % of original dose 840 mg), Intravenous, Once, 17 of 17 cycles  Dose modification: 840 mg (original dose 840 mg, Cycle 5), 420 mg (original dose 420 mg, Cycle 8), 420 mg (original dose 420 mg, Cycle 17)  Administration: 840 mg (1/15/2020), 420 mg (2/6/2020), 420 mg (3/2/2020), 420 mg (3/23/2020), 420 mg (4/13/2020), 420 mg (5/4/2020), 420 mg (5/26/2020), 420 mg (6/15/2020), 420 mg (7/6/2020), 420 mg (7/27/2020), 420 mg (8/17/2020), 420 mg (9/11/2020), 420 mg (9/29/2020), 420 mg (11/10/2020), 420 mg (12/1/2020), 420 mg (12/22/2020), 420 mg (10/20/2020)  PACLitaxel (TAXOL) 127 8 mg in sodium chloride 0 9 % 250 mL chemo IVPB, 80 mg/m2 = 127 8 mg, Intravenous, Once, 11 of 11 cycles  Dose modification: 60 mg/m2 (original dose 80 mg/m2, Cycle 14, Reason: Dose Not Tolerated)  Administration: 127 8 mg (1/15/2020), 120 6 mg (1/22/2020), 120 6 mg (2/6/2020), 120 6 mg (2/17/2020), 120 6 mg (3/9/2020), 90 6 mg (3/23/2020), 120 6 mg (3/30/2020), 120 6 mg (1/29/2020), 120 6 mg (2/25/2020), 120 6 mg (3/16/2020), 120 6 mg (4/7/2020)  trastuzumab (HERCEPTIN) 214 mg in sodium chloride 0 9 % 250 mL chemo infusion, 4 mg/kg = 214 mg, Intravenous, Once, 25 of 25 cycles  Administration: 214 mg (1/15/2020), 96 mg (1/22/2020), 96 mg (2/6/2020), 96 mg (2/17/2020), 96 mg (3/2/2020), 96 mg (3/9/2020), 96 mg (3/23/2020), 96 mg (3/30/2020), 300 mg (4/13/2020), 96 mg (1/29/2020), 96 mg (2/25/2020), 96 mg (3/16/2020), 96 mg (4/7/2020), 300 mg (5/4/2020), 300 mg (5/26/2020), 300 mg (6/15/2020), 300 mg (7/6/2020), 300 mg (7/27/2020), 300 mg (8/17/2020), 318 mg (9/11/2020), 318 mg (9/29/2020), 318 mg (11/10/2020), 318 mg (12/1/2020), 318 mg (12/22/2020), 318 mg (10/20/2020)     4/28/2020 Surgery    Left breast modified radical mastectomy  No residual invasive carcinoma present  Background focal atypical ductal hyperplasia  0/11 Lymph nodes  Complete response to neoadjuvant chemotherapy    Immediate reconstruction with tissue expander (Dr Reji Herrera)     5/22/2020 -  Cancer Staged    Staging form: Breast, AJCC 8th Edition  - Pathologic: No Stage Recommended (ypT0, pN0, cM0, G2, ER+, HI+, HER2+) - Signed by Everardo Phan MD on 5/22/2020  Stage prefix: y  Neoadjuvant therapy: Yes  Response to neoadjuvant therapy: Complete response  Laterality: Left  Multigene prognostic tests performed: None  Histologic grading system: 3 grade system       5/22/2020 -  Cancer Staged    Staging form: Breast, AJCC 8th Edition  - Clinical: Stage IB (cT2, cN1, cM0, G2, ER+, HI+, HER2+) - Signed by Everardo Phan MD on 5/22/2020  Laterality: Left  Histologic grading system: 3 grade system       9/10/2020 - 10/14/2020 Radiation    Course: C1    Plan ID Energy Fractions Dose per Fraction (cGy) Dose Correction (cGy) Total Dose Delivered (cGy) Elapsed Days   BH L CW 6X 13 / 13 200 0 2,600 34   BH L CW BOLUS 6X 12 / 12 200 0 2,400 32   BH L PAB 10X 25 / 25 18 0 450 34   BH L Sclav 6X 25 / 25 200 0 5,000 29      Dr Pia Greenwood       11/2020 -  Hormone Therapy    Tamoxifen 20 mg PO daily          -Interval History:  Patient notices no new breast lumps, skin changes  She continues on tamoxifen  She had a right breast augmentation with implant placement in April 2021  She denies persistent headaches, back pain or bone pain, cough or shortness of breath, abdominal pain  Review of Systems:  Review of Systems   Constitutional: Negative for activity change, appetite change, chills, fatigue, fever and unexpected weight change  Respiratory: Negative for cough and shortness of breath  Cardiovascular: Negative for chest pain  Gastrointestinal: Negative for abdominal pain, constipation, diarrhea, nausea and vomiting  Musculoskeletal: Negative for arthralgias, back pain, gait problem and myalgias  Skin: Negative for color change and rash  Neurological: Negative for dizziness and headaches  Hematological: Negative for adenopathy  Psychiatric/Behavioral: Negative for agitation and confusion     All other systems reviewed and are negative  Patient Active Problem List   Diagnosis    Malignant neoplasm of overlapping sites of left breast in female, estrogen receptor positive ([facility] [address] )    Varicose veins of left lower extremity with pain    Mild intermittent asthma without complication    Seasonal allergic rhinitis due to pollen    Chemotherapy induced neutropenia (HCC)    Muscle spasm    Dehiscence of incision    S/P left breast implant    Use of tamoxifen (Nolvadex)    COVID-19 virus detected    History of augmentation of right breast     Past Medical History:   Diagnosis Date    Asthma     BRCA gene mutation negative 10/2019    [employer]    Breast cancer ([facility] [address] ) 09/10/2019    [facility]    Cancer ([facility] [address] )     breast    COVID-19 2020    History of chemotherapy 10/2019    Pneumonia      Past Surgical History:   Procedure Laterality Date    AUGMENTATION BREAST Right 2021    Procedure: RIGHT BREAST AUGMENTATION;  Surgeon: Louann Pop MD;  [facility];  Service: Plastics    BREAST BIOPSY Left 09/10/2019    inv br ca   [facility] BREAST CYST INCISION AND DRAINAGE Left 2020    Procedure: INCISION AND DRAINAGE (I&D) BREAST;  Surgeon: Louann Pop MD;  [facility];  Service: Plastics    BREAST RECONSTRUCTION Left 2020    Procedure: BREAST IMMEDIATE RECONSTRUCTION WITH IMPLANT VERSUS TISSUE EXPANDER; ACELLULAR DERMAL MATRIX (ADM); Surgeon: Louann Pop MD;  [facility];  Service: Plastics     SECTION      IR PORT PLACEMENT  10/29/2019    IR PORT REMOVAL  2020    MASTECTOMY Left 2020    [place] MASTECTOMY, MODIFIED RADICAL Left 2020    Procedure: BREAST MODIFIED RADICAL MASTECTOMY WITH AXILLARY LYMPH NODE NEEDLE LOCALIZATION (NEEDLE LOC AT 1130);   Surgeon: Yennifer Garrett MD;  [facility];  Service: Surgical Oncology    US BREAST NEEDLE LOC LEFT Left 2020    US GUIDANCE BREAST BIOPSY LEFT EACH ADDITIONAL Left 9/10/2019    US GUIDED BREAST BIOPSY LEFT COMPLETE Left 9/10/2019    US GUIDED BREAST LYMPH NODE BIOPSY LEFT Left 9/10/2019     Family History   Problem Relation Age of Onset    Diabetes Father     Asthma Father     No Known Problems Daughter     No Known Problems Maternal Aunt     No Known Problems Maternal Aunt     No Known Problems Maternal Aunt     Breast cancer Paternal Aunt         age at dx unk    Stomach cancer Paternal Aunt     Pancreatic cancer Maternal Grandmother         age at dx unk    Cancer Paternal Uncle         type and age unk     Social History     Socioeconomic History    Marital status:      Spouse name: Not on file    Number of children: 2    Years of education: Not on file    Highest education level: Not on file   Occupational History    Not on file   Social Needs    Financial resource strain: Not hard at all   [email or web address] insecurity     Worry: Never true     Inability: Never true    Transportation needs     Medical: No     Non-medical: No   Tobacco Use    Smoking status: Never Smoker    Smokeless tobacco: Never Used   Substance and Sexual Activity    Alcohol use: Never     Frequency: Never     Binge frequency: Never    Drug use: Never    Sexual activity: Yes     Partners: Male   Lifestyle    Physical activity     Days per week: 0 days     Minutes per session: 0 min    Stress:  Only a little   Relationships    Social connections     Talks on phone: Patient refused     Gets together: Patient refused     Attends Worship service: Patient refused     Active member of club or organization: Patient refused     Attends meetings of clubs or organizations: Patient refused     Relationship status: Patient refused    Intimate partner violence     Fear of current or ex partner: Patient refused     Emotionally abused: Patient refused     Physically abused: Patient refused     Forced sexual activity: Patient refused   Other Topics Concern    Not on file   Social History Narrative    Not on file Current Outpatient Medications:     acetaminophen (TYLENOL) 500 MG chewable tablet, Chew 1 tablet (500 mg total) every 6 (six) hours, Disp: 30 tablet, Rfl: 0    albuterol (PROVENTIL HFA,VENTOLIN HFA) 90 mcg/act inhaler, Inhale 2 puffs every 6 (six) hours as needed for wheezing or shortness of breath, Disp: 1 Inhaler, Rfl: 3    loratadine (CLARITIN) 10 mg tablet, Take 1 tablet (10 mg total) by mouth daily, Disp: 90 tablet, Rfl: 1    mometasone (NASONEX) 50 mcg/act nasal spray, 2 sprays into each nostril daily (Patient taking differently: 2 sprays into each nostril as needed ), Disp: 3 Act, Rfl: 1    montelukast (SINGULAIR) 10 mg tablet, Take 1 tablet (10 mg total) by mouth daily at bedtime (Patient taking differently: Take 10 mg by mouth as needed ), Disp: 90 tablet, Rfl: 1    multivitamin (THERAGRAN) TABS, Take 1 tablet by mouth daily, Disp: , Rfl:     tamoxifen (NOLVADEX) 20 mg tablet, Take 1 tablet (20 mg total) by mouth daily (Patient taking differently: Take 20 mg by mouth daily at bedtime ), Disp: 90 tablet, Rfl: 1    gabapentin (NEURONTIN) 100 mg capsule, Take 1 capsule (100 mg total) by mouth 3 (three) times a day for 10 days, Disp: 30 capsule, Rfl: 0    gabapentin (NEURONTIN) 300 mg capsule, Take 1 capsule (300 mg total) by mouth 3 (three) times a day for 10 days, Disp: 30 capsule, Rfl: 0  Allergies   Allergen Reactions    Shellfish-Derived Products - Food Allergy Anaphylaxis     Facial swelling/itchy throat    Aspirin Edema, Eye Swelling and Facial Swelling    Codeine Rash    Fentanyl Itching     Vitals:    05/20/21 1341   BP: 100/80   Pulse: 61   Resp: 16   Temp: 97 5 °F (36 4 °C)   SpO2: 99%       Physical Exam  Vitals signs reviewed  Constitutional:       General: She is not in acute distress  Appearance: Normal appearance  She is well-developed  She is not diaphoretic  HENT:      Head: Normocephalic and atraumatic  Neck:      Musculoskeletal: Normal range of motion  Cardiovascular:      Rate and Rhythm: Normal rate and regular rhythm  Heart sounds: Normal heart sounds  Pulmonary:      Effort: Pulmonary effort is normal       Breath sounds: Normal breath sounds  Chest:      Breasts:         Right: Skin change (inferior breast surgical scar) present  No swelling, bleeding, inverted nipple, mass, nipple discharge or tenderness  Comments: Left reconstructed breast with implant present  There are no masses, skin nodules or lesions  Right breast implant present  Abdominal:      Palpations: Abdomen is soft  There is no mass  Tenderness: There is no abdominal tenderness  Musculoskeletal: Normal range of motion  Lymphadenopathy:      Upper Body:      Right upper body: No supraclavicular or axillary adenopathy  Left upper body: No supraclavicular or axillary adenopathy  Skin:     General: Skin is warm and dry  Findings: No rash  Neurological:      Mental Status: She is alert and oriented to person, place, and time  Psychiatric:         Speech: Speech normal            Advance Care Planning/Advance Directives:  Discussed disease status, cancer treatment plans and/or cancer treatment goals with the patient

## 2021-05-28 ENCOUNTER — HOSPITAL ENCOUNTER (OUTPATIENT)
Dept: ULTRASOUND IMAGING | Facility: CLINIC | Age: 41
Discharge: HOME | End: 2021-05-28
Payer: COMMERCIAL

## 2021-05-28 VITALS — WEIGHT: 120 LBS | BODY MASS INDEX: 19.99 KG/M2 | HEIGHT: 65 IN

## 2021-05-28 DIAGNOSIS — Z12.39 ENCOUNTER FOR BREAST CANCER SCREENING OTHER THAN MAMMOGRAM: ICD-10-CM

## 2021-05-28 DIAGNOSIS — R92.2 DENSE BREASTS: ICD-10-CM

## 2021-05-28 PROCEDURE — 76641 ULTRASOUND BREAST COMPLETE: CPT

## 2021-06-08 ENCOUNTER — OFFICE VISIT (OUTPATIENT)
Dept: HEMATOLOGY ONCOLOGY | Facility: CLINIC | Age: 41
End: 2021-06-08
Payer: COMMERCIAL

## 2021-06-08 VITALS
DIASTOLIC BLOOD PRESSURE: 66 MMHG | BODY MASS INDEX: 19.83 KG/M2 | HEART RATE: 90 BPM | SYSTOLIC BLOOD PRESSURE: 104 MMHG | TEMPERATURE: 98 F | OXYGEN SATURATION: 98 % | RESPIRATION RATE: 18 BRPM | WEIGHT: 119 LBS | HEIGHT: 65 IN

## 2021-06-08 DIAGNOSIS — T45.1X5A CHEMOTHERAPY INDUCED NEUTROPENIA (HCC): ICD-10-CM

## 2021-06-08 DIAGNOSIS — C50.812 MALIGNANT NEOPLASM OF OVERLAPPING SITES OF LEFT BREAST IN FEMALE, ESTROGEN RECEPTOR POSITIVE (HCC): Primary | ICD-10-CM

## 2021-06-08 DIAGNOSIS — Z17.0 MALIGNANT NEOPLASM OF OVERLAPPING SITES OF LEFT BREAST IN FEMALE, ESTROGEN RECEPTOR POSITIVE (HCC): Primary | ICD-10-CM

## 2021-06-08 DIAGNOSIS — D70.1 CHEMOTHERAPY INDUCED NEUTROPENIA (HCC): ICD-10-CM

## 2021-06-08 PROCEDURE — 99214 OFFICE O/P EST MOD 30 MIN: CPT | Performed by: INTERNAL MEDICINE

## 2021-06-08 PROCEDURE — 3008F BODY MASS INDEX DOCD: CPT | Performed by: INTERNAL MEDICINE

## 2021-06-08 PROCEDURE — 1036F TOBACCO NON-USER: CPT | Performed by: INTERNAL MEDICINE

## 2021-06-08 RX ORDER — TAMOXIFEN CITRATE 20 MG/1
20 TABLET ORAL DAILY
Qty: 90 TABLET | Refills: 3 | Status: SHIPPED | OUTPATIENT
Start: 2021-06-08 | End: 2022-02-09 | Stop reason: SDUPTHER

## 2021-06-08 NOTE — PROGRESS NOTES
Hematology / Oncology Outpatient Follow Up Note    Karol Phillips 39 y o  female NHT:[id number] GTX:[id number]         Date:  6/8/2021    Quiana / Mignon Antonio premenopausal woman with locally advanced left breast cancer, grade 2, ER 90% positive, PR70% positive, HER2 3+ disease   She has relatively large palpable left breast mass as well as palpable left axillary adenopathy which was biopsy confirmed to be metastatic disease   She is negative for BRCA gene mutation   She was treated with neoadjuvant chemotherapy with dose dense AC followed by paclitaxel, trastuzumab and pertuzumab, resulting in very good clinical partial response followed by mastectomy and axillary lymph node dissection which showed no evidence of residual carcinoma   This is consistent with complete pathological response which give her very good prognosis  She completed adjuvant trastuzumab and pertuzumab in December 2020  She is currently on tamoxifen as adjuvant hormonal therapy with minimal toxicity  Clinically, she has no evidence of recurrent disease  I recommended her to continue with tamoxifen 20 mg daily  She is in agreement with my recommendation  I will see her again in a year for routine follow-up         Subjective:      HPI:  A 70-year-old premenopausal woman who noticed a left breast lump recently which she brought to medical attention  Ashlie Dawson was found to have radiographic abnormality in her left breast as well as left axilla   She underwent left breast biopsy in September 10, 2019 which showed invasive ductal carcinoma, grade 2   This was ER 90 % positive, SC 70% positive, HER2 3+ disease   Biopsy from enlarged left axillary lymph node was also positive for metastatic disease   She was seen by Dr Isaac Fonseca who sent her to me for neoadjuvant chemotherapy   She is negative for BRCA gene mutation   She underwent bone scan and CT scan chest abdomen pelvis which showed no evidence of distant metastasis   She presents today with her  in mother to discuss neoadjuvant chemotherapy   She has some left breast pain   Otherwise, she feels well   She denied any respiratory symptoms   She has no complaint of bone pain   She has regular menstrual cycle   She has 2 children   She has no plan to have more children   She has no surgical history   She has no significant past medical history  Jed Rodriguez is a lifetime never smoker   Her performance status is normal              Interval History:  A 39year-old premenopausal woman with locally advanced left breast cancer, grade 2, ER 90% positive, AR 70% positive, HER2 3+ disease   She has relatively large palpable left breast mass as well as palpable left axillary adenopathy which was biopsy confirmed to be metastatic disease   She has no evidence of distant metastasis   She is negative for BRCA gene mutation   She had 4 cycle of dose dense AC followed by weekly paclitaxel, trastuzumab and try weekly pertuzumab which was completed in February 2020  She achieved at least clinical good partial response   Subsequently, she underwent left mastectomy and axillary lymph node dissection by Dr Tony Iverson in April 28, 2020 which showed no evidence of residual carcinoma with 11 negative axillary lymph nodes, consistent with complete pathological response   She had immediate reconstruction  She is currently on adjuvant trastuzumab and pertuzumab which she will complete in the end of December 2020  She has no cardiac toxicity  Since September 2020, she has been on tamoxifen  She presents today for routine follow-up  She still does not have menstrual cycle back  She has mild hot flashes  Otherwise, she is tolerating tamoxifen well  She denied any pain  Her weight is stable  She has no respiratory symptoms    Her performance status is normal         Objective:      Primary Diagnosis:     1  Locally advanced left breast cancer, grade 2, ER 90 % positive, AR 70 % positive, HER2 3+ disease   Diagnosed in September 2019       2  BRCA gene mutation negative      Cancer Staging:  Cancer Staging  No matching staging information was found for the patient         Previous Hematologic/ Oncologic Treatment:       1  Neoadjuvant chemotherapy with dose dense AC x4 followed by weekly paclitaxel, try weekly trastuzumab, pertuzumab times 12 weeks   Completed in April 2020      2    Adjuvant trastuzumab and pertuzumab completed in December 2020         Current Hematologic/ Oncologic Treatment:       1   Adjuvant hormonal therapy with tamoxifen since September 2020      Disease Status:      1  Clinical good partial response  2  Complete pathological response  3  PELON status post mastectomy and axillary lymph node dissection      Test Results:     Pathology:     Left breast biopsy showed invasive ductal carcinoma, grade 2  ER 90 % positive, IL 70 % positive, HER2 3+ disease        Left mastectomy showed no evidence of residual carcinoma   11 axillary lymph nodes were all negative for metastatic disease in April 2020       Radiology:      right breast ultrasound in May 2021 showed no evidence of malignancy  BI-RADS 1      Laboratory:     See below      Physical Exam:        General Appearance:    Alert, oriented          Eyes:    PERRL   Ears:    Normal external ear canals, both ears   Nose:   Nares normal, septum midline   Throat:   Mucosa moist  Pharynx without injection  Neck:   Supple         Lungs:     Clear to auscultation bilaterally, very localized tenderness even with light touch at left lateral lower rib cage  Chest Wall:    No tenderness or deformity    Heart:    Regular rate and rhythm         Abdomen:     Soft, non-tender, bowel sounds +, no organomegaly               Extremities:   Extremities no cyanosis or edema         Skin:   no rash or icterus   Typical shingle rash in the right shoulder     Lymph nodes:   Cervical, supraclavicular, and axillary nodes normal   Neurologic:   CNII-XII intact, normal strength, sensation and reflexes     Throughout             Breast exam:  Status post left mastectomy with reconstruction   No palpable abnormality in her left reconstructed breast   No palpable left axillary adenopathy   Right breast exam is negative  ROS: Review of Systems   All other systems reviewed and are negative  Imaging: Us Breast Right Complete (abus)    Result Date: 5/28/2021  Narrative: DIAGNOSIS: Dense breasts; Encounter for breast cancer screening other than mammogram TECHNIQUE: Automated breast ultrasound images were obtained on the [employer] system  Imaging was obtained in standard projections including AP, lateral and medial for both breasts, inclusive of all four quadrants  COMPARISONS: None available  RELEVANT HISTORY: Family Breast Cancer History: History of breast cancer in Paternal [de-identified]  Family Medical History: Family medical history includes breast cancer in paternal aunt  Personal History: Hormone history includes tamoxifen  Surgical history includes breast biopsy, mastectomy, and breast explant  Medical history includes breast cancer and history of chemotherapy  RISK ASSESSMENT: 5 Year Tyrer-Cuzick: 0 76 % 10 Year Tyrer-Cuzick: 1 94 % Lifetime Tyrer-Cuzick: 16 52 % TISSUE COMPOSITION: Homogeneous background echotexture - fibroglandular INDICATION: Daysi Maya is a 39 y o  female with dense breasts presenting for automated breast ultrasound screening  FINDINGS: There are no suspicious masses or areas of architectural distortion  Patient is status post left mastectomy  Impression:  No evidence of malignancy  Automated breast ultrasound is an adjunct, not a replacement, for routine yearly screening mammography   ASSESSMENT/BI-RADS CATEGORY: Right: 1 - Negative Overall: 1 - Negative RECOMMENDATION:      - Diagnostic mammogram in 6 months for the right breast  Workstation ID: [id number]        Labs:   Lab Results   Component Value Date    WBC 3 29 (L) 04/03/2021    HGB 12 7 04/03/2021    HCT 38 9 04/03/2021    MCV 94 04/03/2021     04/03/2021     Lab Results   Component Value Date    K 4 0 04/03/2021     (H) 04/03/2021    CO2 27 04/03/2021    BUN 14 04/03/2021    CREATININE 0 78 04/03/2021    GLUF 86 04/03/2021    CALCIUM 8 8 04/03/2021    AST 28 04/03/2021    ALT 57 04/03/2021    ALKPHOS 75 04/03/2021    EGFR 95 04/03/2021             Current Medications: Reviewed  Allergies: Reviewed  PMH/FH/SH:  Reviewed      Vital Sign:    Body surface area is 1 59 meters squared      Wt Readings from Last 3 Encounters:   06/08/21 54 kg (119 lb)   05/28/21 54 4 kg (120 lb)   05/20/21 54 kg (119 lb)        Temp Readings from Last 3 Encounters:   06/08/21 98 °F (36 7 °C) (Tympanic Core)   05/20/21 97 5 °F (36 4 °C) (Temporal)   05/19/21 (!) 97 4 °F (36 3 °C) (Temporal)        BP Readings from Last 3 Encounters:   06/08/21 104/66   05/20/21 100/80   04/26/21 107/61         Pulse Readings from Last 3 Encounters:   06/08/21 90   05/20/21 61   05/19/21 76     @LASTSAO2(3)@

## 2021-06-12 NOTE — TELEPHONE ENCOUNTER
[facility] FROM [facility] INPATIENT LAB CALLED ANSWERING SERVICE TO DELIVER CRITICAL LAB RESULT ON PT TO DR ON-CALL  CALLED DR CAMARGO VIA PHONE AND GAVE PT INFO AND CONNECTED  TO CLINICAL LAB  Pt left to [id number]  06/12/21 3331

## 2021-07-06 ENCOUNTER — TELEPHONE (OUTPATIENT)
Dept: FAMILY MEDICINE CLINIC | Facility: CLINIC | Age: 41
End: 2021-07-06

## 2021-07-06 ENCOUNTER — APPOINTMENT (OUTPATIENT)
Dept: RADIOLOGY | Age: 41
End: 2021-07-06
Attending: PHYSICIAN ASSISTANT
Payer: COMMERCIAL

## 2021-07-06 ENCOUNTER — OFFICE VISIT (OUTPATIENT)
Dept: URGENT CARE | Age: 41
End: 2021-07-06
Payer: COMMERCIAL

## 2021-07-06 VITALS
TEMPERATURE: 98 F | HEART RATE: 89 BPM | RESPIRATION RATE: 16 BRPM | SYSTOLIC BLOOD PRESSURE: 107 MMHG | OXYGEN SATURATION: 99 % | DIASTOLIC BLOOD PRESSURE: 66 MMHG

## 2021-07-06 DIAGNOSIS — R07.89 LEFT-SIDED CHEST WALL PAIN: ICD-10-CM

## 2021-07-06 DIAGNOSIS — R07.81 RIB PAIN ON LEFT SIDE: Primary | ICD-10-CM

## 2021-07-06 DIAGNOSIS — R07.81 RIB PAIN ON LEFT SIDE: ICD-10-CM

## 2021-07-06 PROCEDURE — 71101 X-RAY EXAM UNILAT RIBS/CHEST: CPT

## 2021-07-06 PROCEDURE — 99203 OFFICE O/P NEW LOW 30 MIN: CPT | Performed by: PHYSICIAN ASSISTANT

## 2021-07-06 NOTE — TELEPHONE ENCOUNTER
Pt complaining of pain at an old surgical cancer site under her arm close to her ribs that bothers her when she breathes  I placed her on a cancellation list but suggested [facility] if she cannot wait

## 2021-07-07 NOTE — PATIENT INSTRUCTIONS
May apply ice or heat as needed        Follow-up with your primary care physician /  oncologist for further evaluation if symptoms persist

## 2021-07-07 NOTE — PROGRESS NOTES
[facility]        NAME: Mary Macias is a 39 y o  female  : 1980    MRN: [id number]  DATE: 2021  TIME: 8:48 PM    Assessment and Plan   Rib pain on left side [R07 81]  1  Rib pain on left side  XR ribs left w pa chest min 3 views   2  Left-sided chest wall pain           Patient Instructions       Follow up with PCP in 3-5 days  Proceed to  ER if symptoms worsen  Chief Complaint     Chief Complaint   Patient presents with    axillary pain     left axillary pain at scar site x 2 weeks  had a mastectomy with lymph node removal 2020         History of Present Illness         Patient is here for evaluation of pain on her left chest wall sometimes up into the left axilla  Patient did have surgery on that side for lymph node biopsy  Patient had undergone chemo and radiation over 9 months ago  Patient states his pain started about 2 weeks ago  She has not recall doing anything specific  She states it does hurt which takes a deep breath or coughs or sneezes  She denies any shortness of breath or fevers or chills or weight loss fatigue  Review of Systems   Review of Systems   Constitutional: Negative  Respiratory: Negative  Cardiovascular: Positive for chest pain  Negative for palpitations and leg swelling  Gastrointestinal: Negative  Neurological: Negative            Current Medications       Current Outpatient Medications:     acetaminophen (TYLENOL) 500 MG chewable tablet, Chew 1 tablet (500 mg total) every 6 (six) hours, Disp: 30 tablet, Rfl: 0    albuterol (PROVENTIL HFA,VENTOLIN HFA) 90 mcg/act inhaler, Inhale 2 puffs every 6 (six) hours as needed for wheezing or shortness of breath, Disp: 1 Inhaler, Rfl: 3    gabapentin (NEURONTIN) 100 mg capsule, Take 1 capsule (100 mg total) by mouth 3 (three) times a day for 10 days, Disp: 30 capsule, Rfl: 0    gabapentin (NEURONTIN) 300 mg capsule, Take 1 capsule (300 mg total) by mouth 3 (three) times a day for 10 days, Disp: 30 capsule, Rfl: 0    loratadine (CLARITIN) 10 mg tablet, Take 1 tablet (10 mg total) by mouth daily, Disp: 90 tablet, Rfl: 1    mometasone (NASONEX) 50 mcg/act nasal spray, 2 sprays into each nostril daily (Patient not taking: Reported on 6/8/2021), Disp: 3 Act, Rfl: 1    montelukast (SINGULAIR) 10 mg tablet, Take 1 tablet (10 mg total) by mouth daily at bedtime (Patient not taking: Reported on 6/8/2021), Disp: 90 tablet, Rfl: 1    multivitamin (THERAGRAN) TABS, Take 1 tablet by mouth daily, Disp: , Rfl:     tamoxifen (NOLVADEX) 20 mg tablet, Take 1 tablet (20 mg total) by mouth daily, Disp: 90 tablet, Rfl: 3    Current Allergies     Allergies as of 07/06/2021 - Reviewed 07/06/2021   Allergen Reaction Noted    Shellfish-derived products - food allergy Anaphylaxis 12/30/2020    Aspirin Edema, Eye Swelling, and Facial Swelling 08/21/2019    Codeine Rash 08/21/2019    Fentanyl Itching 04/26/2021            The following portions of the patient's history were reviewed and updated as appropriate: allergies, current medications, past family history, past medical history, past social history, past surgical history and problem list      Past Medical History:   Diagnosis Date    Asthma     BRCA gene mutation negative 10/2019    [facility]    Breast cancer ([facility] [address] ) 09/10/2019    [facility] Left breast    Cancer ([facility] [address] )     breast    COVID-19 12/12/2020    History of chemotherapy 10/2019    Pneumonia        Past Surgical History:   Procedure Laterality Date    AUGMENTATION BREAST Right 4/26/2021    Procedure: RIGHT BREAST AUGMENTATION;  Surgeon: Maurice Rodriguez MD;  [facility];  Service: Plastics    BREAST BIOPSY Left 09/10/2019    [facility]    BREAST CYST INCISION AND DRAINAGE Left 6/13/2020    Procedure: INCISION AND DRAINAGE (I&D) BREAST;  Surgeon: Maurice Rodriguez MD;  [facility];  Service: Plastics    BREAST RECONSTRUCTION Left 4/28/2020    Procedure: BREAST IMMEDIATE RECONSTRUCTION WITH IMPLANT VERSUS TISSUE EXPANDER; ACELLULAR DERMAL MATRIX (ADM); Surgeon: Hulon Hodgkins, MD;  [facility];  Service: Plastics     SECTION      IR PORT PLACEMENT  10/29/2019    IR PORT REMOVAL  2020    MASTECTOMY Left 2020    NE MASTECTOMY, MODIFIED RADICAL Left 2020    Procedure: BREAST MODIFIED RADICAL MASTECTOMY WITH AXILLARY LYMPH NODE NEEDLE LOCALIZATION (NEEDLE LOC AT 1130); Surgeon: Angelica Reinoso MD;  [facility];  Service: Surgical Oncology    US BREAST NEEDLE LOC LEFT Left 2020    US GUIDANCE BREAST BIOPSY LEFT EACH ADDITIONAL Left 9/10/2019    US GUIDED BREAST BIOPSY LEFT COMPLETE Left 9/10/2019    US GUIDED BREAST LYMPH NODE BIOPSY LEFT Left 9/10/2019       Family History   Problem Relation Age of Onset    Diabetes Father     Asthma Father     No Known Problems Daughter     No Known Problems Maternal Aunt     No Known Problems Maternal Aunt     No Known Problems Maternal Aunt     Breast cancer Paternal Aunt         age at dx unk    Stomach cancer Paternal Aunt     Pancreatic cancer Maternal Grandmother         age at dx unk    Cancer Paternal Uncle         type and age unk         Medications have been verified  Objective   /66   Pulse 89   Temp 98 °F (36 7 °C)   Resp 16   LMP  (LMP Unknown)   SpO2 99%   No LMP recorded (lmp unknown)  Patient is postmenopausal        Physical Exam     Physical Exam  Vitals and nursing note reviewed  Constitutional:       General: She is not in acute distress  Appearance: Normal appearance  She is well-developed  She is not ill-appearing, toxic-appearing or diaphoretic  HENT:      Head: Normocephalic and atraumatic  Eyes:      Extraocular Movements: Extraocular movements intact  Conjunctiva/sclera: Conjunctivae normal       Pupils: Pupils are equal, round, and reactive to light     Cardiovascular:      Rate and Rhythm: Normal rate and regular rhythm  Heart sounds: Normal heart sounds  Pulmonary:      Effort: Pulmonary effort is normal  No respiratory distress  Breath sounds: Normal breath sounds  No stridor  No wheezing, rhonchi or rales  Chest:      Chest wall: Tenderness present  Musculoskeletal:      Comments: Full range of motion of the left shoulder with discomfort at extremes  Tenderness over the left lateral chest wall  No erythema  No soft tissue swelling  Patient does feel a pulling sensation in the left axilla with raising the left arm overhead  Skin:     General: Skin is warm and dry  Findings: No rash  Neurological:      General: No focal deficit present  Mental Status: She is alert and oriented to person, place, and time  Psychiatric:         Mood and Affect: Mood normal          Behavior: Behavior normal          Thought Content: Thought content normal          Judgment: Judgment normal        Chest x-ray shows no acute fractures or findings

## 2021-07-08 ENCOUNTER — OFFICE VISIT (OUTPATIENT)
Dept: FAMILY MEDICINE CLINIC | Facility: CLINIC | Age: 41
End: 2021-07-08
Payer: COMMERCIAL

## 2021-07-08 VITALS
WEIGHT: 121.2 LBS | OXYGEN SATURATION: 97 % | HEART RATE: 105 BPM | BODY MASS INDEX: 20.19 KG/M2 | DIASTOLIC BLOOD PRESSURE: 68 MMHG | TEMPERATURE: 98.6 F | SYSTOLIC BLOOD PRESSURE: 90 MMHG | HEIGHT: 65 IN | RESPIRATION RATE: 16 BRPM

## 2021-07-08 DIAGNOSIS — R07.1 CHEST PAIN VARYING WITH BREATHING: Primary | ICD-10-CM

## 2021-07-08 PROCEDURE — 99214 OFFICE O/P EST MOD 30 MIN: CPT | Performed by: FAMILY MEDICINE

## 2021-07-08 PROCEDURE — 93000 ELECTROCARDIOGRAM COMPLETE: CPT | Performed by: FAMILY MEDICINE

## 2021-07-08 PROCEDURE — 1036F TOBACCO NON-USER: CPT | Performed by: FAMILY MEDICINE

## 2021-07-08 PROCEDURE — 3008F BODY MASS INDEX DOCD: CPT | Performed by: FAMILY MEDICINE

## 2021-07-08 RX ORDER — NAPROXEN 500 MG/1
500 TABLET ORAL 2 TIMES DAILY WITH MEALS
Qty: 30 TABLET | Refills: 0 | Status: SHIPPED | OUTPATIENT
Start: 2021-07-08

## 2021-07-08 NOTE — ASSESSMENT & PLAN NOTE
The EKG was NSR and unchanged from previous  No ischemic changes  Differentials include costochondritis, painful scarring post surgery and PE  She has a low risk for PE but was tachycardic today and with pleuritic pain  Because of cancer history will get a ddimer  Treat with naproxen twice daily for 5 days and apply warm compresses

## 2021-07-08 NOTE — PROGRESS NOTES
Assessment/Plan:    Chest pain varying with breathing  The EKG was NSR and unchanged from previous  No ischemic changes  Differentials include costochondritis, painful scarring post surgery and PE  She has a low risk for PE but was tachycardic today and with pleuritic pain  Because of cancer history will get a ddimer  Treat with naproxen twice daily for 5 days and apply warm compresses  Diagnoses and all orders for this visit:    Chest pain varying with breathing  -     D-dimer, quantitative; Future  -     CBC and differential; Future  -     Comprehensive metabolic panel; Future  -     naproxen (NAPROSYN) 500 mg tablet; Take 1 tablet (500 mg total) by mouth 2 (two) times a day with meals  -     POCT ECG          Subjective: left chest pain      Patient ID: Isaías Alamo is a 39 y o  female  HPI  This is a pleasant 38 yo F with a ho left breast cancer grade 2 s/p chemo and mastectomy with lymph node dissection on the left  She completed chemo December 2020 and is currently on tamoxifen  She currently goes to hem/onc, surg/onc, rad/onc  The last ultrasound of the breast was done 5/28/21  She went to urgent care 2 days ago for pain of the left chest wall that radiates up to the left axilla for 2 weeks, associated with pain with deep breathing  An xray was normal and did not show rib fractures  Today she reports the pain in the left lateral upper chest wall and left axilla, rated an 8/10  The pain is describes as a constant pressure/burning sensation worse with coughing, sneezing, bending over, lying flat (because of the weight of the breast), and lifting heavy objects  Denies shortness of breath  She does have a cough that she associates with allergies  NO one in the family is currently sick   She tested negative for covid 7/3 at [facility]         The following portions of the patient's history were reviewed and updated as appropriate:   She   Patient Active Problem List    Diagnosis Date Noted    Chest pain varying with breathing 2021    History of augmentation of right breast 2021    COVID-19 virus detected 12/15/2020    Use of tamoxifen (Nolvadex) 2020    S/P left breast implant 2020    Dehiscence of incision 2020    Muscle spasm 2019    Chemotherapy induced neutropenia (HCC) 10/23/2019    Varicose veins of left lower extremity with pain 10/02/2019    Mild intermittent asthma without complication 15/91/2073    Seasonal allergic rhinitis due to pollen 10/02/2019    Malignant neoplasm of overlapping sites of left breast in female, estrogen receptor positive ([facility] [address] ) 10/01/2019     She  has a past surgical history that includes US guided breast biopsy left complete (Left, 9/10/2019); US guided breast lymph node biopsy left (Left, 9/10/2019); US guidance breast biopsy left each additional (Left, 9/10/2019);  section; IR port placement (10/29/2019); US breast needle loc left (Left, 2020); Breast incision and drainage (Left, 2020); pr mastectomy, modified radical (Left, 2020); Breast reconstruction (Left, 2020); IR port removal (2020); AUGMENTATION BREAST (Right, 2021); Breast biopsy (Left, 09/10/2019); and Mastectomy (Left, 2020)  Her family history includes Asthma in her father; Breast cancer in her paternal aunt; Cancer in her paternal uncle; Diabetes in her father; No Known Problems in her daughter, maternal aunt, maternal aunt, and maternal aunt; Pancreatic cancer in her maternal grandmother; Stomach cancer in her paternal aunt  She  reports that she has never smoked  She has never used smokeless tobacco  She reports that she does not drink alcohol and does not use drugs    Current Outpatient Medications   Medication Sig Dispense Refill    acetaminophen (TYLENOL) 500 MG chewable tablet Chew 1 tablet (500 mg total) every 6 (six) hours 30 tablet 0    albuterol (PROVENTIL HFA,VENTOLIN HFA) 90 mcg/act inhaler Inhale 2 puffs every 6 (six) hours as needed for wheezing or shortness of breath 1 Inhaler 3    loratadine (CLARITIN) 10 mg tablet Take 1 tablet (10 mg total) by mouth daily 90 tablet 1    montelukast (SINGULAIR) 10 mg tablet Take 1 tablet (10 mg total) by mouth daily at bedtime 90 tablet 1    multivitamin (THERAGRAN) TABS Take 1 tablet by mouth daily      tamoxifen (NOLVADEX) 20 mg tablet Take 1 tablet (20 mg total) by mouth daily 90 tablet 3    gabapentin (NEURONTIN) 100 mg capsule Take 1 capsule (100 mg total) by mouth 3 (three) times a day for 10 days 30 capsule 0    gabapentin (NEURONTIN) 300 mg capsule Take 1 capsule (300 mg total) by mouth 3 (three) times a day for 10 days 30 capsule 0    mometasone (NASONEX) 50 mcg/act nasal spray 2 sprays into each nostril daily (Patient not taking: Reported on 6/8/2021) 3 Act 1    naproxen (NAPROSYN) 500 mg tablet Take 1 tablet (500 mg total) by mouth 2 (two) times a day with meals 30 tablet 0     No current facility-administered medications for this visit       Review of Systems   Constitutional: Negative for fever and unexpected weight change  HENT: Negative for ear pain, sore throat and trouble swallowing  Eyes: Negative for pain and visual disturbance  Respiratory: Negative for cough, chest tightness, shortness of breath and wheezing  Cardiovascular: Positive for chest pain  Gastrointestinal: Negative for abdominal distention, abdominal pain, blood in stool, constipation, diarrhea, nausea and vomiting  Endocrine: Negative for polydipsia and polyuria  Genitourinary: Negative for dysuria and hematuria  Musculoskeletal: Negative for back pain and myalgias  Skin: Negative for rash  Neurological: Negative for syncope and headaches  Psychiatric/Behavioral: Negative for suicidal ideas           PHQ-9 Depression Screening    PHQ-9:   Frequency of the following problems over the past two weeks:           [unfilled]    Objective:      BP 90/68 (BP Location: Left arm, Patient Position: Sitting, Cuff Size: Adult)   Pulse 105   Temp 98 6 °F (37 °C) (Tympanic)   Resp 16   Ht 5' 5" (1 651 m)   Wt 55 kg (121 lb 3 2 oz)   LMP  (LMP Unknown)   SpO2 97%   BMI 20 17 kg/m²          Physical Exam  Constitutional:       Appearance: She is well-developed  HENT:      Head: Normocephalic and atraumatic  Right Ear: External ear normal       Left Ear: External ear normal       Mouth/Throat:      Pharynx: No oropharyngeal exudate  Eyes:      General: No scleral icterus  Conjunctiva/sclera: Conjunctivae normal       Pupils: Pupils are equal, round, and reactive to light  Cardiovascular:      Rate and Rhythm: Regular rhythm  Tachycardia present  Heart sounds: No murmur heard  No friction rub  No gallop  Pulmonary:      Effort: Pulmonary effort is normal  No respiratory distress  Breath sounds: Normal breath sounds  No wheezing or rales  Chest:      Chest wall: Tenderness present  No mass, lacerations, deformity, swelling, crepitus or edema  There is no dullness to percussion  Breasts:         Left: Absent  Comments: Tenderness in the left axilla with swelling of the axillary lymph nodes  Implant present in the left breast    Abdominal:      General: Bowel sounds are normal  There is no distension  Palpations: Abdomen is soft  There is no mass  Tenderness: There is no abdominal tenderness  There is no rebound  Musculoskeletal:         General: Normal range of motion  Cervical back: Normal range of motion and neck supple  Lymphadenopathy:      Upper Body:      Left upper body: Axillary adenopathy present  Skin:     General: Skin is warm and dry  Neurological:      Mental Status: She is alert and oriented to person, place, and time

## 2021-07-08 NOTE — PATIENT INSTRUCTIONS
Get blood work done asap  I will call you with results  Use warm compresses on sites of pain  Use naproxen twice a day for 5 days

## 2021-07-10 ENCOUNTER — APPOINTMENT (OUTPATIENT)
Dept: LAB | Age: 41
End: 2021-07-10
Payer: COMMERCIAL

## 2021-07-10 DIAGNOSIS — R07.1 CHEST PAIN VARYING WITH BREATHING: ICD-10-CM

## 2021-07-10 LAB
ALBUMIN SERPL BCP-MCNC: 3.5 G/DL (ref 3.5–5)
ALP SERPL-CCNC: 70 U/L (ref 46–116)
ALT SERPL W P-5'-P-CCNC: 40 U/L (ref 12–78)
ANION GAP SERPL CALCULATED.3IONS-SCNC: 4 MMOL/L (ref 4–13)
AST SERPL W P-5'-P-CCNC: 22 U/L (ref 5–45)
BASOPHILS # BLD AUTO: 0.03 THOUSANDS/ΜL (ref 0–0.1)
BASOPHILS NFR BLD AUTO: 1 % (ref 0–1)
BILIRUB SERPL-MCNC: 0.44 MG/DL (ref 0.2–1)
BUN SERPL-MCNC: 12 MG/DL (ref 5–25)
CALCIUM SERPL-MCNC: 9.1 MG/DL (ref 8.3–10.1)
CHLORIDE SERPL-SCNC: 105 MMOL/L (ref 100–108)
CO2 SERPL-SCNC: 27 MMOL/L (ref 21–32)
CREAT SERPL-MCNC: 0.72 MG/DL (ref 0.6–1.3)
D DIMER PPP FEU-MCNC: <0.27 UG/ML FEU
EOSINOPHIL # BLD AUTO: 0.29 THOUSAND/ΜL (ref 0–0.61)
EOSINOPHIL NFR BLD AUTO: 9 % (ref 0–6)
ERYTHROCYTE [DISTWIDTH] IN BLOOD BY AUTOMATED COUNT: 12.7 % (ref 11.6–15.1)
GFR SERPL CREATININE-BSD FRML MDRD: 104 ML/MIN/1.73SQ M
GLUCOSE P FAST SERPL-MCNC: 88 MG/DL (ref 65–99)
HCT VFR BLD AUTO: 41.9 % (ref 34.8–46.1)
HGB BLD-MCNC: 13.4 G/DL (ref 11.5–15.4)
IMM GRANULOCYTES # BLD AUTO: 0.01 THOUSAND/UL (ref 0–0.2)
IMM GRANULOCYTES NFR BLD AUTO: 0 % (ref 0–2)
LYMPHOCYTES # BLD AUTO: 1.24 THOUSANDS/ΜL (ref 0.6–4.47)
LYMPHOCYTES NFR BLD AUTO: 39 % (ref 14–44)
MCH RBC QN AUTO: 31 PG (ref 26.8–34.3)
MCHC RBC AUTO-ENTMCNC: 32 G/DL (ref 31.4–37.4)
MCV RBC AUTO: 97 FL (ref 82–98)
MONOCYTES # BLD AUTO: 0.43 THOUSAND/ΜL (ref 0.17–1.22)
MONOCYTES NFR BLD AUTO: 13 % (ref 4–12)
NEUTROPHILS # BLD AUTO: 1.22 THOUSANDS/ΜL (ref 1.85–7.62)
NEUTS SEG NFR BLD AUTO: 38 % (ref 43–75)
NRBC BLD AUTO-RTO: 0 /100 WBCS
PLATELET # BLD AUTO: 272 THOUSANDS/UL (ref 149–390)
PMV BLD AUTO: 9.8 FL (ref 8.9–12.7)
POTASSIUM SERPL-SCNC: 4 MMOL/L (ref 3.5–5.3)
PROT SERPL-MCNC: 8.1 G/DL (ref 6.4–8.2)
RBC # BLD AUTO: 4.32 MILLION/UL (ref 3.81–5.12)
SODIUM SERPL-SCNC: 136 MMOL/L (ref 136–145)
WBC # BLD AUTO: 3.22 THOUSAND/UL (ref 4.31–10.16)

## 2021-07-10 PROCEDURE — 36415 COLL VENOUS BLD VENIPUNCTURE: CPT

## 2021-07-10 PROCEDURE — 80053 COMPREHEN METABOLIC PANEL: CPT

## 2021-07-10 PROCEDURE — 85379 FIBRIN DEGRADATION QUANT: CPT

## 2021-07-10 PROCEDURE — 85025 COMPLETE CBC W/AUTO DIFF WBC: CPT

## 2021-07-12 ENCOUNTER — TELEPHONE (OUTPATIENT)
Dept: FAMILY MEDICINE CLINIC | Facility: CLINIC | Age: 41
End: 2021-07-12

## 2021-07-12 NOTE — TELEPHONE ENCOUNTER
Call back from patient  Did not find a note in chart  Pt states she is feeling a little bit better on the medication but the pain is still present

## 2021-07-22 ENCOUNTER — OFFICE VISIT (OUTPATIENT)
Dept: PLASTIC SURGERY | Facility: CLINIC | Age: 41
End: 2021-07-22

## 2021-07-22 VITALS — WEIGHT: 120 LBS | TEMPERATURE: 97.5 F | HEIGHT: 65 IN | BODY MASS INDEX: 19.99 KG/M2

## 2021-07-22 DIAGNOSIS — Z17.0 MALIGNANT NEOPLASM OF OVERLAPPING SITES OF LEFT BREAST IN FEMALE, ESTROGEN RECEPTOR POSITIVE (HCC): Primary | ICD-10-CM

## 2021-07-22 DIAGNOSIS — C50.812 MALIGNANT NEOPLASM OF OVERLAPPING SITES OF LEFT BREAST IN FEMALE, ESTROGEN RECEPTOR POSITIVE (HCC): Primary | ICD-10-CM

## 2021-07-22 PROCEDURE — 99024 POSTOP FOLLOW-UP VISIT: CPT | Performed by: PLASTIC SURGERY

## 2021-07-22 PROCEDURE — 3008F BODY MASS INDEX DOCD: CPT | Performed by: FAMILY MEDICINE

## 2021-07-22 NOTE — PROGRESS NOTES
Cherie Gleason is post-op: Right breast augmentation and left mastectomy with direct to implant reconstruction  Presenting for routine follow-up  S:  Doing well  Patient has noted no excessive redness, swelling and discharge  She does complains of thightness along her left arm that has caused discomfort, She saw her PCP and Naproxen was prescribed with some improvement    O:  Right breast: soft, breast implant capsule soft and in good position  Scar well healed         Left breast: soft, capsule stable  Left arm abduction limited to 60 degrees with some tightness     A:  Satisfactory course  I discussed that I felt her implants are in good shape for the moment and no evidence of capsular problems  Patient does have left axilla contracture likely post mastectomy and radiation induced  Patient has not had any therapy  P: Patient was advised to continue breast exercises and to be seen by PT/OT for range of motion exercises  Patient to return in 3 months  Patient is to return as needed for redness, swelling, discomfort, or any concern about her surgery

## 2021-09-22 ENCOUNTER — ANNUAL EXAM (OUTPATIENT)
Dept: OBGYN CLINIC | Facility: CLINIC | Age: 41
End: 2021-09-22
Payer: COMMERCIAL

## 2021-09-22 VITALS
SYSTOLIC BLOOD PRESSURE: 98 MMHG | WEIGHT: 129 LBS | BODY MASS INDEX: 21.49 KG/M2 | DIASTOLIC BLOOD PRESSURE: 60 MMHG | HEIGHT: 65 IN

## 2021-09-22 DIAGNOSIS — Z12.31 ENCOUNTER FOR SCREENING MAMMOGRAM FOR MALIGNANT NEOPLASM OF BREAST: ICD-10-CM

## 2021-09-22 DIAGNOSIS — Z01.419 PAP SMEAR, AS PART OF ROUTINE GYNECOLOGICAL EXAMINATION: ICD-10-CM

## 2021-09-22 DIAGNOSIS — Z01.419 ENCNTR FOR GYN EXAM (GENERAL) (ROUTINE) W/O ABN FINDINGS: Primary | ICD-10-CM

## 2021-09-22 PROCEDURE — G0145 SCR C/V CYTO,THINLAYER,RESCR: HCPCS | Performed by: OBSTETRICS & GYNECOLOGY

## 2021-09-22 PROCEDURE — 99396 PREV VISIT EST AGE 40-64: CPT | Performed by: OBSTETRICS & GYNECOLOGY

## 2021-09-22 PROCEDURE — 0503F POSTPARTUM CARE VISIT: CPT | Performed by: OBSTETRICS & GYNECOLOGY

## 2021-09-22 NOTE — PROGRESS NOTES
Assessment/Plan:    No problem-specific Assessment & Plan notes found for this encounter  Diagnoses and all orders for this visit:    Encntr for gyn exam (general) (routine) w/o abn findings  -     Liquid-based pap, screening    Pap smear, as part of routine gynecological examination    Encounter for screening mammogram for malignant neoplasm of breast  -     Mammo screening bilateral w 3d & cad; Future          Normal gynecological physical examination  Self-breast examination stressed  Discussed regular exercise, healthy diet, importance of vitamin D and calcium supplements  Discussed importance of sun block use during periods of prolonged sun exposure  Patient will be seen in 1 year for routine gynecologic and medical examination  Patient will call office for any problems, concerns, or issues which may arise during the interim  Subjective:      Patient ID: Mayra Drew is a 39 y o  female  Pt is a 39year old female who presents today for her annual gynecologic and medical examination  She has a PMH of breast cancer and had a double mastectomy and had breast reconstruction surgery a couple months ago  She denies any complications from this  She denies F/C, headaches, chest pain, SOB, abdominal pain, and abnormal bleeding  She has not had a period since her chemotherapy ended  She notes good appetite and healthy bowel and bladder habits  The following portions of the patient's history were reviewed and updated as appropriate: allergies, current medications, past family history, past medical history, past social history, past surgical history and problem list     Review of Systems   Constitutional: Negative  Negative for appetite change, diaphoresis, fatigue, fever and unexpected weight change  HENT: Negative  Eyes: Negative  Respiratory: Negative  Cardiovascular: Negative  Gastrointestinal: Negative    Negative for abdominal pain, blood in stool, constipation, diarrhea, nausea and vomiting  Endocrine: Negative  Negative for cold intolerance and heat intolerance  Genitourinary: Negative  Negative for dysuria, frequency, hematuria, urgency, vaginal bleeding, vaginal discharge and vaginal pain  Musculoskeletal: Negative  Skin: Negative  Allergic/Immunologic: Negative  Neurological: Negative  Hematological: Negative  Negative for adenopathy  Psychiatric/Behavioral: Negative  Objective:      BP 98/60 (BP Location: Left arm, Patient Position: Sitting, Cuff Size: Standard)   Ht 5' 5" (1 651 m)   Wt 58 5 kg (129 lb)   LMP  (LMP Unknown)   BMI 21 47 kg/m²          Physical Exam  Constitutional:       General: She is not in acute distress  Appearance: Normal appearance  She is well-developed  She is not diaphoretic  HENT:      Head: Normocephalic and atraumatic  Eyes:      Pupils: Pupils are equal, round, and reactive to light  Cardiovascular:      Rate and Rhythm: Normal rate and regular rhythm  Heart sounds: Normal heart sounds  No murmur heard  No friction rub  No gallop  Pulmonary:      Effort: Pulmonary effort is normal       Breath sounds: Normal breath sounds  Chest:      Breasts: Breasts are symmetrical          Right: No inverted nipple, mass, nipple discharge, skin change or tenderness  Left: No inverted nipple, mass, nipple discharge, skin change or tenderness  Abdominal:      General: Bowel sounds are normal       Palpations: Abdomen is soft  Genitourinary:     General: Normal vulva  Exam position: Supine  Labia:         Right: No rash or lesion  Left: No rash or lesion  Vagina: Normal  No vaginal discharge, erythema, tenderness or bleeding  Cervix: No discharge or friability  Uterus: Not enlarged and not tender  Adnexa:         Right: No mass, tenderness or fullness  Left: No mass, tenderness or fullness          Rectum: Normal  Guaiac result negative  Musculoskeletal:         General: Normal range of motion  Cervical back: Normal range of motion and neck supple  Lymphadenopathy:      Cervical: No cervical adenopathy  Upper Body:      Right upper body: No supraclavicular adenopathy  Left upper body: No supraclavicular adenopathy  Skin:     General: Skin is warm and dry  Findings: No rash  Neurological:      Mental Status: She is alert and oriented to person, place, and time  Psychiatric:         Mood and Affect: Mood normal          Speech: Speech normal          Behavior: Behavior normal          Thought Content:  Thought content normal          Judgment: Judgment normal

## 2021-09-29 LAB
LAB AP GYN PRIMARY INTERPRETATION: NORMAL
Lab: NORMAL

## 2021-11-10 ENCOUNTER — OFFICE VISIT (OUTPATIENT)
Dept: PLASTIC SURGERY | Facility: CLINIC | Age: 41
End: 2021-11-10
Payer: COMMERCIAL

## 2021-11-10 DIAGNOSIS — C50.812 MALIGNANT NEOPLASM OF OVERLAPPING SITES OF LEFT BREAST IN FEMALE, ESTROGEN RECEPTOR POSITIVE (HCC): Primary | ICD-10-CM

## 2021-11-10 DIAGNOSIS — Z17.0 MALIGNANT NEOPLASM OF OVERLAPPING SITES OF LEFT BREAST IN FEMALE, ESTROGEN RECEPTOR POSITIVE (HCC): Primary | ICD-10-CM

## 2021-11-10 PROCEDURE — 99212 OFFICE O/P EST SF 10 MIN: CPT | Performed by: PLASTIC SURGERY

## 2021-11-26 ENCOUNTER — HOSPITAL ENCOUNTER (OUTPATIENT)
Dept: MAMMOGRAPHY | Facility: CLINIC | Age: 41
Discharge: HOME | End: 2021-11-26
Payer: COMMERCIAL

## 2021-11-26 VITALS — BODY MASS INDEX: 21.49 KG/M2 | HEIGHT: 65 IN | WEIGHT: 128.97 LBS

## 2021-11-26 DIAGNOSIS — Z17.0 MALIGNANT NEOPLASM OF OVERLAPPING SITES OF LEFT BREAST IN FEMALE, ESTROGEN RECEPTOR POSITIVE (HCC): ICD-10-CM

## 2021-11-26 DIAGNOSIS — C50.812 MALIGNANT NEOPLASM OF OVERLAPPING SITES OF LEFT BREAST IN FEMALE, ESTROGEN RECEPTOR POSITIVE (HCC): ICD-10-CM

## 2021-11-26 PROCEDURE — 77065 DX MAMMO INCL CAD UNI: CPT

## 2021-11-26 PROCEDURE — G0279 TOMOSYNTHESIS, MAMMO: HCPCS

## 2021-12-23 ENCOUNTER — OFFICE VISIT (OUTPATIENT)
Dept: SURGICAL ONCOLOGY | Facility: CLINIC | Age: 41
End: 2021-12-23
Payer: COMMERCIAL

## 2021-12-23 VITALS
HEART RATE: 105 BPM | OXYGEN SATURATION: 99 % | TEMPERATURE: 97 F | SYSTOLIC BLOOD PRESSURE: 110 MMHG | DIASTOLIC BLOOD PRESSURE: 80 MMHG | BODY MASS INDEX: 22.33 KG/M2 | HEIGHT: 65 IN | RESPIRATION RATE: 14 BRPM | WEIGHT: 134 LBS

## 2021-12-23 DIAGNOSIS — Z79.810 USE OF TAMOXIFEN (NOLVADEX): ICD-10-CM

## 2021-12-23 DIAGNOSIS — Z17.0 MALIGNANT NEOPLASM OF OVERLAPPING SITES OF LEFT BREAST IN FEMALE, ESTROGEN RECEPTOR POSITIVE (HCC): Primary | ICD-10-CM

## 2021-12-23 DIAGNOSIS — C50.812 MALIGNANT NEOPLASM OF OVERLAPPING SITES OF LEFT BREAST IN FEMALE, ESTROGEN RECEPTOR POSITIVE (HCC): Primary | ICD-10-CM

## 2021-12-23 DIAGNOSIS — R92.2 DENSE BREASTS: ICD-10-CM

## 2021-12-23 DIAGNOSIS — Z12.39 ENCOUNTER FOR BREAST CANCER SCREENING OTHER THAN MAMMOGRAM: ICD-10-CM

## 2021-12-23 PROCEDURE — 99214 OFFICE O/P EST MOD 30 MIN: CPT | Performed by: NURSE PRACTITIONER

## 2022-01-06 ENCOUNTER — EVALUATION (OUTPATIENT)
Dept: PHYSICAL THERAPY | Facility: CLINIC | Age: 42
End: 2022-01-06
Payer: COMMERCIAL

## 2022-01-06 DIAGNOSIS — C50.812 MALIGNANT NEOPLASM OF OVERLAPPING SITES OF LEFT BREAST IN FEMALE, ESTROGEN RECEPTOR POSITIVE (HCC): ICD-10-CM

## 2022-01-06 DIAGNOSIS — G89.18 POST-MASTECTOMY PAIN: Primary | ICD-10-CM

## 2022-01-06 DIAGNOSIS — I89.0 LYMPHEDEMA: ICD-10-CM

## 2022-01-06 DIAGNOSIS — Z17.0 MALIGNANT NEOPLASM OF OVERLAPPING SITES OF LEFT BREAST IN FEMALE, ESTROGEN RECEPTOR POSITIVE (HCC): ICD-10-CM

## 2022-01-06 PROCEDURE — 97162 PT EVAL MOD COMPLEX 30 MIN: CPT | Performed by: PHYSICAL THERAPIST

## 2022-01-06 PROCEDURE — 97110 THERAPEUTIC EXERCISES: CPT | Performed by: PHYSICAL THERAPIST

## 2022-01-06 NOTE — PROGRESS NOTES
PT Evaluation    Today's date: 22  Patient name: Nasima Reynolds  : 1980  MRN: [id number]  Referring provider: Louann James MD  Dx:   Encounter Diagnosis     ICD-10-CM    1  Status post left mastectomy Z90 12 Ambulatory referral to Physical Therapy   2  Malignant neoplasm of overlapping sites of left breast in female, estrogen receptor positive [facility]) C50 812 Ambulatory referral to Physical Therapy    Z17 0                   Assessment    22:  Tuan May returns to physical therapy for post mastectomy pain/lymphedema and post radiation fibrosis  She presents with s/s consistent with above diagnosis  She would benefit from skilled manual therapy for myofascial release, PROM, AAROM progressing to strengthening and functional activities  She should also have a compression sleeve at 20-30 mmHg for daily use with increased activity  She has been given an hep and is in agreement with the plan of care  Thank you for your referral       20:  Tuan May has attended physical therapy for post mastectomy pain syndrome  She has made excellent progress in decreasing pain and increasing strength, ROM and function  She is able to begin radiation at this time  We will continue to monitor her progress when she begins radiation therapy in the coming weeks  Thank you for your referral       Assessment details: Nasima Reynolds is a 36 y o  female with 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  She  presents with pain, decreased strength, decreased ROM, impaired sensation,  postural  dysfunction as well as visible axillary cording  Due to these impairments, Patient has difficulty performing a/iadls, recreational activities and engaging in social activities  Patient's clinical presentation is consistent with their referring diagnosis   Patient would benefit from skilled physical therapy to address their aforementioned impairments, improve their level of function and to improve their overall quality of life   has been given a home exercise program and is in agreement with the plan of care  She would benefit from a compression sleeve of 20-30 mmHG for use when she returns to work  Thank you for your referral   Impairments: abnormal or restricted ROM, activity intolerance, impaired physical strength, lacks appropriate home exercise program and pain with function    Symptom irritability: moderateUnderstanding of Dx/Px/POC: excellent  Goals  ST Goals - 2-4 weeks  1  Patient will report decreased pain with activity by at least 2 points within 4 weeks   2  Patient will improve ROM 5-10 degrees within 4 weeks   3  Patient will demonstrate ability to actively correct posture without cueing within 4 weeks   4  Patient will perform IADLs without pain in 2 weeks   5  Patient will increase strength by 25% in 4 weeks     LT Goals - Discharge  1  Patient will improve FOTO score to maximum stated or greater by discharge  2  Patient will return to preferred recreational activity without significant pain increase by discharge   3  Patient will return to all work related activities without pain by discharge       Plan  Patient would benefit from: skilled physical therapy  Planned therapy interventions: joint mobilization, manual therapy, stretching, therapeutic activities, home exercise program and functional ROM exercises  Frequency: 2x week  Duration in visits: 20  Duration in weeks: 20  Plan of Care beginning date: 1/6/22  Plan of Care expiration date:  Treatment plan discussed with: patient        Subjective Evaluation    History of Present Illness  Date of surgery: 4/20/2020  Mechanism of injury: surgery  Mechanism of injury: Patient reports that she had L mastectomy axillary dissection and  expander placement on 4/20/2020  She  had  enoadjuvent chemotherapy and will be assessed by radiation therapy on 5/22/2020  S/S:  Patient reports that she has a feeling of pulling and pain from the axilla down her UE  She notes that any time she tries to stretch her elbow she has this discomfort  She notes that she cannot reach up overhead  The most she can reach is directly in front of her but her elbow does not extend  Function:  She reports that she is a   She reports that she needs to lift, pull and move her UE quite a bit  She states that she may have to lift up to 50#  She works for [employer]  She is able to perform ADLs  She is not yet driving  She has teenage children  22:  Patient returns to physical therapy s/p L mastectomy with reconstruction  Patient is a 35-year-old female who presents today for six-month follow-up visit for left breast cancer diagnosed in 2019  Her pathology revealed invasive ductal carcinoma, ER positive, NM positive, HER2 positive   She had left axillary lymph node involvement   She underwent neoadjuvant chemotherapy and underwent a left modified radical mastectomy with Dr Resendez Edu had immediate reconstruction with Dr Pamela Schreiber  She completed trastuzumab and pertuzumab therapy and is currently taking Tamoxifen    She completed radiation in Oct, 21  Currently she states that she has tightness in the pectoral region as well as pain in the L axilla into the lat/serratus region  She also has decreased ROM  She is working at [employer]  She sometimes needs to lift or reach overhead  She states that sometimes if she has to reach something overhead with both arms she has dropped things  She also reports that she does get some swelling              Recurrent probem    Quality of life: excellent    Pain  Current pain rating: 3 / 1  / 5/10  At worst pain ratin / 2 /  4-6/10    Quality: sharp and radiating  Aggravating factors: lifting and overhead activity  Progression: improved    Hand dominance: right          Objective     Observations     Additional Observation Details  + Axillary Web Syndrome    Active Range of Motion     Left Shoulder   Flexion: 73 degrees  / 140 / 120   Abduction: 55 degrees  118 /   90  External Rotation:  T2 / C4 with pain  Internal Rotation;  L4 /  L4    Right Shoulder   Flexion: 160 degrees   Abduction: 160 degrees   [facility]  Internal rotation BTB: [facility]    Strength/Myotome Testing     Right Shoulder   Normal muscle strength    Swelling   Left shoulder swelling details: Lymphedema MMTs     RIGHT                                          1/6/22    palm          18                  18 8  wrist          14 2                15  Wrist + 10 cm         16 5                 18   Wrist + 16 cm          20 5         22 5  Elbow           21 5                23  Wrist + 35          23 5                26 5        Wrist + 40           25 3          29      Right shoulder swelling details: Lymphedema MMTs                            LEFT                             8/19/20 1/6/22    palm                          18                                    18 2                    18 6  wrist               14 5                                  14 5                     15  Wrist + 10 cm               18                                16                       17 8  Wrist + 16 cm                20 5                                20                        22  Elbow                 21 5                                  21 2                    22 5  Wrist + 33                24 5                                 24 5                    26  Wrist + 40                      26                                 26 5                    29 5      Length:  47 cm         Precautions: L BCA      Manuals 1/6            IASTM nv            (-) pressure IASTM nv                                      Neuro Re-Ed                          Scapula retraction                                                                              Ther Ex             Pulleys             Wall Climbs             [facility] [address] Ther Activity                                       Gait Training                                       Modalities

## 2022-01-11 ENCOUNTER — OFFICE VISIT (OUTPATIENT)
Dept: PHYSICAL THERAPY | Facility: CLINIC | Age: 42
End: 2022-01-11
Payer: COMMERCIAL

## 2022-01-11 DIAGNOSIS — C50.812 MALIGNANT NEOPLASM OF OVERLAPPING SITES OF LEFT BREAST IN FEMALE, ESTROGEN RECEPTOR POSITIVE (HCC): Primary | ICD-10-CM

## 2022-01-11 DIAGNOSIS — Z17.0 MALIGNANT NEOPLASM OF OVERLAPPING SITES OF LEFT BREAST IN FEMALE, ESTROGEN RECEPTOR POSITIVE (HCC): Primary | ICD-10-CM

## 2022-01-11 DIAGNOSIS — G89.18 POST-MASTECTOMY PAIN: ICD-10-CM

## 2022-01-11 PROCEDURE — 97140 MANUAL THERAPY 1/> REGIONS: CPT

## 2022-01-11 PROCEDURE — 97110 THERAPEUTIC EXERCISES: CPT

## 2022-01-11 NOTE — PROGRESS NOTES
Daily Note     Today's date: 2022  Patient name: Nate Up  : 1980  MRN: [id number]  Referring provider: Sabrina Dan MD  Dx: No diagnosis found  Subjective: No complaints since last session  Objective: See treatment diary below       Assessment: Tolerated treatment well  Patient exhibited good technique with therapeutic exercises      Plan: Continue per plan of care  Patient arrived late and was accommodated        Precautions: L BCA      Manuals            IASTM nv            (-) pressure IASTM nv            PROM  8                        Neuro Re-Ed                          Scapula retraction                                                                              Ther Ex             Pulleys  5 min            Wall Climbs  flex 10" x5           [address]  20"x 5           Supine AAROM flexion  10" x 5           Supine AAROM haircut  10" x 5                                                  Ther Activity                                       Gait Training                                       Modalities

## 2022-01-17 ENCOUNTER — OFFICE VISIT (OUTPATIENT)
Dept: PHYSICAL THERAPY | Facility: CLINIC | Age: 42
End: 2022-01-17
Payer: COMMERCIAL

## 2022-01-17 DIAGNOSIS — C50.812 MALIGNANT NEOPLASM OF OVERLAPPING SITES OF LEFT BREAST IN FEMALE, ESTROGEN RECEPTOR POSITIVE (HCC): Primary | ICD-10-CM

## 2022-01-17 DIAGNOSIS — Z17.0 MALIGNANT NEOPLASM OF OVERLAPPING SITES OF LEFT BREAST IN FEMALE, ESTROGEN RECEPTOR POSITIVE (HCC): Primary | ICD-10-CM

## 2022-01-17 DIAGNOSIS — G89.18 POST-MASTECTOMY PAIN: ICD-10-CM

## 2022-01-17 PROCEDURE — 97140 MANUAL THERAPY 1/> REGIONS: CPT | Performed by: PHYSICAL THERAPIST

## 2022-01-17 PROCEDURE — 97110 THERAPEUTIC EXERCISES: CPT | Performed by: PHYSICAL THERAPIST

## 2022-01-17 NOTE — PROGRESS NOTES
Daily Note     Today's date: 2022  Patient name: Asia Coronel  : 1980  MRN: [id number]  Referring provider: Avtar Lizama MD  Dx:   Encounter Diagnosis     ICD-10-CM    1  [facility] left [facility]  C50 812     Z17 0    2  Post-mastectomy pain  G89 18                   Subjective: Patient reports some soreness in the axilla and lateral chest wall  Objective: See treatment diary below      Assessment: Tolerated treatment well  Patient would benefit from continued PT  Patient with moderate axillary cording which did release somewhat today  Plan: Continue per plan of care        Precautions: L BCA      Manuals           IASTM nv            (-) pressure IASTM nv            PROM/MFR  8 10                       Neuro Re-Ed                          Scapula retraction             TB Row/Ext/ER                                                                 Ther Ex             Pulleys  5 min  5          Wall Climbs  flex 10" x5 5 x :10          [address]  20"x 5 5 x :20          Supine AAROM flexion  10" x 5 10 x :10          Supine AAROM haircut  10" x 5 10 x :10                                                 Ther Activity                                       Gait Training                                       Modalities

## 2022-01-20 ENCOUNTER — OFFICE VISIT (OUTPATIENT)
Dept: PHYSICAL THERAPY | Facility: CLINIC | Age: 42
End: 2022-01-20
Payer: COMMERCIAL

## 2022-01-20 DIAGNOSIS — G89.18 POST-MASTECTOMY PAIN: Primary | ICD-10-CM

## 2022-01-20 DIAGNOSIS — I89.0 LYMPHEDEMA: ICD-10-CM

## 2022-01-20 PROCEDURE — 97110 THERAPEUTIC EXERCISES: CPT | Performed by: PHYSICAL THERAPIST

## 2022-01-20 PROCEDURE — 97140 MANUAL THERAPY 1/> REGIONS: CPT | Performed by: PHYSICAL THERAPIST

## 2022-01-20 NOTE — PROGRESS NOTES
Daily Note     Today's date: 2022  Patient name: Mayelin Casarez  : 1980  MRN: [id number]  Referring provider: Otoniel Ingram MD  Dx:   Encounter Diagnosis     ICD-10-CM    1  Post-mastectomy pain  G89 18    2  Lymphedema  I89 0                   Subjective: Patient reports       Objective: See treatment diary below      Assessment: Tolerated treatment well  Patient would benefit from continued PT      Plan: Continue per plan of care        Precautions: L BCA      Manuals          IASTM nv            (-) pressure IASTM nv            PROM/MFR  8 10 10                      Neuro Re-Ed                          Scapula retraction             TB Row/Ext/ER                                                                 Ther Ex             Pulleys  5 min  5 5'         Wall Climbs  flex 10" x5 5 x :10 5 x :20         [address]  20"x 5 5 x :20 5 x :20         Supine AAROM flexion  10" x 5 10 x :10 10 x :10         Supine AAROM haircut  10" x 5 10 x :10 10 x :10                                                Ther Activity                                       Gait Training                                       Modalities

## 2022-01-24 ENCOUNTER — OFFICE VISIT (OUTPATIENT)
Dept: PHYSICAL THERAPY | Facility: CLINIC | Age: 42
End: 2022-01-24
Payer: COMMERCIAL

## 2022-01-24 DIAGNOSIS — C50.812 MALIGNANT NEOPLASM OF OVERLAPPING SITES OF LEFT BREAST IN FEMALE, ESTROGEN RECEPTOR POSITIVE (HCC): ICD-10-CM

## 2022-01-24 DIAGNOSIS — I89.0 LYMPHEDEMA: ICD-10-CM

## 2022-01-24 DIAGNOSIS — Z17.0 MALIGNANT NEOPLASM OF OVERLAPPING SITES OF LEFT BREAST IN FEMALE, ESTROGEN RECEPTOR POSITIVE (HCC): ICD-10-CM

## 2022-01-24 DIAGNOSIS — G89.18 POST-MASTECTOMY PAIN: Primary | ICD-10-CM

## 2022-01-24 PROCEDURE — 97140 MANUAL THERAPY 1/> REGIONS: CPT

## 2022-01-24 PROCEDURE — 97110 THERAPEUTIC EXERCISES: CPT

## 2022-01-24 NOTE — PROGRESS NOTES
Daily Note     Today's date: 2022  Patient name: Sintia Velarde  : 1980  MRN: [id number]  Referring provider: Jeison Reid MD  Dx: No diagnosis found  Subjective: patient states that she feel her ROM in flexion is improving  Abduction and pec stretch remain painful  Objective: See treatment diary below      Assessment: Tolerated treatment well  Patient exhibited good technique with therapeutic exercises      Plan: Continue per plan of care        Precautions: L BCA      Manuals         IASTM nv            (-) pressure IASTM nv            PROM/MFR  8 10 10 10                      Neuro Re-Ed                          Scapula retraction             TB Row/Ext/ER                                                                 Ther Ex             Pulleys  5 min  5 5' 5        Wall Climbs  flex 10" x5 5 x :10 5 x :20 5 x 20"        [address]  20"x 5 5 x :20 5 x :20 5 x 20"        Supine AAROM flexion  10" x 5 10 x :10 10 x :10 10" x 10        Supine AAROM haircut  10" x 5 10 x :10 10 x :10 10" x 10         Standing pball abduction     10" x 5                                   Ther Activity                                       Gait Training                                       Modalities

## 2022-01-27 ENCOUNTER — OFFICE VISIT (OUTPATIENT)
Dept: PHYSICAL THERAPY | Facility: CLINIC | Age: 42
End: 2022-01-27
Payer: COMMERCIAL

## 2022-01-27 DIAGNOSIS — G89.18 POST-MASTECTOMY PAIN: Primary | ICD-10-CM

## 2022-01-27 PROCEDURE — 97140 MANUAL THERAPY 1/> REGIONS: CPT | Performed by: PHYSICAL THERAPIST

## 2022-01-27 PROCEDURE — 97110 THERAPEUTIC EXERCISES: CPT | Performed by: PHYSICAL THERAPIST

## 2022-01-27 PROCEDURE — 97112 NEUROMUSCULAR REEDUCATION: CPT | Performed by: PHYSICAL THERAPIST

## 2022-01-27 NOTE — PROGRESS NOTES
Daily Note     Today's date: 2022  Patient name: Steven Andrade  : 1980  MRN: [id number]  Referring provider: Sarbjit Long MD  Dx:   Encounter Diagnosis     ICD-10-CM    1  Post-mastectomy pain  G89 18                   Subjective: Patient reports that she has some soreness today  Objective: See treatment diary below      Assessment: Tolerated treatment well  Patient would benefit from continued PT      Plan: Continue per plan of care        Precautions: L BCA      Manuals        IASTM nv            (-) pressure IASTM nv            PROM/MFR  8 10 10 10  10                    Neuro Re-Ed                          Scapula retraction             TB Row/Ext/ER      YTB x 10                                                                       Ther Ex             Pulleys  5 min  5 5' 5 5       Wall Climbs  flex 10" x5 5 x :10 5 x :20 5 x 20" 5 x :20       [address]  20"x 5 5 x :20 5 x :20 5 x 20" 5 x :20       Supine AAROM flexion  10" x 5 10 x :10 10 x :10 10" x 10 10 x :10       Supine AAROM haircut  10" x 5 10 x :10 10 x :10 10" x 10  10 x :10       Standing pball abduction     10" x 5  5 x :10                                 Ther Activity                                       Gait Training                                       Modalities

## 2022-01-31 ENCOUNTER — OFFICE VISIT (OUTPATIENT)
Dept: PHYSICAL THERAPY | Facility: CLINIC | Age: 42
End: 2022-01-31
Payer: COMMERCIAL

## 2022-01-31 DIAGNOSIS — G89.18 POST-MASTECTOMY PAIN: Primary | ICD-10-CM

## 2022-01-31 PROCEDURE — 97140 MANUAL THERAPY 1/> REGIONS: CPT | Performed by: PHYSICAL THERAPIST

## 2022-01-31 PROCEDURE — 97110 THERAPEUTIC EXERCISES: CPT

## 2022-01-31 NOTE — PROGRESS NOTES
Daily Note     Today's date: 2022  Patient name: Nate Up  : 1980  MRN: [id number]  Referring provider: Sabrina Dan MD  Dx:   Encounter Diagnosis     ICD-10-CM    1  Post-mastectomy pain  G89 18                   Subjective: Moderate soreness following last treatment session  Objective: See treatment diary below      Assessment: Tolerated treatment well  Patient exhibited good technique with therapeutic exercises      Plan: Continue per plan of care        Precautions: L BCA      Manuals       IASTM nv            (-) pressure IASTM nv            PROM/MFR  8 10 10 10  10 1010 - MW                   Neuro Re-Ed                          Scapula retraction             TB Row/Ext/ER      YTB x 10             YTB x 10                                                           Ther Ex             Pulleys  5 min  5 5' 5 5 5      Wall Climbs  flex 10" x5 5 x :10 5 x :20 5 x 20" 5 x :20 5 x 20"      [address]  20"x 5 5 x :20 5 x :20 5 x 20" 5 x :20 5 x 20"      Supine AAROM flexion  10" x 5 10 x :10 10 x :10 10" x 10 10 x :10 10" x 10      Supine AAROM haircut  10" x 5 10 x :10 10 x :10 10" x 10  10 x :10 10" x 10      Standing pball abduction     10" x 5  5 x :10 nv                                Ther Activity                                       Gait Training                                       Modalities

## 2022-02-02 NOTE — PROGRESS NOTES
Daily Note     Today's date: 2022  Patient name: Michelle Michel  : 1980  MRN: [id number]  Referring provider: Nayla Kim MD  Dx:   Encounter Diagnosis     ICD-10-CM    1  Post-mastectomy pain  G89 18        Start Time:   Stop Time: 320  Total time in clinic (min): 45 minutes         Subjective: Patient continues to report no significant complaints  Objective: See treatment diary below      Assessment: Tolerated treatment well  Patient would benefit from continued PT      Plan: Continue per plan of care        Precautions: L BCA      Manuals       IASTM nv            (-) pressure IASTM nv            PROM/MFR  8 10 10 10  10 10                   Neuro Re-Ed                          Scapula retraction             TB Row/Ext/ER      YTB x 10             YTB x 10                                                          Ther Ex             Pulleys  5 min  5 5' 5 5 5      Wall Climbs  flex 10" x5 5 x :10 5 x :20 5 x 20" 5 x :20 5 x :20      [address]  20"x 5 5 x :20 5 x :20 5 x 20" 5 x :20 5 x :20      Supine AAROM flexion  10" x 5 10 x :10 10 x :10 10" x 10 10 x :10 10 x :10      Supine AAROM haircut  10" x 5 10 x :10 10 x :10 10" x 10  10 x :10 10 x :10      Standing pball abduction     10" x 5  5 x :10 5 x :10                                Ther Activity                                       Gait Training                                       Modalities

## 2022-02-03 ENCOUNTER — OFFICE VISIT (OUTPATIENT)
Dept: PHYSICAL THERAPY | Facility: CLINIC | Age: 42
End: 2022-02-03
Payer: COMMERCIAL

## 2022-02-03 DIAGNOSIS — G89.18 POST-MASTECTOMY PAIN: Primary | ICD-10-CM

## 2022-02-03 PROCEDURE — 97530 THERAPEUTIC ACTIVITIES: CPT | Performed by: PHYSICAL THERAPIST

## 2022-02-03 PROCEDURE — 97140 MANUAL THERAPY 1/> REGIONS: CPT | Performed by: PHYSICAL THERAPIST

## 2022-02-03 PROCEDURE — 97112 NEUROMUSCULAR REEDUCATION: CPT | Performed by: PHYSICAL THERAPIST

## 2022-02-03 PROCEDURE — 97110 THERAPEUTIC EXERCISES: CPT | Performed by: PHYSICAL THERAPIST

## 2022-02-03 NOTE — PROGRESS NOTES
Daily Note     Today's date: 2/3/2022  Patient name: Michelle Michel  : 1980  MRN: [id number]  Referring provider: Nayla Kim MD  Dx:   Encounter Diagnosis     ICD-10-CM    1  Post-mastectomy pain  G89 18                   Subjective: Patient reports that she had some soreness in the axillary region after LV which lasted x 25 hours  Objective: See treatment diary below      Assessment: Tolerated treatment well  Patient would benefit from continued PT      Plan: Continue per plan of care        Precautions: L BCA      Manuals 1/6 1/11 1/17 1/20 1/24 1/27 2/2 2/3     IASTM nv       5     (-) pressure IASTM nv       10     PROM/MFR  8 10 10 10  10 10 5                  Neuro Re-Ed                          Scapula retraction             TB Row/Ext/ER      YTB x 10             YTB x 10 ytb x 15                                                         Ther Ex             Pulleys  5 min  5 5' 5 5 5 5'     Wall Climbs  flex 10" x5 5 x :10 5 x :20 5 x 20" 5 x :20 5 x :20 5 x :20     [address]  20"x 5 5 x :20 5 x :20 5 x 20" 5 x :20 5 x :20 5 x :20     Supine AAROM flexion  10" x 5 10 x :10 10 x :10 10" x 10 10 x :10 10 x :10 10 x :10     Supine AAROM haircut  10" x 5 10 x :10 10 x :10 10" x 10  10 x :10 10 x :10 10 x :10     Standing pball abduction     10" x 5  5 x :10 5 x :10 5 x :10                               Ther Activity             Bicep curls        3# x 10     Rows        3# x 10     Triceps        3# x 10                               Modalities

## 2022-02-07 ENCOUNTER — OFFICE VISIT (OUTPATIENT)
Dept: PHYSICAL THERAPY | Facility: CLINIC | Age: 42
End: 2022-02-07
Payer: COMMERCIAL

## 2022-02-07 DIAGNOSIS — G89.18 POST-MASTECTOMY PAIN: Primary | ICD-10-CM

## 2022-02-07 PROCEDURE — 97112 NEUROMUSCULAR REEDUCATION: CPT

## 2022-02-07 PROCEDURE — 97140 MANUAL THERAPY 1/> REGIONS: CPT

## 2022-02-07 PROCEDURE — 97110 THERAPEUTIC EXERCISES: CPT

## 2022-02-07 NOTE — PROGRESS NOTES
Daily Note     Today's date: 2022  Patient name: Tasneem Muhammad  : 1980  MRN: [id number]  Referring provider: Fatou Lopez MD  Dx: No diagnosis found  Subjective: patient sore following last treatment session  Objective: See treatment diary below      Assessment: Tolerated treatment well  Patient exhibited good technique with therapeutic exercises      Plan: Continue per plan of care        Precautions: L BCA      Manuals  2 2/3 2/7    IASTM nv       5     (-) pressure IASTM nv       10 6     PROM/MFR  8 10 10 10  10 10 5 10                 Neuro Re-Ed                          Scapula retraction             TB Row/Ext/ER      YTB x 10             YTB x 10 ytb x 15 YTB x 15                                                         Ther Ex             Pulleys  5 min  5 5' 5 5 5 5' 5    Wall Climbs  flex 10" x5 5 x :10 5 x :20 5 x 20" 5 x :20 5 x :20 5 x :20 10" X 10     [address]  20"x 5 5 x :20 5 x :20 5 x 20" 5 x :20 5 x :20 5 x :20 5 X 20"    Supine AAROM flexion  10" x 5 10 x :10 10 x :10 10" x 10 10 x :10 10 x :10 10 x :10 10'    Supine AAROM haircut  10" x 5 10 x :10 10 x :10 10" x 10  10 x :10 10 x :10 10 x :10 10" x 10     Standing pball abduction     10" x 5  5 x :10 5 x :10 5 x :10                               Ther Activity             Bicep curls        3# x 10 3# x 10     Rows        3# x 10 3# 10    Triceps        3# x 10 3# 10                              Modalities

## 2022-02-09 DIAGNOSIS — D70.1 CHEMOTHERAPY INDUCED NEUTROPENIA (HCC): ICD-10-CM

## 2022-02-09 DIAGNOSIS — C50.812 MALIGNANT NEOPLASM OF OVERLAPPING SITES OF LEFT BREAST IN FEMALE, ESTROGEN RECEPTOR POSITIVE (HCC): ICD-10-CM

## 2022-02-09 DIAGNOSIS — Z17.0 MALIGNANT NEOPLASM OF OVERLAPPING SITES OF LEFT BREAST IN FEMALE, ESTROGEN RECEPTOR POSITIVE (HCC): ICD-10-CM

## 2022-02-09 DIAGNOSIS — T45.1X5A CHEMOTHERAPY INDUCED NEUTROPENIA (HCC): ICD-10-CM

## 2022-02-10 ENCOUNTER — OFFICE VISIT (OUTPATIENT)
Dept: PHYSICAL THERAPY | Facility: CLINIC | Age: 42
End: 2022-02-10
Payer: COMMERCIAL

## 2022-02-10 DIAGNOSIS — Z17.0 MALIGNANT NEOPLASM OF OVERLAPPING SITES OF LEFT BREAST IN FEMALE, ESTROGEN RECEPTOR POSITIVE (HCC): ICD-10-CM

## 2022-02-10 DIAGNOSIS — C50.812 MALIGNANT NEOPLASM OF OVERLAPPING SITES OF LEFT BREAST IN FEMALE, ESTROGEN RECEPTOR POSITIVE (HCC): ICD-10-CM

## 2022-02-10 DIAGNOSIS — G89.18 POST-MASTECTOMY PAIN: Primary | ICD-10-CM

## 2022-02-10 PROCEDURE — 97140 MANUAL THERAPY 1/> REGIONS: CPT

## 2022-02-10 PROCEDURE — 97110 THERAPEUTIC EXERCISES: CPT

## 2022-02-10 RX ORDER — TAMOXIFEN CITRATE 20 MG/1
20 TABLET ORAL DAILY
Qty: 90 TABLET | Refills: 0 | Status: SHIPPED | OUTPATIENT
Start: 2022-02-10 | End: 2022-07-14 | Stop reason: SDUPTHER

## 2022-02-10 NOTE — PROGRESS NOTES
Daily Note     Today's date: 2/10/2022  Patient name: Ariel Sorenson  : 1980  MRN: [id number]  Referring provider: Genoveva Nicole MD  Dx:   Encounter Diagnosis     ICD-10-CM    1  Post-mastectomy pain  G89 18                   Subjective: Patient states that she is sore in axillary region   Moderate ecchymosis with (-) IASTM today  Objective: See treatment diary below      2Assessment: Tolerated treatment well  Patient exhibited good technique with therapeutic exercises      Plan: Continue per plan of care        Precautions: L BCA      Manuals  2 2/3 2/7 2/10    IASTM nv       5     (-) pressure IASTM nv       10 6  6   PROM/MFR  8 10 10 10  10 10 5 10 10                 Neuro Re-Ed                          Scapula retraction             TB Row/Ext/ER      YTB x 10             YTB x 10 ytb x 15 YTB x 15  YTB 2 x 10                                                       Ther Ex             Pulleys  5 min  5 5' 5 5 5 5' 5 5   Wall Climbs  flex 10" x5 5 x :10 5 x :20 5 x 20" 5 x :20 5 x :20 5 x :20 10" X 10  10" x 10   [address]  20"x 5 5 x :20 5 x :20 5 x 20" 5 x :20 5 x :20 5 x :20 5 X 20" 5 x 20"   Supine AAROM flexion  10" x 5 10 x :10 10 x :10 10" x 10 10 x :10 10 x :10 10 x :10 10" x 10  10" x 10    Supine AAROM haircut  10" x 5 10 x :10 10 x :10 10" x 10  10 x :10 10 x :10 10 x :10 10" x 10  10"x 10    Standing pball abduction     10" x 5  5 x :10 5 x :10 5 x :10  10" x 5                              Ther Activity             Bicep curls        3# x 10 3# x 10  3# x 10    Rows        3# x 10 3# 10 3#x 10    Triceps        3# x 10 3# 10 3# 10    Scaption          NV                Modalities

## 2022-02-14 ENCOUNTER — OFFICE VISIT (OUTPATIENT)
Dept: PHYSICAL THERAPY | Facility: CLINIC | Age: 42
End: 2022-02-14
Payer: COMMERCIAL

## 2022-02-14 DIAGNOSIS — G89.18 POST-MASTECTOMY PAIN: Primary | ICD-10-CM

## 2022-02-14 DIAGNOSIS — C50.812 MALIGNANT NEOPLASM OF OVERLAPPING SITES OF LEFT BREAST IN FEMALE, ESTROGEN RECEPTOR POSITIVE (HCC): ICD-10-CM

## 2022-02-14 DIAGNOSIS — Z17.0 MALIGNANT NEOPLASM OF OVERLAPPING SITES OF LEFT BREAST IN FEMALE, ESTROGEN RECEPTOR POSITIVE (HCC): ICD-10-CM

## 2022-02-14 PROCEDURE — 97140 MANUAL THERAPY 1/> REGIONS: CPT

## 2022-02-14 PROCEDURE — 97110 THERAPEUTIC EXERCISES: CPT

## 2022-02-14 NOTE — PROGRESS NOTES
Daily Note     Today's date: 2022  Patient name: Ena Sow  : 1980  MRN: [id number]  Referring provider: Em Helms MD  Dx: No diagnosis found  Subjective: Patient states that she was experiencing an increase in pain on left chest wall into L elbow  Objective: See treatment diary below      Assessment: Tolerated treatment well  Patient exhibited good technique with therapeutic exercises      Plan: Continue per plan of care        Precautions: L BCA      Manuals 2/14      2/2 2/3 2/7 2/10    IASTM        5     (-) pressure IASTM 10       10 6  6   PROM/MFR 10      10 5 10 10                 Neuro Re-Ed                          Scapula retraction             TB Row/Ext/ER YTB 2 x 10       YTB x 10 ytb x 15 YTB x 15  YTB 2 x 10                                                       Ther Ex             Pulleys 5      5 5' 5 5   Wall Climbs 10" x 10       5 x :20 5 x :20 10" X 10  10" x 10   [address] 5 x 20"      5 x :20 5 x :20 5 X 20" 5 x 20"   Supine AAROM flexion 10" x 10       10 x :10 10 x :10 10" x 10  10" x 10    Supine AAROM haircut 10"x 10       10 x :10 10 x :10 10" x 10  10"x 10    Standing pball abduction 10" x 5       5 x :10 5 x :10  10" x 5                              Ther Activity             Bicep curls        3# x 10 3# x 10  3# x 10    Rows        3# x 10 3# 10 3#x 10    Triceps        3# x 10 3# 10 3# 10    Scaption          NV                Modalities

## 2022-02-17 ENCOUNTER — OFFICE VISIT (OUTPATIENT)
Dept: PHYSICAL THERAPY | Facility: CLINIC | Age: 42
End: 2022-02-17
Payer: COMMERCIAL

## 2022-02-17 DIAGNOSIS — G89.18 POST-MASTECTOMY PAIN: Primary | ICD-10-CM

## 2022-02-17 PROCEDURE — 97112 NEUROMUSCULAR REEDUCATION: CPT | Performed by: PHYSICAL THERAPIST

## 2022-02-17 PROCEDURE — 97140 MANUAL THERAPY 1/> REGIONS: CPT | Performed by: PHYSICAL THERAPIST

## 2022-02-17 PROCEDURE — 97530 THERAPEUTIC ACTIVITIES: CPT | Performed by: PHYSICAL THERAPIST

## 2022-02-17 PROCEDURE — 97110 THERAPEUTIC EXERCISES: CPT | Performed by: PHYSICAL THERAPIST

## 2022-02-17 NOTE — PROGRESS NOTES
PT Re-evaluation    Today's date: 22  Patient name: Michell Carcamo  : 1980  MRN: [id number]  Referring provider: Eber Le MD  Dx:   Encounter Diagnosis     ICD-10-CM    1  Status post left mastectomy Z90 12 Ambulatory referral to Physical Therapy   2  Malignant neoplasm of overlapping sites of left breast in female, estrogen receptor positive [facility]) C50 812 Ambulatory referral to Physical Therapy    Z17 0                   Assessment    22:  Adi Beasley has been attending physical therapy for post mastectomy pain  She has made excellent progress in decreasing her pain levels and increasing strength and function  She would continue to benefit from an additional 2-3 weeks of PT to maximize ROM, strength and function  22Thejeanne Mata returns to physical therapy for post mastectomy pain/lymphedema and post radiation fibrosis  She presents with s/s consistent with above diagnosis  She would benefit from skilled manual therapy for myofascial release, PROM, AAROM progressing to strengthening and functional activities  She should also have a compression sleeve at 20-30 mmHg for daily use with increased activity  She has been given an hep and is in agreement with the plan of care  Thank you for your referral       20:  Adi Beasley has attended physical therapy for post mastectomy pain syndrome  She has made excellent progress in decreasing pain and increasing strength, ROM and function  She is able to begin radiation at this time  We will continue to monitor her progress when she begins radiation therapy in the coming weeks  Thank you for your referral       Assessment details: Michell Carcamo is a 36 y o  female with 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  She  presents with pain, decreased strength, decreased ROM, impaired sensation,  postural  dysfunction as well as visible axillary cording     Due to these impairments, Patient has difficulty performing a/iadls, recreational activities and engaging in social activities  Patient's clinical presentation is consistent with their referring diagnosis  Patient would benefit from skilled physical therapy to address their aforementioned impairments, improve their level of function and to improve their overall quality of life   has been given a home exercise program and is in agreement with the plan of care  She would benefit from a compression sleeve of 20-30 mmHG for use when she returns to work  Thank you for your referral   Impairments: abnormal or restricted ROM, activity intolerance, impaired physical strength, lacks appropriate home exercise program and pain with function    Symptom irritability: moderateUnderstanding of Dx/Px/POC: excellent  Goals  ST Goals - 2-4 weeks  1  Patient will report decreased pain with activity by at least 2 points within 4 weeks   2  Patient will improve ROM 5-10 degrees within 4 weeks   3  Patient will demonstrate ability to actively correct posture without cueing within 4 weeks   4  Patient will perform IADLs without pain in 2 weeks   5  Patient will increase strength by 25% in 4 weeks     LT Goals - Discharge  1  Patient will improve FOTO score to maximum stated or greater by discharge  2  Patient will return to preferred recreational activity without significant pain increase by discharge   3    Patient will return to all work related activities without pain by discharge       Plan  Patient would benefit from: skilled physical therapy  Planned therapy interventions: joint mobilization, manual therapy, stretching, therapeutic activities, home exercise program and functional ROM exercises  Frequency: 2x week  Duration in visits: 20  Duration in weeks: 20  Plan of Care beginning date: 1/6/22  Plan of Care expiration date:  Treatment plan discussed with: patient        Subjective Evaluation    History of Present Illness  Date of surgery: 4/20/2020  Mechanism of injury: surgery  Mechanism of injury: Patient reports that she had L mastectomy axillary dissection and  expander placement on 4/20/2020  She  had  enoadjuvent chemotherapy and will be assessed by radiation therapy on 5/22/2020  S/S:  Patient reports that she has a feeling of pulling and pain from the axilla down her UE  She notes that any time she tries to stretch her elbow she has this discomfort  She notes that she cannot reach up overhead  The most she can reach is directly in front of her but her elbow does not extend  Function:  She reports that she is a   She reports that she needs to lift, pull and move her UE quite a bit  She states that she may have to lift up to 50#  She works for [employer]  She is able to perform ADLs  She is not yet driving  She has teenage children  1/6/22:  Patient returns to physical therapy s/p L mastectomy with reconstruction  Patient is a 44-year-old female who presents today for six-month follow-up visit for left breast cancer diagnosed in September of 2019  Her pathology revealed invasive ductal carcinoma, ER positive, GA positive, HER2 positive   She had left axillary lymph node involvement   She underwent neoadjuvant chemotherapy and underwent a left modified radical mastectomy with Dr Shelton Castillo had immediate reconstruction with Dr Reji Herrera  She completed trastuzumab and pertuzumab therapy and is currently taking Tamoxifen    She completed radiation in Oct, 21  Currently she states that she has tightness in the pectoral region as well as pain in the L axilla into the lat/serratus region  She also has decreased ROM  She is working at [employer]  She sometimes needs to lift or reach overhead  She states that sometimes if she has to reach something overhead with both arms she has dropped things  She also reports that she does get some swelling              Recurrent probem    Quality of life: excellent    Pain  Current pain rating: 3 / 1  / 5/10  / 0/10  At worst pain ratin / 2 /  4-6/10    Quality: sharp and radiating  Aggravating factors: lifting and overhead activity  Progression: improved    Hand dominance: right          Objective     Observations     Additional Observation Details  + Axillary Web Syndrome    Active Range of Motion     Left Shoulder   Flexion: 73 degrees  / 140 / 120 /  138 degrees  Abduction: 55 degrees  118 /   90  /  103 degrees  External Rotation:  T2 / C4 with pain /T1    Internal Rotation;  L4 /  L4     Right Shoulder   Flexion: 160 degrees   Abduction: 160 degrees   External rotation BTH: [facility]  Internal rotation BTB: [facility]    Strength/Myotome Testing     Right Shoulder   Normal muscle strength    Swelling   Left shoulder swelling details: Lymphedema MMTs     RIGHT                                          22    palm          18                  18 8  wrist          14 2                15  Wrist + 10 cm         16 5                 18   Wrist + 16 cm          20 5         22 5  Elbow           21 5                23  Wrist + 35          23 5                26 5        Wrist + 40           25 3          29      Right shoulder swelling details: Lymphedema MMTs                            LEFT                             20    palm                          18                                    18 2                    18 6                 17 3  wrist               14 5                                  14 5                     15                   14 8  Wrist + 10 cm               18                                16                       17 8                 18  Wrist + 16 cm                20 5                                20                        22                    21 5  Elbow                 21 5                                  21 2                    22 5                 22 5  Wrist + 33                24 5                                 24 5 26                     26  Wrist + 40                      26                                 26 5                    29 5                  29      Length:  47 cm

## 2022-02-17 NOTE — PROGRESS NOTES
Daily Note     Today's date: 2022  Patient name: Ena Sow  : 1980  MRN: [id number]  Referring provider: Em Helms MD  Dx:   Encounter Diagnosis     ICD-10-CM    1  Post-mastectomy pain  G89 18                   Subjective: Patient reports that her UE has been feeling well  Patient      Objective: See treatment diary below      Assessment: Tolerated treatment well  Patient would benefit from continued PT      Plan: Continue per plan of care        Precautions: L BCA      Manuals 2/14 2/17     2/2 2/3 2/7 2/10    IASTM        5     (-) pressure IASTM 10 10      10 6  6   PROM/MFR 10      10 5 10 10    REV  10           Neuro Re-Ed                          Scapula retraction             TB Row/Ext/ER YTB 2 x 10  RTB x 20     YTB x 10 ytb x 15 YTB x 15  YTB 2 x 10                                                       Ther Ex             Pulleys 5 5'     5 5' 5 5   Wall Climbs 10" x 10  10 x :10     5 x :20 5 x :20 10" X 10  10" x 10   [address] 5 x 20" 5 x :20     5 x :20 5 x :20 5 X 20" 5 x 20"   Supine AAROM flexion 10" x 10  10 x :10     10 x :10 10 x :10 10" x 10  10" x 10    Supine AAROM haircut 10"x 10  10 x :10     10 x :10 10 x :10 10" x 10  10"x 10    Standing pball abduction 10" x 5  10 x :10     5 x :10 5 x :10  10" x 5                              Ther Activity             Bicep curls  3# 2 x 10      3# x 10 3# x 10  3# x 10    Rows  3# 2 x 10      3# x 10 3# 10 3#x 10    Triceps  3# 2 x 10      3# x 10 3# 10 3# 10    Scaption          NV                Modalities

## 2022-02-21 ENCOUNTER — OFFICE VISIT (OUTPATIENT)
Dept: PHYSICAL THERAPY | Facility: CLINIC | Age: 42
End: 2022-02-21
Payer: COMMERCIAL

## 2022-02-21 DIAGNOSIS — G89.18 POST-MASTECTOMY PAIN: Primary | ICD-10-CM

## 2022-02-21 PROCEDURE — 97530 THERAPEUTIC ACTIVITIES: CPT | Performed by: PHYSICAL THERAPIST

## 2022-02-21 PROCEDURE — 97110 THERAPEUTIC EXERCISES: CPT | Performed by: PHYSICAL THERAPIST

## 2022-02-21 PROCEDURE — 97140 MANUAL THERAPY 1/> REGIONS: CPT | Performed by: PHYSICAL THERAPIST

## 2022-02-21 PROCEDURE — 97112 NEUROMUSCULAR REEDUCATION: CPT | Performed by: PHYSICAL THERAPIST

## 2022-02-21 NOTE — PROGRESS NOTES
Daily Note     Today's date: 2022  Patient name: Karol Phillips  : 1980  MRN: [id number]  Referring provider: Ricardo Monson MD  Dx:   Encounter Diagnosis     ICD-10-CM    1  Post-mastectomy pain  G89 18                   Subjective: Patient reports that her UE is improving      Objective: See treatment diary below      Assessment: Tolerated treatment well  Patient would benefit from continued PT      Plan: Continue per plan of care        Precautions: L BCA      Manuals 2/14 2/17 2/21    2/2 2/3 2/7 2/10    IASTM        5     (-) pressure IASTM 10 10      10 6  6   PROM/MFR 10      10 5 10 10    REV  10           Neuro Re-Ed                          Scapula retraction             TB Row/Ext/ER YTB 2 x 10  RTB x 20 RTB x 20    YTB x 10 ytb x 15 YTB x 15  YTB 2 x 10                                                       Ther Ex             Pulleys 5 5' 5'    5 5' 5 5   Wall Climbs 10" x 10  10 x :10 DC    5 x :20 5 x :20 10" X 10  10" x 10   [address] 5 x 20" 5 x :20 5 x :20    5 x :20 5 x :20 5 X 20" 5 x 20"   Supine AAROM flexion 10" x 10  10 x :10 10 x :10    10 x :10 10 x :10 10" x 10  10" x 10    Supine AAROM haircut 10"x 10  10 x :10 10 x :10    10 x :10 10 x :10 10" x 10  10"x 10    Standing pball abduction 10" x 5  10 x :10 10 x :10    5 x :10 5 x :10  10" x 5                              Ther Activity             Bicep curls  3# 2 x 10 3# 2 x 10     3# x 10 3# x 10  3# x 10    Rows  3# 2 x 10 3# 2 x 10     3# x 10 3# 10 3#x 10    Triceps  3# 2 x 10 3# 2 x 10     3# x 10 3# 10 3# 10    Scaption          NV                Modalities

## 2022-02-24 ENCOUNTER — OFFICE VISIT (OUTPATIENT)
Dept: PHYSICAL THERAPY | Facility: CLINIC | Age: 42
End: 2022-02-24
Payer: COMMERCIAL

## 2022-02-24 DIAGNOSIS — C50.812 MALIGNANT NEOPLASM OF OVERLAPPING SITES OF LEFT BREAST IN FEMALE, ESTROGEN RECEPTOR POSITIVE (HCC): ICD-10-CM

## 2022-02-24 DIAGNOSIS — G89.18 POST-MASTECTOMY PAIN: Primary | ICD-10-CM

## 2022-02-24 DIAGNOSIS — Z17.0 MALIGNANT NEOPLASM OF OVERLAPPING SITES OF LEFT BREAST IN FEMALE, ESTROGEN RECEPTOR POSITIVE (HCC): ICD-10-CM

## 2022-02-24 PROCEDURE — 97140 MANUAL THERAPY 1/> REGIONS: CPT

## 2022-02-24 PROCEDURE — 97110 THERAPEUTIC EXERCISES: CPT

## 2022-02-24 NOTE — PROGRESS NOTES
Daily Note     Today's date: 2022  Patient name: Asia Coronel  : 1980  MRN: [id number]  Referring provider: Avtar Lizama MD  Dx: No diagnosis found  Subjective: States that her Left arm feels heavy to begin treatment but otherwise is feeling well  Objective: See treatment diary below      Assessment: Tolerated treatment well  Patient exhibited good technique with therapeutic exercises      Plan: Continue per plan of care        Precautions: L BCA      Manuals 2/14 2/17 2/21 2/24   2/2 2/3 2/7 2/10    IASTM        5     (-) pressure IASTM 10 10  10     10 6  6   PROM/MFR 10      10 5 10 10    REV  10           Neuro Re-Ed                          Scapula retraction             TB Row/Ext/ER YTB 2 x 10  RTB x 20 RTB x 20 RTB x 20   YTB x 10 ytb x 15 YTB x 15  YTB 2 x 10                                                       Ther Ex             Pulleys 5 5' 5' 5   5 5' 5 5                [address] 5 x 20" 5 x :20 5 x :20 5 x 20"    5 x :20 5 x :20 5 X 20" 5 x 20"   Supine AAROM flexion 10" x 10  10 x :10 10 x :10 10" x 10    10 x :10 10 x :10 10" x 10  10" x 10    Supine AAROM haircut 10"x 10  10 x :10 10 x :10 10" x 10    10 x :10 10 x :10 10" x 10  10"x 10    Standing pball abduction 10" x 5  10 x :10 10 x :10 10" x 10    5 x :10 5 x :10  10" x 5                              Ther Activity             Bicep curls  3# 2 x 10 3# 2 x 10 3# 2 x 10     3# x 10 3# x 10  3# x 10    Rows  3# 2 x 10 3# 2 x 10 3# 2 x 10     3# x 10 3# 10 3#x 10    Triceps  3# 2 x 10 3# 2 x 10 3# x 20     3# x 10 3# 10 3# 10    Scaption    B 1# x 10       NV                Modalities

## 2022-02-28 ENCOUNTER — OFFICE VISIT (OUTPATIENT)
Dept: PHYSICAL THERAPY | Facility: CLINIC | Age: 42
End: 2022-02-28
Payer: COMMERCIAL

## 2022-02-28 DIAGNOSIS — G89.18 POST-MASTECTOMY PAIN: Primary | ICD-10-CM

## 2022-02-28 PROCEDURE — 97140 MANUAL THERAPY 1/> REGIONS: CPT | Performed by: PHYSICAL THERAPIST

## 2022-02-28 PROCEDURE — 97110 THERAPEUTIC EXERCISES: CPT | Performed by: PHYSICAL THERAPIST

## 2022-02-28 PROCEDURE — 97530 THERAPEUTIC ACTIVITIES: CPT | Performed by: PHYSICAL THERAPIST

## 2022-02-28 PROCEDURE — 97112 NEUROMUSCULAR REEDUCATION: CPT | Performed by: PHYSICAL THERAPIST

## 2022-02-28 NOTE — PROGRESS NOTES
Daily Note     Today's date: 2022  Patient name: Kvng Hastings  : 1980  MRN: [id number]  Referring provider: Venita Begum MD  Dx:   Encounter Diagnosis     ICD-10-CM    1  Post-mastectomy pain  G89 18                   Subjective: Patient reports that she notes less tightness in the axillary region with abd  Objective: See treatment diary below      Assessment: Tolerated treatment well  Patient would benefit from continued PT      Plan: Continue per plan of care        Precautions: L BCA      Manuals         IASTM             (-) pressure IASTM 10 10  10  10        PROM/MFR 10            REV  10           Neuro Re-Ed                          Scapula retraction             TB Row/Ext/ER YTB 2 x 10  RTB x 20 RTB x 20 RTB x 20 RTB x 20                                                            Ther Ex             Pulleys 5 5' 5' 5 5                     [address] 5 x 20" 5 x :20 5 x :20 5 x 20"  5 x :20        Supine AAROM flexion 10" x 10  10 x :10 10 x :10 10" x 10  10 x :10        Supine AAROM haircut 10"x 10  10 x :10 10 x :10 10" x 10  10 x :10        Standing pball abduction 10" x 5  10 x :10 10 x :10 10" x 10  10 x :10                                  Ther Activity             Bicep curls  3# 2 x 10 3# 2 x 10 3# 2 x 10  3# 2 x 10        Rows  3# 2 x 10 3# 2 x 10 3# 2 x 10  3# 2 x 10        Triceps  3# 2 x 10 3# 2 x 10 3# x 20  3# x20        Scaption    B 1# x 10  B 1#x 10                     Modalities

## 2022-03-01 ENCOUNTER — PATIENT OUTREACH (OUTPATIENT)
Dept: CASE MANAGEMENT | Facility: HOSPITAL | Age: 42
End: 2022-03-01

## 2022-03-03 ENCOUNTER — OFFICE VISIT (OUTPATIENT)
Dept: PHYSICAL THERAPY | Facility: CLINIC | Age: 42
End: 2022-03-03
Payer: COMMERCIAL

## 2022-03-03 DIAGNOSIS — G89.18 POST-MASTECTOMY PAIN: Primary | ICD-10-CM

## 2022-03-03 PROCEDURE — 97140 MANUAL THERAPY 1/> REGIONS: CPT | Performed by: PHYSICAL THERAPIST

## 2022-03-03 PROCEDURE — 97530 THERAPEUTIC ACTIVITIES: CPT | Performed by: PHYSICAL THERAPIST

## 2022-03-03 PROCEDURE — 97112 NEUROMUSCULAR REEDUCATION: CPT | Performed by: PHYSICAL THERAPIST

## 2022-03-03 NOTE — PROGRESS NOTES
Daily Note     Today's date: 3/3/2022  Patient name: Gemini Scott  : 1980  MRN: [id number]  Referring provider: Antonietta Oviedo MD  Dx:   Encounter Diagnosis     ICD-10-CM    1  Post-mastectomy pain  G89 18             Patient has discontinued treatment at this time and will continue exercising on her own  Subjective: Patient reports that she is able to reach out to the side better and feels overall improvement  Objective: See treatment diary below      Assessment: Tolerated treatment well  Plan: Patient is DC at this time and will continue exercising on her own         Precautions: L BCA      Manuals 2/14 2/17 2/21 2/24 2/28 3/3       IASTM             (-) pressure IASTM 10 10  10  10 15       PROM/MFR 10            REV  10           Neuro Re-Ed                          Scapula retraction             TB Row/Ext/ER YTB 2 x 10  RTB x 20 RTB x 20 RTB x 20 RTB x 20 RTB x 20                                                           Ther Ex             Pulleys 5 5' 5' 5 5 5'                    [address] 5 x 20" 5 x :20 5 x :20 5 x 20"  5 x :20 5 x :20       Supine AAROM flexion 10" x 10  10 x :10 10 x :10 10" x 10  10 x :10 10 x :10       Supine AAROM haircut 10"x 10  10 x :10 10 x :10 10" x 10  10 x :10 10 x :10       Standing pball abduction 10" x 5  10 x :10 10 x :10 10" x 10  10 x :10 10 x :10                                 Ther Activity             Bicep curls  3# 2 x 10 3# 2 x 10 3# 2 x 10  3# 2 x 10 3# x 20       Rows  3# 2 x 10 3# 2 x 10 3# 2 x 10  3# 2 x 10 3# x 20       Triceps  3# 2 x 10 3# 2 x 10 3# x 20  3# x20 3# x 20       Scaption    B 1# x 10  B 1#x 10 B 1# x 10                    Modalities

## 2022-03-23 ENCOUNTER — PATIENT MESSAGE (OUTPATIENT)
Dept: HEMATOLOGY ONCOLOGY | Facility: CLINIC | Age: 42
End: 2022-03-23

## 2022-04-08 ENCOUNTER — TELEPHONE (OUTPATIENT)
Dept: HEMATOLOGY ONCOLOGY | Facility: CLINIC | Age: 42
End: 2022-04-08

## 2022-05-18 ENCOUNTER — RADIATION ONCOLOGY FOLLOW-UP (OUTPATIENT)
Dept: RADIATION ONCOLOGY | Facility: HOSPITAL | Age: 42
End: 2022-05-18
Attending: RADIOLOGY
Payer: COMMERCIAL

## 2022-05-18 ENCOUNTER — TELEPHONE (OUTPATIENT)
Dept: GYNECOLOGIC ONCOLOGY | Facility: CLINIC | Age: 42
End: 2022-05-18

## 2022-05-18 VITALS
RESPIRATION RATE: 16 BRPM | OXYGEN SATURATION: 98 % | TEMPERATURE: 97.6 F | WEIGHT: 130 LBS | SYSTOLIC BLOOD PRESSURE: 110 MMHG | DIASTOLIC BLOOD PRESSURE: 64 MMHG | HEART RATE: 110 BPM | BODY MASS INDEX: 21.63 KG/M2

## 2022-05-18 DIAGNOSIS — C50.812 MALIGNANT NEOPLASM OF OVERLAPPING SITES OF LEFT BREAST IN FEMALE, ESTROGEN RECEPTOR POSITIVE (HCC): Primary | ICD-10-CM

## 2022-05-18 DIAGNOSIS — C77.3 BREAST CANCER METASTASIZED TO AXILLARY LYMPH NODE, LEFT (HCC): Primary | ICD-10-CM

## 2022-05-18 DIAGNOSIS — Z17.0 MALIGNANT NEOPLASM OF OVERLAPPING SITES OF LEFT BREAST IN FEMALE, ESTROGEN RECEPTOR POSITIVE (HCC): Primary | ICD-10-CM

## 2022-05-18 DIAGNOSIS — C50.912 BREAST CANCER METASTASIZED TO AXILLARY LYMPH NODE, LEFT (HCC): Primary | ICD-10-CM

## 2022-05-18 PROCEDURE — 99213 OFFICE O/P EST LOW 20 MIN: CPT | Performed by: RADIOLOGY

## 2022-05-18 PROCEDURE — 99211 OFF/OP EST MAY X REQ PHY/QHP: CPT | Performed by: RADIOLOGY

## 2022-05-18 NOTE — PROGRESS NOTES
Bhakti Sosa 1980 is a 39 y o  female with invasive mammary carcinoma the left breast, T2 N1 M0 grade 2 ER positive MN positive HER2 positive status post neoadjuvant systemic therapy followed by mastectomy, axillary dissection, and immediate reconstruction with expander placement demonstrating a complete pathologic response  She has completed a course of postmastectomy radiation therapy on 10/14/20  She started Tamoxifen therapy in November 2020  The pt was last seen in radiation on 05/19/21  She is here for her follow up     05/20/21 - Surg Manuel Valdez  Pt has no concerns or complaints  Follow up in 6 months    05/28/21 - US breast right completed (abus)  FINDINGS:   There are no suspicious masses or areas of architectural distortion  Patient is status post left mastectomy  IMPRESSION:   No evidence of malignancy  Automated breast ultrasound is an adjunct, not a replacement, for routine yearly screening mammography  ASSESSMENT/BI-RADS CATEGORY:  Right: 1 - Negative  Overall: 1 - Negative       06/08/21 - Hem Riri Valdez  Clinically no evidence of disease  Continue on Tamoxifen  Follow up in 1 year      07/22/21 - Plastic SurgNyla  Pt is doing well   No excessive redness, swelling, or discharge  Advised to continue with breast exercises  Follow up in 3 months    11/10/21 - Plastic SurgeryNyla  6 months post op  No concerns at this time  Pt to continue with breast exercises  Follow up as needed    11/26/21 - Mammo diagnostic right w 3d & cad   FINDINGS:   Right breast:  There are no suspicious masses, grouped microcalcifications or areas of unexplained architectural distortion  The skin and nipple areolar complex are unremarkable  There is a retropectoral implant  IMPRESSION:   Right breast:  Benign findings  No mammographic evidence of malignancy    ASSESSMENT/BI-RADS CATEGORY:  Right: 2 - Benign  Overall: 2 - Benign     12/23/21 - Surg Onc, [place]  Pt has some tenderness in the left chest wall - recommended the strength ABC program  Follow up in 6 months      3/3/22 PT - Kathryn Flores  Pt discontinuing treatment at this time and will continue exercising on her own  Feels overall improvement in pain and ROM  Upcomin22 - US breast right complete (abus)  22 - Surg Onc, [place]  22 [facility]            Oncology History   Malignant neoplasm of overlapping sites of left breast in female, estrogen receptor positive ([address] )   9/10/2019 Biopsy    Left breast ultrasound-guided biopsy  A  2 o'clock, 2 cm from nipple  Invasive breast carcinoma of no special type (ductal, NST)  Grade 2  ER 90  WA 70  HER2 3+    B  3 o'clock, 1 cm from nipple  Invasive breast carcinoma of no special type (ductal NST)  Grade 2  ER 70  WA 60  HER2 3+    C  Left axillary lymph node  Metastatic adenocarcinoma     2019 Genetic Testing    Panel of 19 genes were evaluated  Negative result   No pathogenic sequence variants or deletions/dupllications identified  [employer]     2019 - 2020 Chemotherapy    DOXOrubicin (ADRIAMYCIN), 60 mg/m2 = 96 mg, Intravenous, Once, 4 of 4 cycles  Administration: 96 mg (2019), 96 mg (2019), 96 mg (2019), 96 mg (2019)  pegfilgrastim-jmdb (Chares Chalk), 6 mg, Subcutaneous, Once, 4 of 4 cycles  Administration: 6 mg (2019), 6 mg (2019), 6 mg (2019), 6 mg (2019)  cyclophosphamide (CYTOXAN) IVPB, 960 mg, Intravenous, Once, 4 of 4 cycles  Administration: 1,000 mg (2019), 1,000 mg (2019), 1,000 mg (2019), 1,000 mg (2019)  fosaprepitant (EMEND) IVPB (ONC use only), 150 mg, Intravenous, Once, 4 of 4 cycles  Administration: 150 mg (2019), 150 mg (2019), 150 mg (2019), 150 mg (2019)  pertuzumab (PERJETA) IVPB, 840 mg (100 % of original dose 840 mg), Intravenous, Once, 17 of 17 cycles  Dose modification: 840 mg (original dose 840 mg, Cycle 5), 420 mg (original dose 420 mg, Cycle 8), 420 mg (original dose 420 mg, Cycle 17)  Administration: 840 mg (1/15/2020), 420 mg (2/6/2020), 420 mg (3/2/2020), 420 mg (3/23/2020), 420 mg (4/13/2020), 420 mg (5/4/2020), 420 mg (5/26/2020), 420 mg (6/15/2020), 420 mg (7/6/2020), 420 mg (7/27/2020), 420 mg (8/17/2020), 420 mg (9/11/2020), 420 mg (9/29/2020), 420 mg (11/10/2020), 420 mg (12/1/2020), 420 mg (12/22/2020), 420 mg (10/20/2020)  PACLItaxel (TAXOL) chemo IVPB, 80 mg/m2 = 127 8 mg, Intravenous, Once, 11 of 11 cycles  Dose modification: 60 mg/m2 (original dose 80 mg/m2, Cycle 14, Reason: Dose Not Tolerated)  Administration: 127 8 mg (1/15/2020), 120 6 mg (1/22/2020), 120 6 mg (2/6/2020), 120 6 mg (2/17/2020), 120 6 mg (3/9/2020), 90 6 mg (3/23/2020), 120 6 mg (3/30/2020), 120 6 mg (1/29/2020), 120 6 mg (2/25/2020), 120 6 mg (3/16/2020), 120 6 mg (4/7/2020)  trastuzumab (HERCEPTIN) chemo infusion, 4 mg/kg = 214 mg, Intravenous, Once, 25 of 25 cycles  Administration: 214 mg (1/15/2020), 96 mg (1/22/2020), 96 mg (2/6/2020), 96 mg (2/17/2020), 96 mg (3/2/2020), 96 mg (3/9/2020), 96 mg (3/23/2020), 96 mg (3/30/2020), 300 mg (4/13/2020), 96 mg (1/29/2020), 96 mg (2/25/2020), 96 mg (3/16/2020), 96 mg (4/7/2020), 300 mg (5/4/2020), 300 mg (5/26/2020), 300 mg (6/15/2020), 300 mg (7/6/2020), 300 mg (7/27/2020), 300 mg (8/17/2020), 318 mg (9/11/2020), 318 mg (9/29/2020), 318 mg (11/10/2020), 318 mg (12/1/2020), 318 mg (12/22/2020), 318 mg (10/20/2020)     4/28/2020 Surgery    Left breast modified radical mastectomy  No residual invasive carcinoma present  Background focal atypical ductal hyperplasia  0/11 Lymph nodes  Complete response to neoadjuvant chemotherapy    Immediate reconstruction with tissue expander (Dr Jordi Aguilar)     5/22/2020 -  Cancer Staged    Staging form: Breast, AJCC 8th Edition  - Pathologic: No Stage Recommended (ypT0, pN0, cM0, G2, ER+, NC+, HER2+) - Signed by Gabriele Carolina MD on 5/22/2020  Stage prefix: y  Neoadjuvant therapy: Yes  Response to neoadjuvant therapy: Complete response  Laterality: Left  Multigene prognostic tests performed: None  Histologic grading system: 3 grade system       5/22/2020 -  Cancer Staged    Staging form: Breast, AJCC 8th Edition  - Clinical: Stage IB (cT2, cN1, cM0, G2, ER+, VT+, HER2+) - Signed by Paula Moreau MD on 5/22/2020  Laterality: Left  Histologic grading system: 3 grade system       9/10/2020 - 10/14/2020 Radiation    Course: C1    Plan ID Energy Fractions Dose per Fraction (cGy) Dose Correction (cGy) Total Dose Delivered (cGy) Elapsed Days   BH L CW 6X 13 / 13 200 0 2,600 34   BH L CW BOLUS 6X 12 / 12 200 0 2,400 32   BH L PAB 10X 25 / 25 18 0 450 34   BH L Sclav 6X 25 / 25 200 0 5,000 34      Dr Juliana Arango       11/2020 -  Hormone Therapy    Tamoxifen 20 mg PO daily         Review of Systems:  Review of Systems   Constitutional: Negative  HENT: Negative  Eyes: Negative  Respiratory: Negative  Cardiovascular: Negative  Gastrointestinal: Negative  Endocrine: Negative  Genitourinary: Negative  Musculoskeletal: Positive for arthralgias (fingers)  Mild stiffness to Left arm/axilla and tenderness, she was d/c from PT, continues to work on exercises at home   Skin: Negative  Allergic/Immunologic: Positive for environmental allergies (seasonal)  Neurological: Positive for headaches (3-4x weekly "pressure" )  Hematological: Negative  Psychiatric/Behavioral: Negative          Clinical Trial: no    Covid Vaccine Status: unvaccinated    Health Maintenance   Topic Date Due    Hepatitis C Screening  Never done    COVID-19 Vaccine (1) Never done    Pneumococcal Vaccine: Pediatrics (0 to 5 Years) and At-Risk Patients (6 to 59 Years) (1 - PCV) Never done    HIV Screening  Never done    DTaP,Tdap,and Td Vaccines (1 - Tdap) Never done    Depression Screening  07/08/2022    Influenza Vaccine (Season Ended) 09/01/2022    Annual Physical  09/22/2022    Breast Cancer Screening: Mammogram  2022    BMI: Adult  2023    Cervical Cancer Screening  2026    HIB Vaccine  Aged Out    Hepatitis B Vaccine  Aged Out    IPV Vaccine  Aged Out    Hepatitis A Vaccine  Aged Out    Meningococcal ACWY Vaccine  Aged Out    HPV Vaccine  Aged Out     Patient Active Problem List   Diagnosis    Malignant neoplasm of overlapping sites of left breast in female, estrogen receptor positive ([facility] [address]    Varicose veins of left lower extremity with pain    Mild intermittent asthma without complication    Seasonal allergic rhinitis due to pollen    Chemotherapy induced neutropenia ([facility] [address] )    Muscle spasm    Dehiscence of incision    S/P left breast implant    Use of tamoxifen (Nolvadex)    COVID-19 virus detected    History of augmentation of right breast    Chest pain varying with breathing     Past Medical History:   Diagnosis Date    Asthma     BRCA gene mutation negative 10/2019    [employer]    Breast cancer ([facility] [address] ) 09/10/2019    IDC Left breast    Cancer ([facility] [address] )     breast    COVID-19 2020    History of chemotherapy 10/2019    Pneumonia      Past Surgical History:   Procedure Laterality Date    AUGMENTATION BREAST Right 2021    Procedure: RIGHT BREAST AUGMENTATION;  Surgeon: Kelly Tyler MD;  [facility];  Service: Plastics    BREAST BIOPSY Left 09/10/2019    inv br ca    BREAST CYST INCISION AND DRAINAGE Left 2020    Procedure: INCISION AND DRAINAGE (I&D) BREAST;  Surgeon: Kelly Tyler MD;  [facility];  Service: Plastics    BREAST RECONSTRUCTION Left 2020    Procedure: BREAST IMMEDIATE RECONSTRUCTION WITH IMPLANT VERSUS TISSUE EXPANDER; ACELLULAR DERMAL MATRIX (ADM);   Surgeon: Kelly Tyler MD;  [facility];  Service: Plastics     SECTION      IR PORT PLACEMENT  10/29/2019    IR PORT REMOVAL  2020    MASTECTOMY Left 2020    [place] MASTECTOMY, MODIFIED RADICAL Left 4/28/2020    Procedure: BREAST MODIFIED RADICAL MASTECTOMY WITH AXILLARY LYMPH NODE NEEDLE LOCALIZATION (NEEDLE LOC AT 1130);   Surgeon: Amarilis Coleman MD;  [facility];  Service: Surgical Oncology    US BREAST NEEDLE LOC LEFT Left 4/28/2020    US GUIDANCE BREAST BIOPSY LEFT EACH ADDITIONAL Left 9/10/2019    US GUIDED BREAST BIOPSY LEFT COMPLETE Left 9/10/2019    US GUIDED BREAST LYMPH NODE BIOPSY LEFT Left 9/10/2019     Family History   Problem Relation Age of Onset    Diabetes Father     Asthma Father     Pancreatic cancer Maternal Grandmother         age at dx unk    No Known Problems Daughter     No Known Problems Maternal Aunt     No Known Problems Maternal Aunt     No Known Problems Maternal Aunt     Breast cancer Paternal Aunt         age at dx unk    Stomach cancer Paternal Aunt     Cancer Paternal Uncle         type and age unk     Social History     Socioeconomic History    Marital status:      Spouse name: Not on file    Number of children: 2    Years of education: Not on file    Highest education level: Not on file   Occupational History    Not on file   Tobacco Use    Smoking status: Never Smoker    Smokeless tobacco: Never Used   Vaping Use    Vaping Use: Never used   Substance and Sexual Activity    Alcohol use: Never    Drug use: Never    Sexual activity: Yes     Partners: Male   Other Topics Concern    Not on file   Social History Narrative    Not on file     Social Determinants of Health     Financial Resource Strain: Not on file   Food Insecurity: Not on file   Transportation Needs: Not on file   Physical Activity: Not on file   Stress: Not on file   Social Connections: Not on file   Intimate Partner Violence: Not on file   Housing Stability: Not on file       Current Outpatient Medications:     albuterol (PROVENTIL HFA,VENTOLIN HFA) 90 mcg/act inhaler, Inhale 2 puffs every 6 (six) hours as needed for wheezing or shortness of breath, Disp: 1 Inhaler, Rfl: 3    loratadine (CLARITIN) 10 mg tablet, Take 1 tablet (10 mg total) by mouth daily, Disp: 90 tablet, Rfl: 1    montelukast (SINGULAIR) 10 mg tablet, Take 1 tablet (10 mg total) by mouth daily at bedtime (Patient taking differently: Take 10 mg by mouth as needed in the morning ), Disp: 90 tablet, Rfl: 1    multivitamin (THERAGRAN) TABS, Take 1 tablet by mouth daily, Disp: , Rfl:     tamoxifen (NOLVADEX) 20 mg tablet, Take 1 tablet (20 mg total) by mouth daily, Disp: 90 tablet, Rfl: 0    acetaminophen (TYLENOL) 500 MG chewable tablet, Chew 1 tablet (500 mg total) every 6 (six) hours, Disp: 30 tablet, Rfl: 0    mometasone (NASONEX) 50 mcg/act nasal spray, 2 sprays into each nostril daily (Patient taking differently: 2 sprays into each nostril as needed in the morning ), Disp: 3 Act, Rfl: 1    naproxen (NAPROSYN) 500 mg tablet, Take 1 tablet (500 mg total) by mouth 2 (two) times a day with meals, Disp: 30 tablet, Rfl: 0  Allergies   Allergen Reactions    Shellfish-Derived Products - Food Allergy Anaphylaxis     Facial swelling/itchy throat    Aspirin Edema, Eye Swelling and Facial Swelling    Codeine Rash    Fentanyl Itching     Vitals:    05/18/22 0911   BP: 110/64   BP Location: Right arm   Pulse: (!) 110   Resp: 16   Temp: 97 6 °F (36 4 °C)   TempSrc: Temporal   SpO2: 98%   Weight: 59 kg (130 lb)

## 2022-05-18 NOTE — PROGRESS NOTES
Follow-up - Radiation Oncology   Blair Burch 1980 39 y o  female [id number]      History of Present Illness   Cancer Staging  Malignant neoplasm of overlapping sites of left breast in female, estrogen receptor positive ([facility] [address] )  Staging form: Breast, AJCC 8th Edition  - Pathologic: No Stage Recommended - Unsigned  Stage prefix: Post-therapy  Neoadjuvant therapy: Yes  Response to neoadjuvant therapy: Complete response  - Pathologic: No Stage Recommended (ypT0, pN0, cM0, G2, ER+, NC+, HER2+) - Signed by Shola Castro MD on 5/22/2020  Stage prefix: Post-therapy  Neoadjuvant therapy: Yes  Response to neoadjuvant therapy: Complete response  Multigene prognostic tests performed: None  Histologic grading system: 3 grade system  Laterality: Left  - Clinical: Stage IB (cT2, cN1, cM0, G2, ER+, NC+, HER2+) - Signed by Shola Castro MD on 5/22/2020  Histologic grading system: 3 grade system  Laterality: Left      Blair Burch is a 39 y o  female with invasive mammary carcinoma the left breast, T2 N1 M0 grade 2 ER positive NC positive HER2 positive status post neoadjuvant systemic therapy followed by mastectomy, axillary dissection, and immediate reconstruction with expander placement demonstrating a complete pathologic response   She completed a course of postmastectomy radiation therapy on 10/14/20  She started tamoxifen therapy in November 2020  Interval History:  05/20/21 - Surg OncManuel  Pt has no concerns or complaints  Follow up in 6 months     05/28/21 - US breast right completed (abus)  FINDINGS:   There are no suspicious masses or areas of architectural distortion   Patient is status post left mastectomy     IMPRESSION:   No evidence of malignancy  Automated breast ultrasound is an adjunct, not a replacement, for routine yearly screening mammography    ASSESSMENT/BI-RADS CATEGORY:  Right: 1 - Negative  Overall: 1 - Negative        06/08/21 - Hem Riri Valdez  Clinically no evidence of disease  Continue on Tamoxifen  Follow up in 1 year        21 - Plastic SurgLola  Pt is doing well   No excessive redness, swelling, or discharge  Advised to continue with breast exercises  Follow up in 3 months     11/10/21 - Plastic Lola oRjo  6 months post op  No concerns at this time  Pt to continue with breast exercises  Follow up as needed     21 - Mammo diagnostic right w 3d & cad   FINDINGS:   Right breast:  There are no suspicious masses, grouped microcalcifications or areas of unexplained architectural distortion  The skin and nipple areolar complex are unremarkable    There is a retropectoral implant  IMPRESSION:   Right breast:  Benign findings   No mammographic evidence of malignancy  ASSESSMENT/BI-RADS CATEGORY:  Right: 2 - Benign  Overall: 2 - Benign     21 - Surg OncManuel  Pt has some tenderness in the left chest wall - recommended the strength ABC program  Follow up in 6 months        3/3/22 PT - Stone Fernández  Pt discontinuing treatment at this time and will continue exercising on her own  Feels overall improvement in pain and ROM        Upcomin22 - US breast right complete (abus)  22 - Surg OncManuel  22 Hem OncRiri    Upon interview, she endorses the above history  She continues to have tightness and numbness in the left axilla  She is performing home exercises provided by physical therapy  She has noted a tingling sensation in the right nipple without underlying mass or nodularity  She has an upcoming right breast ultrasound and will follow-up with her surgeon in 2022  Prior mammogram in 2021 revealed no evidence of disease  She continues tamoxifen and is without additional acute concerns          Historical Information   Oncology History   Malignant neoplasm of overlapping sites of left breast in female, estrogen receptor positive ([facility] [address] )   9/10/2019 Biopsy    Left breast ultrasound-guided biopsy  A  2 o'clock, 2 cm from nipple  Invasive breast carcinoma of no special type (ductal, NST)  Grade 2  ER 90  SC 70  HER2 3+    B  3 o'clock, 1 cm from nipple  Invasive breast carcinoma of no special type (ductal NST)  Grade 2  ER 70  SC 60  HER2 3+    C  Left axillary lymph node  Metastatic adenocarcinoma     9/23/2019 Genetic Testing    Panel of 19 genes were evaluated  Negative result   No pathogenic sequence variants or deletions/dupllications identified  [employer]     11/6/2019 - 12/22/2020 Chemotherapy    DOXOrubicin (ADRIAMYCIN), 60 mg/m2 = 96 mg, Intravenous, Once, 4 of 4 cycles  Administration: 96 mg (11/6/2019), 96 mg (11/20/2019), 96 mg (12/4/2019), 96 mg (12/18/2019)  pegfilgrastim-jmdb (Preethi Chester), 6 mg, Subcutaneous, Once, 4 of 4 cycles  Administration: 6 mg (11/7/2019), 6 mg (11/21/2019), 6 mg (12/5/2019), 6 mg (12/19/2019)  cyclophosphamide (CYTOXAN) IVPB, 960 mg, Intravenous, Once, 4 of 4 cycles  Administration: 1,000 mg (11/6/2019), 1,000 mg (11/20/2019), 1,000 mg (12/4/2019), 1,000 mg (12/18/2019)  fosaprepitant (EMEND) IVPB (ONC use only), 150 mg, Intravenous, Once, 4 of 4 cycles  Administration: 150 mg (11/6/2019), 150 mg (11/20/2019), 150 mg (12/4/2019), 150 mg (12/18/2019)  pertuzumab (PERJETA) IVPB, 840 mg (100 % of original dose 840 mg), Intravenous, Once, 17 of 17 cycles  Dose modification: 840 mg (original dose 840 mg, Cycle 5), 420 mg (original dose 420 mg, Cycle 8), 420 mg (original dose 420 mg, Cycle 17)  Administration: 840 mg (1/15/2020), 420 mg (2/6/2020), 420 mg (3/2/2020), 420 mg (3/23/2020), 420 mg (4/13/2020), 420 mg (5/4/2020), 420 mg (5/26/2020), 420 mg (6/15/2020), 420 mg (7/6/2020), 420 mg (7/27/2020), 420 mg (8/17/2020), 420 mg (9/11/2020), 420 mg (9/29/2020), 420 mg (11/10/2020), 420 mg (12/1/2020), 420 mg (12/22/2020), 420 mg (10/20/2020)  PACLItaxel (TAXOL) chemo IVPB, 80 mg/m2 = 127 8 mg, Intravenous, Once, 11 of 11 cycles  Dose modification: 60 mg/m2 (original dose 80 mg/m2, Cycle 14, Reason: Dose Not Tolerated)  Administration: 127 8 mg (1/15/2020), 120 6 mg (1/22/2020), 120 6 mg (2/6/2020), 120 6 mg (2/17/2020), 120 6 mg (3/9/2020), 90 6 mg (3/23/2020), 120 6 mg (3/30/2020), 120 6 mg (1/29/2020), 120 6 mg (2/25/2020), 120 6 mg (3/16/2020), 120 6 mg (4/7/2020)  trastuzumab (HERCEPTIN) chemo infusion, 4 mg/kg = 214 mg, Intravenous, Once, 25 of 25 cycles  Administration: 214 mg (1/15/2020), 96 mg (1/22/2020), 96 mg (2/6/2020), 96 mg (2/17/2020), 96 mg (3/2/2020), 96 mg (3/9/2020), 96 mg (3/23/2020), 96 mg (3/30/2020), 300 mg (4/13/2020), 96 mg (1/29/2020), 96 mg (2/25/2020), 96 mg (3/16/2020), 96 mg (4/7/2020), 300 mg (5/4/2020), 300 mg (5/26/2020), 300 mg (6/15/2020), 300 mg (7/6/2020), 300 mg (7/27/2020), 300 mg (8/17/2020), 318 mg (9/11/2020), 318 mg (9/29/2020), 318 mg (11/10/2020), 318 mg (12/1/2020), 318 mg (12/22/2020), 318 mg (10/20/2020)     4/28/2020 Surgery    Left breast modified radical mastectomy  No residual invasive carcinoma present  Background focal atypical ductal hyperplasia  0/11 Lymph nodes  Complete response to neoadjuvant chemotherapy    Immediate reconstruction with tissue expander (Dr Edin Montez)     5/22/2020 -  Cancer Staged    Staging form: Breast, AJCC 8th Edition  - Pathologic: No Stage Recommended (ypT0, pN0, cM0, G2, ER+, ID+, HER2+) - Signed by Jo Patel MD on 5/22/2020  Stage prefix: y  Neoadjuvant therapy: Yes  Response to neoadjuvant therapy: Complete response  Laterality: Left  Multigene prognostic tests performed: None  Histologic grading system: 3 grade system       5/22/2020 -  Cancer Staged    Staging form: Breast, AJCC 8th Edition  - Clinical: Stage IB (cT2, cN1, cM0, G2, ER+, ID+, HER2+) - Signed by Jo Patel MD on 5/22/2020  Laterality: Left  Histologic grading system: 3 grade system       9/10/2020 - 10/14/2020 Radiation    Course: C1    Plan ID Energy Fractions Dose per Fraction (cGy) Dose Correction (cGy) Total Dose Delivered (cGy) Elapsed Days   BH L CW 6X 13 / 13 200 0 2,600 34   BH L CW BOLUS 6X 12 / 12 200 0 2,400 32   BH L PAB 10X 25 / 25 18 0 450 34   BH L Sclav 6X 25 / 25 200 0 5,000 34      Dr Sho Fierro       2020 -  Hormone Therapy    Tamoxifen 20 mg PO daily         Past Medical History:   Diagnosis Date    Asthma     BRCA gene mutation negative 10/2019    [employer]    Breast cancer ([facility] [address] ) 09/10/2019    IDC Left breast    Cancer ([facility] [address] )     breast    COVID-19 2020    History of chemotherapy 10/2019    Pneumonia      Past Surgical History:   Procedure Laterality Date    AUGMENTATION BREAST Right 2021    Procedure: RIGHT BREAST AUGMENTATION;  Surgeon: Jose Tomlinson MD;  [facility];  Service: Plastics    BREAST BIOPSY Left 09/10/2019    [facility]    BREAST CYST INCISION AND DRAINAGE Left 2020    Procedure: INCISION AND DRAINAGE (I&D) BREAST;  Surgeon: Jose Tomlinson MD;  [facility];  Service: Plastics    BREAST RECONSTRUCTION Left 2020    Procedure: BREAST IMMEDIATE RECONSTRUCTION WITH IMPLANT VERSUS TISSUE EXPANDER; ACELLULAR DERMAL MATRIX (ADM); Surgeon: Jose Tomlinson MD;  [facility];  Service: Plastics     SECTION      IR PORT PLACEMENT  10/29/2019    IR PORT REMOVAL  2020    MASTECTOMY Left 2020    VA MASTECTOMY, MODIFIED RADICAL Left 2020    Procedure: BREAST MODIFIED RADICAL MASTECTOMY WITH AXILLARY LYMPH NODE NEEDLE LOCALIZATION (NEEDLE LOC AT 1130);   Surgeon: Rashmi Choe MD;  [facility];  Service: Surgical Oncology    US BREAST NEEDLE LOC LEFT Left 2020    US GUIDANCE BREAST BIOPSY LEFT EACH ADDITIONAL Left 9/10/2019    US GUIDED BREAST BIOPSY LEFT COMPLETE Left 9/10/2019    US GUIDED BREAST LYMPH NODE BIOPSY LEFT Left 9/10/2019       Social History   Social History     Substance and Sexual Activity   Alcohol Use Never     Social History     Substance and Sexual Activity Drug Use Never     Social History     Tobacco Use   Smoking Status Never Smoker   Smokeless Tobacco Never Used         Meds/Allergies     Current Outpatient Medications:     albuterol (PROVENTIL HFA,VENTOLIN HFA) 90 mcg/act inhaler, Inhale 2 puffs every 6 (six) hours as needed for wheezing or shortness of breath, Disp: 1 Inhaler, Rfl: 3    loratadine (CLARITIN) 10 mg tablet, Take 1 tablet (10 mg total) by mouth daily, Disp: 90 tablet, Rfl: 1    montelukast (SINGULAIR) 10 mg tablet, Take 1 tablet (10 mg total) by mouth daily at bedtime (Patient taking differently: Take 10 mg by mouth as needed in the morning ), Disp: 90 tablet, Rfl: 1    multivitamin (THERAGRAN) TABS, Take 1 tablet by mouth daily, Disp: , Rfl:     tamoxifen (NOLVADEX) 20 mg tablet, Take 1 tablet (20 mg total) by mouth daily, Disp: 90 tablet, Rfl: 0    acetaminophen (TYLENOL) 500 MG chewable tablet, Chew 1 tablet (500 mg total) every 6 (six) hours, Disp: 30 tablet, Rfl: 0    mometasone (NASONEX) 50 mcg/act nasal spray, 2 sprays into each nostril daily (Patient taking differently: 2 sprays into each nostril as needed in the morning ), Disp: 3 Act, Rfl: 1    naproxen (NAPROSYN) 500 mg tablet, Take 1 tablet (500 mg total) by mouth 2 (two) times a day with meals, Disp: 30 tablet, Rfl: 0  Allergies   Allergen Reactions    Shellfish-Derived Products - Food Allergy Anaphylaxis     Facial swelling/itchy throat    Aspirin Edema, Eye Swelling and Facial Swelling    Codeine Rash    Fentanyl Itching         Review of Systems   Constitutional: Negative  HENT: Negative  Eyes: Negative  Respiratory: Negative  Cardiovascular: Negative  Gastrointestinal: Negative  Endocrine: Negative  Genitourinary: Negative  Musculoskeletal: Positive for arthralgias (fingers)  Mild stiffness to Left arm/axilla and tenderness, she was d/c from PT, continues to work on exercises at home   Skin: Negative      Allergic/Immunologic: Positive for environmental allergies (seasonal)  Neurological: Positive for headaches (3-4x weekly "pressure" )  Hematological: Negative  Psychiatric/Behavioral: Negative  OBJECTIVE:   /64 (BP Location: Right arm)   Pulse (!) 110   Temp 97 6 °F (36 4 °C) (Temporal)   Resp 16   Wt 59 kg (130 lb)   LMP  (LMP Unknown)   SpO2 98%   BMI 21 63 kg/m²   Pain Assessment:  0  Karnofsky: 90 - Able to carry on normal activity; minor signs or symptoms of disease     Physical Exam   The patient presents today in no apparent distress  Sclera anicteric  No palpable cervical or supraclavicular lymphadenopathy  Normal respiratory effort  Breast examination performed in the supine and seated positions with chaperone present  The right breast is soft, nontender, without visible findings  The left reconstructed breast is also soft, nontender, without visible or palpable suspicious findings  No axillary lymphadenopathy or lymphedema  RESULTS    Lab Results: No results found for this or any previous visit (from the past 672 hour(s))  Imaging Studies:No results found  Assessment/Plan:  No orders of the defined types were placed in this encounter  Yun Fair is a 39 y o  female with invasive mammary carcinoma the left breast, T2 N1 M0 grade 2 ER positive ND positive HER2 positive status post neoadjuvant systemic therapy followed by mastectomy, axillary dissection, and immediate reconstruction with expander placement demonstrating a complete pathologic response   She completed a course of postmastectomy radiation therapy on 10/14/20 and returns for routine follow up  She continues tamoxifen  She remains without clinical or mammographic evidence of recurrent disease  She is appropriately scheduled for right breast ultrasound and will continue follow up with her surgeon and medical oncologist   She continue physical therapy exercises to maintain arm range of motion    She will return in 1 year and has been encouraged to call with questions or concerns in the interim  Isaias Laurent MD  5/18/2022,9:29 AM    Portions of the record may have been created with voice recognition software   Occasional wrong word or "sound a like" substitutions may have occurred due to the inherent limitations of voice recognition software   Read the chart carefully and recognize, using context, where substitutions have occurred

## 2022-05-18 NOTE — TELEPHONE ENCOUNTER
Called patient and rescheduled appt on 6/16/22 with Heaven PARSON to be with Anneliese Vásquez at [facility]

## 2022-05-19 ENCOUNTER — OFFICE VISIT (OUTPATIENT)
Dept: FAMILY MEDICINE CLINIC | Facility: CLINIC | Age: 42
End: 2022-05-19
Payer: COMMERCIAL

## 2022-05-19 ENCOUNTER — TELEPHONE (OUTPATIENT)
Dept: SURGICAL ONCOLOGY | Facility: CLINIC | Age: 42
End: 2022-05-19

## 2022-05-19 VITALS
BODY MASS INDEX: 21.79 KG/M2 | WEIGHT: 130.8 LBS | HEIGHT: 65 IN | OXYGEN SATURATION: 99 % | DIASTOLIC BLOOD PRESSURE: 68 MMHG | RESPIRATION RATE: 16 BRPM | SYSTOLIC BLOOD PRESSURE: 100 MMHG | TEMPERATURE: 98.7 F | HEART RATE: 97 BPM

## 2022-05-19 DIAGNOSIS — J45.20 MILD INTERMITTENT ASTHMA WITHOUT COMPLICATION: ICD-10-CM

## 2022-05-19 DIAGNOSIS — K62.5 RECTAL BLEED: Primary | ICD-10-CM

## 2022-05-19 PROCEDURE — 99214 OFFICE O/P EST MOD 30 MIN: CPT | Performed by: FAMILY MEDICINE

## 2022-05-19 PROCEDURE — 82270 OCCULT BLOOD FECES: CPT | Performed by: FAMILY MEDICINE

## 2022-05-19 RX ORDER — FLUTICASONE PROPIONATE 250 UG/1
1 POWDER, METERED RESPIRATORY (INHALATION) 2 TIMES DAILY
Qty: 60 BLISTER | Refills: 2 | Status: SHIPPED | OUTPATIENT
Start: 2022-05-19 | End: 2022-07-21

## 2022-05-19 RX ORDER — ALBUTEROL SULFATE 90 UG/1
2 AEROSOL, METERED RESPIRATORY (INHALATION) EVERY 6 HOURS PRN
Qty: 18 G | Refills: 2 | Status: SHIPPED | OUTPATIENT
Start: 2022-05-19

## 2022-05-19 NOTE — TELEPHONE ENCOUNTER
Called patient for the second attempt to reschedule appt on 6/16/22 with Amanda PARSON to be seen with Remigio Valera at [facility]

## 2022-05-19 NOTE — PROGRESS NOTES
Assessment/Plan:    Rectal bleed  No vaginal or rectal bleed present on exam  Sent to GI fo a anoscope or colonoscopy  Diagnoses and all orders for this visit:    Rectal bleed  -     Ambulatory Referral to Colorectal Surgery; Future  -     POCT hemoccult screening    Mild intermittent asthma without complication  -     albuterol (PROVENTIL HFA,VENTOLIN HFA) 90 mcg/act inhaler; Inhale 2 puffs every 6 (six) hours as needed for wheezing or shortness of breath  -     Nebulizer  -     Nebulizer Supplies  -     Fluticasone Propionate, Inhal, (Flovent Diskus) 250 MCG/BLIST AEPB; Inhale 1 puff  in the morning and 1 puff before bedtime  Rinse mouth after use           Subjective: anal bleeding     Patient ID: Doris Allen is a 43 y o  female  HPI  Here for rectal bleeding  The bleeding started about 1 year ago  She has a history of breast cancer and completed chemotherapy 1 year ago  The patient went through premature menopause and is currently on tamoxifen  The bleeding occurs on its own and also with bowel movements  The bowel movements are normal without melena and the bleeding is bright red and is mainly noticed in the underwear, toilet paper and in the toilet  Denies pain  She no longer has menses due to tamoxifen  The following portions of the patient's history were reviewed and updated as appropriate: allergies, current medications, past family history, past medical history, past social history, past surgical history and problem list     Review of Systems   Constitutional: Negative for fever and unexpected weight change  HENT: Negative for ear pain, sore throat and trouble swallowing  Eyes: Negative for pain and visual disturbance  Respiratory: Negative for cough, chest tightness, shortness of breath and wheezing  Cardiovascular: Negative for chest pain  Gastrointestinal: Positive for anal bleeding   Negative for abdominal distention, abdominal pain, blood in stool, constipation, diarrhea, nausea and vomiting  Endocrine: Negative for polydipsia and polyuria  Genitourinary: Negative for decreased urine volume, difficulty urinating, dysuria, flank pain, frequency, hematuria, menstrual problem, pelvic pain, urgency, vaginal bleeding, vaginal discharge and vaginal pain  Musculoskeletal: Negative for back pain and myalgias  Skin: Negative for rash  Neurological: Negative for syncope and headaches  Psychiatric/Behavioral: Negative for suicidal ideas  PHQ-2/9 Depression Screening    Little interest or pleasure in doing things: 0 - not at all  Feeling down, depressed, or hopeless: 0 - not at all  PHQ-2 Score: 0  PHQ-2 Interpretation: Negative depression screen       [unfilled]    Objective:      /68 (BP Location: Right arm, Patient Position: Sitting, Cuff Size: Adult)   Pulse 97   Temp 98 7 °F (37 1 °C) (Tympanic)   Resp 16   Ht 5' 5" (1 651 m)   Wt 59 3 kg (130 lb 12 8 oz)   LMP  (LMP Unknown)   SpO2 99%   BMI 21 77 kg/m²          Physical Exam  Constitutional:       Appearance: She is well-developed  HENT:      Head: Normocephalic and atraumatic  Right Ear: External ear normal       Left Ear: External ear normal       Mouth/Throat:      Pharynx: No oropharyngeal exudate  Eyes:      General: No scleral icterus  Conjunctiva/sclera: Conjunctivae normal       Pupils: Pupils are equal, round, and reactive to light  Cardiovascular:      Rate and Rhythm: Normal rate and regular rhythm  Heart sounds: No murmur heard  No friction rub  No gallop  Pulmonary:      Effort: Pulmonary effort is normal  No respiratory distress  Breath sounds: Normal breath sounds  No wheezing or rales  Chest:   Breasts: Breasts are symmetrical       Right: No inverted nipple, mass, nipple discharge, skin change or tenderness  Left: No inverted nipple, mass, nipple discharge, skin change or tenderness         Abdominal:      General: Bowel sounds are normal  There is no distension  Palpations: Abdomen is soft  There is no mass  Tenderness: There is no abdominal tenderness  There is no rebound  Hernia: There is no hernia in the left inguinal area  Genitourinary:     Labia:         Right: No rash, tenderness, lesion or injury  Left: No rash, tenderness, lesion or injury  Vagina: No foreign body  No vaginal discharge, erythema or tenderness  Cervix: No cervical motion tenderness, discharge or friability  Adnexa:         Right: No mass, tenderness or fullness  Left: No mass, tenderness or fullness  Rectum: Guaiac result negative  Musculoskeletal:         General: Normal range of motion  Cervical back: Normal range of motion and neck supple  Skin:     General: Skin is warm and dry  Neurological:      Mental Status: She is alert and oriented to person, place, and time

## 2022-05-22 PROBLEM — K62.5 RECTAL BLEED: Status: ACTIVE | Noted: 2022-05-22

## 2022-05-22 LAB — SL AMB POCT FECES OCC BLD: NEGATIVE

## 2022-05-31 ENCOUNTER — TELEPHONE (OUTPATIENT)
Dept: SURGICAL ONCOLOGY | Facility: CLINIC | Age: 42
End: 2022-05-31

## 2022-05-31 ENCOUNTER — HOSPITAL ENCOUNTER (OUTPATIENT)
Dept: ULTRASOUND IMAGING | Facility: CLINIC | Age: 42
Discharge: HOME | End: 2022-05-31
Payer: COMMERCIAL

## 2022-05-31 VITALS — WEIGHT: 130.73 LBS | HEIGHT: 65 IN | BODY MASS INDEX: 21.78 KG/M2

## 2022-05-31 DIAGNOSIS — Z12.39 ENCOUNTER FOR BREAST CANCER SCREENING OTHER THAN MAMMOGRAM: ICD-10-CM

## 2022-05-31 DIAGNOSIS — R92.2 DENSE BREASTS: ICD-10-CM

## 2022-05-31 PROCEDURE — 76641 ULTRASOUND BREAST COMPLETE: CPT

## 2022-05-31 NOTE — TELEPHONE ENCOUNTER
Called pt for 3x to r/s appt on 6/16 to see keyana instead of marita red stating appt is now cancelled

## 2022-06-23 ENCOUNTER — APPOINTMENT (OUTPATIENT)
Dept: LAB | Facility: HOSPITAL | Age: 42
End: 2022-06-23
Payer: COMMERCIAL

## 2022-06-23 ENCOUNTER — OFFICE VISIT (OUTPATIENT)
Dept: FAMILY MEDICINE CLINIC | Facility: CLINIC | Age: 42
End: 2022-06-23
Payer: COMMERCIAL

## 2022-06-23 ENCOUNTER — HOSPITAL ENCOUNTER (OUTPATIENT)
Dept: RADIOLOGY | Facility: HOSPITAL | Age: 42
Discharge: HOME | End: 2022-06-23
Payer: COMMERCIAL

## 2022-06-23 VITALS
DIASTOLIC BLOOD PRESSURE: 60 MMHG | HEART RATE: 94 BPM | HEIGHT: 65 IN | WEIGHT: 130 LBS | OXYGEN SATURATION: 99 % | BODY MASS INDEX: 21.66 KG/M2 | TEMPERATURE: 97.5 F | SYSTOLIC BLOOD PRESSURE: 100 MMHG | RESPIRATION RATE: 16 BRPM

## 2022-06-23 DIAGNOSIS — R53.83 FATIGUE, UNSPECIFIED TYPE: ICD-10-CM

## 2022-06-23 DIAGNOSIS — Z23 ENCOUNTER FOR VACCINATION: ICD-10-CM

## 2022-06-23 DIAGNOSIS — R07.1 CHEST PAIN VARYING WITH BREATHING: ICD-10-CM

## 2022-06-23 DIAGNOSIS — Z00.00 ANNUAL PHYSICAL EXAM: Primary | ICD-10-CM

## 2022-06-23 DIAGNOSIS — K62.5 RECTAL BLEED: ICD-10-CM

## 2022-06-23 DIAGNOSIS — J45.20 MILD INTERMITTENT ASTHMA WITHOUT COMPLICATION: ICD-10-CM

## 2022-06-23 PROBLEM — U07.1 COVID-19 VIRUS DETECTED: Status: RESOLVED | Noted: 2020-12-15 | Resolved: 2022-06-23

## 2022-06-23 LAB
ALBUMIN SERPL BCP-MCNC: 3.5 G/DL (ref 3.5–5)
ALP SERPL-CCNC: 66 U/L (ref 46–116)
ALT SERPL W P-5'-P-CCNC: 53 U/L (ref 12–78)
ANION GAP SERPL CALCULATED.3IONS-SCNC: 2 MMOL/L (ref 4–13)
AST SERPL W P-5'-P-CCNC: 21 U/L (ref 5–45)
BASOPHILS # BLD AUTO: 0.04 THOUSANDS/ΜL (ref 0–0.1)
BASOPHILS NFR BLD AUTO: 1 % (ref 0–1)
BILIRUB SERPL-MCNC: 0.36 MG/DL (ref 0.2–1)
BUN SERPL-MCNC: 13 MG/DL (ref 5–25)
CALCIUM SERPL-MCNC: 9.2 MG/DL (ref 8.3–10.1)
CHLORIDE SERPL-SCNC: 108 MMOL/L (ref 100–108)
CO2 SERPL-SCNC: 29 MMOL/L (ref 21–32)
CREAT SERPL-MCNC: 0.65 MG/DL (ref 0.6–1.3)
EOSINOPHIL # BLD AUTO: 0.51 THOUSAND/ΜL (ref 0–0.61)
EOSINOPHIL NFR BLD AUTO: 9 % (ref 0–6)
ERYTHROCYTE [DISTWIDTH] IN BLOOD BY AUTOMATED COUNT: 12.6 % (ref 11.6–15.1)
GFR SERPL CREATININE-BSD FRML MDRD: 109 ML/MIN/1.73SQ M
GLUCOSE P FAST SERPL-MCNC: 80 MG/DL (ref 65–99)
HCT VFR BLD AUTO: 39.2 % (ref 34.8–46.1)
HGB BLD-MCNC: 12.7 G/DL (ref 11.5–15.4)
IMM GRANULOCYTES # BLD AUTO: 0.01 THOUSAND/UL (ref 0–0.2)
IMM GRANULOCYTES NFR BLD AUTO: 0 % (ref 0–2)
LYMPHOCYTES # BLD AUTO: 1.44 THOUSANDS/ΜL (ref 0.6–4.47)
LYMPHOCYTES NFR BLD AUTO: 26 % (ref 14–44)
MCH RBC QN AUTO: 30 PG (ref 26.8–34.3)
MCHC RBC AUTO-ENTMCNC: 32.4 G/DL (ref 31.4–37.4)
MCV RBC AUTO: 93 FL (ref 82–98)
MONOCYTES # BLD AUTO: 0.54 THOUSAND/ΜL (ref 0.17–1.22)
MONOCYTES NFR BLD AUTO: 10 % (ref 4–12)
NEUTROPHILS # BLD AUTO: 2.94 THOUSANDS/ΜL (ref 1.85–7.62)
NEUTS SEG NFR BLD AUTO: 54 % (ref 43–75)
NRBC BLD AUTO-RTO: 0 /100 WBCS
PLATELET # BLD AUTO: 343 THOUSANDS/UL (ref 149–390)
PMV BLD AUTO: 9.6 FL (ref 8.9–12.7)
POTASSIUM SERPL-SCNC: 3.7 MMOL/L (ref 3.5–5.3)
PROT SERPL-MCNC: 7.7 G/DL (ref 6.4–8.2)
RBC # BLD AUTO: 4.24 MILLION/UL (ref 3.81–5.12)
SODIUM SERPL-SCNC: 139 MMOL/L (ref 136–145)
TSH SERPL DL<=0.05 MIU/L-ACNC: 1.52 UIU/ML (ref 0.45–4.5)
WBC # BLD AUTO: 5.48 THOUSAND/UL (ref 4.31–10.16)

## 2022-06-23 PROCEDURE — 71046 X-RAY EXAM CHEST 2 VIEWS: CPT

## 2022-06-23 PROCEDURE — 84443 ASSAY THYROID STIM HORMONE: CPT

## 2022-06-23 PROCEDURE — 80053 COMPREHEN METABOLIC PANEL: CPT

## 2022-06-23 PROCEDURE — 99213 OFFICE O/P EST LOW 20 MIN: CPT | Performed by: FAMILY MEDICINE

## 2022-06-23 PROCEDURE — 85025 COMPLETE CBC W/AUTO DIFF WBC: CPT

## 2022-06-23 PROCEDURE — 90715 TDAP VACCINE 7 YRS/> IM: CPT

## 2022-06-23 PROCEDURE — 90471 IMMUNIZATION ADMIN: CPT

## 2022-06-23 PROCEDURE — 36415 COLL VENOUS BLD VENIPUNCTURE: CPT

## 2022-06-23 PROCEDURE — 99396 PREV VISIT EST AGE 40-64: CPT | Performed by: FAMILY MEDICINE

## 2022-06-23 NOTE — PROGRESS NOTES
ADULT ANNUAL [facility]    NAME: Bekah Bravo  AGE: 43 y o  SEX: female  : 1980     DATE: 2022     Assessment and Plan:     Problem List Items Addressed This Visit        Digestive    Rectal bleed     Make appt with colorectal  Check CBC  Respiratory    Mild intermittent asthma without complication     On flovent, singular and as needed albuterol  Given a neb machine  Still has mild wheezing  Referred to pulmonology  Relevant Orders    Ambulatory Referral to Pulmonology       Other    Annual physical exam - Primary     Normal physical exam    Goes to obgyn  Pap is UTD  Ammonorrhea due to tamoxifen  Mammograms UTD  Has a ho breast cancer  Labs ordered  Tdap given today  Chest pain varying with breathing    Relevant Orders    XR chest pa & lateral      Other Visit Diagnoses     Fatigue, unspecified type        Relevant Orders    CBC and differential    Comprehensive metabolic panel    TSH, 3rd generation with Free T4 reflex    Encounter for vaccination        Relevant Orders    TDAP VACCINE GREATER THAN OR EQUAL TO 8YO IM          Immunizations and preventive care screenings were discussed with patient today  Appropriate education was printed on patient's after visit summary  Counseling:  Alcohol/drug use: discussed moderation in alcohol intake, the recommendations for healthy alcohol use, and avoidance of illicit drug use  Dental Health: discussed importance of regular tooth brushing, flossing, and dental visits  Exercise: the importance of regular exercise/physical activity was discussed  Recommend exercise 3-5 times per week for at least 30 minutes  Return in about 6 months (around 2022) for chronic conditions check up        Chief Complaint:     Chief Complaint   Patient presents with    1 month follow up       History of Present Illness:     Adult Annual Physical   Patient here for a comprehensive physical exam  The patient reports no problems  Diet and Physical Activity  Diet/Nutrition: well balanced diet  Exercise: walking  Depression Screening  PHQ-2/9 Depression Screening         General Health  Sleep: sleeps well  Hearing: no issues  Vision: goes for regular eye exams  Dental: regular dental visits  /GYN Health  Last menstrual period: amenorrhea due to tamoxifen      Review of Systems:     Review of Systems   Constitutional: Positive for fatigue  Negative for fever and unexpected weight change  HENT: Negative for ear pain, sore throat and trouble swallowing  Eyes: Negative for pain and visual disturbance  Respiratory: Negative for cough, chest tightness, shortness of breath and wheezing  Cardiovascular: Negative for chest pain  Gastrointestinal: Positive for blood in stool  Negative for abdominal distention, abdominal pain, constipation, diarrhea, nausea and vomiting  Endocrine: Negative for polydipsia and polyuria  Genitourinary: Negative for dysuria and hematuria  Musculoskeletal: Negative for back pain and myalgias  Skin: Negative for rash  Neurological: Negative for syncope and headaches  Psychiatric/Behavioral: Negative for suicidal ideas        Past Medical History:     Past Medical History:   Diagnosis Date    Asthma     BRCA gene mutation negative 10/2019    [employer]    Breast cancer ([facility] ) 09/10/2019    [facility] Left breast    Cancer ([facility] )     breast    COVID-19 12/12/2020    History of chemotherapy 10/2019    Pneumonia       Past Surgical History:     Past Surgical History:   Procedure Laterality Date    AUGMENTATION BREAST Right 4/26/2021    Procedure: RIGHT BREAST AUGMENTATION;  Surgeon: Brina Ontiveros MD;  [facility];  Service: Plastics    BREAST BIOPSY Left 09/10/2019    inv br ca    BREAST CYST INCISION AND DRAINAGE Left 6/13/2020    Procedure: INCISION AND DRAINAGE (I&D) BREAST;  Surgeon: Prudencio Maynard MD;  [facility]: [facility];  Service: Plastics    BREAST RECONSTRUCTION Left 2020    Procedure: BREAST IMMEDIATE RECONSTRUCTION WITH IMPLANT VERSUS TISSUE EXPANDER; ACELLULAR DERMAL MATRIX (ADM); Surgeon: Prudencio Maynard MD;  [facility]: [facility];  Service: Plastics     SECTION      IR PORT PLACEMENT  10/29/2019    IR PORT REMOVAL  2020    MASTECTOMY Left 2020    GA MASTECTOMY, MODIFIED RADICAL Left 2020    Procedure: BREAST MODIFIED RADICAL MASTECTOMY WITH AXILLARY LYMPH NODE NEEDLE LOCALIZATION (NEEDLE LOC AT 1130);   Surgeon: Fer Moreno MD;  [facility];  Service: Surgical Oncology    US BREAST NEEDLE LOC LEFT Left 2020    US GUIDANCE BREAST BIOPSY LEFT EACH ADDITIONAL Left 9/10/2019    US GUIDED BREAST BIOPSY LEFT COMPLETE Left 9/10/2019    US GUIDED BREAST LYMPH NODE BIOPSY LEFT Left 9/10/2019      Social History:     Social History     Socioeconomic History    Marital status:      Spouse name: None    Number of children: 2    Years of education: None    Highest education level: None   Occupational History    None   Tobacco Use    Smoking status: Never Smoker    Smokeless tobacco: Never Used   Vaping Use    Vaping Use: Never used   Substance and Sexual Activity    Alcohol use: Never    Drug use: Never    Sexual activity: Yes     Partners: Male   Other Topics Concern    None   Social History Narrative    None     Social Determinants of Health     Financial Resource Strain: Not on file   Food Insecurity: Not on file   Transportation Needs: Not on file   Physical Activity: Not on file   Stress: Not on file   Social Connections: Not on file   Intimate Partner Violence: Not on file   Housing Stability: Not on file      Family History:     Family History   Problem Relation Age of Onset    Diabetes Father     Asthma Father     Pancreatic cancer Maternal Grandmother         age at dx abdirashid Jaimes No Known Problems Daughter     No Known Problems Maternal Aunt     No Known Problems Maternal Aunt     No Known Problems Maternal Aunt     Breast cancer Paternal Aunt         age at dx unk    Stomach cancer Paternal Aunt     Cancer Paternal Uncle         type and age unk      Current Medications:     Current Outpatient Medications   Medication Sig Dispense Refill    albuterol (PROVENTIL HFA,VENTOLIN HFA) 90 mcg/act inhaler Inhale 2 puffs every 6 (six) hours as needed for wheezing or shortness of breath 18 g 2    loratadine (CLARITIN) 10 mg tablet Take 1 tablet (10 mg total) by mouth daily 90 tablet 1    tamoxifen (NOLVADEX) 20 mg tablet Take 1 tablet (20 mg total) by mouth daily 90 tablet 0    acetaminophen (TYLENOL) 500 MG chewable tablet Chew 1 tablet (500 mg total) every 6 (six) hours 30 tablet 0    Fluticasone Propionate, Inhal, (Flovent Diskus) 250 MCG/BLIST AEPB Inhale 1 puff  in the morning and 1 puff before bedtime  Rinse mouth after use    60 blister 2    multivitamin (THERAGRAN) TABS Take 1 tablet by mouth daily      naproxen (NAPROSYN) 500 mg tablet Take 1 tablet (500 mg total) by mouth 2 (two) times a day with meals 30 tablet 0     No current facility-administered medications for this visit  Allergies: Allergies   Allergen Reactions    Shellfish-Derived Products - Food Allergy Anaphylaxis     Facial swelling/itchy throat    Aspirin Edema, Eye Swelling and Facial Swelling    Codeine Rash    Fentanyl Itching      Physical Exam:     /60 (BP Location: Left arm, Patient Position: Sitting, Cuff Size: Adult)   Pulse 94   Temp 97 5 °F (36 4 °C) (Tympanic)   Resp 16   Ht 5' 5" (1 651 m)   Wt 59 kg (130 lb)   LMP  (LMP Unknown)   SpO2 99%   BMI 21 63 kg/m²     Physical Exam  Constitutional:       Appearance: She is well-developed  HENT:      Head: Normocephalic and atraumatic  Eyes:      General: No scleral icterus       Conjunctiva/sclera: Conjunctivae normal  Pupils: Pupils are equal, round, and reactive to light  Cardiovascular:      Rate and Rhythm: Normal rate and regular rhythm  Heart sounds: Normal heart sounds  No murmur heard  No friction rub  No gallop  Pulmonary:      Effort: Pulmonary effort is normal  No respiratory distress  Breath sounds: Wheezing (on and off) present  No rales  Chest:      Chest wall: No tenderness  Abdominal:      General: Bowel sounds are normal  There is no distension  Palpations: Abdomen is soft  There is no mass  Tenderness: There is no abdominal tenderness  Musculoskeletal:         General: Normal range of motion  Cervical back: Normal range of motion and neck supple  Lymphadenopathy:      Cervical: No cervical adenopathy  Skin:     General: Skin is warm and dry  Capillary Refill: Capillary refill takes less than 2 seconds  Findings: No rash  Neurological:      Mental Status: She is alert and oriented to person, place, and time  Cranial Nerves: No cranial nerve deficit            Grecia Villasenor MD   [address] [place] [address]

## 2022-06-23 NOTE — ASSESSMENT & PLAN NOTE
On flovent, singular and as needed albuterol  Given a neb machine  Still has mild wheezing  Referred to pulmonology

## 2022-06-23 NOTE — ASSESSMENT & PLAN NOTE
Normal physical exam    Goes to obgyn  Pap is UTD  Ammonorrhea due to tamoxifen  Mammograms UTD  Has a ho breast cancer  Labs ordered  Tdap given today

## 2022-06-23 NOTE — PROGRESS NOTES
Assessment/Plan:    Annual physical exam  Normal physical exam    Goes to obgyn  Pap is UTD  Ammonorrhea due to tamoxifen  Mammograms UTD  Has a ho breast cancer  Labs ordered  Tdap given today  Mild intermittent asthma without complication  On flovent, singular and as needed albuterol  Given a neb machine  Still has mild wheezing  Referred to pulmonology  Rectal bleed  Make appt with colorectal  Check CBC  Diagnoses and all orders for this visit:    Annual physical exam    Fatigue, unspecified type  -     CBC and differential; Future  -     Comprehensive metabolic panel; Future  -     TSH, 3rd generation with Free T4 reflex; Future    Encounter for vaccination  -     TDAP VACCINE GREATER THAN OR EQUAL TO 8YO IM    Mild intermittent asthma without complication  -     Ambulatory Referral to Pulmonology; Future    Rectal bleed    Chest pain varying with breathing  -     XR chest pa & lateral; Future        Subjective: 4 week fu      Patient ID: Paty Lamas is a 43 y o  female  HPI  Here for a 4 weeks follow up  She did not follow up with colorectal surgery but will try to set up the appt again  She is concerned about fatigue  Last visit she was started on flovent for shortness of breath and wheezing  She reports no improvement in symptoms during the day but is having improvement at night           The following portions of the patient's history were reviewed and updated as appropriate: allergies, current medications, past family history, past medical history, past social history, past surgical history and problem list     Review of Systems   Constitutional: Negative for fever and unexpected weight change  HENT: Negative for ear pain, sore throat and trouble swallowing  Eyes: Negative for pain and visual disturbance  Respiratory: Negative for cough, chest tightness, shortness of breath and wheezing  Cardiovascular: Negative for chest pain     Gastrointestinal: Negative for abdominal distention, abdominal pain, blood in stool, constipation, diarrhea, nausea and vomiting  Endocrine: Negative for polydipsia and polyuria  Genitourinary: Negative for dysuria and hematuria  Musculoskeletal: Negative for back pain and myalgias  Skin: Negative for rash  Neurological: Negative for syncope and headaches  Psychiatric/Behavioral: Negative for suicidal ideas  PHQ-2/9 Depression Screening         [unfilled]    Objective:      /60 (BP Location: Left arm, Patient Position: Sitting, Cuff Size: Adult)   Pulse 94   Temp 97 5 °F (36 4 °C) (Tympanic)   Resp 16   Ht 5' 5" (1 651 m)   Wt 59 kg (130 lb)   LMP  (LMP Unknown)   SpO2 99%   BMI 21 63 kg/m²          Physical Exam  Constitutional:       Appearance: She is well-developed  HENT:      Head: Normocephalic and atraumatic  Right Ear: External ear normal       Left Ear: External ear normal       Mouth/Throat:      Pharynx: No oropharyngeal exudate  Eyes:      General: No scleral icterus  Conjunctiva/sclera: Conjunctivae normal       Pupils: Pupils are equal, round, and reactive to light  Cardiovascular:      Rate and Rhythm: Normal rate and regular rhythm  Heart sounds: No murmur heard  No friction rub  No gallop  Pulmonary:      Effort: Pulmonary effort is normal  No respiratory distress  Breath sounds: Normal breath sounds  No wheezing or rales  Abdominal:      General: Bowel sounds are normal  There is no distension  Palpations: Abdomen is soft  There is no mass  Tenderness: There is no abdominal tenderness  There is no rebound  Musculoskeletal:         General: Normal range of motion  Cervical back: Normal range of motion and neck supple  Skin:     General: Skin is warm and dry  Neurological:      Mental Status: She is alert and oriented to person, place, and time

## 2022-07-11 ENCOUNTER — TELEPHONE (OUTPATIENT)
Dept: HEMATOLOGY ONCOLOGY | Facility: CLINIC | Age: 42
End: 2022-07-11

## 2022-07-11 NOTE — TELEPHONE ENCOUNTER
Scheduling Appointment     Who Is Calling to Schedule Patient   Doctor Dr Analia Victor   Date and Time 7/14/22 @ 2:40pm   Reason for scheduling appointment 1yr F/U   Patient verbalized understanding   yes

## 2022-07-11 NOTE — TELEPHONE ENCOUNTER
Appointment Cancellation Or Reschedule     Person calling in Patient    Provider Ayde Betancur   Office Visit Date and Time 6/16/22 @2:30pm   Office Visit Location [place]   Did patient want to reschedule their office appointment? If so, when was it scheduled to? Yes 7/21/22 @9am with Flaco Cuellar   Is this patient calling to reschedule an infusion appointment? no   When is their next infusion appointment? na   Is this patient a Chemo patient? no   Reason for Cancellation or Reschedule Patient states she didn't receive a call to r/s appt     If the patient is a treatment patient, please route this to the office nurse  If the patient is not on treatment, please route to the office MA  If the patient is a surgical oncology patient, please route to surg/onc clinical pool

## 2022-07-14 ENCOUNTER — OFFICE VISIT (OUTPATIENT)
Dept: HEMATOLOGY ONCOLOGY | Facility: CLINIC | Age: 42
End: 2022-07-14
Payer: COMMERCIAL

## 2022-07-14 VITALS
HEART RATE: 103 BPM | SYSTOLIC BLOOD PRESSURE: 124 MMHG | HEIGHT: 65 IN | DIASTOLIC BLOOD PRESSURE: 72 MMHG | TEMPERATURE: 96.9 F | BODY MASS INDEX: 22.16 KG/M2 | WEIGHT: 133 LBS | OXYGEN SATURATION: 98 % | RESPIRATION RATE: 18 BRPM

## 2022-07-14 DIAGNOSIS — T45.1X5A CHEMOTHERAPY INDUCED NEUTROPENIA (HCC): ICD-10-CM

## 2022-07-14 DIAGNOSIS — Z17.0 MALIGNANT NEOPLASM OF OVERLAPPING SITES OF LEFT BREAST IN FEMALE, ESTROGEN RECEPTOR POSITIVE (HCC): Primary | ICD-10-CM

## 2022-07-14 DIAGNOSIS — D70.1 CHEMOTHERAPY INDUCED NEUTROPENIA (HCC): ICD-10-CM

## 2022-07-14 DIAGNOSIS — C50.812 MALIGNANT NEOPLASM OF OVERLAPPING SITES OF LEFT BREAST IN FEMALE, ESTROGEN RECEPTOR POSITIVE (HCC): Primary | ICD-10-CM

## 2022-07-14 PROCEDURE — 99214 OFFICE O/P EST MOD 30 MIN: CPT | Performed by: INTERNAL MEDICINE

## 2022-07-14 RX ORDER — TAMOXIFEN CITRATE 20 MG/1
20 TABLET ORAL DAILY
Qty: 90 TABLET | Refills: 3 | Status: SHIPPED | OUTPATIENT
Start: 2022-07-14

## 2022-07-14 NOTE — PROGRESS NOTES
Hematology / Oncology Outpatient Follow Up Note    Jayden Garsia 43 y o  female KTV:[id number] QLN:[id number]         Date:  7/14/2022    Assessment / Plan:  A 43year-old premenopausal woman with locally advanced left breast cancer, grade 2, ER 90% positive, PR70% positive, HER2 3+ disease   She has relatively large palpable left breast mass as well as palpable left axillary adenopathy which was biopsy confirmed to be metastatic disease   She is negative for BRCA gene mutation   She was treated with neoadjuvant chemotherapy with dose dense AC followed by paclitaxel, trastuzumab and pertuzumab, resulting in complete pathologic response  She is status post mastectomy and axillary lymph node dissection  She completed adjuvant trastuzumab and pertuzumab in December 2020  She is currently on tamoxifen as adjuvant hormonal therapy with excellent tolerance  Clinically, she has no evidence for recurrent disease  I recommended her to continue tamoxifen 20 mg daily  She is in agreement with my recommendations  I will see her again in a year for routine follow-up         Subjective:      HPI:  A 68-year-old premenopausal woman who noticed a left breast lump recently which she brought to medical attention  Mikhail Octaviano was found to have radiographic abnormality in her left breast as well as left axilla   She underwent left breast biopsy in September 10, 2019 which showed invasive ductal carcinoma, grade 2   This was ER 90 % positive, CO 70% positive, HER2 3+ disease   Biopsy from enlarged left axillary lymph node was also positive for metastatic disease   She was seen by Dr Edin Webb who sent her to me for neoadjuvant chemotherapy   She is negative for BRCA gene mutation   She underwent bone scan and CT scan chest abdomen pelvis which showed no evidence of distant metastasis   She presents today with her  in mother to discuss neoadjuvant chemotherapy   She has some left breast pain   Otherwise, she feels well   She denied any respiratory symptoms   She has no complaint of bone pain   She has regular menstrual cycle   She has 2 children   She has no plan to have more children   She has no surgical history   She has no significant past medical history  Kristian Dorantes is a lifetime never smoker   Her performance status is normal              Interval History:  A 43year-old premenopausal woman with locally advanced left breast cancer, grade 2, ER 90% positive, FL 70% positive, HER2 3+ disease   She has relatively large palpable left breast mass as well as palpable left axillary adenopathy which was biopsy confirmed to be metastatic disease   She has no evidence of distant metastasis   She is negative for BRCA gene mutation   She had 4 cycle of dose dense AC followed by weekly paclitaxel, trastuzumab and try weekly pertuzumab which was completed in February 2020  She achieved at least clinical good partial response   Subsequently, she underwent left mastectomy and axillary lymph node dissection by Dr Sarthak Valenzuela in April 28, 2020 which showed no evidence of residual carcinoma with 11 negative axillary lymph nodes, consistent with complete pathological response   She had immediate reconstruction   She completed adjuvant trastuzumab and pertuzumab in the end of December 2020 with no cardiac toxicity  Since September 2020, she has been on tamoxifen  She presents today for routine follow-up  She has no menstrual cycles since the neoadjuvant chemotherapy  However, she has no complaint of hot flashes  She denied any pain  She has no respiratory symptoms  Her weight is stable    Her performance status is normal       Objective:      Primary Diagnosis:     1  Locally advanced left breast cancer, grade 2, ER 90 % positive, FL 70 % positive, HER2 3+ disease   Diagnosed in September 2019       2  BRCA gene mutation negative      Cancer Staging:  Cancer Staging  No matching staging information was found for the patient         Previous Hematologic/ Oncologic Treatment:       1  Neoadjuvant chemotherapy with dose dense AC x4 followed by weekly paclitaxel, try weekly trastuzumab, pertuzumab times 12 weeks   Completed in April 2020       2  Adjuvant trastuzumab and pertuzumab completed in December 2020         Current Hematologic/ Oncologic Treatment:       1   Adjuvant hormonal therapy with tamoxifen since September 2020      Disease Status:      1  Clinical good partial response  2  Complete pathological response  3  PELON status post mastectomy and axillary lymph node dissection      Test Results:     Pathology:     Left breast biopsy showed invasive ductal carcinoma, grade 2  ER 90 % positive, GA 70 % positive, HER2 3+ disease        Left mastectomy showed no evidence of residual carcinoma   11 axillary lymph nodes were all negative for metastatic disease in April 2020       Radiology:     Mammography in November 2021 was benign  BI-RADS 2      Laboratory:     See below      Physical Exam:        General Appearance:    Alert, oriented          Eyes:    PERRL   Ears:    Normal external ear canals, both ears   Nose:   Nares normal, septum midline   Throat:   Mucosa moist  Pharynx without injection  Neck:   Supple         Lungs:     Clear to auscultation bilaterally, very localized tenderness even with light touch at left lateral lower rib cage  Chest Wall:    No tenderness or deformity    Heart:    Regular rate and rhythm         Abdomen:     Soft, non-tender, bowel sounds +, no organomegaly               Extremities:   Extremities no cyanosis or edema         Skin:   no rash or icterus   Typical shingle rash in the right shoulder     Lymph nodes:   Cervical, supraclavicular, and axillary nodes normal   Neurologic:   CNII-XII intact, normal strength, sensation and reflexes     Throughout             Breast exam:  Status post left mastectomy with reconstruction   No palpable abnormality in her left reconstructed breast   No palpable left axillary adenopathy  [facility]  ROS: Review of Systems   All other systems reviewed and are negative  Imaging: XR chest pa & lateral    Result Date: 6/25/2022  Narrative: CHEST INDICATION:   R07 1: Chest pain on breathing  COMPARISON:  Chest radiograph from 4/13/2020 and chest CT from 12/20/2019  EXAM PERFORMED/VIEWS:  XR CHEST PA & LATERAL  DUAL ENERGY SUBTRACTION  FINDINGS: Cardiomediastinal silhouette appears unremarkable  The lungs are clear  No pneumothorax or pleural effusion  Osseous structures appear within normal limits for patient age  Left mastectomy and left axillary lymph node dissection  Bilateral breast implants  Impression: No acute cardiopulmonary disease  Workstation performed: [id number]         Labs:   Lab Results   Component Value Date    WBC 5 48 06/23/2022    HGB 12 7 06/23/2022    HCT 39 2 06/23/2022    MCV 93 06/23/2022     06/23/2022     Lab Results   Component Value Date    K 3 7 06/23/2022     06/23/2022    CO2 29 06/23/2022    BUN 13 06/23/2022    CREATININE 0 65 06/23/2022    GLUF 80 06/23/2022    CALCIUM 9 2 06/23/2022    AST 21 06/23/2022    ALT 53 06/23/2022    ALKPHOS 66 06/23/2022    EGFR 109 06/23/2022       Current Medications: Reviewed  Allergies: Reviewed  PMH/FH/SH:  Reviewed      Vital Sign:    Body surface area is 1 66 meters squared      Wt Readings from Last 3 Encounters:   07/14/22 60 3 kg (133 lb)   06/23/22 59 kg (130 lb)   05/31/22 59 3 kg (130 lb 11 7 oz)        Temp Readings from Last 3 Encounters:   07/14/22 (!) 96 9 °F (36 1 °C) (Tympanic)   06/23/22 97 5 °F (36 4 °C) (Tympanic)   05/19/22 98 7 °F (37 1 °C) (Tympanic)        BP Readings from Last 3 Encounters:   07/14/22 124/72   06/23/22 100/60   05/19/22 100/68         Pulse Readings from Last 3 Encounters:   07/14/22 103   06/23/22 94   05/19/22 97     @LASTSAO2(3)@

## 2022-07-21 ENCOUNTER — OFFICE VISIT (OUTPATIENT)
Dept: SURGICAL ONCOLOGY | Facility: CLINIC | Age: 42
End: 2022-07-21
Payer: COMMERCIAL

## 2022-07-21 VITALS
SYSTOLIC BLOOD PRESSURE: 112 MMHG | DIASTOLIC BLOOD PRESSURE: 74 MMHG | TEMPERATURE: 97.2 F | RESPIRATION RATE: 14 BRPM | HEIGHT: 65 IN | HEART RATE: 78 BPM | WEIGHT: 133 LBS | BODY MASS INDEX: 22.16 KG/M2 | OXYGEN SATURATION: 98 %

## 2022-07-21 DIAGNOSIS — Z79.810 USE OF TAMOXIFEN (NOLVADEX): ICD-10-CM

## 2022-07-21 DIAGNOSIS — C50.812 MALIGNANT NEOPLASM OF OVERLAPPING SITES OF LEFT BREAST IN FEMALE, ESTROGEN RECEPTOR POSITIVE (HCC): Primary | ICD-10-CM

## 2022-07-21 DIAGNOSIS — Z17.0 MALIGNANT NEOPLASM OF OVERLAPPING SITES OF LEFT BREAST IN FEMALE, ESTROGEN RECEPTOR POSITIVE (HCC): Primary | ICD-10-CM

## 2022-07-21 PROCEDURE — 99214 OFFICE O/P EST MOD 30 MIN: CPT

## 2022-07-21 NOTE — PROGRESS NOTES
Surgical Oncology Follow Up       [facility] SURGICAL ONCOLOGY [place]  [address] [facility] [id number]    Leotha Half  1980  [id number]  [facility] SURGICAL ONCOLOGY [place]  [address] [facility] [address]    Chief Complaint   Patient presents with    Follow-up       Assessment/Plan:  1  Malignant neoplasm of overlapping sites of left breast in female, estrogen receptor positive ([facility] [address] )  - 6 month follow up  - Mammo diagnostic right w 3d & cad; Future    2  Use of tamoxifen (Nolvadex)  - continue use per medical oncology    Discussion/Summary:  Patient is a 45-year-old female presenting today for a six-month follow-up for multifocal left breast cancer diagnosed in September 2019  Pathology revealed invasive carcinoma ER/HI positive, HER2 positive  She also had left axillary lymph node metastatic adenocarcinoma  She had genetic testing which was negative  She completed neoadjuvant chemotherapy and had a left breast modified radical mastectomy with immediate reconstruction  She completed whole breast radiation therapy and is currently on tamoxifen  She had an automated right breast ultrasound on 05/31/2022 which was BI-RADS 1  She will be due for a diagnostic mammogram in November  There are no concerns on her clinical breast exam   She has left axillary tightness however she is not interested in meeting with PT again  She said she will try to do her exercises and stretches at home  I will see the patient back in 6 months or sooner should the need arise  She was instructed to call with any questions or concerns prior to this time  All questions were answered today        History of Present Illness:     Oncology History   Malignant neoplasm of overlapping sites of left breast in female, estrogen receptor positive ([facility] [address] )   9/10/2019 Biopsy    Left breast ultrasound-guided biopsy  A  2 o'clock, 2 cm from nipple  Invasive breast carcinoma of no special type (ductal, NST)  Grade 2  ER 90  SD 70  HER2 3+    B  3 o'clock, 1 cm from nipple  Invasive breast carcinoma of no special type (ductal NST)  Grade 2  ER 70  SD 60  HER2 3+    C  Left axillary lymph node  Metastatic adenocarcinoma     9/23/2019 Genetic Testing    Panel of 19 genes were evaluated  Negative result   No pathogenic sequence variants or deletions/dupllications identified  [employer]     11/6/2019 - 12/22/2020 Chemotherapy    DOXOrubicin (ADRIAMYCIN), 60 mg/m2 = 96 mg, Intravenous, Once, 4 of 4 cycles  Administration: 96 mg (11/6/2019), 96 mg (11/20/2019), 96 mg (12/4/2019), 96 mg (12/18/2019)  pegfilgrastim-jmdb (Aretta Ditto), 6 mg, Subcutaneous, Once, 4 of 4 cycles  Administration: 6 mg (11/7/2019), 6 mg (11/21/2019), 6 mg (12/5/2019), 6 mg (12/19/2019)  cyclophosphamide (CYTOXAN) IVPB, 960 mg, Intravenous, Once, 4 of 4 cycles  Administration: 1,000 mg (11/6/2019), 1,000 mg (11/20/2019), 1,000 mg (12/4/2019), 1,000 mg (12/18/2019)  fosaprepitant (EMEND) IVPB (ONC use only), 150 mg, Intravenous, Once, 4 of 4 cycles  Administration: 150 mg (11/6/2019), 150 mg (11/20/2019), 150 mg (12/4/2019), 150 mg (12/18/2019)  pertuzumab (PERJETA) IVPB, 840 mg (100 % of original dose 840 mg), Intravenous, Once, 17 of 17 cycles  Dose modification: 840 mg (original dose 840 mg, Cycle 5), 420 mg (original dose 420 mg, Cycle 8), 420 mg (original dose 420 mg, Cycle 17)  Administration: 840 mg (1/15/2020), 420 mg (2/6/2020), 420 mg (3/2/2020), 420 mg (3/23/2020), 420 mg (4/13/2020), 420 mg (5/4/2020), 420 mg (5/26/2020), 420 mg (6/15/2020), 420 mg (7/6/2020), 420 mg (7/27/2020), 420 mg (8/17/2020), 420 mg (9/11/2020), 420 mg (9/29/2020), 420 mg (11/10/2020), 420 mg (12/1/2020), 420 mg (12/22/2020), 420 mg (10/20/2020)  PACLItaxel (TAXOL) chemo IVPB, 80 mg/m2 = 127 8 mg, Intravenous, Once, 11 of 11 cycles  Dose modification: 60 mg/m2 (original dose 80 mg/m2, Cycle 14, Reason: Dose Not Tolerated)  Administration: 127 8 mg (1/15/2020), 120 6 mg (1/22/2020), 120 6 mg (2/6/2020), 120 6 mg (2/17/2020), 120 6 mg (3/9/2020), 90 6 mg (3/23/2020), 120 6 mg (3/30/2020), 120 6 mg (1/29/2020), 120 6 mg (2/25/2020), 120 6 mg (3/16/2020), 120 6 mg (4/7/2020)  trastuzumab (HERCEPTIN) chemo infusion, 4 mg/kg = 214 mg, Intravenous, Once, 25 of 25 cycles  Administration: 214 mg (1/15/2020), 96 mg (1/22/2020), 96 mg (2/6/2020), 96 mg (2/17/2020), 96 mg (3/2/2020), 96 mg (3/9/2020), 96 mg (3/23/2020), 96 mg (3/30/2020), 300 mg (4/13/2020), 96 mg (1/29/2020), 96 mg (2/25/2020), 96 mg (3/16/2020), 96 mg (4/7/2020), 300 mg (5/4/2020), 300 mg (5/26/2020), 300 mg (6/15/2020), 300 mg (7/6/2020), 300 mg (7/27/2020), 300 mg (8/17/2020), 318 mg (9/11/2020), 318 mg (9/29/2020), 318 mg (11/10/2020), 318 mg (12/1/2020), 318 mg (12/22/2020), 318 mg (10/20/2020)     4/28/2020 Surgery    Left breast modified radical mastectomy  No residual invasive carcinoma present  Background focal atypical ductal hyperplasia  0/11 Lymph nodes  Complete response to neoadjuvant chemotherapy    Immediate reconstruction with tissue expander (Dr Zion Damian)     5/22/2020 -  Cancer Staged    Staging form: Breast, AJCC 8th Edition  - Pathologic: No Stage Recommended (ypT0, pN0, cM0, G2, ER+, AK+, HER2+) - Signed by Tonia Machado MD on 5/22/2020  Stage prefix: y  Neoadjuvant therapy: Yes  Response to neoadjuvant therapy: Complete response  Laterality: Left  Multigene prognostic tests performed: None  Histologic grading system: 3 grade system       5/22/2020 -  Cancer Staged    Staging form: Breast, AJCC 8th Edition  - Clinical: Stage IB (cT2, cN1, cM0, G2, ER+, AK+, HER2+) - Signed by Tonia Machado MD on 5/22/2020  Laterality: Left  Histologic grading system: 3 grade system       9/10/2020 - 10/14/2020 Radiation    Course: C1    Plan ID Energy Fractions Dose per Fraction (cGy) Dose Correction (cGy) Total Dose Delivered (cGy) Elapsed Days   BH L CW 6X 13 / 13 200 0 2,600 34   BH L CW BOLUS 6X 12 / 12 200 0 2,400 32   BH L PAB 10X 25 / 25 18 0 450 34   BH L Sclav 6X 25 / 25 200 0 5,000 29      Dr Jaguar Ibrahim       11/2020 -  Hormone Therapy    Tamoxifen 20 mg PO daily          -Interval History:  Patient is a 42-year-old female presenting today for a six-month follow-up for multifocal left breast cancer diagnosed in September 2019  She is currently on tamoxifen  She had an automated right breast ultrasound on 05/31/2022 which was BI-RADS 1  Patient denies changes on her breast exam  She denies persistent headaches, bone pain, back pain, shortness of breath, or abdominal pain  Review of Systems:  Review of Systems   Constitutional: Negative for activity change, appetite change, fatigue and unexpected weight change  Respiratory: Negative for cough and shortness of breath  Cardiovascular: Negative for chest pain  Gastrointestinal: Negative for abdominal pain, diarrhea, nausea and vomiting  Endocrine: Negative for heat intolerance  Musculoskeletal: Positive for arthralgias  Negative for back pain and myalgias  Skin: Negative for rash  Neurological: Negative for weakness and headaches  Hematological: Negative for adenopathy         Patient Active Problem List   Diagnosis    Annual physical exam    Malignant neoplasm of overlapping sites of left breast in female, estrogen receptor positive ([facility] [address] )    Varicose veins of left lower extremity with pain    Mild intermittent asthma without complication    Seasonal allergic rhinitis due to pollen    Chemotherapy induced neutropenia (HCC)    Muscle spasm    Dehiscence of incision    S/P left breast implant    Use of tamoxifen (Nolvadex)    History of augmentation of right breast    Chest pain varying with breathing    Rectal bleed     Past Medical History:   Diagnosis Date    Asthma     BRCA gene mutation negative 10/2019    [employer]    Breast cancer ([facility] [address] ) 09/10/2019    IDC Left breast    Cancer [facility])     breast    COVID-19 2020    History of chemotherapy 10/2019    Pneumonia      Past Surgical History:   Procedure Laterality Date    AUGMENTATION BREAST Right 2021    Procedure: RIGHT BREAST AUGMENTATION;  Surgeon: Valentine Carlson MD;  [facility];  Service: Plastics    BREAST BIOPSY Left 09/10/2019    inv br ca    BREAST CYST INCISION AND DRAINAGE Left 2020    Procedure: INCISION AND DRAINAGE (I&D) BREAST;  Surgeon: Valentine Carlson MD;  [facility];  Service: Plastics    BREAST RECONSTRUCTION Left 2020    Procedure: BREAST IMMEDIATE RECONSTRUCTION WITH IMPLANT VERSUS TISSUE EXPANDER; ACELLULAR DERMAL MATRIX (ADM); Surgeon: Valentine Carlson MD;  [facility];  Service: Plastics     SECTION      IR PORT PLACEMENT  10/29/2019    IR PORT REMOVAL  2020    MASTECTOMY Left 2020    RI MASTECTOMY, MODIFIED RADICAL Left 2020    Procedure: BREAST MODIFIED RADICAL MASTECTOMY WITH AXILLARY LYMPH NODE NEEDLE LOCALIZATION (NEEDLE LOC AT 1130);   Surgeon: Ever Waddell MD;  [facility];  Service: Surgical Oncology    US BREAST NEEDLE LOC LEFT Left 2020    US GUIDANCE BREAST BIOPSY LEFT EACH ADDITIONAL Left 9/10/2019    US GUIDED BREAST BIOPSY LEFT COMPLETE Left 9/10/2019    US GUIDED BREAST LYMPH NODE BIOPSY LEFT Left 9/10/2019     Family History   Problem Relation Age of Onset    Diabetes Father     Asthma Father     Pancreatic cancer Maternal Grandmother         age at dx unk    No Known Problems Daughter     No Known Problems Maternal Aunt     No Known Problems Maternal Aunt     No Known Problems Maternal Aunt     Breast cancer Paternal Aunt         age at dx unk    Stomach cancer Paternal Aunt     Cancer Paternal Uncle         type and age unk     Social History     Socioeconomic History    Marital status:      Spouse name: Not on file    Number of children: 2    Years of education: Not on file    Highest education level: Not on file   Occupational History    Not on file   Tobacco Use    Smoking status: Never Smoker    Smokeless tobacco: Never Used   Vaping Use    Vaping Use: Never used   Substance and Sexual Activity    Alcohol use: Never    Drug use: Never    Sexual activity: Yes     Partners: Male   Other Topics Concern    Not on file   Social History Narrative    Not on file     Social Determinants of Health     Financial Resource Strain: Not on file   Food Insecurity: Not on file   Transportation Needs: Not on file   Physical Activity: Not on file   Stress: Not on file   Social Connections: Not on file   Intimate Partner Violence: Not on file   Housing Stability: Not on file       Current Outpatient Medications:     albuterol (PROVENTIL HFA,VENTOLIN HFA) 90 mcg/act inhaler, Inhale 2 puffs every 6 (six) hours as needed for wheezing or shortness of breath, Disp: 18 g, Rfl: 2    Fluticasone Propionate, Inhal, (Flovent Diskus) 250 MCG/BLIST AEPB, Inhale 1 puff  in the morning and 1 puff before bedtime  Rinse mouth after use   , Disp: 60 blister, Rfl: 2    loratadine (CLARITIN) 10 mg tablet, Take 1 tablet (10 mg total) by mouth daily, Disp: 90 tablet, Rfl: 1    multivitamin (THERAGRAN) TABS, Take 1 tablet by mouth daily, Disp: , Rfl:     tamoxifen (NOLVADEX) 20 mg tablet, Take 1 tablet (20 mg total) by mouth daily, Disp: 90 tablet, Rfl: 3    acetaminophen (TYLENOL) 500 MG chewable tablet, Chew 1 tablet (500 mg total) every 6 (six) hours, Disp: 30 tablet, Rfl: 0    naproxen (NAPROSYN) 500 mg tablet, Take 1 tablet (500 mg total) by mouth 2 (two) times a day with meals, Disp: 30 tablet, Rfl: 0  Allergies   Allergen Reactions    Shellfish-Derived Products - Food Allergy Anaphylaxis     Facial swelling/itchy throat    Aspirin Edema, Eye Swelling and Facial Swelling    Codeine Rash    Fentanyl Itching     Vitals:    07/21/22 0850   BP: 112/74   Pulse: 78   Resp: 14 Temp: (!) 97 2 °F (36 2 °C)   SpO2: 98%       Physical Exam  Constitutional:       General: She is not in acute distress  Appearance: Normal appearance  Cardiovascular:      Rate and Rhythm: Normal rate and regular rhythm  Pulses: Normal pulses  Heart sounds: Normal heart sounds  Pulmonary:      Effort: Pulmonary effort is normal       Breath sounds: Normal breath sounds  Chest:      Chest wall: No mass  Breasts:      Right: No swelling, bleeding, inverted nipple, mass, nipple discharge, skin change, tenderness, axillary adenopathy or supraclavicular adenopathy  Left: No swelling, bleeding, mass, skin change, tenderness, axillary adenopathy or supraclavicular adenopathy  Comments: Left breast mastectomy scar  B/L implants present  No masses, nodularity, skin changes, nipple changes or discharge (of right breast), or adenopathy appreciated on physical exam      Abdominal:      General: Abdomen is flat  Palpations: Abdomen is soft  Lymphadenopathy:      Upper Body:      Right upper body: No supraclavicular, axillary or pectoral adenopathy  Left upper body: No supraclavicular, axillary or pectoral adenopathy  Skin:     General: Skin is warm  Neurological:      General: No focal deficit present  Mental Status: She is alert and oriented to person, place, and time  Psychiatric:         Mood and Affect: Mood normal          Behavior: Behavior normal            Results:    Imaging  XR chest pa & lateral    Result Date: 6/25/2022  Narrative: CHEST INDICATION:   R07 1: Chest pain on breathing  COMPARISON:  Chest radiograph from 4/13/2020 and chest CT from 12/20/2019  EXAM PERFORMED/VIEWS:  XR CHEST PA & LATERAL  DUAL ENERGY SUBTRACTION  FINDINGS: Cardiomediastinal silhouette appears unremarkable  The lungs are clear  No pneumothorax or pleural effusion  Osseous structures appear within normal limits for patient age   Left mastectomy and left axillary lymph node dissection  Bilateral breast implants  Impression: No acute cardiopulmonary disease  Workstation performed: [id number]       I reviewed the above imaging data  Advance Care Planning/Advance Directives:  Discussed disease status, cancer treatment plans and/or cancer treatment goals with the patient

## 2022-08-29 ENCOUNTER — TELEPHONE (OUTPATIENT)
Dept: FAMILY MEDICINE CLINIC | Facility: CLINIC | Age: 42
End: 2022-08-29

## 2022-08-29 NOTE — TELEPHONE ENCOUNTER
I have not used [employer] but I believe it is a portal to submit paperwork to DME suppliers    It is not a new order    please ask Hernan Fisher how this can get done    thanks

## 2022-08-29 NOTE — TELEPHONE ENCOUNTER
Dr Marcelo Brunner,   [facility] medical equip is asking for office note dated 05/19/22 sent through [employer] so they may dispense the Nebulizer equipment and supplies  I asked if I could fax them and they said no it has to be done through [employer]

## 2022-08-29 NOTE — TELEPHONE ENCOUNTER
Shira Augustin, are you able to fax office notes to [facility] for this patient please read message I sent to Dr Leeanne Sacks    He never used it before so he does not know how to use it

## 2022-08-30 DIAGNOSIS — J45.20 MILD INTERMITTENT ASTHMA WITHOUT COMPLICATION: Primary | ICD-10-CM

## 2022-08-30 RX ORDER — ALBUTEROL SULFATE 2.5 MG/3ML
2.5 SOLUTION RESPIRATORY (INHALATION) EVERY 6 HOURS PRN
Qty: 90 ML | Refills: 0 | Status: SHIPPED | OUTPATIENT
Start: 2022-08-30

## 2022-08-30 NOTE — TELEPHONE ENCOUNTER
PCP out of the office  Order signed in parachute for DME by me in PCP's absence    Does Ori need albuterol solution for nebulizer machine?     If yes, which pharmacy should I send this rx to?    thx

## 2022-08-31 ENCOUNTER — CONSULT (OUTPATIENT)
Dept: GASTROENTEROLOGY | Facility: MEDICAL CENTER | Age: 42
End: 2022-08-31
Payer: COMMERCIAL

## 2022-08-31 VITALS
HEIGHT: 65 IN | HEART RATE: 62 BPM | DIASTOLIC BLOOD PRESSURE: 58 MMHG | BODY MASS INDEX: 22.46 KG/M2 | OXYGEN SATURATION: 98 % | SYSTOLIC BLOOD PRESSURE: 110 MMHG | WEIGHT: 134.8 LBS

## 2022-08-31 DIAGNOSIS — K59.00 CONSTIPATION, UNSPECIFIED CONSTIPATION TYPE: ICD-10-CM

## 2022-08-31 DIAGNOSIS — K62.5 RECTAL BLEED: Primary | ICD-10-CM

## 2022-08-31 LAB

## 2022-08-31 PROCEDURE — 99203 OFFICE O/P NEW LOW 30 MIN: CPT | Performed by: PHYSICIAN ASSISTANT

## 2022-08-31 NOTE — PATIENT INSTRUCTIONS
Scheduled date of colon (as of today) 9/13/22  Physician performing Dr Don Rodriguez:  Location of procedure  [place]:   Bowel prep reviewed with patient: brown/dulcolax  Instructions reviewed with patient by: ma  Clearances: na

## 2022-08-31 NOTE — PROGRESS NOTES
[facility] - Outpatient Consultation  Gracie Leonardo 43 y o  female MRN: [id number]  Encounter: [id number]          ASSESSMENT AND PLAN:      1  Rectal bleed  2  Constipation, unspecified constipation type: She admits to lifelong complaint of constipation, she tried over-the-counter medications as needed  She thinks this is under better control these days but is currently not using any medication     She is agreeable to try MiraLax  She admits to intermittent bright red blood per rectum 1st noticed in May of this year but worsening with large amounts of blood seen in the toilet bowl and with wiping  This is painless in nature, no abdominal pain, no melena  She is concerned given personal history of breast cancer as well as family history of stomach and colon cancer in secondary relatives  Will plan for colonoscopy for further evaluation  - Colonoscopy; Future  -GoLYTELY/Dulcolax  -start miralax 17 g daily or bid prn    Risks of colonoscopy were discussed with patient and  including but not limited to bleeding, infection, perforation  She understands and agrees to proceed with procedure        ______________________________________________________________________    HPI:  Gracie Leonardo is a 66-year-old female with a past medical history of left-sided multifocal breast cancer originally diagnosed October 2019, her 2 positive with left axillary lymph node metastatic adenocarcinoma status post neoadjuvant chemotherapy, left breast radical mastectomy with immediate reconstruction as well as breast radiation therapy and currently on tamoxifen here as a new patient for bright red blood per rectum  She states that she 1st saw small amounts of bright red blood per rectum about 3 months ago  It has been intermittent in nature  She states that sometimes it is a significant amount of blood that fills the toilet bowl and is seen with wiping    She also admits that is mixed with her stool  It is painless, no abdominal pain  She is eating well, no change in appetite, no weight loss, no melena  She has never had a colonoscopy in the past   She does admit to family history the GI cancers including stomach and colon in an aunt  Surgical history includes  section      REVIEW OF SYSTEMS:    CONSTITUTIONAL: Denies any fever, chills, rigors, and weight loss  HEENT: No earache or tinnitus  Denies hearing loss or visual disturbances  CARDIOVASCULAR: No chest pain or palpitations  RESPIRATORY: Denies any cough, hemoptysis, shortness of breath or dyspnea on exertion  GASTROINTESTINAL: As noted in the History of Present Illness  GENITOURINARY: No problems with urination  Denies any hematuria or dysuria  NEUROLOGIC: No dizziness or vertigo, denies headaches  MUSCULOSKELETAL: Denies any muscle or joint pain  SKIN: Denies skin rashes or itching  ENDOCRINE: Denies excessive thirst  Denies intolerance to heat or cold  PSYCHOSOCIAL: Denies depression or anxiety  Denies any recent memory loss  Historical Information   Past Medical History:   Diagnosis Date    Asthma     BRCA gene mutation negative 10/2019    [employer]    [facility] ) 09/10/2019    IDC Left breast    Cancer ([facility] )     breast    COVID-19 2020    History of chemotherapy 10/2019    Pneumonia      Past Surgical History:   Procedure Laterality Date    AUGMENTATION BREAST Right 2021    Procedure: RIGHT BREAST AUGMENTATION;  Surgeon: Julia Echols MD;  [facility];  Service: Plastics    BREAST BIOPSY Left 09/10/2019    inv br ca   Yonathan Ochoa BREAST CYST INCISION AND DRAINAGE Left 2020    Procedure: INCISION AND DRAINAGE (I&D) BREAST;  Surgeon: Julia Echols MD;  [facility];  Service: Plastics    BREAST RECONSTRUCTION Left 2020    Procedure: BREAST IMMEDIATE RECONSTRUCTION WITH IMPLANT VERSUS TISSUE EXPANDER; ACELLULAR DERMAL MATRIX (ADM);   Surgeon: Leeanna Navarrete Iqra Lopez MD;  [facility];  Service: Plastics     SECTION      IR PORT PLACEMENT  10/29/2019    IR PORT REMOVAL  2020    MASTECTOMY Left 2020    OK MASTECTOMY, MODIFIED RADICAL Left 2020    Procedure: BREAST MODIFIED RADICAL MASTECTOMY WITH AXILLARY LYMPH NODE NEEDLE LOCALIZATION (NEEDLE LOC AT 1130);   Surgeon: Elizabeth Carter MD;  [facility];  Service: Surgical Oncology    US BREAST NEEDLE LOC LEFT Left 2020    US GUIDANCE BREAST BIOPSY LEFT EACH ADDITIONAL Left 9/10/2019    US GUIDED BREAST BIOPSY LEFT COMPLETE Left 9/10/2019    US GUIDED BREAST LYMPH NODE BIOPSY LEFT Left 9/10/2019     Social History   Social History     Substance and Sexual Activity   Alcohol Use Never     Social History     Substance and Sexual Activity   Drug Use Never     Social History     Tobacco Use   Smoking Status Never Smoker   Smokeless Tobacco Never Used     Family History   Problem Relation Age of Onset    Diabetes Father     Asthma Father     Pancreatic cancer Maternal Grandmother         age at dx unk    No Known Problems Daughter     No Known Problems Maternal Aunt     No Known Problems Maternal Aunt     No Known Problems Maternal Aunt     Breast cancer Paternal Aunt         age at dx unk    Stomach cancer Paternal Aunt     Cancer Paternal Uncle         type and age unk       Meds/Allergies       Current Outpatient Medications:     albuterol (2 5 mg/3 mL) 0 083 % nebulizer solution    albuterol (PROVENTIL HFA,VENTOLIN HFA) 90 mcg/act inhaler    loratadine (CLARITIN) 10 mg tablet    multivitamin (THERAGRAN) TABS    tamoxifen (NOLVADEX) 20 mg tablet    acetaminophen (TYLENOL) 500 MG chewable tablet    Fluticasone Propionate, Inhal, (Flovent Diskus) 250 MCG/BLIST AEPB    naproxen (NAPROSYN) 500 mg tablet    Allergies   Allergen Reactions    Shellfish-Derived Products - Food Allergy Anaphylaxis     Facial swelling/itchy throat    Aspirin Edema, Eye Swelling and Facial Swelling    Codeine Rash    Fentanyl Itching           Objective     Blood pressure 110/58, pulse 62, height 5' 5" (1 651 m), weight 61 1 kg (134 lb 12 8 oz), SpO2 98 %, not currently breastfeeding  Body mass index is 22 43 kg/m²  PHYSICAL EXAM:      General Appearance:   Alert, cooperative, no distress   HEENT:   Normocephalic, atraumatic, anicteric      Neck:  Supple, symmetrical, trachea midline   Lungs:   Clear to auscultation bilaterally; no rales, rhonchi or wheezing; respirations unlabored    Heart[de-identified]   Regular rate and rhythm; no murmur, rub, or gallop  Abdomen:   Soft, non-tender, non-distended; normal bowel sounds; no masses, no organomegaly    Genitalia:   Deferred    Rectal:   Deferred    Extremities:  No cyanosis, clubbing or edema        Skin:  No jaundice, rashes, or lesions          Lab Results:   No visits with results within 1 Day(s) from this visit  Latest known visit with results is:   Refill on 08/30/2022   Component Date Value    Supplier Name 08/30/2022 [employer] - [id number] [facility] Supplier Phone Number 08/30/2022 [phone]     Order Status 08/30/2022 Delivery Unsuccessful     Delivery Request Date 08/30/2022 08/30/2022     Supplier Name 08/30/2022 08/30/2022     Item Description 08/30/2022 Nebulizer Compressor for Reusable Package with Mask     Item Description 08/30/2022 Nebulizer Set, Reusable     Item Description 08/30/2022 Adult Nebulizer Mask     Item Description 08/30/2022 Disposable Nebulizer Compressor Filter     Item Description 08/30/2022 Nebulizer Set, Disposable          Radiology Results:   No results found

## 2022-09-11 RX ORDER — SODIUM CHLORIDE 9 MG/ML
125 INJECTION, SOLUTION INTRAVENOUS CONTINUOUS
Status: CANCELLED | OUTPATIENT
Start: 2022-09-11

## 2022-09-13 ENCOUNTER — ANESTHESIA (OUTPATIENT)
Dept: GASTROENTEROLOGY | Facility: HOSPITAL | Age: 42
End: 2022-09-13

## 2022-09-13 ENCOUNTER — HOSPITAL ENCOUNTER (OUTPATIENT)
Dept: GASTROENTEROLOGY | Facility: HOSPITAL | Age: 42
Setting detail: OUTPATIENT SURGERY
Discharge: HOME | End: 2022-09-13
Attending: INTERNAL MEDICINE
Payer: COMMERCIAL

## 2022-09-13 ENCOUNTER — ANESTHESIA EVENT (OUTPATIENT)
Dept: GASTROENTEROLOGY | Facility: HOSPITAL | Age: 42
End: 2022-09-13

## 2022-09-13 VITALS
HEART RATE: 74 BPM | TEMPERATURE: 97.9 F | DIASTOLIC BLOOD PRESSURE: 71 MMHG | SYSTOLIC BLOOD PRESSURE: 100 MMHG | OXYGEN SATURATION: 96 % | RESPIRATION RATE: 16 BRPM

## 2022-09-13 DIAGNOSIS — K62.5 RECTAL BLEED: ICD-10-CM

## 2022-09-13 LAB
EXT PREGNANCY TEST URINE: NEGATIVE
EXT. CONTROL: NORMAL

## 2022-09-13 PROCEDURE — 81025 URINE PREGNANCY TEST: CPT | Performed by: ANESTHESIOLOGY

## 2022-09-13 PROCEDURE — 45378 DIAGNOSTIC COLONOSCOPY: CPT | Performed by: INTERNAL MEDICINE

## 2022-09-13 RX ORDER — SODIUM CHLORIDE 9 MG/ML
125 INJECTION, SOLUTION INTRAVENOUS CONTINUOUS
Status: DISCONTINUED | OUTPATIENT
Start: 2022-09-13 | End: 2022-09-17 | Stop reason: HOSPADM

## 2022-09-13 RX ORDER — PROPOFOL 10 MG/ML
INJECTION, EMULSION INTRAVENOUS AS NEEDED
Status: DISCONTINUED | OUTPATIENT
Start: 2022-09-13 | End: 2022-09-13

## 2022-09-13 RX ADMIN — PROPOFOL 100 MG: 10 INJECTION, EMULSION INTRAVENOUS at 08:51

## 2022-09-13 RX ADMIN — PROPOFOL 100 MG: 10 INJECTION, EMULSION INTRAVENOUS at 08:55

## 2022-09-13 RX ADMIN — SODIUM CHLORIDE 125 ML/HR: 0.9 INJECTION, SOLUTION INTRAVENOUS at 08:30

## 2022-09-13 RX ADMIN — PROPOFOL 50 MG: 10 INJECTION, EMULSION INTRAVENOUS at 09:04

## 2022-09-13 NOTE — ANESTHESIA POSTPROCEDURE EVALUATION
Post-Op Assessment Note    CV Status:  Stable  Pain Score: 1    Pain management: adequate     Mental Status:  Alert and awake   Hydration Status:  Euvolemic   PONV Controlled:  Controlled   Airway Patency:  Patent      Post Op Vitals Reviewed: Yes      Staff: Anesthesiologist         No complications documented      BP      Temp      Pulse     Resp      SpO2      BP 94/62   Pulse 85   Temp 97 9 °F (36 6 °C) (Temporal)   Resp 16   LMP  (LMP Unknown)   SpO2 100%

## 2022-09-13 NOTE — H&P
History and Physical - [facility]  Dulce Abreu 43 y o  female MRN: [id number]                  HPI: Dulce Abreu is a 43y o  year old female who presents for rectal bleeding      REVIEW OF SYSTEMS: Per the HPI, and otherwise unremarkable  Historical Information   Past Medical History:   Diagnosis Date    Asthma     BRCA gene mutation negative 10/2019    [employer]    Breast cancer ([facility] [address] ) 09/10/2019    IDC Left breast    Cancer ([facility] [address] )     breast    COVID-19 2020    History of chemotherapy 10/2019    Pneumonia      Past Surgical History:   Procedure Laterality Date    AUGMENTATION BREAST Right 2021    Procedure: RIGHT BREAST AUGMENTATION;  Surgeon: Jacqueline Rodrigues MD;  [facility];  Service: Plastics    BREAST BIOPSY Left 09/10/2019    inv br ca   Anitra Leisure BREAST CYST INCISION AND DRAINAGE Left 2020    Procedure: INCISION AND DRAINAGE (I&D) BREAST;  Surgeon: Jacqueline Rodrigues MD;  [facility];  Service: Plastics    BREAST RECONSTRUCTION Left 2020    Procedure: BREAST IMMEDIATE RECONSTRUCTION WITH IMPLANT VERSUS TISSUE EXPANDER; ACELLULAR DERMAL MATRIX (ADM); Surgeon: Jacqueline Rodrigues MD;  [facility];  Service: Plastics     SECTION      IR PORT PLACEMENT  10/29/2019    IR PORT REMOVAL  2020    MASTECTOMY Left 2020    VT MASTECTOMY, MODIFIED RADICAL Left 2020    Procedure: BREAST MODIFIED RADICAL MASTECTOMY WITH AXILLARY LYMPH NODE NEEDLE LOCALIZATION (NEEDLE LOC AT 1130);   Surgeon: Esau Denson MD;  [facility];  Service: Surgical Oncology    US BREAST NEEDLE LOC LEFT Left 2020    US GUIDANCE BREAST BIOPSY LEFT EACH ADDITIONAL Left 9/10/2019    US GUIDED BREAST BIOPSY LEFT COMPLETE Left 9/10/2019    US GUIDED BREAST LYMPH NODE BIOPSY LEFT Left 9/10/2019     Social History   Social History     Substance and Sexual Activity   Alcohol Use Never     Social History Substance and Sexual Activity   Drug Use Never     Social History     Tobacco Use   Smoking Status Never Smoker   Smokeless Tobacco Never Used     Family History   Problem Relation Age of Onset    Diabetes Father     Asthma Father     Pancreatic cancer Maternal Grandmother         age at dx unk    No Known Problems Daughter     No Known Problems Maternal Aunt     No Known Problems Maternal Aunt     No Known Problems Maternal Aunt     Breast cancer Paternal Aunt         age at dx unk    Stomach cancer Paternal Aunt     Cancer Paternal Uncle         type and age unk       Meds/Allergies       Current Outpatient Medications:     albuterol (PROVENTIL HFA,VENTOLIN HFA) 90 mcg/act inhaler    loratadine (CLARITIN) 10 mg tablet    multivitamin (THERAGRAN) TABS    tamoxifen (NOLVADEX) 20 mg tablet    acetaminophen (TYLENOL) 500 MG chewable tablet    albuterol (2 5 mg/3 mL) 0 083 % nebulizer solution    bisacodyl (DULCOLAX) 5 mg EC tablet    Fluticasone Propionate, Inhal, (Flovent Diskus) 250 MCG/BLIST AEPB    naproxen (NAPROSYN) 500 mg tablet    polyethylene glycol (GOLYTELY) 4000 mL solution    Current Facility-Administered Medications:     sodium chloride 0 9 % infusion, 125 mL/hr, Intravenous, Continuous    Allergies   Allergen Reactions    Shellfish-Derived Products - Food Allergy Anaphylaxis     Facial swelling/itchy throat    Aspirin Edema, Eye Swelling and Facial Swelling    Codeine Rash    Fentanyl Itching       Objective     BP 94/62   Pulse 85   Temp 97 9 °F (36 6 °C) (Temporal)   Resp 16   LMP  (LMP Unknown)   SpO2 100%       PHYSICAL EXAM    Gen: NAD  Head: NCAT  CV: RRR  CHEST: Clear  ABD: soft, NT/ND  EXT: no edema      ASSESSMENT/PLAN:  This is a 43y o  year old female here for rectal bleeding, and she is stable and optimized for her procedure

## 2022-09-13 NOTE — ANESTHESIA PREPROCEDURE EVALUATION
Procedure:  COLONOSCOPY    Relevant Problems   CARDIO   (+) Chest pain varying with breathing      GI/HEPATIC   (+) Rectal bleed      GYN   (+) Malignant neoplasm of overlapping sites of left breast in female, estrogen receptor positive (HCC)      PULMONARY   (+) Mild intermittent asthma without complication        Physical Exam    Airway    Mallampati score: II  TM Distance: >3 FB  Neck ROM: full     Dental       Cardiovascular  Rhythm: regular, Rate: normal,     Pulmonary  Breath sounds clear to auscultation,     Other Findings        Anesthesia Plan  ASA Score- 2     Anesthesia Type- general with ASA Monitors  Additional Monitors:   Airway Plan:           Plan Factors-Exercise tolerance (METS): >4 METS  Chart reviewed  Existing labs reviewed  Patient summary reviewed  Patient is not a current smoker  Patient not instructed to abstain from smoking on day of procedure  Patient did not smoke on day of surgery  Obstructive sleep apnea risk education given perioperatively  Induction- intravenous  Postoperative Plan-     Informed Consent- Anesthetic plan and risks discussed with patient

## 2022-09-20 ENCOUNTER — CONSULT (OUTPATIENT)
Dept: PULMONOLOGY | Facility: CLINIC | Age: 42
End: 2022-09-20
Payer: COMMERCIAL

## 2022-09-20 VITALS
OXYGEN SATURATION: 98 % | WEIGHT: 134 LBS | BODY MASS INDEX: 22.33 KG/M2 | TEMPERATURE: 97.8 F | HEIGHT: 65 IN | SYSTOLIC BLOOD PRESSURE: 110 MMHG | DIASTOLIC BLOOD PRESSURE: 68 MMHG | RESPIRATION RATE: 18 BRPM | HEART RATE: 67 BPM

## 2022-09-20 DIAGNOSIS — J45.50 SEVERE PERSISTENT ASTHMA WITHOUT COMPLICATION: ICD-10-CM

## 2022-09-20 PROCEDURE — 94664 DEMO&/EVAL PT USE INHALER: CPT | Performed by: INTERNAL MEDICINE

## 2022-09-20 PROCEDURE — 99244 OFF/OP CNSLTJ NEW/EST MOD 40: CPT | Performed by: INTERNAL MEDICINE

## 2022-09-20 RX ORDER — FLUTICASONE PROPIONATE AND SALMETEROL 250; 50 UG/1; UG/1
1 POWDER RESPIRATORY (INHALATION) 2 TIMES DAILY
Qty: 60 BLISTER | Refills: 5 | Status: SHIPPED | OUTPATIENT
Start: 2022-09-20

## 2022-09-20 RX ORDER — MONTELUKAST SODIUM 10 MG/1
10 TABLET ORAL
Qty: 30 TABLET | Refills: 5 | Status: SHIPPED | OUTPATIENT
Start: 2022-09-20

## 2022-09-20 NOTE — PROGRESS NOTES
Pulmonary Consultation   Chandana Garcia 43 y o  female MRN: [id number]  9/20/2022      Assessment:  Severe persistent asthma   · By clinical diagnosis, given the remote history of asthma as a child   · Possible allergic type 2, noted elevated absolute eosinophilic count at 316 cells 2 months ago  · Recurrent symptoms of episodic cough/wheezing/chest tightness with exposure to allergens environmental/seasonal  · Asthma control test score today is 8/25, with frequent use of p r n  albuterol on daily basis and daily nocturnal awakening because of cough   · On Flovent Diskus 250/p r n  albuterol    Plan:   · Step up treatment to Advair/Wixela 250  · Reviewed the proper inhaler use technique, noted that she was not fully exhaling or holding breath with each use   · Instructed to rinse mouth following each use   · Added Singulair 10 mg HS  · Continue p r n  albuterol HFA/nebs  · Check complete pulmonary function test/BD response    Return in about 8 weeks (around 11/15/2022)  History of Present Illness     New Consult for: hx Asthma      Background  43 y o  female with a h/o left breast cancer, allergic rhinitis, mild asthma,     Dx left breast cancer in 09/2019  ER/SD/HER2 positive, no BRCA mutation  S/p left mastectomy, noted metastasis to the left axillary lymph node, s/p radiation therapy  Treated with neoadjuvant chemo  Currently on tamoxifen  Presented today for initial evaluation by Pulmonary due to recurrent symptoms of asthma  States that she had asthma as a child, has been on different inhalers with multiple hospitalization until her a early 25s when she recovered from it  Over the past 2 years, noted worse seasonal allergies, episodic symptoms of chest tightness, congestion and wheezing  Specially with exposure to triggers such as extreme temperature/cold, pets dander, dust, grass, pollen or with seasonal variations  Also reported frequent nocturnal symptoms      2 months ago, given Flovent 250 Diskus by PCP and p r n  albuterol HFA/nebs  No significant improvement with Flovent, p r n  albuterol usually helps  Asthma control test score today is 8/25    Otherwise, no change in the environment, moving, no new carpet, paint or changing her job  Review of Systems  As per hpi, all other systems reviewed and were negative  Social/exposure history   Born and raised in [place] [facility] [place] [facility], moved to [place] years ago  Lifelong nonsmoker, nonalcoholic  Works as a , no significant exposure to dusts  Lives with her family in [place], possible exposure to some molds in her house  No pets  Studies:  Imaging and other studies: I have personally reviewed pertinent films in PACS      Pulmonary function testing:   None on file    EKG, Pathology, and Other Studies: I have personally reviewed pertinent reports  Historical Information   Past Medical History:   Diagnosis Date    Asthma     BRCA gene mutation negative 10/2019    [employer]    Breast cancer ([facility] [address] ) 09/10/2019    IDC Left breast    Cancer ([facility] [address] )     breast    COVID-19 2020    History of chemotherapy 10/2019    Pneumonia      Past Surgical History:   Procedure Laterality Date    AUGMENTATION BREAST Right 2021    Procedure: RIGHT BREAST AUGMENTATION;  Surgeon: Louann Pop MD;  [facility];  Service: Plastics    BREAST BIOPSY Left 09/10/2019    inv br ca   Jamie Singletary BREAST CYST INCISION AND DRAINAGE Left 2020    Procedure: INCISION AND DRAINAGE (I&D) BREAST;  Surgeon: Louann Pop MD;  [facility];  Service: Plastics    BREAST RECONSTRUCTION Left 2020    Procedure: BREAST IMMEDIATE RECONSTRUCTION WITH IMPLANT VERSUS TISSUE EXPANDER; ACELLULAR DERMAL MATRIX (ADM);   Surgeon: Louann Pop MD;  [facility];  Service: Plastics     SECTION      IR PORT PLACEMENT  10/29/2019    IR PORT REMOVAL  2020    MASTECTOMY Left 2020    OK MASTECTOMY, MODIFIED RADICAL Left 4/28/2020    Procedure: BREAST MODIFIED RADICAL MASTECTOMY WITH AXILLARY LYMPH NODE NEEDLE LOCALIZATION (NEEDLE LOC AT 1130); Surgeon: Roland Arceo MD;  [facility];  Service: Surgical Oncology    US BREAST NEEDLE LOC LEFT Left 4/28/2020    US GUIDANCE BREAST BIOPSY LEFT EACH ADDITIONAL Left 9/10/2019    US GUIDED BREAST BIOPSY LEFT COMPLETE Left 9/10/2019    US GUIDED BREAST LYMPH NODE BIOPSY LEFT Left 9/10/2019     Family History   Problem Relation Age of Onset    Diabetes Father     Asthma Father     Pancreatic cancer Maternal Grandmother         age at dx unk    No Known Problems Daughter     No Known Problems Maternal Aunt     No Known Problems Maternal Aunt     No Known Problems Maternal Aunt     Breast cancer Paternal Aunt         age at dx unk    Stomach cancer Paternal Aunt     Cancer Paternal Uncle         type and age unk         Medications/Allergies: Reviewed    Vitals: Blood pressure 110/68, pulse 67, temperature 97 8 °F (36 6 °C), temperature source Tympanic, resp  rate 18, height 5' 5" (1 651 m), weight 60 8 kg (134 lb), SpO2 98 %, not currently breastfeeding  Body mass index is 22 3 kg/m²  Oxygen Therapy  SpO2: 98 %      Physical Exam  Body mass index is 22 3 kg/m²     Gen: not in acute distress,   Neck/Eyes: supple, no adenopathy, PERRL  Ear: normal appearance, no significant hearing impairment  Nose:  normal nasal mucosa, no drainage  Mouth:  unremarkable/normal appearance of lips, teeth and gums  Oropharynx: mucosa is moist, no focal lesions or erythema  Salivary glands: soft nontender  Chest: normal respiratory efforts, clear breath sounds bilaterally  CV: RRR, no murmurs appreciated, no edema  Abdomen: soft, non tender  Extremities:  No observed deformity   Skin: unremarkable  Neuro: AAO X3, no focal motor deficit         Labs:  Lab Results   Component Value Date    WBC 5 48 06/23/2022    HGB 12 7 06/23/2022    HCT 39 2 06/23/2022    MCV 93 06/23/2022     06/23/2022     Lab Results   Component Value Date    CALCIUM 9 2 06/23/2022    K 3 7 06/23/2022    CO2 29 06/23/2022     06/23/2022    BUN 13 06/23/2022    CREATININE 0 65 06/23/2022     No results found for: IGE  Lab Results   Component Value Date    ALT 53 06/23/2022    AST 21 06/23/2022    ALKPHOS 66 06/23/2022           Portions of the record may have been created with voice recognition software  Occasional wrong word or "sound a like" substitutions may have occurred due to the inherent limitations of voice recognition software  Read the chart carefully and recognize, using context, where substitutions have occurred    [facility]

## 2022-11-28 ENCOUNTER — HOSPITAL ENCOUNTER (OUTPATIENT)
Dept: MAMMOGRAPHY | Facility: CLINIC | Age: 42
Discharge: HOME | End: 2022-11-28

## 2022-11-28 VITALS — HEIGHT: 65 IN | BODY MASS INDEX: 22.33 KG/M2 | WEIGHT: 134 LBS

## 2022-11-28 DIAGNOSIS — C50.812 MALIGNANT NEOPLASM OF OVERLAPPING SITES OF LEFT BREAST IN FEMALE, ESTROGEN RECEPTOR POSITIVE (HCC): ICD-10-CM

## 2022-11-28 DIAGNOSIS — Z17.0 MALIGNANT NEOPLASM OF OVERLAPPING SITES OF LEFT BREAST IN FEMALE, ESTROGEN RECEPTOR POSITIVE (HCC): ICD-10-CM

## 2023-01-12 ENCOUNTER — OFFICE VISIT (OUTPATIENT)
Dept: FAMILY MEDICINE CLINIC | Facility: CLINIC | Age: 43
End: 2023-01-12

## 2023-01-12 VITALS
TEMPERATURE: 98.3 F | DIASTOLIC BLOOD PRESSURE: 55 MMHG | HEART RATE: 84 BPM | OXYGEN SATURATION: 98 % | RESPIRATION RATE: 16 BRPM | WEIGHT: 129.8 LBS | BODY MASS INDEX: 21.63 KG/M2 | SYSTOLIC BLOOD PRESSURE: 102 MMHG | HEIGHT: 65 IN

## 2023-01-12 DIAGNOSIS — M79.645 PAIN OF FINGER OF LEFT HAND: ICD-10-CM

## 2023-01-12 DIAGNOSIS — C50.812 MALIGNANT NEOPLASM OF OVERLAPPING SITES OF LEFT BREAST IN FEMALE, ESTROGEN RECEPTOR POSITIVE (HCC): ICD-10-CM

## 2023-01-12 DIAGNOSIS — Z17.0 MALIGNANT NEOPLASM OF OVERLAPPING SITES OF LEFT BREAST IN FEMALE, ESTROGEN RECEPTOR POSITIVE (HCC): ICD-10-CM

## 2023-01-12 DIAGNOSIS — J45.20 MILD INTERMITTENT ASTHMA WITHOUT COMPLICATION: ICD-10-CM

## 2023-01-12 DIAGNOSIS — Z13.820 SCREENING FOR OSTEOPOROSIS: ICD-10-CM

## 2023-01-12 DIAGNOSIS — J30.1 SEASONAL ALLERGIC RHINITIS DUE TO POLLEN: ICD-10-CM

## 2023-01-12 DIAGNOSIS — J45.50 SEVERE PERSISTENT ASTHMA WITHOUT COMPLICATION: ICD-10-CM

## 2023-01-12 DIAGNOSIS — Z79.810 USE OF TAMOXIFEN (NOLVADEX): ICD-10-CM

## 2023-01-12 DIAGNOSIS — Z78.0 POSTMENOPAUSAL: Primary | ICD-10-CM

## 2023-01-12 PROBLEM — D70.1 CHEMOTHERAPY INDUCED NEUTROPENIA (HCC): Status: RESOLVED | Noted: 2019-10-23 | Resolved: 2023-01-12

## 2023-01-12 PROBLEM — K62.5 RECTAL BLEED: Status: RESOLVED | Noted: 2022-05-22 | Resolved: 2023-01-12

## 2023-01-12 PROBLEM — T81.31XA DEHISCENCE OF INCISION: Status: RESOLVED | Noted: 2020-06-12 | Resolved: 2023-01-12

## 2023-01-12 PROBLEM — M62.838 MUSCLE SPASM: Status: RESOLVED | Noted: 2019-12-21 | Resolved: 2023-01-12

## 2023-01-12 PROBLEM — R07.1 CHEST PAIN VARYING WITH BREATHING: Status: RESOLVED | Noted: 2021-07-08 | Resolved: 2023-01-12

## 2023-01-12 PROBLEM — T45.1X5A CHEMOTHERAPY INDUCED NEUTROPENIA (HCC): Status: RESOLVED | Noted: 2019-10-23 | Resolved: 2023-01-12

## 2023-01-12 RX ORDER — ALBUTEROL SULFATE 90 UG/1
2 AEROSOL, METERED RESPIRATORY (INHALATION) EVERY 6 HOURS PRN
Qty: 18 G | Refills: 2 | Status: SHIPPED | OUTPATIENT
Start: 2023-01-12

## 2023-01-12 RX ORDER — MONTELUKAST SODIUM 10 MG/1
10 TABLET ORAL
Qty: 90 TABLET | Refills: 1 | Status: SHIPPED | OUTPATIENT
Start: 2023-01-12

## 2023-01-12 RX ORDER — LORATADINE 10 MG/1
10 TABLET ORAL DAILY
Qty: 90 TABLET | Refills: 1 | Status: SHIPPED | OUTPATIENT
Start: 2023-01-12

## 2023-01-12 NOTE — PROGRESS NOTES
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  We will check a DEXA scan  Diagnoses and all orders for this visit:    Postmenopausal  -     DXA bone density spine hip and pelvis; Future    Screening for osteoporosis  -     DXA bone density spine hip and pelvis; Future    Seasonal allergic rhinitis due to pollen  -     loratadine (CLARITIN) 10 mg tablet; Take 1 tablet (10 mg total) by mouth daily    Severe persistent asthma without complication  -     montelukast (SINGULAIR) 10 mg tablet; Take 1 tablet (10 mg total) by mouth daily at bedtime    Mild intermittent asthma without complication  -     albuterol (PROVENTIL HFA,VENTOLIN HFA) 90 mcg/act inhaler; Inhale 2 puffs every 6 (six) hours as needed for wheezing or shortness of breath    Malignant neoplasm of overlapping sites of left breast in female, estrogen receptor positive (HCC)    Use of tamoxifen (Nolvadex)    Pain of finger of left hand  Comments:  Ice 3 times daily naproxen twice a day for 4 5 to 7 days  Subjective: chronic conditions checkup     Patient ID: Zion Orozco is a 43 y o  female with a history of left-sided breast cancer    HPI  Left-sided breast cancer was diagnosed September 2019  Pathology revealed invasive carcinoma ER/UT positive, HER2 positive with left axillary lymph node metastatic adenocarcinoma  She completed left mastectomy with whole breast radiation and is currently on tamoxifen    Follows with surgical oncology and hematology oncology  She goes to surgical oncology every 6 months for a breast exam and mammograms  She complains of left-sided fifth finger pain over the DIP joint that started several months ago and is on and off  The following portions of the patient's history were reviewed and updated as appropriate: allergies, current medications, past family history, past medical history, past social history, past surgical history and problem list     Review of Systems   Constitutional: Negative for fever and unexpected weight change  HENT: Negative for ear pain, sore throat and trouble swallowing  Eyes: Negative for pain and visual disturbance  Respiratory: Negative for cough, chest tightness, shortness of breath and wheezing  Cardiovascular: Negative for chest pain  Gastrointestinal: Negative for abdominal distention, abdominal pain, blood in stool, constipation, diarrhea, nausea and vomiting  Endocrine: Negative for polydipsia and polyuria  Genitourinary: Negative for dysuria and hematuria  Musculoskeletal: Negative for back pain and myalgias  Skin: Negative for rash  Neurological: Negative for syncope and headaches  Psychiatric/Behavioral: Negative for suicidal ideas  PHQ-2/9 Depression Screening         [unfilled]    Objective:      /55 (BP Location: Left arm, Patient Position: Sitting, Cuff Size: Adult)   Pulse 84   Temp 98 3 °F (36 8 °C) (Tympanic)   Resp 16   Ht 5' 5" (1 651 m)   Wt 58 9 kg (129 lb 12 8 oz)   LMP  (LMP Unknown)   SpO2 98%   BMI 21 60 kg/m²          Physical Exam  Constitutional:       Appearance: She is well-developed  HENT:      Head: Normocephalic and atraumatic  Right Ear: External ear normal       Left Ear: External ear normal       Mouth/Throat:      Pharynx: No oropharyngeal exudate  Eyes:      General: No scleral icterus  Conjunctiva/sclera: Conjunctivae normal       Pupils: Pupils are equal, round, and reactive to light     Cardiovascular: Rate and Rhythm: Normal rate and regular rhythm  Heart sounds: No murmur heard  No friction rub  No gallop  Pulmonary:      Effort: Pulmonary effort is normal  No respiratory distress  Breath sounds: Normal breath sounds  No wheezing or rales  Abdominal:      General: Bowel sounds are normal  There is no distension  Palpations: Abdomen is soft  There is no mass  Tenderness: There is no abdominal tenderness  There is no rebound  Musculoskeletal:         General: Normal range of motion  Cervical back: Normal range of motion and neck supple  Comments: Point tenderness over the left fifth finger DIP joint   Skin:     General: Skin is warm and dry  Neurological:      Mental Status: She is alert and oriented to person, place, and time

## 2023-01-12 NOTE — ASSESSMENT & PLAN NOTE
Continues to follow with surgical oncology and hematology oncology for every 6 month breast exams and mammograms  Completed whole breast radiation on the left with a radical mastectomy and is currently on tamoxifen

## 2023-01-24 ENCOUNTER — OFFICE VISIT (OUTPATIENT)
Dept: SURGICAL ONCOLOGY | Facility: CLINIC | Age: 43
End: 2023-01-24

## 2023-01-24 VITALS
WEIGHT: 129 LBS | TEMPERATURE: 97.1 F | RESPIRATION RATE: 17 BRPM | DIASTOLIC BLOOD PRESSURE: 62 MMHG | OXYGEN SATURATION: 97 % | HEART RATE: 80 BPM | BODY MASS INDEX: 21.49 KG/M2 | SYSTOLIC BLOOD PRESSURE: 96 MMHG | HEIGHT: 65 IN

## 2023-01-24 DIAGNOSIS — Z17.0 MALIGNANT NEOPLASM OF OVERLAPPING SITES OF LEFT BREAST IN FEMALE, ESTROGEN RECEPTOR POSITIVE (HCC): Primary | ICD-10-CM

## 2023-01-24 DIAGNOSIS — Z12.39 ENCOUNTER FOR BREAST CANCER SCREENING OTHER THAN MAMMOGRAM: ICD-10-CM

## 2023-01-24 DIAGNOSIS — R92.2 DENSE BREAST: ICD-10-CM

## 2023-01-24 DIAGNOSIS — Z79.810 USE OF TAMOXIFEN (NOLVADEX): ICD-10-CM

## 2023-01-24 DIAGNOSIS — C50.812 MALIGNANT NEOPLASM OF OVERLAPPING SITES OF LEFT BREAST IN FEMALE, ESTROGEN RECEPTOR POSITIVE (HCC): Primary | ICD-10-CM

## 2023-01-24 NOTE — PROGRESS NOTES
Surgical Oncology Follow Up       [address] [facility],[address] [facility]  CANCER CARE [facility] SURGICAL ONCOLOGY [place]  [address]  [facility] [address]    Devin Frias  1980  [id number]  [address] [facility],[address] [place] [address]  CANCER CARE [facility] SURGICAL ONCOLOGY [place] [address] [address] [id number]    Chief Complaint   Patient presents with   • Follow-up       Assessment/Plan:  1  Malignant neoplasm of overlapping sites of left breast in female, estrogen receptor positive ([facility] [address] )  - 6 month follow up    2  Use of tamoxifen (Nolvadex)  - continue use per medical oncology    3  Encounter for breast cancer screening other than mammogram  - US breast screening bilateral complete (ABUS); Future    4  Dense breast  - US breast screening bilateral complete (ABUS); Future      Discussion/Summary: Patient is a 55-year-old female presenting today for a six-month follow-up for multifocal left breast cancer diagnosed in September 2019  Pathology revealed invasive carcinoma ER/SD/HER2 positive  She also had left axillary lymph node metastatic adenocarcinoma  She had genetic testing which was negative  She completed neoadjuvant chemotherapy and had a left breast modified radical mastectomy with immediate reconstruction  She completed whole breast radiation therapy and is currently on tamoxifen  She had a diagnostic mammogram of the right breast on 11/28/2022 which was BI-RADS 2 category 4 density  I will order automated breast ultrasound for the summer  There were no concerns on her cbe  I will see the patient back in 6 months or sooner should the need arise  She was instructed to call with any questions or concerns prior to this time  All questions were answered today        History of Present Illness:     Oncology History   Malignant neoplasm of overlapping sites of left breast in female, estrogen receptor positive ([facility] [address] )   9/10/2019 Biopsy    Left breast ultrasound-guided biopsy  A  2 o'clock, 2 cm from nipple  Invasive breast carcinoma of no special type (ductal, NST)  Grade 2  ER 90  DC 70  HER2 3+    B  3 o'clock, 1 cm from nipple  Invasive breast carcinoma of no special type (ductal NST)  Grade 2  ER 70  DC 60  HER2 3+    C  Left axillary lymph node  Metastatic adenocarcinoma     9/23/2019 Genetic Testing    Panel of 19 genes were evaluated  Negative result   No pathogenic sequence variants or deletions/dupllications identified  [employer]     11/6/2019 - 12/22/2020 Chemotherapy    DOXOrubicin (ADRIAMYCIN), 60 mg/m2 = 96 mg, Intravenous, Once, 4 of 4 cycles  Administration: 96 mg (11/6/2019), 96 mg (11/20/2019), 96 mg (12/4/2019), 96 mg (12/18/2019)  pegfilgrastim-jmdb (Isabel Chu), 6 mg, Subcutaneous, Once, 4 of 4 cycles  Administration: 6 mg (11/7/2019), 6 mg (11/21/2019), 6 mg (12/5/2019), 6 mg (12/19/2019)  cyclophosphamide (CYTOXAN) IVPB, 960 mg, Intravenous, Once, 4 of 4 cycles  Administration: 1,000 mg (11/6/2019), 1,000 mg (11/20/2019), 1,000 mg (12/4/2019), 1,000 mg (12/18/2019)  fosaprepitant (EMEND) IVPB (ONC use only), 150 mg, Intravenous, Once, 4 of 4 cycles  Administration: 150 mg (11/6/2019), 150 mg (11/20/2019), 150 mg (12/4/2019), 150 mg (12/18/2019)  pertuzumab (PERJETA) IVPB, 840 mg (100 % of original dose 840 mg), Intravenous, Once, 17 of 17 cycles  Dose modification: 840 mg (original dose 840 mg, Cycle 5), 420 mg (original dose 420 mg, Cycle 8), 420 mg (original dose 420 mg, Cycle 17)  Administration: 840 mg (1/15/2020), 420 mg (2/6/2020), 420 mg (3/2/2020), 420 mg (3/23/2020), 420 mg (4/13/2020), 420 mg (5/4/2020), 420 mg (5/26/2020), 420 mg (6/15/2020), 420 mg (7/6/2020), 420 mg (7/27/2020), 420 mg (8/17/2020), 420 mg (9/11/2020), 420 mg (9/29/2020), 420 mg (11/10/2020), 420 mg (12/1/2020), 420 mg (12/22/2020), 420 mg (10/20/2020)  PACLItaxel (TAXOL) chemo IVPB, 80 mg/m2 = 127 8 mg, Intravenous, Once, 11 of 11 cycles  Dose modification: 60 mg/m2 (original dose 80 mg/m2, Cycle 14, Reason: Dose Not Tolerated)  Administration: 127 8 mg (1/15/2020), 120 6 mg (1/22/2020), 120 6 mg (2/6/2020), 120 6 mg (2/17/2020), 120 6 mg (3/9/2020), 90 6 mg (3/23/2020), 120 6 mg (3/30/2020), 120 6 mg (1/29/2020), 120 6 mg (2/25/2020), 120 6 mg (3/16/2020), 120 6 mg (4/7/2020)  trastuzumab (HERCEPTIN) chemo infusion, 4 mg/kg = 214 mg, Intravenous, Once, 25 of 25 cycles  Administration: 214 mg (1/15/2020), 96 mg (1/22/2020), 96 mg (2/6/2020), 96 mg (2/17/2020), 96 mg (3/2/2020), 96 mg (3/9/2020), 96 mg (3/23/2020), 96 mg (3/30/2020), 300 mg (4/13/2020), 96 mg (1/29/2020), 96 mg (2/25/2020), 96 mg (3/16/2020), 96 mg (4/7/2020), 300 mg (5/4/2020), 300 mg (5/26/2020), 300 mg (6/15/2020), 300 mg (7/6/2020), 300 mg (7/27/2020), 300 mg (8/17/2020), 318 mg (9/11/2020), 318 mg (9/29/2020), 318 mg (11/10/2020), 318 mg (12/1/2020), 318 mg (12/22/2020), 318 mg (10/20/2020)     4/28/2020 Surgery    Left breast modified radical mastectomy  No residual invasive carcinoma present  Background focal atypical ductal hyperplasia  0/11 Lymph nodes  Complete response to neoadjuvant chemotherapy    Immediate reconstruction with tissue expander (Dr Scarlet Ngo)     5/22/2020 -  Cancer Staged    Staging form: Breast, AJCC 8th Edition  - Pathologic: No Stage Recommended (ypT0, pN0, cM0, G2, ER+, MD+, HER2+) - Signed by Jorge Thompson MD on 5/22/2020  Stage prefix: y  Neoadjuvant therapy: Yes  Response to neoadjuvant therapy: Complete response  Laterality: Left  Multigene prognostic tests performed: None  Histologic grading system: 3 grade system       5/22/2020 -  Cancer Staged    Staging form: Breast, AJCC 8th Edition  - Clinical: Stage IB (cT2, cN1, cM0, G2, ER+, MD+, HER2+) - Signed by Jorge Thompson MD on 5/22/2020  Laterality: Left  Histologic grading system: 3 grade system       9/10/2020 - 10/14/2020 Radiation    Course: C1    Plan ID Energy Fractions Dose per Fraction (cGy) Dose Correction (cGy) Total Dose Delivered (cGy) Elapsed Days   BH L CW 6X 13 / 13 200 0 2,600 34   BH L CW BOLUS 6X 12 / 12 200 0 2,400 32   BH L PAB 10X 25 / 25 18 0 450 34   BH L Sclav 6X 25 / 25 200 0 5,000 29      Dr Gonzales Palacio       11/2020 -  Hormone Therapy    Tamoxifen 20 mg PO daily          -Interval History: Patient is a 42-year-old female presenting today for a six-month follow-up for multifocal left breast cancer diagnosed in September 2019  She is currently on tamoxifen  She had a diagnostic mammogram of the right breast on 11/28/2022 which was BI-RADS 2 category 4 density  Patient denies changes on her breast exam  She denies persistent headaches, bone pain, back pain, shortness of breath, or abdominal pain  Review of Systems:  Review of Systems   Constitutional: Negative for activity change, appetite change, fatigue and unexpected weight change  Respiratory: Negative for cough and shortness of breath  Cardiovascular: Negative for chest pain  Gastrointestinal: Negative for abdominal pain, diarrhea, nausea and vomiting  Endocrine: Negative for heat intolerance  Musculoskeletal: Positive for arthralgias  Negative for back pain and myalgias  Skin: Negative for rash  Neurological: Negative for weakness and headaches  Hematological: Negative for adenopathy         Patient Active Problem List   Diagnosis   • Annual physical exam   • Malignant neoplasm of overlapping sites of left breast in female, estrogen receptor positive ([facility] [address] )   • Varicose veins of left lower extremity with pain   • Mild intermittent asthma without complication   • Seasonal allergic rhinitis due to pollen   • S/P left breast implant   • Use of tamoxifen (Nolvadex)   • History of augmentation of right breast     Past Medical History:   Diagnosis Date   • Asthma    • BRCA gene mutation negative 10/2019    [employer]   • Breast cancer ([facility] [address] ) 09/10/2019    IDC Left breast   • Cancer ([facility] [address] )     breast   • COVID-19 12/12/2020   • History of chemotherapy 10/2019   • Pneumonia      Past Surgical History:   Procedure Laterality Date   • AUGMENTATION BREAST Right 2021    Procedure: RIGHT BREAST AUGMENTATION;  Surgeon: Jaylan Freedman MD;  [facility]: [facility];  Service: Plastics   • BREAST BIOPSY Left 09/10/2019    inv br ca   • BREAST CYST INCISION AND DRAINAGE Left 2020    Procedure: INCISION AND DRAINAGE (I&D) BREAST;  Surgeon: Jaylan Freedman MD;  Location: [facility];  Service: Plastics   • BREAST RECONSTRUCTION Left 2020    Procedure: BREAST IMMEDIATE RECONSTRUCTION WITH IMPLANT VERSUS TISSUE EXPANDER; ACELLULAR DERMAL MATRIX (ADM); Surgeon: Jaylan Freedman MD;  Location: [facility];  Service: Plastics   •  SECTION     • IR PORT PLACEMENT  10/29/2019   • IR PORT REMOVAL  2020   • MASTECTOMY Left 2020   • NY MAST MODF RAD W/AX LYMPH NOD W/WO PECT/DENZEL MIN Left 2020    Procedure: BREAST MODIFIED RADICAL MASTECTOMY WITH AXILLARY LYMPH NODE NEEDLE LOCALIZATION (NEEDLE LOC AT 1130);   Surgeon: Don Rodriguez MD;  Location[facility];  Service: Surgical Oncology   • US BREAST NEEDLE LOC LEFT Left 2020   • US GUIDANCE BREAST BIOPSY LEFT EACH ADDITIONAL Left 9/10/2019   • US GUIDED BREAST BIOPSY LEFT COMPLETE Left 9/10/2019   • US GUIDED BREAST LYMPH NODE BIOPSY LEFT Left 9/10/2019     Family History   Problem Relation Age of Onset   • Diabetes Father    • Asthma Father    • Pancreatic cancer Maternal Grandmother         age at dx unk   • No Known Problems Daughter    • No Known Problems Maternal Aunt    • No Known Problems Maternal Aunt    • No Known Problems Maternal Aunt    • Breast cancer Paternal Aunt         age at dx unk   • Stomach cancer Paternal Aunt    • Cancer Paternal Uncle         type and age unk     Social History     Socioeconomic History   • Marital status:      Spouse name: Not on file   • Number of children: 2   • Years of education: Not on file   • Highest education level: Not on file   Occupational History   • Not on file   Tobacco Use   • Smoking status: Never   • Smokeless tobacco: Never   Vaping Use   • Vaping Use: Never used   Substance and Sexual Activity   • Alcohol use: Never   • Drug use: Never   • Sexual activity: Yes     Partners: Male   Other Topics Concern   • Not on file   Social History Narrative   • Not on file     Social Determinants of Health     Financial Resource Strain: Not on file   Food Insecurity: Not on file   Transportation Needs: Not on file   Physical Activity: Not on file   Stress: Not on file   Social Connections: Not on file   Intimate Partner Violence: Not on file   Housing Stability: Not on file       Current Outpatient Medications:   •  albuterol (PROVENTIL HFA,VENTOLIN HFA) 90 mcg/act inhaler, Inhale 2 puffs every 6 (six) hours as needed for wheezing or shortness of breath, Disp: 18 g, Rfl: 2  •  Fluticasone-Salmeterol (Wixela Inhub) 250-50 mcg/dose inhaler, Inhale 1 puff 2 (two) times a day Rinse mouth after use , Disp: 60 blister, Rfl: 5  •  loratadine (CLARITIN) 10 mg tablet, Take 1 tablet (10 mg total) by mouth daily, Disp: 90 tablet, Rfl: 1  •  montelukast (SINGULAIR) 10 mg tablet, Take 1 tablet (10 mg total) by mouth daily at bedtime, Disp: 90 tablet, Rfl: 1  •  naproxen (NAPROSYN) 500 mg tablet, Take 1 tablet (500 mg total) by mouth 2 (two) times a day with meals, Disp: 30 tablet, Rfl: 0  •  tamoxifen (NOLVADEX) 20 mg tablet, Take 1 tablet (20 mg total) by mouth daily, Disp: 90 tablet, Rfl: 3  Allergies   Allergen Reactions   • Shellfish-Derived Products - Food Allergy Anaphylaxis     Facial swelling/itchy throat   • Aspirin Edema, Eye Swelling and Facial Swelling   • Codeine Rash   • Fentanyl Itching     Vitals:    01/24/23 1350   BP: 96/62   Pulse: 80   Resp: 17   Temp: (!) 97 1 °F (36 2 °C)   SpO2: 97%       Physical Exam  Constitutional:       General: She is not in acute distress  Appearance: Normal appearance     Cardiovascular:      Rate and Rhythm: Normal rate and regular rhythm  Pulses: Normal pulses  Heart sounds: Normal heart sounds  Pulmonary:      Effort: Pulmonary effort is normal       Breath sounds: Normal breath sounds  Chest:      Chest wall: No mass  Breasts:     Right: No swelling, bleeding, inverted nipple, mass, nipple discharge, skin change or tenderness  Left: No swelling, bleeding, mass, skin change or tenderness  Comments: Left breast mastectomy scar with b/l implants  No masses, nodularity, skin changes, nipple changes or discharge (of right breast), or adenopathy appreciated on physical exam      Abdominal:      General: Abdomen is flat  Palpations: Abdomen is soft  Lymphadenopathy:      Upper Body:      Right upper body: No supraclavicular, axillary or pectoral adenopathy  Left upper body: No supraclavicular, axillary or pectoral adenopathy  Skin:     General: Skin is warm  Neurological:      General: No focal deficit present  Mental Status: She is alert and oriented to person, place, and time  Psychiatric:         Mood and Affect: Mood normal          Behavior: Behavior normal            Results:    Imaging  No results found  I reviewed the above imaging data  Advance Care Planning/Advance Directives:  Discussed disease status, cancer treatment plans and/or cancer treatment goals with the patient

## 2023-06-15 ENCOUNTER — RADIATION ONCOLOGY FOLLOW-UP (OUTPATIENT)
Dept: RADIATION ONCOLOGY | Facility: HOSPITAL | Age: 43
End: 2023-06-15
Attending: RADIOLOGY
Payer: COMMERCIAL

## 2023-06-15 VITALS
HEART RATE: 65 BPM | SYSTOLIC BLOOD PRESSURE: 106 MMHG | TEMPERATURE: 98 F | OXYGEN SATURATION: 98 % | DIASTOLIC BLOOD PRESSURE: 74 MMHG | BODY MASS INDEX: 21.52 KG/M2 | WEIGHT: 129.2 LBS | HEIGHT: 65 IN | RESPIRATION RATE: 18 BRPM

## 2023-06-15 DIAGNOSIS — C50.812 MALIGNANT NEOPLASM OF OVERLAPPING SITES OF LEFT BREAST IN FEMALE, ESTROGEN RECEPTOR POSITIVE (HCC): Primary | ICD-10-CM

## 2023-06-15 DIAGNOSIS — Z17.0 MALIGNANT NEOPLASM OF OVERLAPPING SITES OF LEFT BREAST IN FEMALE, ESTROGEN RECEPTOR POSITIVE (HCC): Primary | ICD-10-CM

## 2023-06-15 PROCEDURE — 99213 OFFICE O/P EST LOW 20 MIN: CPT | Performed by: RADIOLOGY

## 2023-06-15 PROCEDURE — 99211 OFF/OP EST MAY X REQ PHY/QHP: CPT | Performed by: RADIOLOGY

## 2023-06-15 NOTE — PROGRESS NOTES
Marylu Velazco 1980 is a 37 y o  female with invasive mammary carcinoma the left breast, T2 N1 M0 grade 2 ER positive NV positive HER2 positive status post neoadjuvant systemic therapy followed by mastectomy, axillary dissection, and immediate reconstruction with expander placement demonstrating a complete pathologic response  She completed a course of postmastectomy radiation therapy on 10/14/20  She started tamoxifen therapy in 2020  She presents today for routine follow up      22 - Hematology Oncology - Dr Katie Camacho  Clinically, she has no evidence for recurrent disease  I recommended her to continue tamoxifen 20 mg daily  22 - Surgical Oncology - Edward Boyle  There are no concerns on her clinical breast exam   She has left axillary tightness however she is not interested in meeting with PT again  22 - Mammo diagnostic right w 3d & cad  FINDINGS:   There are no suspicious masses, grouped microcalcifications or areas of unexplained architectural distortion  The skin and nipple areolar complex are unremarkable  Retropectoral breast implant is seen  IMPRESSION:   Right breast implant  23 - Surgical Oncology - Edward Boyle   She had a diagnostic mammogram of the right breast on 2022 which was BI-RADS 2 category 4 density  I will order automated breast ultrasound for the summer  There were no concerns on her cbe  Upcomin23 - Hematology Oncology - Dr Katie Camacho  23 - Surgical Oncology - BLANK Rhodes      Oncology History   Malignant neoplasm of overlapping sites of left breast in female, estrogen receptor positive ([facility] [address] )   9/10/2019 Biopsy    Left breast ultrasound-guided biopsy  A  2 o'clock, 2 cm from nipple  Invasive breast carcinoma of no special type (ductal, NST)  Grade 2  ER 90  NV 70  HER2 3+    B  3 o'clock, 1 cm from nipple  Invasive breast carcinoma of no special type (ductal NST)  Grade 2  ER 70  NV 60  HER2 3+    C   Left axillary lymph node  Metastatic adenocarcinoma     9/23/2019 Genetic Testing    Panel of 19 genes were evaluated  Negative result   No pathogenic sequence variants or deletions/dupllications identified  [employer]     11/6/2019 - 12/22/2020 Chemotherapy    DOXOrubicin (ADRIAMYCIN), 60 mg/m2 = 96 mg, Intravenous, Once, 4 of 4 cycles  Administration: 96 mg (11/6/2019), 96 mg (11/20/2019), 96 mg (12/4/2019), 96 mg (12/18/2019)  pegfilgrastim-jmdb (Suly Merlso), 6 mg, Subcutaneous, Once, 4 of 4 cycles  Administration: 6 mg (11/7/2019), 6 mg (11/21/2019), 6 mg (12/5/2019), 6 mg (12/19/2019)  cyclophosphamide (CYTOXAN) IVPB, 960 mg, Intravenous, Once, 4 of 4 cycles  Administration: 1,000 mg (11/6/2019), 1,000 mg (11/20/2019), 1,000 mg (12/4/2019), 1,000 mg (12/18/2019)  fosaprepitant (EMEND) IVPB (ONC use only), 150 mg, Intravenous, Once, 4 of 4 cycles  Administration: 150 mg (11/6/2019), 150 mg (11/20/2019), 150 mg (12/4/2019), 150 mg (12/18/2019)  pertuzumab (PERJETA) IVPB, 840 mg (100 % of original dose 840 mg), Intravenous, Once, 17 of 17 cycles  Dose modification: 840 mg (original dose 840 mg, Cycle 5), 420 mg (original dose 420 mg, Cycle 8), 420 mg (original dose 420 mg, Cycle 17)  Administration: 840 mg (1/15/2020), 420 mg (2/6/2020), 420 mg (3/2/2020), 420 mg (3/23/2020), 420 mg (4/13/2020), 420 mg (5/4/2020), 420 mg (5/26/2020), 420 mg (6/15/2020), 420 mg (7/6/2020), 420 mg (7/27/2020), 420 mg (8/17/2020), 420 mg (9/11/2020), 420 mg (9/29/2020), 420 mg (11/10/2020), 420 mg (12/1/2020), 420 mg (12/22/2020), 420 mg (10/20/2020)  PACLItaxel (TAXOL) chemo IVPB, 80 mg/m2 = 127 8 mg, Intravenous, Once, 11 of 11 cycles  Dose modification: 60 mg/m2 (original dose 80 mg/m2, Cycle 14, Reason: Dose Not Tolerated)  Administration: 127 8 mg (1/15/2020), 120 6 mg (1/22/2020), 120 6 mg (2/6/2020), 120 6 mg (2/17/2020), 120 6 mg (3/9/2020), 90 6 mg (3/23/2020), 120 6 mg (3/30/2020), 120 6 mg (1/29/2020), 120 6 mg (2/25/2020), 120 6 mg (3/16/2020), 120 6 mg (4/7/2020)  trastuzumab (HERCEPTIN) chemo infusion, 4 mg/kg = 214 mg, Intravenous, Once, 25 of 25 cycles  Administration: 214 mg (1/15/2020), 96 mg (1/22/2020), 96 mg (2/6/2020), 96 mg (2/17/2020), 96 mg (3/2/2020), 96 mg (3/9/2020), 96 mg (3/23/2020), 96 mg (3/30/2020), 300 mg (4/13/2020), 96 mg (1/29/2020), 96 mg (2/25/2020), 96 mg (3/16/2020), 96 mg (4/7/2020), 300 mg (5/4/2020), 300 mg (5/26/2020), 300 mg (6/15/2020), 300 mg (7/6/2020), 300 mg (7/27/2020), 300 mg (8/17/2020), 318 mg (9/11/2020), 318 mg (9/29/2020), 318 mg (11/10/2020), 318 mg (12/1/2020), 318 mg (12/22/2020), 318 mg (10/20/2020)     4/28/2020 Surgery    Left breast modified radical mastectomy  No residual invasive carcinoma present  Background focal atypical ductal hyperplasia  0/11 Lymph nodes  Complete response to neoadjuvant chemotherapy    Immediate reconstruction with tissue expander (Dr Adama Trejo)     5/22/2020 -  Cancer Staged    Staging form: Breast, AJCC 8th Edition  - Pathologic: No Stage Recommended (ypT0, pN0, cM0, G2, ER+, OK+, HER2+) - Signed by Umang Hampton MD on 5/22/2020  Stage prefix: y  Neoadjuvant therapy: Yes  Response to neoadjuvant therapy: Complete response  Laterality: Left  Multigene prognostic tests performed: None  Histologic grading system: 3 grade system       5/22/2020 -  Cancer Staged    Staging form: Breast, AJCC 8th Edition  - Clinical: Stage IB (cT2, cN1, cM0, G2, ER+, OK+, HER2+) - Signed by Umang Hampton MD on 5/22/2020  Laterality: Left  Histologic grading system: 3 grade system       9/10/2020 - 10/14/2020 Radiation    Course: C1    Plan ID Energy Fractions Dose per Fraction (cGy) Dose Correction (cGy) Total Dose Delivered (cGy) Elapsed Days   BH L CW 6X 13 / 13 200 0 2,600 34   BH L CW BOLUS 6X 12 / 12 200 0 2,400 32   BH L PAB 10X 25 / 25 18 0 450 34   BH L Sclav 6X 25 / 25 200 0 5,000 34      Dr Roxanne Henry       11/2020 -  Hormone Therapy    Tamoxifen 20 mg PO daily Review of Systems:  Review of Systems   Constitutional: Negative  HENT: Negative  Eyes: Negative  Respiratory: Negative  Cardiovascular: Negative  Gastrointestinal: Negative  Endocrine: Negative  Genitourinary: Negative  Musculoskeletal:        Soreness to left foot, s/p fracture, receiving PT   Skin: Negative  Allergic/Immunologic: Negative  Neurological: Positive for headaches (Occasional)  Hematological: Negative  Psychiatric/Behavioral: Negative          Clinical Trial: no        Health Maintenance   Topic Date Due   • Hepatitis C Screening  Never done   • COVID-19 Vaccine (1) Never done   • Pneumococcal Vaccine: Pediatrics (0 to 5 Years) and At-Risk Patients (6 to 59 Years) (1 - PCV) Never done   • HIV Screening  Never done   • ONC Colorectal Surgery Screening  Never done   • Breast Cancer Survivorship Visit  Never done   • ONC Cervical Cancer Screening  01/19/2023   • ONC DXA Scan  Never done   • ONC Physical Therapy Referral  Never done   • Annual Physical  06/23/2023   • Influenza Vaccine (Season Ended) 09/01/2023   • Breast Cancer Screening: Mammogram  11/28/2023   • BMI: Adult  01/24/2024   • Depression Screening  06/15/2024   • Cervical Cancer Screening  09/22/2026   • DTaP,Tdap,and Td Vaccines (2 - Td or Tdap) 06/23/2032   • HIB Vaccine  Aged Out   • IPV Vaccine  Aged Out   • Hepatitis A Vaccine  Aged Out   • Meningococcal ACWY Vaccine  Aged Out   • HPV Vaccine  Aged Out     Patient Active Problem List   Diagnosis   • Annual physical exam   • Malignant neoplasm of overlapping sites of left breast in female, estrogen receptor positive ([facility] [address] )   • Varicose veins of left lower extremity with pain   • Mild intermittent asthma without complication   • Seasonal allergic rhinitis due to pollen   • S/P left breast implant   • Use of tamoxifen (Nolvadex)   • History of augmentation of right breast     Past Medical History:   Diagnosis Date   • Asthma    • BRCA gene mutation negative 10/2019    [facility]   • Breast cancer ([address] ) 09/10/2019    IDC Left breast   • Cancer [facility])     breast   • COVID-19 2020   • History of chemotherapy 10/2019   • Pneumonia      Past Surgical History:   Procedure Laterality Date   • AUGMENTATION BREAST Right 2021    Procedure: RIGHT BREAST AUGMENTATION;  Surgeon: Narda Yeager MD;  [facility];  Service: Plastics   • BREAST BIOPSY Left 09/10/2019    inv br ca   • BREAST CYST INCISION AND DRAINAGE Left 2020    Procedure: INCISION AND DRAINAGE (I&D) BREAST;  Surgeon: Narda Yeager MD;  [facility]: [facility];  Service: Plastics   • BREAST RECONSTRUCTION Left 2020    Procedure: BREAST IMMEDIATE RECONSTRUCTION WITH IMPLANT VERSUS TISSUE EXPANDER; ACELLULAR DERMAL MATRIX (ADM); Surgeon: Narda Yeager MD;  Location[facility];  Service: Plastics   •  SECTION     • IR PORT PLACEMENT  10/29/2019   • IR PORT REMOVAL  2020   • MASTECTOMY Left 2020   • TN MAST MODF RAD W/AX LYMPH NOD W/WO PECT/DENZEL MIN Left 2020    Procedure: BREAST MODIFIED RADICAL MASTECTOMY WITH AXILLARY LYMPH NODE NEEDLE LOCALIZATION (NEEDLE LOC AT 1130);   Surgeon: Chary Key MD;  [facility];  Service: Surgical Oncology   • US BREAST NEEDLE LOC LEFT Left 2020   • US GUIDANCE BREAST BIOPSY LEFT EACH ADDITIONAL Left 9/10/2019   • US GUIDED BREAST BIOPSY LEFT COMPLETE Left 9/10/2019   • US GUIDED BREAST LYMPH NODE BIOPSY LEFT Left 9/10/2019     Family History   Problem Relation Age of Onset   • Diabetes Father    • Asthma Father    • Pancreatic cancer Maternal Grandmother         age at dx unk   • No Known Problems Daughter    • No Known Problems Maternal Aunt    • No Known Problems Maternal Aunt    • No Known Problems Maternal Aunt    • Breast cancer Paternal Aunt         age at dx unk   • Stomach cancer Paternal Aunt    • Cancer Paternal Uncle         type and age unk     Social History Socioeconomic History   • Marital status:      Spouse name: Not on file   • Number of children: 2   • Years of education: Not on file   • Highest education level: Not on file   Occupational History   • Not on file   Tobacco Use   • Smoking status: Never   • Smokeless tobacco: Never   Vaping Use   • Vaping Use: Never used   Substance and Sexual Activity   • Alcohol use: Never   • Drug use: Never   • Sexual activity: Yes     Partners: Male   Other Topics Concern   • Not on file   Social History Narrative   • Not on file     Social Determinants of Health     Financial Resource Strain: Low Risk  (5/13/2021)    Overall Financial Resource Strain (CARDIA)    • Difficulty of Paying Living Expenses: Not hard at all   Food Insecurity: No Food Insecurity (5/13/2021)    Hunger Vital Sign    • Worried About Running Out of Food in the Last Year: Never true    • Ran Out of Food in the Last Year: Never true   Transportation Needs: No Transportation Needs (5/13/2021)    PRAPARE - Transportation    • Lack of Transportation (Medical): No    • Lack of Transportation (Non-Medical): No   Physical Activity: Inactive (10/2/2019)    Exercise Vital Sign    • Days of Exercise per Week: 0 days    • Minutes of Exercise per Session: 0 min   Stress: No Stress Concern Present (10/2/2019)    [address]    • Feeling of Stress :  Only a little   Social Connections: Unknown (10/2/2019)    Social Connection and Isolation Panel [NHANES]    • Frequency of Communication with Friends and Family: Patient refused    • Frequency of Social Gatherings with Friends and Family: Patient refused    • Attends Taoist Services: Patient refused    • Active Member of Clubs or Organizations: Patient refused    • Attends Club or Organization Meetings: Patient refused    • Marital Status: Patient refused   Intimate Partner Violence: Unknown (10/2/2019)    Humiliation, Afraid, Rape, and Kick "questionnaire    • Fear of Current or Ex-Partner: Patient refused    • Emotionally Abused: Patient refused    • Physically Abused: Patient refused    • Sexually Abused: Patient refused   Housing Stability: Not on file       Current Outpatient Medications:   •  albuterol (PROVENTIL HFA,VENTOLIN HFA) 90 mcg/act inhaler, Inhale 2 puffs every 6 (six) hours as needed for wheezing or shortness of breath, Disp: 18 g, Rfl: 2  •  Fluticasone-Salmeterol (Laurell Shape) 250-50 mcg/dose inhaler, Inhale 1 puff 2 (two) times a day Rinse mouth after use , Disp: 60 blister, Rfl: 5  •  loratadine (CLARITIN) 10 mg tablet, Take 1 tablet (10 mg total) by mouth daily, Disp: 90 tablet, Rfl: 1  •  montelukast (SINGULAIR) 10 mg tablet, Take 1 tablet (10 mg total) by mouth daily at bedtime, Disp: 90 tablet, Rfl: 1  •  tamoxifen (NOLVADEX) 20 mg tablet, Take 1 tablet (20 mg total) by mouth daily, Disp: 90 tablet, Rfl: 3  •  naproxen (NAPROSYN) 500 mg tablet, Take 1 tablet (500 mg total) by mouth 2 (two) times a day with meals (Patient not taking: Reported on 6/15/2023), Disp: 30 tablet, Rfl: 0  Allergies   Allergen Reactions   • Shellfish-Derived Products - Food Allergy Anaphylaxis     Facial swelling/itchy throat   • Aspirin Edema, Eye Swelling and Facial Swelling   • Codeine Rash   • Fentanyl Itching     Vitals:    06/15/23 1359   BP: 106/74   BP Location: Left arm   Patient Position: Sitting   Cuff Size: Standard   Pulse: 65   Resp: 18   Temp: 98 °F (36 7 °C)   TempSrc: Temporal   SpO2: 98%   Weight: 58 6 kg (129 lb 3 2 oz)   Height: 5' 5\" (1 651 m)      Pain Score: 0-No pain  "

## 2023-06-15 NOTE — PROGRESS NOTES
Follow-up - Radiation Oncology   [facility] 1980 37 y o  female [id number]      History of Present Illness   Cancer Staging   Malignant neoplasm of overlapping sites of left breast in female, estrogen receptor positive ([facility] [address] )  Staging form: Breast, AJCC 8th Edition  - Pathologic: No Stage Recommended - Unsigned  Stage prefix: Post-therapy  Neoadjuvant therapy: Yes  Response to neoadjuvant therapy: Complete response  - Pathologic: No Stage Recommended (ypT0, pN0, cM0, G2, ER+, DC+, HER2+) - Signed by Tanner Alarcon MD on 5/22/2020  Stage prefix: Post-therapy  Neoadjuvant therapy: Yes  Response to neoadjuvant therapy: Complete response  Multigene prognostic tests performed: None  Histologic grading system: 3 grade system  Laterality: Left  - Clinical: Stage IB (cT2, cN1, cM0, G2, ER+, DC+, HER2+) - Signed by Tanner Alarcon MD on 5/22/2020  Histologic grading system: 3 grade system  Laterality: Left      Romeone Setting is a 37 y o  female with invasive mammary carcinoma the left breast, T2 N1 M0 grade 2 ER positive DC positive HER2 positive status post neoadjuvant systemic therapy followed by mastectomy, axillary dissection, and immediate reconstruction with expander placement demonstrating a complete pathologic response   She completed a course of postmastectomy radiation therapy on 10/14/20  She started tamoxifen therapy in November 2020  Interval History:  7/14/22 - Hematology Oncology - Dr Johney Gowers  Clinically, she has no evidence for recurrent disease   I recommended her to continue tamoxifen 20 mg daily      7/21/22 - Surgical Oncology - Mercedes Pacheco  There are no concerns on her clinical breast exam   She has left axillary tightness however she is not interested in meeting with PT again       11/28/22 - Mammo diagnostic right w 3d & cad  FINDINGS:   There are no suspicious masses, grouped microcalcifications or areas of unexplained architectural distortion   The skin and nipple areolar complex are unremarkable  Retropectoral breast implant is seen  IMPRESSION:   Right breast implant      23 - Surgical Oncology - [facility]   She had a diagnostic mammogram of the right breast on 2022 which was BI-RADS 2 category 4 density   I will order automated breast ultrasound for the summer  There were no concerns on her cbe       Upcomin23 - Hematology Oncology - Dr Isela Rush  23 - Surgical Oncology - [facility]    Upon interview, she endorses the above history  She denies significant limitations in right shoulder ROM  She also denies lymphedema/swelling  She has not noticed new nodules, masses or skin changes  She is without additional acute concerns  Historical Information   Oncology History   Malignant neoplasm of overlapping sites of left breast in female, estrogen receptor positive ([facility] [address] )   9/10/2019 Biopsy    Left breast ultrasound-guided biopsy  A  2 o'clock, 2 cm from nipple  Invasive breast carcinoma of no special type (ductal, NST)  Grade 2  ER 90  PA 70  HER2 3+    B  3 o'clock, 1 cm from nipple  Invasive breast carcinoma of no special type (ductal NST)  Grade 2  ER 70  PA 60  HER2 3+    C  Left axillary lymph node  Metastatic adenocarcinoma     2019 Genetic Testing    Panel of 19 genes were evaluated  Negative result   No pathogenic sequence variants or deletions/dupllications identified  [employer]     2019 - 2020 Chemotherapy    DOXOrubicin (ADRIAMYCIN), 60 mg/m2 = 96 mg, Intravenous, Once, 4 of 4 cycles  Administration: 96 mg (2019), 96 mg (2019), 96 mg (2019), 96 mg (2019)  pegfilgrastim-jmdb (Chhaya Kessler), 6 mg, Subcutaneous, Once, 4 of 4 cycles  Administration: 6 mg (2019), 6 mg (2019), 6 mg (2019), 6 mg (2019)  cyclophosphamide (CYTOXAN) IVPB, 960 mg, Intravenous, Once, 4 of 4 cycles  Administration: 1,000 mg (2019), 1,000 mg (2019), 1,000 mg (2019), 1,000 mg (12/18/2019)  fosaprepitant (EMEND) IVPB (ONC use only), 150 mg, Intravenous, Once, 4 of 4 cycles  Administration: 150 mg (11/6/2019), 150 mg (11/20/2019), 150 mg (12/4/2019), 150 mg (12/18/2019)  pertuzumab (PERJETA) IVPB, 840 mg (100 % of original dose 840 mg), Intravenous, Once, 17 of 17 cycles  Dose modification: 840 mg (original dose 840 mg, Cycle 5), 420 mg (original dose 420 mg, Cycle 8), 420 mg (original dose 420 mg, Cycle 17)  Administration: 840 mg (1/15/2020), 420 mg (2/6/2020), 420 mg (3/2/2020), 420 mg (3/23/2020), 420 mg (4/13/2020), 420 mg (5/4/2020), 420 mg (5/26/2020), 420 mg (6/15/2020), 420 mg (7/6/2020), 420 mg (7/27/2020), 420 mg (8/17/2020), 420 mg (9/11/2020), 420 mg (9/29/2020), 420 mg (11/10/2020), 420 mg (12/1/2020), 420 mg (12/22/2020), 420 mg (10/20/2020)  PACLItaxel (TAXOL) chemo IVPB, 80 mg/m2 = 127 8 mg, Intravenous, Once, 11 of 11 cycles  Dose modification: 60 mg/m2 (original dose 80 mg/m2, Cycle 14, Reason: Dose Not Tolerated)  Administration: 127 8 mg (1/15/2020), 120 6 mg (1/22/2020), 120 6 mg (2/6/2020), 120 6 mg (2/17/2020), 120 6 mg (3/9/2020), 90 6 mg (3/23/2020), 120 6 mg (3/30/2020), 120 6 mg (1/29/2020), 120 6 mg (2/25/2020), 120 6 mg (3/16/2020), 120 6 mg (4/7/2020)  trastuzumab (HERCEPTIN) chemo infusion, 4 mg/kg = 214 mg, Intravenous, Once, 25 of 25 cycles  Administration: 214 mg (1/15/2020), 96 mg (1/22/2020), 96 mg (2/6/2020), 96 mg (2/17/2020), 96 mg (3/2/2020), 96 mg (3/9/2020), 96 mg (3/23/2020), 96 mg (3/30/2020), 300 mg (4/13/2020), 96 mg (1/29/2020), 96 mg (2/25/2020), 96 mg (3/16/2020), 96 mg (4/7/2020), 300 mg (5/4/2020), 300 mg (5/26/2020), 300 mg (6/15/2020), 300 mg (7/6/2020), 300 mg (7/27/2020), 300 mg (8/17/2020), 318 mg (9/11/2020), 318 mg (9/29/2020), 318 mg (11/10/2020), 318 mg (12/1/2020), 318 mg (12/22/2020), 318 mg (10/20/2020)     4/28/2020 Surgery    Left breast modified radical mastectomy  No residual invasive carcinoma present  Background focal atypical ductal hyperplasia  0/11 Lymph nodes  Complete response to neoadjuvant chemotherapy    Immediate reconstruction with tissue expander (Dr Jhonatan Cabello)     5/22/2020 -  Cancer Staged    Staging form: Breast, AJCC 8th Edition  - Pathologic: No Stage Recommended (ypT0, pN0, cM0, G2, ER+, WV+, HER2+) - Signed by Torin Lock MD on 5/22/2020  Stage prefix: y  Neoadjuvant therapy: Yes  Response to neoadjuvant therapy: Complete response  Laterality: Left  Multigene prognostic tests performed: None  Histologic grading system: 3 grade system       5/22/2020 -  Cancer Staged    Staging form: Breast, AJCC 8th Edition  - Clinical: Stage IB (cT2, cN1, cM0, G2, ER+, WV+, HER2+) - Signed by Torin Lock MD on 5/22/2020  Laterality: Left  Histologic grading system: 3 grade system       9/10/2020 - 10/14/2020 Radiation    Course: C1    Plan ID Energy Fractions Dose per Fraction (cGy) Dose Correction (cGy) Total Dose Delivered (cGy) Elapsed Days   BH L CW 6X 13 / 13 200 0 2,600 34   BH L CW BOLUS 6X 12 / 12 200 0 2,400 32   BH L PAB 10X 25 / 25 18 0 450 34   BH L Sclav 6X 25 / 25 200 0 5,000 34      Dr Treasure Del Angel       11/2020 -  Hormone Therapy    Tamoxifen 20 mg PO daily         Past Medical History:   Diagnosis Date   • Asthma    • BRCA gene mutation negative 10/2019    [employer]   • Breast cancer ([facility] [address] ) 09/10/2019    IDC Left breast   • Cancer ([facility] [address] )     breast   • COVID-19 12/12/2020   • History of chemotherapy 10/2019   • Pneumonia      Past Surgical History:   Procedure Laterality Date   • AUGMENTATION BREAST Right 4/26/2021    Procedure: RIGHT BREAST AUGMENTATION;  Surgeon: Stacey Jett MD;  [facility]: [facility];  Service: Plastics   • BREAST BIOPSY Left 09/10/2019    inv br ca   • BREAST CYST INCISION AND DRAINAGE Left 6/13/2020    Procedure: INCISION AND DRAINAGE (I&D) BREAST;  Surgeon: Stacey Jett MD;  Location: [facility];  Service: Plastics   • BREAST RECONSTRUCTION Left 4/28/2020 Procedure: BREAST IMMEDIATE RECONSTRUCTION WITH IMPLANT VERSUS TISSUE EXPANDER; ACELLULAR DERMAL MATRIX (ADM); Surgeon: Stacey Jett MD;  Location: [facility];  Service: Plastics   •  SECTION     • IR PORT PLACEMENT  10/29/2019   • IR PORT REMOVAL  2020   • MASTECTOMY Left 2020   • AR MAST MODF RAD W/AX LYMPH NOD W/WO PECT/DENZEL MIN Left 2020    Procedure: BREAST MODIFIED RADICAL MASTECTOMY WITH AXILLARY LYMPH NODE NEEDLE LOCALIZATION (NEEDLE LOC AT 1130);   Surgeon: Andrey Busch MD;  [facility];  Service: Surgical Oncology   • US BREAST NEEDLE LOC LEFT Left 2020   • US GUIDANCE BREAST BIOPSY LEFT EACH ADDITIONAL Left 9/10/2019   • US GUIDED BREAST BIOPSY LEFT COMPLETE Left 9/10/2019   • US GUIDED BREAST LYMPH NODE BIOPSY LEFT Left 9/10/2019       Social History   Social History     Substance and Sexual Activity   Alcohol Use Never     Social History     Substance and Sexual Activity   Drug Use Never     Social History     Tobacco Use   Smoking Status Never   Smokeless Tobacco Never         Meds/Allergies     Current Outpatient Medications:   •  albuterol (PROVENTIL HFA,VENTOLIN HFA) 90 mcg/act inhaler, Inhale 2 puffs every 6 (six) hours as needed for wheezing or shortness of breath, Disp: 18 g, Rfl: 2  •  Fluticasone-Salmeterol (Jonita Velarde) 250-50 mcg/dose inhaler, Inhale 1 puff 2 (two) times a day Rinse mouth after use , Disp: 60 blister, Rfl: 5  •  loratadine (CLARITIN) 10 mg tablet, Take 1 tablet (10 mg total) by mouth daily, Disp: 90 tablet, Rfl: 1  •  montelukast (SINGULAIR) 10 mg tablet, Take 1 tablet (10 mg total) by mouth daily at bedtime, Disp: 90 tablet, Rfl: 1  •  tamoxifen (NOLVADEX) 20 mg tablet, Take 1 tablet (20 mg total) by mouth daily, Disp: 90 tablet, Rfl: 3  •  naproxen (NAPROSYN) 500 mg tablet, Take 1 tablet (500 mg total) by mouth 2 (two) times a day with meals (Patient not taking: Reported on 6/15/2023), Disp: 30 tablet, Rfl: 0  Allergies "  Allergen Reactions   • Shellfish-Derived Products - Food Allergy Anaphylaxis     Facial swelling/itchy throat   • Aspirin Edema, Eye Swelling and Facial Swelling   • Codeine Rash   • Fentanyl Itching         Review of Systems   Constitutional: Negative  HENT: Negative  Eyes: Negative  Respiratory: Negative  Cardiovascular: Negative  Gastrointestinal: Negative  Endocrine: Negative  Genitourinary: Negative  Musculoskeletal:        Soreness to left foot, s/p fracture, receiving PT   Skin: Negative  Allergic/Immunologic: Negative  Neurological: Positive for headaches (Occasional)  Hematological: Negative  Psychiatric/Behavioral: Negative  OBJECTIVE:   /74 (BP Location: Left arm, Patient Position: Sitting, Cuff Size: Standard)   Pulse 65   Temp 98 °F (36 7 °C) (Temporal)   Resp 18   Ht 5' 5\" (1 651 m)   Wt 58 6 kg (129 lb 3 2 oz)   LMP 10/01/2019   SpO2 98%   BMI 21 50 kg/m²   Pain Assessment:  0  Karnofsky: 90 - Able to carry on normal activity; minor signs or symptoms of disease     Physical Exam   The patient presents today in no apparent distress   Sclera anicteric   No palpable cervical or supraclavicular lymphadenopathy   Normal respiratory effort   Breast examination performed in the supine and seated positions with chaperone present  The right breast is soft, nontender, without visible findings   The left reconstructed breast is also soft, nontender, without visible or palpable suspicious findings   No axillary lymphadenopathy or lymphedema  RESULTS    Lab Results: No results found for this or any previous visit (from the past 672 hour(s))  Imaging Studies:No results found  Assessment/Plan:  No orders of the defined types were placed in this encounter         Tamela Kessler is a 37 y o  female with invasive mammary carcinoma the left breast, T2 N1 M0 grade 2 ER positive OR positive HER2 positive status post neoadjuvant systemic therapy " "followed by mastectomy, axillary dissection, and immediate reconstruction with expander placement demonstrating a complete pathologic response   She completed a course of postmastectomy radiation therapy on 10/14/20 and returns for routine follow up       She remains without clinical evidence of recurrent disease  She will continue imaging surveillance as well  as follow up with her surgeon and medical oncologist   She will return in 1 year and has been encouraged to call with questions or concerns in the interim   Ney Gomez MD  6/15/2023,2:28 PM    Portions of the record may have been created with voice recognition software   Occasional wrong word or \"sound a like\" substitutions may have occurred due to the inherent limitations of voice recognition software   Read the chart carefully and recognize, using context, where substitutions have occurred        "

## 2023-07-13 ENCOUNTER — OFFICE VISIT (OUTPATIENT)
Dept: HEMATOLOGY ONCOLOGY | Facility: CLINIC | Age: 43
End: 2023-07-13
Payer: COMMERCIAL

## 2023-07-13 VITALS
HEIGHT: 65 IN | OXYGEN SATURATION: 98 % | WEIGHT: 130 LBS | DIASTOLIC BLOOD PRESSURE: 64 MMHG | RESPIRATION RATE: 18 BRPM | SYSTOLIC BLOOD PRESSURE: 110 MMHG | TEMPERATURE: 96.9 F | HEART RATE: 105 BPM | BODY MASS INDEX: 21.66 KG/M2

## 2023-07-13 DIAGNOSIS — D70.1 CHEMOTHERAPY INDUCED NEUTROPENIA (HCC): ICD-10-CM

## 2023-07-13 DIAGNOSIS — T45.1X5A CHEMOTHERAPY INDUCED NEUTROPENIA (HCC): ICD-10-CM

## 2023-07-13 DIAGNOSIS — Z17.0 MALIGNANT NEOPLASM OF OVERLAPPING SITES OF LEFT BREAST IN FEMALE, ESTROGEN RECEPTOR POSITIVE (HCC): ICD-10-CM

## 2023-07-13 DIAGNOSIS — C50.812 MALIGNANT NEOPLASM OF OVERLAPPING SITES OF LEFT BREAST IN FEMALE, ESTROGEN RECEPTOR POSITIVE (HCC): ICD-10-CM

## 2023-07-13 PROCEDURE — 99214 OFFICE O/P EST MOD 30 MIN: CPT | Performed by: INTERNAL MEDICINE

## 2023-07-13 RX ORDER — TAMOXIFEN CITRATE 20 MG/1
20 TABLET ORAL DAILY
Qty: 90 TABLET | Refills: 3 | Status: SHIPPED | OUTPATIENT
Start: 2023-07-13

## 2023-07-13 NOTE — PROGRESS NOTES
Hematology / Oncology Outpatient Follow Up Note    Tomas Resendiz 37 y.o. female IVH:[birth year] OMQ:[id number]         Date:  7/13/2023    Assessment / YQYV:  C [id number] premenopausal woman with locally advanced left breast cancer, grade 2, ER 90% positive, PR70% positive, HER2 3+ disease.  She has relatively large palpable left breast mass as well as palpable left axillary adenopathy which was biopsy confirmed to be metastatic disease.  She is negative for BRCA gene mutation.  She was treated with neoadjuvant chemotherapy with dose dense AC followed by paclitaxel, trastuzumab and pertuzumab, resulting in complete pathologic response. She is status post mastectomy and axillary lymph node dissection. She completed adjuvant trastuzumab and pertuzumab in December 2020. She is currently on tamoxifen as adjuvant hormonal therapy with excellent tolerance. Clinically, she has no evidence for recurrent disease. I recommended her to continue tamoxifen 20 mg daily. She is in agreement with my recommendations. We will see her again in a year for routine follow-up.        Subjective:      HPI:  A 80-year-old premenopausal woman who noticed a left breast lump recently which she brought to medical attention. Gabrielle Betancourt was found to have radiographic abnormality in her left breast as well as left axilla.  She underwent left breast biopsy in September 10, 2019 which showed invasive ductal carcinoma, grade 2.  This was ER 90 % positive, OR 70% positive, HER2 3+ disease.  Biopsy from enlarged left axillary lymph node was also positive for metastatic disease.  She was seen by Dr. Rosemary Primrose who sent her to me for neoadjuvant chemotherapy.  She is negative for BRCA gene mutation.  She underwent bone scan and CT scan chest abdomen pelvis which showed no evidence of distant metastasis.  She presents today with her  in mother to discuss neoadjuvant chemotherapy.  She has some left breast pain.  Otherwise, she feels well.  She denied any respiratory symptoms.  She has no complaint of bone pain.  She has regular menstrual cycle.  She has 2 children.  She has no plan to have more children.  She has no surgical history.  She has no significant past medical history. Karla Alcantara is a lifetime never smoker.  Her performance status is normal.             Interval History:  A 37year-old premenopausal woman with locally advanced left breast cancer, grade 2, ER 90% positive, AK 70% positive, HER2 3+ disease.  She has relatively large palpable left breast mass as well as palpable left axillary adenopathy which was biopsy confirmed to be metastatic disease.  She has no evidence of distant metastasis.  She is negative for BRCA gene mutation.  She had 4 cycle of dose dense AC followed by weekly paclitaxel, trastuzumab and try weekly pertuzumab which was completed in February 2020. She achieved at least clinical good partial response.  Subsequently, she underwent left mastectomy and axillary lymph node dissection by Dr. Ashlie Hernandez in April 28, 2020 which showed no evidence of residual carcinoma with 11 negative axillary lymph nodes, consistent with complete pathological response.  She had immediate reconstruction.  She completed adjuvant trastuzumab and pertuzumab in the end of December 2020 with no cardiac toxicity. Since September 2020, she has been on tamoxifen. She presents today for routine follow-up. She has no menstrual cycles since the neoadjuvant chemotherapy. However, she has no complaint of hot flashes. She denied any pain. She has no respiratory symptoms. Her weight is stable.   Her performance status is normal.      Objective:      Primary Diagnosis:     1. Locally advanced left breast cancer, grade 2, ER 90 % positive, AK 70 % positive, HER2 3+ disease.  Diagnosed in September 2019.      2. BRCA gene mutation negative.     Cancer Staging:  Cancer Staging  No matching staging information was found for the patient.        Previous Hematologic/ Oncologic Treatment:       1. Neoadjuvant chemotherapy with dose dense AC x4 followed by weekly paclitaxel, try weekly trastuzumab, pertuzumab times 12 weeks.  Completed in April 2020.      2. Adjuvant trastuzumab and pertuzumab completed in December 2020.        Current Hematologic/ Oncologic Treatment:       1.  Adjuvant hormonal therapy with tamoxifen since September 2020.     Disease Status:      1. Clinical good partial response. 2. Complete pathological response. 3. PELON status post mastectomy and axillary lymph node dissection.     Test Results:     Pathology:     Left breast biopsy showed invasive ductal carcinoma, grade 2. ER 90 % positive, AK 70 % positive, HER2 3+ disease.       Left mastectomy showed no evidence of residual carcinoma.  11 axillary lymph nodes were all negative for metastatic disease in April 2020.      Radiology:     Mammography in November 2022 was benign. BI-RADS 2. Right breast implant.     Laboratory:     See below.     Physical Exam:        General Appearance:    Alert, oriented          Eyes:    PERRL   Ears:    Normal external ear canals, both ears   Nose:   Nares normal, septum midline   Throat:   Mucosa moist. Pharynx without injection. Neck:   Supple         Lungs:     Clear to auscultation bilaterally, very localized tenderness even with light touch at left lateral lower rib cage. Chest Wall:    No tenderness or deformity    Heart:    Regular rate and rhythm         Abdomen:     Soft, non-tender, bowel sounds +, no organomegaly               Extremities:   Extremities no cyanosis or edema         Skin:   no rash or icterus.  Typical shingle rash in the right shoulder.    Lymph nodes:   Cervical, supraclavicular, and axillary nodes normal   Neurologic:   CNII-XII intact, normal strength, sensation and reflexes     Throughout             Breast exam:  Status post left mastectomy with reconstruction.  No palpable abnormality in her left reconstructed breast.  No palpable left axillary adenopathy.  Right breast exam is negative. ROS: Review of Systems   All other systems reviewed and are negative. Imaging: XR chest pa & lateral    Result Date: 6/25/2022  Narrative: CHEST INDICATION:   R07.1: Chest pain on breathing. COMPARISON:  Chest radiograph from 4/13/2020 and chest CT from 12/20/2019. EXAM PERFORMED/VIEWS:  XR CHEST PA & LATERAL  DUAL ENERGY SUBTRACTION. FINDINGS: Cardiomediastinal silhouette appears unremarkable. The lungs are clear. No pneumothorax or pleural effusion. Osseous structures appear within normal limits for patient age. Left mastectomy and left axillary lymph node dissection. Bilateral breast implants. Impression: No acute cardiopulmonary disease. Workstation performed: [id number]         Labs:   Lab Results   Component Value Date    WBC 5.48 06/23/2022    HGB 12.7 06/23/2022    HCT 39.2 06/23/2022    MCV 93 06/23/2022     06/23/2022     Lab Results   Component Value Date    K 3.7 06/23/2022     06/23/2022    CO2 29 06/23/2022    BUN 13 06/23/2022    CREATININE 0.65 06/23/2022    GLUF 80 06/23/2022    CALCIUM 9.2 06/23/2022    AST 21 06/23/2022    ALT 53 06/23/2022    ALKPHOS 66 06/23/2022    EGFR 109 06/23/2022       Current Medications: Reviewed  Allergies: Reviewed  PMH/FH/SH:  Reviewed      Vital Sign:    Body surface area is 1.65 meters squared.     Wt Readings from Last 3 Encounters:   07/13/23 59 kg (130 lb)   06/15/23 58.6 kg (129 lb 3.2 oz)   01/24/23 58.5 kg (129 lb)        Temp Readings from Last 3 Encounters:   07/13/23 (!) 96.9 °F (36.1 °C) (Tympanic)   06/15/23 98 °F (36.7 °C) (Temporal)   01/24/23 (!) 97.1 °F (36.2 °C)        BP Readings from Last 3 Encounters:   07/13/23 110/64   06/15/23 106/74   01/24/23 96/62         Pulse Readings from Last 3 Encounters:   07/13/23 105   06/15/23 65   01/24/23 80     @LASTSAO2(3)@

## 2023-10-11 ENCOUNTER — TELEPHONE (OUTPATIENT)
Dept: HEMATOLOGY ONCOLOGY | Facility: CLINIC | Age: 43
End: 2023-10-11

## 2023-10-20 NOTE — PROGRESS NOTES
Family Hematology / Oncology Outpatient Follow Up Note    Juma Smart 44 y o  female WTT:[id number]         Date:  2/25/2020    Assessment / Plan:  A 27-year-old premenopausal woman with locally advanced left breast cancer, grade 2, ER 90 % positive, NC 70% positive, HER2 3+ disease  Itzel Byrne has relatively large palpable left breast mass as well as palpable left axillary adenopathy which was biopsy confirmed to be metastatic disease   She is negative for BRCA gene mutation   She had 4 cycle of dose dense AC as neoadjuvant chemotherapy with partial response  Currently, she is on weekly paclitaxel, trastuzumab and try weekly pertuzumab with excellent tolerance  Based on physical examination, she has partial response with no progression  I recommended her to continue with current regimen until the beginning of April 2020  I am going to set up echocardiogram for cardiac monitoring in late March 2020  I will see her again in April 1, 2020 for follow-up  Subjective:      HPI:  A 27-year-old premenopausal woman who noticed a left breast lump recently which she brought to medical attention  Itzel Byrne was found to have radiographic abnormality in her left breast as well as left axilla   She underwent left breast biopsy in September 10, 2019 which showed invasive ductal carcinoma, grade 2   This was ER 90 % positive, NC 70% positive, HER2 3+ disease   Biopsy from enlarged left axillary lymph node was also positive for metastatic disease   She was seen by Dr Braden Pacheco who sent her to me for neoadjuvant chemotherapy   She is negative for BRCA gene mutation   She underwent bone scan and CT scan chest abdomen pelvis which showed no evidence of distant metastasis   She presents today with her  in mother to discuss neoadjuvant chemotherapy   She has some left breast pain   Otherwise, she feels well   She denied any respiratory symptoms   She has no complaint of bone pain   She has regular menstrual cycle   She has 2 children   She has no plan to have more children   She has no surgical history   She has no significant past medical history  Gianluca Meyer is a lifetime never smoker   Her performance status is normal              Interval History:  A 68-year-old premenopausal woman with locally advanced left breast cancer, grade 2, ER 90% positive, NJ 70% positive, HER2 3+ disease   She has relatively large palpable left breast mass as well as palpable left axillary adenopathy which was biopsy confirmed to be metastatic disease   She has no evidence of distant metastasis   She is negative for BRCA gene mutation   She had 4 cycle of dose dense AC with partial response  Her paclitaxel and trastuzumab was delayed by a week due to the shingle  She received 6 cycle of weekly paclitaxel and trastuzumab, today  She has some mild neuropathy  She denied any skin rash, nausea or vomiting  She has no history of neutropenic fever  Her previous left lower ribcage pain has marked improved  She has no respiratory symptoms  She has stable weight  Her performance status is normal     Objective:      Primary Diagnosis:     1  Locally advanced left breast cancer, grade 2, ER 90 % positive, NJ 70 % positive, HER2 3+ disease   Diagnosed in September 2019    2  BRCA gene mutation negative      Cancer Staging:  Cancer Staging  No matching staging information was found for the patient         Previous Hematologic/ Oncologic Treatment:            Current Hematologic/ Oncologic Treatment:       Neoadjuvant chemotherapy with dose dense AC x4 followed by weekly paclitaxel, trastuzumab and try weekly pertuzumab x12 weeks  6th cycle of paclitaxel, trastuzumab and pertuzumab was given today  Disease Status:      Clinical partial response      Test Results:     Pathology:     Left breast biopsy showed invasive ductal carcinoma, grade 2   ER 90 % positive, NJ 70 % positive, HER2 3+ disease        Radiology:     CT scan chest abdomen pelvis showed no evidence of distant metastasis      Bone scan was negative for osseous metastasis      Echocardiogram showed ejection fraction 60%     Laboratory:     See below      Physical Exam:        General Appearance:    Alert, oriented          Eyes:    PERRL   Ears:    Normal external ear canals, both ears   Nose:   Nares normal, septum midline   Throat:   Mucosa moist  Pharynx without injection  Neck:   Supple         Lungs:     Clear to auscultation bilaterally, very localized tenderness even with light touch at left lateral lower rib cage  Chest Wall:    No tenderness or deformity    Heart:    Regular rate and rhythm         Abdomen:     Soft, non-tender, bowel sounds +, no organomegaly               Extremities:   Extremities no cyanosis or edema         Skin:   no rash or icterus   Typical shingle rash in the right shoulder  Lymph nodes:   Cervical, supraclavicular, and axillary nodes normal   Neurologic:   CNII-XII intact, normal strength, sensation and reflexes     Throughout             Breast exam:  1 5 x 1 5  cm of palpable mass at 2:00 position in her left breast   Left axillary adenopathy with hardly palpable   Right breast exam is negative  ROS: Review of Systems   Neurological:        Neuropathy   All other systems reviewed and are negative  Imaging: No results found  Labs:   Lab Results   Component Value Date    WBC 2 68 (L) 02/24/2020    HGB 9 6 (L) 02/24/2020    HCT 30 0 (L) 02/24/2020    MCV 98 02/24/2020     02/24/2020     Lab Results   Component Value Date    K 3 5 02/14/2020     (H) 02/14/2020    CO2 28 02/14/2020    BUN 11 02/14/2020    CREATININE 0 55 (L) 02/14/2020    GLUF 82 11/16/2019    CALCIUM 9 4 02/14/2020    AST 26 02/14/2020    ALT 58 02/14/2020    ALKPHOS 64 02/14/2020    EGFR 119 02/14/2020         Current Medications: Reviewed  Allergies: Reviewed  PMH/FH/SH:  Reviewed      Vital Sign:    Body surface area is 1 54 meters squared      Wt Readings from Last 3 Encounters:   02/25/20 50 3 kg (111 lb)   02/25/20 49 8 kg (109 lb 12 6 oz)   02/21/20 48 6 kg (107 lb 4 oz)        Temp Readings from Last 3 Encounters:   02/25/20 98 6 °F (37 °C) (Tympanic)   02/25/20 97 9 °F (36 6 °C) (Oral)   02/21/20 98 7 °F (37 1 °C)        BP Readings from Last 3 Encounters:   02/25/20 104/62   02/25/20 90/54   02/21/20 102/60         Pulse Readings from Last 3 Encounters:   02/25/20 86   02/25/20 80   02/21/20 97     @LASTSAO2(3)@

## (undated) DEVICE — PAD GROUNDING ADULT

## (undated) DEVICE — GLOVE INDICATOR PI UNDERGLOVE SZ 8.5 BLUE

## (undated) DEVICE — TELFA NON-ADHERENT ABSORBENT DRESSING: Brand: TELFA

## (undated) DEVICE — PACK UNIVERSAL DRAPES SUB-Q ICD

## (undated) DEVICE — ELECTRODE BLADE MOD  E-Z CLEAN 6.5IN -0014M

## (undated) DEVICE — MAYO STAND COVER: Brand: CONVERTORS

## (undated) DEVICE — JP CHAN DRN SIL HUBLESS 15FR W/TRO: Brand: CARDINAL HEALTH

## (undated) DEVICE — CHLORAPREP HI-LITE 26ML ORANGE

## (undated) DEVICE — PENCIL ELECTROSURG E-Z CLEAN -0035H

## (undated) DEVICE — BETHLEHEM UNIVERSAL BREAST PK: Brand: CARDINAL HEALTH

## (undated) DEVICE — 3M™ STERI-STRIP™ REINFORCED ADHESIVE SKIN CLOSURES, R1547, 1/2 IN X 4 IN (12 MM X 100 MM), 6 STRIPS/ENVELOPE: Brand: 3M™ STERI-STRIP™

## (undated) DEVICE — PLUMEPEN PRO 10FT

## (undated) DEVICE — SUT MONOCRYL 4-0 PS-2 18 IN Y496G

## (undated) DEVICE — ELECTRODE BLADE MOD E-Z CLEAN  2.75IN 7CM -0012AM

## (undated) DEVICE — Device

## (undated) DEVICE — BULB SYRINGE,IRRIGATION WITH PROTECTIVE CAP: Brand: DOVER

## (undated) DEVICE — SPECIMEN CONTAINER STERILE PEEL PACK

## (undated) DEVICE — INTENDED FOR TISSUE SEPARATION, AND OTHER PROCEDURES THAT REQUIRE A SHARP SURGICAL BLADE TO PUNCTURE OR CUT.: Brand: BARD-PARKER ® CARBON RIB-BACK BLADES

## (undated) DEVICE — NEEDLE 25G X 1 1/2

## (undated) DEVICE — SCD SEQUENTIAL COMPRESSION COMFORT SLEEVE MEDIUM KNEE LENGTH: Brand: KENDALL SCD

## (undated) DEVICE — INTENDED FOR TISSUE SEPARATION, AND OTHER PROCEDURES THAT REQUIRE A SHARP SURGICAL BLADE TO PUNCTURE OR CUT.: Brand: BARD-PARKER SAFETY BLADES SIZE 15, STERILE

## (undated) DEVICE — GLOVE SRG BIOGEL ECLIPSE 8

## (undated) DEVICE — ADHESIVE SKIN HIGH VISCOSITY EXOFIN 1ML

## (undated) DEVICE — MODERATE PLUS PROFILE XTRA, M+X 215CC GEL SIZER: Brand: MENTOR MEMORYGEL XTRA RESTERILIZABLE GEL SIZER

## (undated) DEVICE — SUT SILK 2-0 SH 30 IN K833H

## (undated) DEVICE — SUT PLAIN 5-0 PC-1 18 IN 1915G

## (undated) DEVICE — 450 ML BOTTLE OF 0.05% CHLORHEXIDINE GLUCONATE IN 99.95% STERILE WATER FOR IRRIGATION, USP AND APPLICATOR.: Brand: IRRISEPT ANTIMICROBIAL WOUND LAVAGE

## (undated) DEVICE — NEEDLE BLUNT 18 G X 1 1/2IN

## (undated) DEVICE — PROXIMATE SKIN STAPLERS (35 WIDE) CONTAINS 35 STAINLESS STEEL STAPLES (FIXED HEAD): Brand: PROXIMATE

## (undated) DEVICE — JACKSON-PRATT 100CC BULB RESERVOIR: Brand: CARDINAL HEALTH

## (undated) DEVICE — 3M™ TEGADERM™ TRANSPARENT FILM DRESSING FRAME STYLE, 1624W, 2-3/8 IN X 2-3/4 IN (6 CM X 7 CM), 100/CT 4CT/CASE: Brand: 3M™ TEGADERM™

## (undated) DEVICE — CHEST/BREAST DRAPE: Brand: CONVERTORS

## (undated) DEVICE — FUNNEL E KELLER 2 DELIVERY DEVICE

## (undated) DEVICE — MEDI-VAC YANK SUCT HNDL W/TPRD BULBOUS TIP: Brand: CARDINAL HEALTH

## (undated) DEVICE — VIAL DECANTER

## (undated) DEVICE — UTILITY MARKER,BLACK WITH LABELS: Brand: DEVON

## (undated) DEVICE — GLOVE SRG BIOGEL ECLIPSE 8.5

## (undated) DEVICE — SYRINGE 50ML LL

## (undated) DEVICE — SUT MONOCRYL 3-0 SH 27 IN Y416H

## (undated) DEVICE — SUT VICRYL 2-0 REEL 54 IN J286G

## (undated) DEVICE — LIGACLIP MCA MULTIPLE CLIP APPLIERS, 20 MEDIUM CLIPS: Brand: LIGACLIP

## (undated) DEVICE — SUT PDS II 2-0 SH 27 IN Z317H

## (undated) DEVICE — SUT VICRYL 2-0 SH 27 IN UNDYED J417H

## (undated) DEVICE — DRAIN SPONGES,6 PLY: Brand: EXCILON

## (undated) DEVICE — MAGNETIC INSTRUMENT PAD 16" X 20"; LARGE; DISPOSABLE: Brand: CARDINAL HEALTH

## (undated) DEVICE — GLOVE SRG BIOGEL 7

## (undated) DEVICE — LIGHT HANDLE COVER SLEEVE DISP BLUE STELLAR

## (undated) DEVICE — HIGH PROFILE XTRA, GEL SIZER FOR USE WITH SHPX-285: Brand: MENTOR MEMORYGEL XTRA RESTERILIZABLE GEL SIZER

## (undated) DEVICE — DRESSING MEPILEX AG BORDER 4 X 4 IN

## (undated) DEVICE — DRAPE EQUIPMENT RF WAND

## (undated) DEVICE — SMOKE EVACUATION TUBING WITH 8 IN INTEGRAL WAND AND SPONGE GUARD: Brand: BUFFALO FILTER

## (undated) DEVICE — ABDOMINAL PAD: Brand: DERMACEA